# Patient Record
Sex: FEMALE | Race: WHITE | Employment: FULL TIME | URBAN - METROPOLITAN AREA
[De-identification: names, ages, dates, MRNs, and addresses within clinical notes are randomized per-mention and may not be internally consistent; named-entity substitution may affect disease eponyms.]

---

## 2017-05-02 ENCOUNTER — OFFICE VISIT (OUTPATIENT)
Dept: OBGYN CLINIC | Age: 37
End: 2017-05-02

## 2017-05-02 VITALS
BODY MASS INDEX: 38.76 KG/M2 | HEIGHT: 64 IN | DIASTOLIC BLOOD PRESSURE: 74 MMHG | WEIGHT: 227 LBS | SYSTOLIC BLOOD PRESSURE: 118 MMHG

## 2017-05-02 DIAGNOSIS — Z30.09 FAMILY PLANNING: ICD-10-CM

## 2017-05-02 DIAGNOSIS — N92.0 MENORRHAGIA WITH REGULAR CYCLE: ICD-10-CM

## 2017-05-02 DIAGNOSIS — Z01.419 ENCOUNTER FOR GYNECOLOGICAL EXAMINATION (GENERAL) (ROUTINE) WITHOUT ABNORMAL FINDINGS: Primary | ICD-10-CM

## 2017-05-02 RX ORDER — DESOGESTREL AND ETHINYL ESTRADIOL 21-5 (28)
1 KIT ORAL DAILY
Qty: 3 PACKAGE | Refills: 4 | Status: SHIPPED | OUTPATIENT
Start: 2017-05-02 | End: 2017-08-29

## 2017-05-02 NOTE — MR AVS SNAPSHOT
Visit Information Date & Time Provider Department Dept. Phone Encounter #  
 5/2/2017  9:30 AM China Swanson MD [facility] [phone] [id number] Follow-up Instructions Return in about 1 year (around 5/2/2018) for Annual exam.  
  
Your Appointments 5/5/2017  9:30 AM  
COMPLETE PHYSICAL with Joanie Ganser, MD  
[facility] [address] [facility]) Appt Note: CPE  
 [facility] [address] [phone]  
[address] [facility] [address] [facility] Upcoming Health Maintenance Date Due INFLUENZA AGE 9 TO ADULT 8/1/2017 PAP AKA CERVICAL CYTOLOGY 4/1/2020 Allergies as of 5/2/2017  Review Complete On: 5/2/2017 By: China Swanson MD  
 No Known Allergies Current Immunizations  Reviewed on 1/17/2012 Name Date Influenza Vaccine Split 1/17/2012 Not reviewed this visit You Were Diagnosed With   
  
 Codes Comments Encounter for gynecological examination (general) (routine) without abnormal findings    -  Primary ICD-10-CM: P81.069 ICD-9-CM: V72.31 Family planning     ICD-10-CM: Z30.09 
ICD-9-CM: V25.09 Vitals BP Height(growth percentile) Weight(growth percentile) LMP BMI OB Status 118/74 5' 4\" (1.626 m) 227 lb (103 kg) 04/24/2017 (Exact Date) 38.96 kg/m2 Having regular periods Smoking Status Never Smoker BMI and BSA Data Body Mass Index Body Surface Area  
 38.96 kg/m 2 2.16 m 2 Preferred Pharmacy Pharmacy Name Phone [facility] [address] [place] [address], [address] [place] [address] [phone] Your Updated Medication List  
  
   
This list is accurate as of: 5/2/17  9:52 AM.  Always use your most recent med list.  
  
  
  
  
 cetirizine 10 mg tablet Commonly known as:  ZYRTEC Take 1 Tab by mouth daily. DAILY MULTI-VITAMINS/IRON tablet Generic drug:  multivitamin with iron Take 1 Tab by mouth daily. folic acid 282 mcg tablet Take 400 mcg by mouth daily. gabapentin 300 mg capsule Commonly known as:  NEURONTIN Take 1 Cap by mouth two (2) times a day. ZOLMitriptan 5 mg disintegrating tablet Commonly known as:  ZOMIG ZMT  
1 tab at onset of migraine; take 1 tab in 2 hours if headache remains; Limit: 2 tabs in 24 hours, max 3 days a week. 90 day Rx Follow-up Instructions Return in about 1 year (around 5/2/2018) for Annual exam.  
  
  
Patient Instructions Well Visit, Ages 25 to 48: Care Instructions Your Care Instructions Physical exams can help you stay healthy. Your doctor has checked your overall health and may have suggested ways to take good care of yourself. He or she also may have recommended tests. At home, you can help prevent illness with healthy eating, regular exercise, and other steps. Follow-up care is a key part of your treatment and safety. Be sure to make and go to all appointments, and call your doctor if you are having problems. It's also a good idea to know your test results and keep a list of the medicines you take. How can you care for yourself at home? · Reach and stay at a healthy weight. This will lower your risk for many problems, such as obesity, diabetes, heart disease, and high blood pressure. · Get at least 30 minutes of physical activity on most days of the week. Walking is a good choice. You also may want to do other activities, such as running, swimming, cycling, or playing tennis or team sports. Discuss any changes in your exercise program with your doctor. · Do not smoke or allow others to smoke around you. If you need help quitting, talk to your doctor about stop-smoking programs and medicines. These can increase your chances of quitting for good. · Talk to your doctor about whether you have any risk factors for sexually transmitted infections (STIs).  Having one sex partner (who does not have STIs and does not have sex with anyone else) is a good way to avoid these infections. · Use birth control if you do not want to have children at this time. Talk with your doctor about the choices available and what might be best for you. · Protect your skin from too much sun. When you're outdoors from 10 a.m. to 4 p.m., stay in the shade or cover up with clothing and a hat with a wide brim. Wear sunglasses that block UV rays. Even when it's cloudy, put broad-spectrum sunscreen (SPF 30 or higher) on any exposed skin. · See a dentist one or two times a year for checkups and to have your teeth cleaned. · Wear a seat belt in the car. · Drink alcohol in moderation, if at all. That means no more than 2 drinks a day for men and 1 drink a day for women. Follow your doctor's advice about when to have certain tests. These tests can spot problems early. For everyone · Cholesterol. Have the fat (cholesterol) in your blood tested after age 21. Your doctor will tell you how often to have this done based on your age, family history, or other things that can increase your risk for heart disease. · Blood pressure. Have your blood pressure checked during a routine doctor visit. Your doctor will tell you how often to check your blood pressure based on your age, your blood pressure results, and other factors. · Vision. Talk with your doctor about how often to have a glaucoma test. 
· Diabetes. Ask your doctor whether you should have tests for diabetes. · Colon cancer. Have a test for colon cancer at age 48. You may have one of several tests. If you are younger than 48, you may need a test earlier if you have any risk factors. Risk factors include whether you already had a precancerous polyp removed from your colon or whether your parent, brother, sister, or child has had colon cancer. For women · Breast exam and mammogram. Talk to your doctor about when you should have a clinical breast exam and a mammogram. Medical experts differ on whether and how often women under 50 should have these tests. Your doctor can help you decide what is right for you. · Pap test and pelvic exam. Begin Pap tests at age 24. A Pap test is the best way to find cervical cancer. The test often is part of a pelvic exam. Ask how often to have this test. 
· Tests for sexually transmitted infections (STIs). Ask whether you should have tests for STIs. You may be at risk if you have sex with more than one person, especially if your partners do not wear condoms. For men · Tests for sexually transmitted infections (STIs). Ask whether you should have tests for STIs. You may be at risk if you have sex with more than one person, especially if you do not wear a condom. · Testicular cancer exam. Ask your doctor whether you should check your testicles regularly. · Prostate exam. Talk to your doctor about whether you should have a blood test (called a PSA test) for prostate cancer. Experts differ on whether and when men should have this test. Some experts suggest it if you are older than 39 and are -American or have a father or brother who got prostate cancer when he was younger than 72. When should you call for help? Watch closely for changes in your health, and be sure to contact your doctor if you have any problems or symptoms that concern you. Where can you learn more? Go to http://kori-estella.info/. Enter P072 in the search box to learn more about \"Well Visit, Ages 25 to 48: Care Instructions. \" Current as of: July 19, 2016 Content Version: 11.2 © 0068-8449 Healthwise, Incorporated. Care instructions adapted under license by In[employer] (which disclaims liability or warranty for this information).  If you have questions about a medical condition or this instruction, always ask your healthcare professional. [employer] disclaims any warranty or liability for your use of this information. Introducing [facility] & HEALTH SERVICES! Dear Dee Garcia: Thank you for requesting a [email or web address] account. Our records indicate that you already have an active [email or web address] account. You can access your account anytime at [email or web address]. [email or web address] Player Did you know that you can access your hospital and ER discharge instructions at any time in [email or web address]? You can also review all of your test results from your hospital stay or ER visit. Additional Information If you have questions, please visit the Frequently Asked Questions section of the [email or web address] website at [email or web address] Remember, [email or web address] is NOT to be used for urgent needs. For medical emergencies, dial 911. Now available from your iPhone and Android! Please provide this summary of care documentation to your next provider. Your primary care clinician is listed as Moriah Morris. If you have any questions after today's visit, please call [phone].

## 2017-05-02 NOTE — PATIENT INSTRUCTIONS

## 2017-07-20 ENCOUNTER — HOSPITAL ENCOUNTER (EMERGENCY)
Age: 37
Discharge: STILL ADMITTED OR ADMINISTRATIVE | End: 2017-07-20
Attending: OBSTETRICS & GYNECOLOGY | Admitting: OBSTETRICS & GYNECOLOGY

## 2017-07-20 VITALS — HEIGHT: 67 IN | WEIGHT: 143 LBS | BODY MASS INDEX: 22.44 KG/M2

## 2017-07-20 RX ORDER — SODIUM CHLORIDE, SODIUM LACTATE, POTASSIUM CHLORIDE, CALCIUM CHLORIDE 600; 310; 30; 20 MG/100ML; MG/100ML; MG/100ML; MG/100ML
125 INJECTION, SOLUTION INTRAVENOUS CONTINUOUS
Status: CANCELLED | OUTPATIENT
Start: 2017-07-20

## 2017-07-20 RX ORDER — SODIUM CHLORIDE 0.9 % (FLUSH) 0.9 %
5-10 SYRINGE (ML) INJECTION EVERY 8 HOURS
Status: CANCELLED | OUTPATIENT
Start: 2017-07-20

## 2017-07-20 RX ORDER — SODIUM CHLORIDE 900 MG/100ML
INJECTION INTRAVENOUS
Status: DISCONTINUED
Start: 2017-07-20 | End: 2017-07-20 | Stop reason: HOSPADM

## 2017-07-20 RX ORDER — NALOXONE HYDROCHLORIDE 0.4 MG/ML
0.4 INJECTION, SOLUTION INTRAMUSCULAR; INTRAVENOUS; SUBCUTANEOUS AS NEEDED
Status: DISCONTINUED | OUTPATIENT
Start: 2017-07-20 | End: 2017-07-20 | Stop reason: HOSPADM

## 2017-07-20 RX ORDER — PENICILLIN G POTASSIUM 5000000 [IU]/1
INJECTION, POWDER, FOR SOLUTION INTRAMUSCULAR; INTRAVENOUS
Status: DISCONTINUED
Start: 2017-07-20 | End: 2017-07-20 | Stop reason: HOSPADM

## 2017-07-20 RX ORDER — SODIUM CHLORIDE 0.9 % (FLUSH) 0.9 %
5-10 SYRINGE (ML) INJECTION AS NEEDED
Status: CANCELLED | OUTPATIENT
Start: 2017-07-20

## 2017-08-14 ENCOUNTER — OFFICE VISIT (OUTPATIENT)
Dept: INTERNAL MEDICINE CLINIC | Age: 37
End: 2017-08-14

## 2017-08-14 VITALS
SYSTOLIC BLOOD PRESSURE: 140 MMHG | HEART RATE: 75 BPM | BODY MASS INDEX: 38.58 KG/M2 | WEIGHT: 226 LBS | DIASTOLIC BLOOD PRESSURE: 93 MMHG | HEIGHT: 64 IN | RESPIRATION RATE: 20 BRPM | TEMPERATURE: 98 F

## 2017-08-14 DIAGNOSIS — R10.84 GENERALIZED ABDOMINAL PAIN: ICD-10-CM

## 2017-08-14 DIAGNOSIS — E66.9 OBESITY (BMI 30-39.9): Chronic | ICD-10-CM

## 2017-08-14 DIAGNOSIS — K21.9 GASTROESOPHAGEAL REFLUX DISEASE, ESOPHAGITIS PRESENCE NOT SPECIFIED: Primary | ICD-10-CM

## 2017-08-14 RX ORDER — LANOLIN ALCOHOL/MO/W.PET/CERES
400 CREAM (GRAM) TOPICAL DAILY
COMMUNITY
End: 2018-11-06

## 2017-08-14 RX ORDER — AA/PROT/LYSINE/METHIO/VIT C/B6 50-12.5 MG
100 TABLET ORAL DAILY
COMMUNITY
End: 2018-11-06

## 2017-08-14 RX ORDER — PANTOPRAZOLE SODIUM 40 MG/1
40 TABLET, DELAYED RELEASE ORAL DAILY
Qty: 30 TAB | Refills: 4 | Status: SHIPPED | OUTPATIENT
Start: 2017-08-14 | End: 2017-09-25 | Stop reason: SDUPTHER

## 2017-08-14 NOTE — MR AVS SNAPSHOT
Visit Information Date & Time Provider Department Dept. Phone Encounter #  
 8/14/2017  2:30 PM Erica Fothergill, [address] [id number] Follow-up Instructions Return in about 3 months (around 11/14/2017). Your Appointments 5/3/2018  7:50 AM  
ESTABLISHED PATIENT with Jaiden Husbands, MD [facility]an ([facility]) Appt Note: annual exam TP  
 [address] [facility] [address] [facility] [phone] [id number]  
[facility] 31 [address] 100[address] [facility] Upcoming Health Maintenance Date Due DTaP/Tdap/Td series (1 - Tdap) 10/6/2003 INFLUENZA AGE 9 TO ADULT 8/1/2017 PAP AKA CERVICAL CYTOLOGY 4/1/2020 Allergies as of 8/14/2017  Review Complete On: 8/14/2017 By: Elliott Chavez III, DO No Known Allergies Current Immunizations  Reviewed on 1/17/2012 Name Date Influenza Vaccine Split 1/17/2012 Not reviewed this visit You Were Diagnosed With   
  
 Codes Comments Gastroesophageal reflux disease, esophagitis presence not specified    -  Primary ICD-10-CM: K21.9 ICD-9-CM: 530.81 Obesity (BMI 30-39. 9)     ICD-10-CM: E66.9 ICD-9-CM: 278.00 Generalized abdominal pain     ICD-10-CM: R10.84 ICD-9-CM: 789.07 Vitals BP Pulse Temp Resp Height(growth percentile) Weight(growth percentile) (!) 140/93 (BP 1 Location: Right arm, BP Patient Position: Sitting) 75 98 °F (36.7 °C) (Oral) 20 5' 4\" (1.626 m) 226 lb (102.5 kg) LMP Breastfeeding? BMI OB Status Smoking Status 08/07/2017 (Exact Date) No 38.79 kg/m2 Having regular periods Never Smoker BMI and BSA Data Body Mass Index Body Surface Area 38.79 kg/m 2 2.15 m 2 Preferred Pharmacy Pharmacy Name Phone [facility], [address] [place] [address] [phone] Your Updated Medication List  
  
   
 This list is accurate as of: 8/14/17  3:20 PM.  Always use your most recent med list.  
  
  
  
  
 cetirizine 10 mg tablet Commonly known as:  ZYRTEC Take 1 Tab by mouth daily. CO Q-10 10 mg Cap Generic drug:  coenzyme q10 Take 100 mg by mouth daily. DAILY MULTI-VITAMINS/IRON tablet Generic drug:  multivitamin with iron Take 1 Tab by mouth daily. desog-e.estradiol/e.estradiol 0.15-0.02 mgx21 /0.01 mg x 5 Tab Commonly known as:  MIRCETTE (28) Take 1 Tab by mouth daily. folic acid 091 mcg tablet Take 400 mcg by mouth daily. gabapentin 300 mg capsule Commonly known as:  NEURONTIN Take 1 Cap by mouth two (2) times a day. magnesium oxide 400 mg tablet Commonly known as:  MAG-OX Take 400 mg by mouth daily. pantoprazole 40 mg tablet Commonly known as:  PROTONIX Take 1 Tab by mouth daily. Indications: gastroesophageal reflux disease ZOLMitriptan 5 mg disintegrating tablet Commonly known as:  ZOMIG ZMT  
1 tab at onset of migraine; take 1 tab in 2 hours if headache remains; Limit: 2 tabs in 24 hours, max 3 days a week. 90 day Rx Prescriptions Sent to Pharmacy Refills  
 pantoprazole (PROTONIX) 40 mg tablet 4 Sig: Take 1 Tab by mouth daily. Indications: gastroesophageal reflux disease Class: Normal  
 Pharmacy: [address], [address] [facility] #: [phone] Route: Oral  
  
We Performed the Following CBC WITH AUTOMATED DIFF [68396 CPT(R)] LIPASE [id number] CPT(R)] METABOLIC PANEL, COMPREHENSIVE [34873 CPT(R)] TSH 3RD GENERATION [68956 CPT(R)] Follow-up Instructions Return in about 3 months (around 11/14/2017). Patient Instructions Gastroesophageal Reflux Disease (GERD): Care Instructions Your Care Instructions Gastroesophageal reflux disease (GERD) is the backward flow of stomach acid into the esophagus. The esophagus is the tube that leads from your throat to your stomach. A one-way valve prevents the stomach acid from moving up into this tube. When you have GERD, this valve does not close tightly enough. If you have mild GERD symptoms including heartburn, you may be able to control the problem with antacids or over-the-counter medicine. Changing your diet, losing weight, and making other lifestyle changes can also help reduce symptoms. Follow-up care is a key part of your treatment and safety. Be sure to make and go to all appointments, and call your doctor if you are having problems. Its also a good idea to know your test results and keep a list of the medicines you take. How can you care for yourself at home? · Take your medicines exactly as prescribed. Call your doctor if you think you are having a problem with your medicine. · Your doctor may recommend over-the-counter medicine. For mild or occasional indigestion, antacids, such as Tums, Gaviscon, Mylanta, or Maalox, may help. Your doctor also may recommend over-the-counter acid reducers, such as Pepcid AC, Tagamet HB, Zantac 75, or Prilosec. Read and follow all instructions on the label. If you use these medicines often, talk with your doctor. · Change your eating habits. ¨ Its best to eat several small meals instead of two or three large meals. ¨ After you eat, wait 2 to 3 hours before you lie down. ¨ Chocolate, mint, and alcohol can make GERD worse. ¨ Spicy foods, foods that have a lot of acid (like tomatoes and oranges), and coffee can make GERD symptoms worse in some people. If your symptoms are worse after you eat a certain food, you may want to stop eating that food to see if your symptoms get better. · Do not smoke or chew tobacco. Smoking can make GERD worse. If you need help quitting, talk to your doctor about stop-smoking programs and medicines. These can increase your chances of quitting for good. · If you have GERD symptoms at night, raise the head of your bed 6 to 8 inches by putting the frame on blocks or placing a foam wedge under the head of your mattress. (Adding extra pillows does not work.) · Do not wear tight clothing around your middle. · Lose weight if you need to. Losing just 5 to 10 pounds can help. When should you call for help? Call your doctor now or seek immediate medical care if: 
· You have new or different belly pain. · Your stools are black and tarlike or have streaks of blood. Watch closely for changes in your health, and be sure to contact your doctor if: 
· Your symptoms have not improved after 2 days. · Food seems to catch in your throat or chest. 
Where can you learn more? Go to http://kori-estella.info/. Enter R443 in the search box to learn more about \"Gastroesophageal Reflux Disease (GERD): Care Instructions. \" Current as of: August 9, 2016 Content Version: 11.3 © 2904-3913 [employer]. Care instructions adapted under license by Space Apart (which disclaims liability or warranty for this information). If you have questions about a medical condition or this instruction, always ask your healthcare professional. [place] [address] any warranty or liability for your use of this information. Introducing hospitals & HEALTH SERVICES! Dear Kevin Mclean: Thank you for requesting a [employer] account. Our records indicate that you already have an active [employer] account. You can access your account anytime at [email or web address]. [email or web address]/[email or web address] Did you know that you can access your hospital and ER discharge instructions at any time in [employer]? You can also review all of your test results from your hospital stay or ER visit. Additional Information If you have questions, please visit the Frequently Asked Questions section of the [employer] website at [email or web address] Remember, Wound Care Technologieshart is NOT to be used for urgent needs. For medical emergencies, dial 911. Now available from your iPhone and Android! Please provide this summary of care documentation to your next provider. Your primary care clinician is listed as Tommie Caballero. If you have any questions after today's visit, please call [phone].

## 2017-08-14 NOTE — PROGRESS NOTES
Johnathon Urbina is a 31 y.o. female who presents for evaluation of severe reflux, as well as constipation. Last seen by dr Praveena Méndez 18, 2015. Father dx with pancreatic cancer in [de-identified], and he is not doing well. She has been having bad reflux, bloating, and intermittent diarrhea and constipation now for about 6 months. No vomiting. Stools have been black on occasion, but always after taking pepto-bismol. Has not tried any interventions for the above. Sometimes is constipated for 2 weeks at a time. ROS:  Constitutional: negative for fevers, chills, anorexia and weight loss  Eyes:   negative for visual disturbance and irritation  ENT:   negative for tinnitus,sore throat,nasal congestion,ear pain,hoarseness  Respiratory:  negative for cough, hemoptysis, dyspnea,wheezing  CV:   negative for chest pain, palpitations, lower extremity edema  GI:   negative for nausea, vomiting,melena. ++abd pain.   Alternating diarrhea and constipation  Genitourinary: negative for frequency, dysuria and hematuria  Musculoskel: negative for myalgias, arthralgias, back pain, muscle weakness, joint pain  Neurological:  negative for headaches, dizziness, focal weakness, numbness  Psychiatric:     Negative for depression or anxiety      Past Medical History:   Diagnosis Date    Dysplasia of cervix, low grade (ORION 1) 2/23/12    colpo    Encounter for IUD removal 08/19/2016    Mirena    H/O abnormal Pap smear 11/8/12;2/8/12    LGSIL, HPV positive    Headache     Headache     IUD (intrauterine device) in place 03/11/2014    Mirena    Migraine headache     without aura    pap smear for 2/1/11;2/5/13; 4/1/15    neg, HPV neg,no ECC; neg, HPV neg, Negative, HPV negative       Past Surgical History:   Procedure Laterality Date    HX COLPOSCOPY  2/23/12    ORION 1    HX HEENT      wisdom teeth    HX LITHOTRIPSY      HX WISDOM TEETH EXTRACTION         Family History   Problem Relation Age of Onset    Hypertension Father  Heart Disease Father     Diabetes Father    [facility] Elevated Lipids Father     Cancer Father      eye    Cancer Mother      Cervical,Uterine,& Ovarian    Depression Mother     Allergic Rhinitis Brother     Asthma Brother     Other Brother      Chron's    Alcohol abuse Maternal Grandfather     Depression Maternal Grandfather     Alcohol abuse Paternal Grandmother     Stroke Paternal Grandmother     Migraines Brother     Allergic Rhinitis Brother     Breast Cancer Other        Social History     Social History    Marital status: SINGLE     Spouse name: N/A    Number of children: N/A    Years of education: N/A     Occupational History    Not on file. Social History Main Topics    Smoking status: Never Smoker    Smokeless tobacco: Never Used    Alcohol use Yes      Comment: social    Drug use: No    Sexual activity: Yes     Partners: Male     Birth control/ protection: Condom     Other Topics Concern    Not on file     Social History Narrative            Visit Vitals    BP (!) 140/93 (BP 1 Location: Right arm, BP Patient Position: Sitting)    Pulse 75    Temp 98 °F (36.7 °C) (Oral)    Resp 20    Ht 5' 4\" (1.626 m)    Wt 226 lb (102.5 kg)    LMP 08/07/2017 (Exact Date)    Breastfeeding No    BMI 38.79 kg/m2       Physical Examination:   General - Well appearing female  HEENT - PERRL, TM no erythema/opacification, normal nasal turbinates, no oropharyngeal erythema or exudate, MMM  Neck - supple, no bruits, no thyroidomegaly, no lymphadenopathy  Pulm - clear to auscultation bilaterally  Cardio - RRR, normal S1 S2, no murmur  Abd - soft, nontender, no masses, no HSM--benign exam  Extrem - no edema, +2 distal pulses  Neuro-  No focal deficits, CN intact     Assessment/Plan:    1.  gerd--no red flags at this time. rx for protonix. Has been few years since had labs done, check cbc  2.  ibs--primarily constipated--check cmp, tsh  3.  abd pain--check lipase  4.   Elevated blood pressure--monitor for now    rtc 3 months        Brooke Ko III, DO

## 2017-08-14 NOTE — PATIENT INSTRUCTIONS

## 2017-08-14 NOTE — PROGRESS NOTES
Reviewed record in preparation for visit and have obtained necessary documentation. Identified pt with two pt identifiers(name and ). Chief Complaint   Patient presents with    Abdominal Pain     unable to go to the bathroom regular, nausea, heartburn    Headache       There were no vitals filed for this visit. Coordination of Care Questionnaire:  :     1) Have you been to an emergency room, urgent care clinic since your last visit? no   Hospitalized since your last visit? no             2) Have you seen or consulted any other health care providers outside of [address] [place] [address] since your last visit? no  (Include any pap smears or colon screenings in this section.)    3) In the event something were to happen to you and you were unable to speak on your behalf, do you have an Advance Directive/ Living Will in place stating your wishes? NO    Do you have an Advance Directive on file? no    4) Are you interested in receiving information on Advance Directives? NO  Patient is accompanied by N/A I have received verbal consent from Jonna Bonilla to discuss any/all medical information while they are present in the room.

## 2017-08-15 ENCOUNTER — TELEPHONE (OUTPATIENT)
Dept: NEUROLOGY | Age: 37
End: 2017-08-15

## 2017-08-15 LAB
ALBUMIN SERPL-MCNC: 4.9 G/DL (ref 3.5–5.5)
ALBUMIN/GLOB SERPL: 2.3 {RATIO} (ref 1.2–2.2)
ALP SERPL-CCNC: 56 IU/L (ref 39–117)
ALT SERPL-CCNC: 19 IU/L (ref 0–32)
AST SERPL-CCNC: 16 IU/L (ref 0–40)
BASOPHILS # BLD AUTO: 0 X10E3/UL (ref 0–0.2)
BASOPHILS NFR BLD AUTO: 0 %
BILIRUB SERPL-MCNC: 0.3 MG/DL (ref 0–1.2)
BUN SERPL-MCNC: 13 MG/DL (ref 6–20)
BUN/CREAT SERPL: 20 (ref 9–23)
CALCIUM SERPL-MCNC: 9.7 MG/DL (ref 8.7–10.2)
CHLORIDE SERPL-SCNC: 99 MMOL/L (ref 96–106)
CO2 SERPL-SCNC: 23 MMOL/L (ref 18–29)
CREAT SERPL-MCNC: 0.66 MG/DL (ref 0.57–1)
EOSINOPHIL # BLD AUTO: 0.2 X10E3/UL (ref 0–0.4)
EOSINOPHIL NFR BLD AUTO: 2 %
ERYTHROCYTE [DISTWIDTH] IN BLOOD BY AUTOMATED COUNT: 13.7 % (ref 12.3–15.4)
GLOBULIN SER CALC-MCNC: 2.1 G/DL (ref 1.5–4.5)
GLUCOSE SERPL-MCNC: 78 MG/DL (ref 65–99)
HCT VFR BLD AUTO: 42 % (ref 34–46.6)
HGB BLD-MCNC: 14.1 G/DL (ref 11.1–15.9)
IMM GRANULOCYTES # BLD: 0 X10E3/UL (ref 0–0.1)
IMM GRANULOCYTES NFR BLD: 0 %
LIPASE SERPL-CCNC: 29 U/L (ref 0–59)
LYMPHOCYTES # BLD AUTO: 3.1 X10E3/UL (ref 0.7–3.1)
LYMPHOCYTES NFR BLD AUTO: 37 %
MCH RBC QN AUTO: 29.8 PG (ref 26.6–33)
MCHC RBC AUTO-ENTMCNC: 33.6 G/DL (ref 31.5–35.7)
MCV RBC AUTO: 89 FL (ref 79–97)
MONOCYTES # BLD AUTO: 0.8 X10E3/UL (ref 0.1–0.9)
MONOCYTES NFR BLD AUTO: 9 %
NEUTROPHILS # BLD AUTO: 4.4 X10E3/UL (ref 1.4–7)
NEUTROPHILS NFR BLD AUTO: 52 %
PLATELET # BLD AUTO: 297 X10E3/UL (ref 150–379)
POTASSIUM SERPL-SCNC: 4.8 MMOL/L (ref 3.5–5.2)
PROT SERPL-MCNC: 7 G/DL (ref 6–8.5)
RBC # BLD AUTO: 4.73 X10E6/UL (ref 3.77–5.28)
SODIUM SERPL-SCNC: 140 MMOL/L (ref 134–144)
TSH SERPL DL<=0.005 MIU/L-ACNC: 2.42 UIU/ML (ref 0.45–4.5)
WBC # BLD AUTO: 8.5 X10E3/UL (ref 3.4–10.8)

## 2017-08-15 RX ORDER — GABAPENTIN 300 MG/1
300 CAPSULE ORAL 2 TIMES DAILY
Qty: 60 CAP | Refills: 5 | Status: SHIPPED | OUTPATIENT
Start: 2017-08-15 | End: 2017-09-25 | Stop reason: SDUPTHER

## 2017-08-15 NOTE — TELEPHONE ENCOUNTER
Requested Prescriptions     Pending Prescriptions Disp Refills    gabapentin (NEURONTIN) 300 mg capsule 60 Cap 5     Sig: Take 1 Cap by mouth two (2) times a day.      Last fill- 2/26/17 #60  LOV- 8/14/17 with Dr. Marni Garcia  [facility] [address]- 11/13/17

## 2017-08-15 NOTE — TELEPHONE ENCOUNTER
From: Lizbethsarah Elizondo  To: Satinder Rueda MD  Sent: 8/15/2017 11:08 AM EDT  Subject: Medication Renewal Request    Original authorizing provider: MD Teddy Brice [facility] would like a refill of the following medications:  gabapentin (NEURONTIN) 300 mg capsule Satinder Rueda MD]    Preferred pharmacy: [facility] [address]    Comment:  I was not given a refil for this yesterday on my visit, and I dont know if i can wait until December when I see [facility] again :( I have been having terrible headaches without this med.

## 2017-08-15 NOTE — TELEPHONE ENCOUNTER
----- Message from Wily Ureña sent at 8/15/2017  3:22 PM EDT -----  Regarding: Dr. Yamile Simmons would like to be seen soon as possible for her migraines. [phone].

## 2017-08-15 NOTE — PROGRESS NOTES
Labs all normal.  Hopefully protonix will do the trick. Work on managing stress--exercise usually helps.

## 2017-08-16 NOTE — TELEPHONE ENCOUNTER
Contacted patient. Offered her 9/14/17 with Dr Joel Barajas. She stated it wasn't soon enough.  Scheduled her with [facility] Tuesday, August 29, 2017 08:00 AM.

## 2017-08-16 NOTE — PROGRESS NOTES
I have attempted to contact this patient by phone with the following results:   Labs all normal.  Hopefully protonix will do the trick. Work on managing stress--exercise usually helps. Results mailed to patient's home.

## 2017-08-29 ENCOUNTER — OFFICE VISIT (OUTPATIENT)
Dept: NEUROLOGY | Age: 37
End: 2017-08-29

## 2017-08-29 VITALS — DIASTOLIC BLOOD PRESSURE: 88 MMHG | SYSTOLIC BLOOD PRESSURE: 118 MMHG | BODY MASS INDEX: 38.62 KG/M2 | WEIGHT: 225 LBS

## 2017-08-29 DIAGNOSIS — R51.9 CHRONIC DAILY HEADACHE: Primary | ICD-10-CM

## 2017-08-29 DIAGNOSIS — G43.909 MIGRAINE WITHOUT STATUS MIGRAINOSUS, NOT INTRACTABLE, UNSPECIFIED MIGRAINE TYPE: ICD-10-CM

## 2017-08-29 RX ORDER — PREDNISONE 10 MG/1
TABLET ORAL
Qty: 42 TAB | Refills: 0 | Status: SHIPPED | OUTPATIENT
Start: 2017-08-29 | End: 2017-11-13

## 2017-08-29 NOTE — PROGRESS NOTES
Murphy Mancia is a 31 y.o. female who presents with the following  Chief Complaint   Patient presents with    Follow-up       HPI Patient comes in with boyfriend for a follow up for chronic daily headaches and worsening migraines and new symptoms. She was last seen by Dr. Nagi Hinton and has tried Elavil, Pamelor, Topamax, Inderal, Wellbutrin, and currently is on Gabapentin for her headache prevention. Nothing has helped in the past. She is having a headache everyday lasting all day and they escalate into  Migraines a few times a week and these migraines can last anywhere between 6-8 hours. She has tried Imitrex and maxalt for abortive. Recently ZOmig and this made her feel like out of it, very sleepy, just did not feel good after taking it. Plus it did not work. Used midrin in the past and this worked well usually after 1-2 capsules. She right now is taking ibuprofen or tylenol and this helps to dull her headaches. not completely. She states the normal headaches are located on the top of the head, dull aching pain. She gets migraines usually behind her eyes. Sharp, intense pain like her head is going to split. She has new visual disturbances and most worrisome to her she can see red spots and it gets worse when she closes her eyes. She get light, sound, smell sensitive. She also gets nausea with no vomiting. Does get dizziness but may be due to the  zomig she is not sure. Has had migraines since age 8. Never had a head image. No family hx. Also on Magnesium and coq10.      No Known Allergies    Current Outpatient Prescriptions   Medication Sig    yzwvnsa-smpjinzjw-hxqynzrnoghd (MIDRIN) -325 mg capsule Take 1-2 capsules at HA onset and repeat with 1 capsule every hour until HA is resolved or if taken 5 capsules in 24 hours    predniSONE (DELTASONE) 10 mg tablet 6 po x2 days, 5 po x 2days, 4 po x 2days, 3 po x2days, 2 po x2days, 1 po x 2days    onabotulinumtoxinA (BOTOX) 200 unit injection Inject 200 units to [place] 3 approved sites face and neck every 12 weeks       gabapentin (NEURONTIN) 300 mg capsule Take 1 Cap by mouth two (2) times a day.  coenzyme q10 (CO Q-10) 10 mg cap Take 100 mg by mouth daily.  magnesium oxide (MAG-OX) 400 mg tablet Take 400 mg by mouth daily.  pantoprazole (PROTONIX) 40 mg tablet Take 1 Tab by mouth daily. Indications: gastroesophageal reflux disease    ZOLMitriptan (ZOMIG ZMT) 5 mg disintegrating tablet 1 tab at onset of migraine; take 1 tab in 2 hours if headache remains; Limit: 2 tabs in 24 hours, max 3 days a week. 90 day Rx    folic acid 964 mcg tablet Take 400 mcg by mouth daily.  cetirizine (ZYRTEC) 10 mg tablet Take 1 Tab by mouth daily. No current facility-administered medications for this visit.         History   Smoking Status    Never Smoker   Smokeless Tobacco    Never Used       Past Medical History:   Diagnosis Date    Dysplasia of cervix, low grade (ORION 1) 2/23/12    colpo    Encounter for IUD removal 08/19/2016    Mirena    H/O abnormal Pap smear 11/8/12;2/8/12    LGSIL, HPV positive    Headache     Headache     IUD (intrauterine device) in place 03/11/2014    Mirena    Migraine headache     without aura    pap smear for 2/1/11;2/5/13; 4/1/15    neg, HPV neg,no ECC; neg, HPV neg, Negative, HPV negative       Past Surgical History:   Procedure Laterality Date    HX COLPOSCOPY  2/23/12    ORION 1    HX HEENT      wisdom teeth    HX LITHOTRIPSY      HX WISDOM TEETH EXTRACTION         Family History   Problem Relation Age of Onset    Hypertension Father     Heart Disease Father     Diabetes Father     Elevated Lipids Father     Cancer Father      eye    Cancer Mother      Cervical,Uterine,& Ovarian    Depression Mother     Allergic Rhinitis Brother     Asthma Brother     Other Brother      Chron's    Alcohol abuse Maternal Grandfather     Depression Maternal Grandfather     Alcohol abuse Paternal Grandmother     Stroke Paternal Grandmother     Migraines Brother     Allergic Rhinitis Brother     Breast Cancer Other        Social History     Social History    Marital status: SINGLE     Spouse name: N/A    Number of children: N/A    Years of education: N/A     Social History Main Topics    Smoking status: Never Smoker    Smokeless tobacco: Never Used    Alcohol use Yes      Comment: social    Drug use: No    Sexual activity: Yes     Partners: Male     Birth control/ protection: Condom     Other Topics Concern    None     Social History Narrative       Review of Systems   HENT: Negative for ear pain, hearing loss and tinnitus. Eyes: Positive for blurred vision, double vision and photophobia. Respiratory: Negative for shortness of breath and wheezing. Cardiovascular: Negative for chest pain and palpitations. Gastrointestinal: Positive for nausea. Negative for vomiting. Neurological: Positive for dizziness and headaches. Negative for tingling, tremors, seizures, loss of consciousness and weakness. Remainder of comprehensive review is negative. Physical Exam :    Visit Vitals    /88    Wt 102.1 kg (225 lb)    LMP 08/07/2017 (Exact Date)    BMI 38.62 kg/m2       General: Well defined, nourished, and groomed individual in no acute distress.    Neck: Supple, nontender, no bruits, no pain with resistance to active range of motion.    Heart: Regular rate and rhythm, no murmurs, rub, or gallop. Normal S1S2. Lungs: Clear to auscultation bilaterally with equal chest expansion, no cough, no wheeze  Musculoskeletal: Extremities revealed no edema and had full range of motion of joints.    Psych: Good mood and bright affect    NEUROLOGICAL EXAMINATION:    Mental Status: Alert and oriented to person, place, and time    Cranial Nerves:    II, III, IV, VI: Visual acuity grossly intact.  Visual fields are normal.    Pupils are equal, round, and reactive to light and accommodation.    Extra-ocular movements are full and fluid. Fundoscopic exam was benign, no ptosis or nystagmus.    V-XII: Hearing is grossly intact. Facial features are symmetric, with normal sensation and strength. The palate rises symmetrically and the tongue protrudes midline. Sternocleidomastoids 5/5. Motor Examination: Normal tone, bulk, and strength, 5/5 muscle strength throughout. Coordination: Finger to nose was normal. No resting or intention tremor    Gait and Station: Steady while walking. Normal arm swing. No pronator drift. No muscle wasting or fasiculations noted. Reflexes: DTRs 2+ throughout. Ashwini Drummond Results for orders placed or performed in visit on 08/14/17   CBC WITH AUTOMATED DIFF   Result Value Ref Range    WBC 8.5 3.4 - 10.8 x10E3/uL    RBC 4.73 3.77 - 5.28 x10E6/uL    HGB 14.1 11.1 - 15.9 g/dL    HCT 42.0 34.0 - 46.6 %    MCV 89 79 - 97 fL    MCH 29.8 26.6 - 33.0 pg    MCHC 33.6 31.5 - 35.7 g/dL    RDW 13.7 12.3 - 15.4 %    PLATELET 382 709 - 969 x10E3/uL    NEUTROPHILS 52 %    Lymphocytes 37 %    MONOCYTES 9 %    EOSINOPHILS 2 %    BASOPHILS 0 %    ABS. NEUTROPHILS 4.4 1.4 - 7.0 x10E3/uL    Abs Lymphocytes 3.1 0.7 - 3.1 x10E3/uL    ABS. MONOCYTES 0.8 0.1 - 0.9 x10E3/uL    ABS. EOSINOPHILS 0.2 0.0 - 0.4 x10E3/uL    ABS. BASOPHILS 0.0 0.0 - 0.2 x10E3/uL    IMMATURE GRANULOCYTES 0 %    ABS. IMM. GRANS. 0.0 0.0 - 0.1 U33U3/UX   METABOLIC PANEL, COMPREHENSIVE   Result Value Ref Range    Glucose 78 65 - 99 mg/dL    BUN 13 6 - 20 mg/dL    Creatinine 0.66 0.57 - 1.00 mg/dL    GFR est non- >59 mL/min/1.73    GFR est  >59 mL/min/1.73    BUN/Creatinine ratio 20 9 - 23    Sodium 140 134 - 144 mmol/L    Potassium 4.8 3.5 - 5.2 mmol/L    Chloride 99 96 - 106 mmol/L    CO2 23 18 - 29 mmol/L    Calcium 9.7 8.7 - 10.2 mg/dL    Protein, total 7.0 6.0 - 8.5 g/dL    Albumin 4.9 3.5 - 5.5 g/dL    GLOBULIN, TOTAL 2.1 1.5 - 4.5 g/dL    A-G Ratio 2.3 (H) 1.2 - 2.2    Bilirubin, total 0.3 0.0 - 1.2 mg/dL    Alk.  phosphatase 56 39 - 117 IU/L AST (SGOT) 16 0 - 40 IU/L    ALT (SGPT) 19 0 - 32 IU/L   LIPASE   Result Value Ref Range    Lipase 29 0 - 59 U/L   TSH 3RD GENERATION   Result Value Ref Range    TSH 2.420 0.450 - 4.500 uIU/mL       Orders Placed This Encounter    MRI BRAIN W WO CONT     Standing Status:   Future     Standing Expiration Date:   2018     Order Specific Question:   Is Patient Allergic to Contrast Dye? Answer:   No     Order Specific Question:   Is Patient Pregnant? Answer:   No     Order Specific Question:   Stat POC Creatinine as needed for Radiology Policy     Answer: Yes    REFERRAL TO NEUROLOGY     Referral Priority:   Routine     Referral Type:   Consultation     Referral Reason:   Specialty Services Required     Referred to Provider:   Ana Klein MD    hhxprli-onngokxfv-tcmoeskzhfjz (MIDRIN) -325 mg capsule     Sig: Take 1-2 capsules at HA onset and repeat with 1 capsule every hour until HA is resolved or if taken 5 capsules in 24 hours     Dispense:  45 Cap     Refill:  1    predniSONE (DELTASONE) 10 mg tablet     Si po x2 days, 5 po x 2days, 4 po x 2days, 3 po x2days, 2 po x2days, 1 po x 2days     Dispense:  42 Tab     Refill:  0    onabotulinumtoxinA (BOTOX) 200 unit injection     Sig: Inject 200 units to 31 fda approved sites face and neck every 12 weeks        Dispense:  200 Units     Refill:  0       1. Chronic daily headache    2. Migraine without status migrainosus, not intractable, unspecified migraine type        Follow-up Disposition:  Return after botox. Chronic headaches with migraines escalating and lasting all day everyday. She has tried and failed  Multiple AED's, SSRI's and beta blockers and does qualify with getting botox. Has been battling these headaches for years and never getting better. She will increase her gabapentin to 300 mg in AM And 600 mg at night to see if this helps.  Also refer to botox therapy as this is FDA approved for headaches treatment and she will benefit from this as everything else has failed. We will give prednisone to break the headache cycle and try to come off all analgesics. Discussed overuse headache. Try midrin for abortive therapy as all triptans make her feel bad and really do not help. Gave her a migriane packet for information. Call with any changes. MRI of the brain to evaluate specifically for causes of her changing headaches and symptoms including stroke, tumors, lesions, masses.          This note will not be viewable in [facility]

## 2017-08-29 NOTE — PROGRESS NOTES
Pt state migraines have gotten worse Pt has a mild headache every day(30days per month). Takes tylenol and ibuprofen to prevent headache from getting worse. Last migraine pt states she felt nauseous and like her \"brain was on fire\". She also sees red when she closes her eyes. She has also been sensitive to smell and light. Pt is accompanied by her boyfriend.

## 2017-08-29 NOTE — MR AVS SNAPSHOT
Visit Information Date & Time Provider Department Dept. Phone Encounter #  
 8/29/2017  8:00 AM Molly Alonso NP [facility] [phone] [id number] Follow-up Instructions Return after botox. Your Appointments 11/13/2017  8:30 AM  
PHYSICAL PRE OP with Missael Hoffmann MD  
[facility] [address]) Appt Note: OB/MyChart Reason for visit: Physical sj 8.14.17  
 [address]  
[phone]  
  
   
 [address] [facility]  [address] [address]  
  
    
 5/3/2018  7:50 AM  
ESTABLISHED PATIENT with Lon Gray MD  
[facility] ([address]) Appt Note: annual exam TP  
 [address] [facility] [address] [facility] [id number]  
[facility] [address] Upcoming Health Maintenance Date Due DTaP/Tdap/Td series (1 - Tdap) 10/6/2003 INFLUENZA AGE 9 TO ADULT 8/1/2017 PAP AKA CERVICAL CYTOLOGY 4/1/2020 Allergies as of 8/29/2017  Review Complete On: 8/29/2017 By: Marquita Marinelli No Known Allergies Current Immunizations  Reviewed on 1/17/2012 Name Date Influenza Vaccine Split 1/17/2012 Not reviewed this visit You Were Diagnosed With   
  
 Codes Comments Chronic daily headache    -  Primary ICD-10-CM: S96 ICD-9-CM: 784.0 Migraine without status migrainosus, not intractable, unspecified migraine type     ICD-10-CM: G43.909 ICD-9-CM: 346.90 Vitals BP Weight(growth percentile) LMP BMI OB Status Smoking Status 118/88 225 lb (102.1 kg) 08/07/2017 (Exact Date) 38.62 kg/m2 Having regular periods Never Smoker BMI and BSA Data Body Mass Index Body Surface Area  
 38.62 kg/m 2 2.15 m 2 Preferred Pharmacy Pharmacy Name Phone [facility] [address] [facility], [address] [place] [address] [phone] Your Updated Medication List  
  
   
This list is accurate as of: 8/29/17  8:58 AM.  Always use your most recent med list.  
  
  
  
  
 cetirizine 10 mg tablet Commonly known as:  ZYRTEC Take 1 Tab by mouth daily. CO Q-10 10 mg Cap Generic drug:  coenzyme q10 Take 100 mg by mouth daily. folic acid 241 mcg tablet Take 400 mcg by mouth daily. gabapentin 300 mg capsule Commonly known as:  NEURONTIN Take 1 Cap by mouth two (2) times a day. ivtlydj-pxrxwzyjx-ddotbryrnppj -325 mg capsule Commonly known as:  Syd Picket Take 1-2 capsules at HA onset and repeat with 1 capsule every hour until HA is resolved or if taken 5 capsules in 24 hours  
  
 magnesium oxide 400 mg tablet Commonly known as:  MAG-OX Take 400 mg by mouth daily. onabotulinumtoxinA 200 unit injection Commonly known as:  BOTOX Inject 200 units to 31 fda approved sites face and neck every 12 weeks  
  
 pantoprazole 40 mg tablet Commonly known as:  PROTONIX Take 1 Tab by mouth daily. Indications: gastroesophageal reflux disease  
  
 predniSONE 10 mg tablet Commonly known as:  DELTASONE  
6 po x2 days, 5 po x 2days, 4 po x 2days, 3 po x2days, 2 po x2days, 1 po x 2days ZOLMitriptan 5 mg disintegrating tablet Commonly known as:  ZOMIG ZMT  
1 tab at onset of migraine; take 1 tab in 2 hours if headache remains; Limit: 2 tabs in 24 hours, max 3 days a week. 90 day Rx Prescriptions Printed Refills  
 hbuabwi-ikjrecfax-qsoofurypbjh (MIDRIN) -325 mg capsule 1 Sig: Take 1-2 capsules at HA onset and repeat with 1 capsule every hour until HA is resolved or if taken 5 capsules in 24 hours Class: Print  
 onabotulinumtoxinA (BOTOX) 200 unit injection 0 Sig: Inject 200 units to 31 fda approved sites face and neck every 12 weeks 
   
 Class: Print Prescriptions Sent to Pharmacy  Refills  
 predniSONE (DELTASONE) 10 mg tablet 0  
 Si po x2 days, 5 po x 2days, 4 po x 2days, 3 po x2days, 2 po x2days, 1 po x 2days Class: Normal  
 Pharmacy: [facility], [address] [facility] #: [phone] We Performed the Following REFERRAL TO NEUROLOGY [IYP55 Custom] Comments:  
 Please evaluate patient for botox Follow-up Instructions Return after botox. To-Do List   
 2017 Imaging:  MRI BRAIN W WO CONT Referral Information Referral ID Referred By Referred To  
  
 [id number] Eli[facility], [facility] [address], [address] [facility] Phone: [phone] Fax: [phone] Visits Status Start Date End Date 1 New Request 17 If your referral has a status of pending review or denied, additional information will be sent to support the outcome of this decision. Referral ID Referred By Referred To  
 [id number] Rut De Souza Not Available Visits Status Start Date End Date 1 New Request 17 If your referral has a status of pending review or denied, additional information will be sent to support the outcome of this decision. Patient Instructions PRESCRIPTION REFILL POLICY [facility] Statement to Patients 2014 In an effort to ensure the large volume of patient prescription refills is processed in the most efficient and expeditious manner, we are asking our patients to assist us by calling your Pharmacy for all prescription refills, this will include also your  Mail Order Pharmacy. The pharmacy will contact our office electronically to continue the refill process. Please do not wait until the last minute to call your pharmacy. We need at least 48 hours (2days) to fill prescriptions. We also encourage you to call your pharmacy before going to  your prescription to make sure it is ready. With regard to controlled substance prescription refill requests (narcotic refills) that need to be picked up at our office, we ask your cooperation by providing us with at least 72 hours (3days) notice that you will need a refill. We will not refill narcotic prescription refill requests after 4:00pm on any weekday, Monday through Thursday, or after 2:00pm on Fridays, or on the weekends. We encourage everyone to explore another way of getting your prescription refill request processed using [employer], our patient web portal through our electronic medical record system. [employer] is an efficient and effective way to communicate your medication request directly to the office and  downloadable as an emilee on your smart phone . [employer] also features a review functionality that allows you to view your medication list as well as leave messages for your physician. Are you ready to get connected? If so please review the attatched instructions or speak to any of our staff to get you set up right away! Thank you so much for your cooperation. Should you have any questions please contact our Practice Administrator. The Physicians and Staff,  [facility] [address] What is a living will? A living will is a legal form you use to write down the kind of care you want at the end of your life. It is used by the health professionals who will treat you if you aren't able to decide for yourself. If you put your wishes in writing, your loved ones and others will know what kind of care you want. They won't need to guess. This can ease your mind and be helpful to others. A living will is not the same as an estate or property will. An estate will explains what you want to happen with your money and property after you die. Is a living will a legal document? A living will is a legal document.  Each state has its own laws about living bee. If you move to another state, make sure that your living will is legal in the state where you now live. Or you might use a universal form that has been approved by many states. This kind of form can sometimes be completed and stored online. Your electronic copy will then be available wherever you have a connection to the Internet. In most cases, doctors will respect your wishes even if you have a form from a different state. · You don't need an  to complete a living will. But legal advice can be helpful if your state's laws are unclear, your health history is complicated, or your family can't agree on what should be in your living will. · You can change your living will at any time. Some people find that their wishes about end-of-life care change as their health changes. · In addition to making a living will, think about completing a medical power of  form. This form lets you name the person you want to make end-of-life treatment decisions for you (your \"health care agent\") if you're not able to. Many hospitals and nursing homes will give you the forms you need to complete a living will and a medical power of . · Your living will is used only if you can't make or communicate decisions for yourself anymore. If you become able to make decisions again, you can accept or refuse any treatment, no matter what you wrote in your living will. · Your state may offer an online registry. This is a place where you can store your living will online so the doctors and nurses who need to treat you can find it right away. What should you think about when creating a living will? Talk about your end-of-life wishes with your family members and your doctor. Let them know what you want. That way the people making decisions for you won't be surprised by your choices. Think about these questions as you make your living will: · Do you know enough about life support methods that might be used? If not, talk to your doctor so you know what might be done if you can't breathe on your own, your heart stops, or you're unable to swallow. · What things would you still want to be able to do after you receive life-support methods? Would you want to be able to walk? To speak? To eat on your own? To live without the help of machines? · If you have a choice, where do you want to be cared for? In your home? At a hospital or nursing home? · Do you want certain Druze practices performed if you become very ill? · If you have a choice at the end of your life, where would you prefer to die? At home? In a hospital or nursing home? Somewhere else? · Would you prefer to be buried or cremated? · Do you want your organs to be donated after you die? What should you do with your living will? · Make sure that your family members and your health care agent have copies of your living will. · Give your doctor a copy of your living will to keep in your medical record. If you have more than one doctor, make sure that each one has a copy. · You may want to put a copy of your living will where it can be easily found. Where can you learn more? Go to http://kori-estella.info/. Enter X466 in the search box to learn more about \"Learning About Living Shirley Arias. \" Current as of: August 8, 2016 Content Version: 11.3 © 4518-3618 [employer]. Care instructions adapted under license by [employer] (which disclaims liability or warranty for this information). If you have questions about a medical condition or this instruction, always ask your healthcare professional. [place] [address] any warranty or liability for your use of this information. Advance Directives: Care Instructions Your Care Instructions An advance directive is a legal way to state your wishes at the end of your life. It tells your family and your doctor what to do if you can no longer say what you want. There are two main types of advance directives. You can change them any time that your wishes change. · A living will tells your family and your doctor your wishes about life support and other treatment. · A durable power of  for health care lets you name a person to make treatment decisions for you when you can't speak for yourself. This person is called a health care agent. If you do not have an advance directive, decisions about your medical care may be made by a doctor or a  who doesn't know you. It may help to think of an advance directive as a gift to the people who care for you. If you have one, they won't have to make tough decisions by themselves. Follow-up care is a key part of your treatment and safety. Be sure to make and go to all appointments, and call your doctor if you are having problems. It's also a good idea to know your test results and keep a list of the medicines you take. How can you care for yourself at home? · Discuss your wishes with your loved ones and your doctor. This way, there are no surprises. · Many states have a unique form. Or you might use a universal form that has been approved by many states. This kind of form can sometimes be completed and stored online. Your electronic copy will then be available wherever you have a connection to the Internet. In most cases, doctors will respect your wishes even if you have a form from a different state. · You don't need a  to do an advance directive. But you may want to get legal advice. · Think about these questions when you prepare an advance directive: ¨ Who do you want to make decisions about your medical care if you are not able to? Many people choose a family member or close friend. ¨ Do you know enough about life support methods that might be used? If not, talk to your doctor so you understand. ¨ What are you most afraid of that might happen? You might be afraid of having pain, losing your independence, or being kept alive by machines. ¨ Where would you prefer to die? Choices include your home, a hospital, or a nursing home. ¨ Would you like to have information about hospice care to support you and your family? ¨ Do you want to donate organs when you die? ¨ Do you want certain Alevism practices performed before you die? If so, put your wishes in the advance directive. · Read your advance directive every year, and make changes as needed. When should you call for help? Be sure to contact your doctor if you have any questions. Where can you learn more? Go to http://kori-estella.info/. Enter R264 in the search box to learn more about \"Advance Directives: Care Instructions. \" Current as of: November 17, 2016 Content Version: 11.3 © 0129-3523 [employer]. Care instructions adapted under license by [employer] (which disclaims liability or warranty for this information). If you have questions about a medical condition or this instruction, always ask your healthcare professional. [place] [address] any warranty or liability for your use of this information. Introducing [facility]! Dear Jose J Alvarez: Thank you for requesting a Iizuu account. Our records indicate that you already have an active Iizuu account. You can access your account anytime at [email or web address] Did you know that you can access your hospital and ER discharge instructions at any time in Iizuu? You can also review all of your test results from your hospital stay or ER visit. Additional Information If you have questions, please visit the Frequently Asked Questions section of the Iizuu website at [email or web address] Remember, Iizuu is NOT to be used for urgent needs. For medical emergencies, dial 911. Now available from your iPhone and Android! Please provide this summary of care documentation to your next provider. Your primary care clinician is listed as Miguelina Arora. If you have any questions after today's visit, please call [phone].

## 2017-08-29 NOTE — PATIENT INSTRUCTIONS
[facility]   Statement to Patients  April 1, 2014      In an effort to ensure the large volume of patient prescription refills is processed in the most efficient and expeditious manner, we are asking our patients to assist us by calling your Pharmacy for all prescription refills, this will include also your  Mail Order Pharmacy. The pharmacy will contact our office electronically to continue the refill process. Please do not wait until the last minute to call your pharmacy. We need at least 48 hours (2days) to fill prescriptions. We also encourage you to call your pharmacy before going to  your prescription to make sure it is ready. With regard to controlled substance prescription refill requests (narcotic refills) that need to be picked up at our office, we ask your cooperation by providing us with at least 72 hours (3days) notice that you will need a refill. We will not refill narcotic prescription refill requests after 4:00pm on any weekday, Monday through Thursday, or after 2:00pm on Fridays, or on the weekends. We encourage everyone to explore another way of getting your prescription refill request processed using Cloud[employer], our patient web portal through our electronic medical record system. [employer] is an efficient and effective way to communicate your medication request directly to the office and  downloadable as an emilee on your smart phone . [employer] also features a review functionality that allows you to view your medication list as well as leave messages for your physician. Are you ready to get connected? If so please review the attatched instructions or speak to any of our staff to get you set up right away! Thank you so much for your cooperation. Should you have any questions please contact our Practice Administrator. The Physicians and Staff,  [facility] [address] [address]  What is a living will?   A living will is a legal form you use to write down the kind of care you want at the end of your life. It is used by the health professionals who will treat you if you aren't able to decide for yourself. If you put your wishes in writing, your loved ones and others will know what kind of care you want. They won't need to guess. This can ease your mind and be helpful to others. A living will is not the same as an estate or property will. An estate will explains what you want to happen with your money and property after you die. Is a living will a legal document? A living will is a legal document. Each state has its own laws about living bee. If you move to another state, make sure that your living will is legal in the state where you now live. Or you might use a universal form that has been approved by many states. This kind of form can sometimes be completed and stored online. Your electronic copy will then be available wherever you have a connection to the Internet. In most cases, doctors will respect your wishes even if you have a form from a different state. · You don't need an  to complete a living will. But legal advice can be helpful if your state's laws are unclear, your health history is complicated, or your family can't agree on what should be in your living will. · You can change your living will at any time. Some people find that their wishes about end-of-life care change as their health changes. · In addition to making a living will, think about completing a medical power of  form. This form lets you name the person you want to make end-of-life treatment decisions for you (your \"health care agent\") if you're not able to. Many hospitals and nursing homes will give you the forms you need to complete a living will and a medical power of . · Your living will is used only if you can't make or communicate decisions for yourself anymore.  If you become able to make decisions again, you can accept or refuse any treatment, no matter what you wrote in your living will. · Your state may offer an online registry. This is a place where you can store your living will online so the doctors and nurses who need to treat you can find it right away. What should you think about when creating a living will? Talk about your end-of-life wishes with your family members and your doctor. Let them know what you want. That way the people making decisions for you won't be surprised by your choices. Think about these questions as you make your living will:  · Do you know enough about life support methods that might be used? If not, talk to your doctor so you know what might be done if you can't breathe on your own, your heart stops, or you're unable to swallow. · What things would you still want to be able to do after you receive life-support methods? Would you want to be able to walk? To speak? To eat on your own? To live without the help of machines? · If you have a choice, where do you want to be cared for? In your home? At a hospital or nursing home? · Do you want certain Alevism practices performed if you become very ill? · If you have a choice at the end of your life, where would you prefer to die? At home? In a hospital or nursing home? Somewhere else? · Would you prefer to be buried or cremated? · Do you want your organs to be donated after you die? What should you do with your living will? · Make sure that your family members and your health care agent have copies of your living will. · Give your doctor a copy of your living will to keep in your medical record. If you have more than one doctor, make sure that each one has a copy. · You may want to put a copy of your living will where it can be easily found. Where can you learn more? Go to http://kori-estella.info/. Enter L108 in the search box to learn more about \"Learning About Living Perasia. \"  Current as of: August 8, 2016  Content Version: 11.3  © 5683-3367 [employer]. Care instructions adapted under license by [employer] (which disclaims liability or warranty for this information). If you have questions about a medical condition or this instruction, always ask your healthcare professional. [facility] any warranty or liability for your use of this information. Advance Directives: Care Instructions  Your Care Instructions  An advance directive is a legal way to state your wishes at the end of your life. It tells your family and your doctor what to do if you can no longer say what you want. There are two main types of advance directives. You can change them any time that your wishes change. · A living will tells your family and your doctor your wishes about life support and other treatment. · A durable power of  for health care lets you name a person to make treatment decisions for you when you can't speak for yourself. This person is called a health care agent. If you do not have an advance directive, decisions about your medical care may be made by a doctor or a  who doesn't know you. It may help to think of an advance directive as a gift to the people who care for you. If you have one, they won't have to make tough decisions by themselves. Follow-up care is a key part of your treatment and safety. Be sure to make and go to all appointments, and call your doctor if you are having problems. It's also a good idea to know your test results and keep a list of the medicines you take. How can you care for yourself at home? · Discuss your wishes with your loved ones and your doctor. This way, there are no surprises. · Many states have a unique form. Or you might use a universal form that has been approved by many states. This kind of form can sometimes be completed and stored online.  Your electronic copy will then be available wherever you have a connection to the Internet. In most cases, doctors will respect your wishes even if you have a form from a different state. · You don't need a  to do an advance directive. But you may want to get legal advice. · Think about these questions when you prepare an advance directive:  ¨ Who do you want to make decisions about your medical care if you are not able to? Many people choose a family member or close friend. ¨ Do you know enough about life support methods that might be used? If not, talk to your doctor so you understand. ¨ What are you most afraid of that might happen? You might be afraid of having pain, losing your independence, or being kept alive by machines. ¨ Where would you prefer to die? Choices include your home, a hospital, or a nursing home. ¨ Would you like to have information about hospice care to support you and your family? ¨ Do you want to donate organs when you die? ¨ Do you want certain Caodaism practices performed before you die? If so, put your wishes in the advance directive. · Read your advance directive every year, and make changes as needed. When should you call for help? Be sure to contact your doctor if you have any questions. Where can you learn more? Go to http://kori-estella.info/. Enter R264 in the search box to learn more about \"Advance Directives: Care Instructions. \"  Current as of: November 17, 2016  Content Version: 11.3  © 5967-6286 [employer]. Care instructions adapted under license by [employer] (which disclaims liability or warranty for this information). If you have questions about a medical condition or this instruction, always ask your healthcare professional. [address] any warranty or liability for your use of this information.

## 2017-09-19 ENCOUNTER — TELEPHONE (OUTPATIENT)
Dept: NEUROLOGY | Age: 37
End: 2017-09-19

## 2017-09-25 ENCOUNTER — TELEPHONE (OUTPATIENT)
Dept: NEUROLOGY | Age: 37
End: 2017-09-25

## 2017-09-25 RX ORDER — GABAPENTIN 300 MG/1
300 CAPSULE ORAL 2 TIMES DAILY
Qty: 60 CAP | Refills: 5 | Status: SHIPPED | OUTPATIENT
Start: 2017-09-25 | End: 2017-11-13 | Stop reason: SDUPTHER

## 2017-09-25 RX ORDER — PANTOPRAZOLE SODIUM 40 MG/1
40 TABLET, DELAYED RELEASE ORAL DAILY
Qty: 90 TAB | Refills: 3 | Status: SHIPPED | OUTPATIENT
Start: 2017-09-25 | End: 2018-10-22 | Stop reason: SDUPTHER

## 2017-09-25 NOTE — TELEPHONE ENCOUNTER
Requested Prescriptions     Pending Prescriptions Disp Refills    gabapentin (NEURONTIN) 300 mg capsule 60 Cap 5     Sig: Take 1 Cap by mouth two (2) times a day.      Last fill- 8/15/17  LOV- 8/14/17  Next OV- 11/13/17

## 2017-09-25 NOTE — TELEPHONE ENCOUNTER
From: Yennifer Monte  To: Shanna Gregorio MD  Sent: 9/25/2017 1:28 PM EDT  Subject: Medication Renewal Request    Original authorizing provider: MD Ronda Leal Aas would like a refill of the following medications:  gabapentin (NEURONTIN) 300 mg capsule Shanna Gregorio MD]    Preferred pharmacy: [address]    Comment:      Medication renewals requested in this message routed to other providers:  pantoprazole (PROTONIX) 40 mg tablet [Lavon Gagnon III, DO]

## 2017-09-25 NOTE — TELEPHONE ENCOUNTER
----- Message from Harvinder Haines sent at 9/25/2017  1:38 PM EDT -----  Regarding: [facility]/ Telephone  Pt would like a callback to schedule Botox injection. Pt was told to give a call once her new insurance was on file.  [phone])[phone]

## 2017-09-25 NOTE — TELEPHONE ENCOUNTER
From: Denisse Madison  To: Sharyle Irani III, DO  Sent: 9/25/2017 1:28 PM EDT  Subject: Medication Renewal Request    Original authorizing provider: Sharyle Irani III, DO Hassan Mao Perri Corns would like a refill of the following medications:  pantoprazole (PROTONIX) 40 mg tablet Sharyle Irani III, DO]    [facility]erred [facility]: [facility] [address] [place] [address]    Comment:      Medication renewals requested in this message routed to other providers:  gabapentin (NEURONTIN) 300 mg capsule Krzysztof Medrano MD]

## 2017-09-27 ENCOUNTER — TELEPHONE (OUTPATIENT)
Dept: NEUROLOGY | Age: 37
End: 2017-09-27

## 2017-09-27 NOTE — TELEPHONE ENCOUNTER
Lm on vm asking for a return call   botox not in office so will not be able to make appt  Is she giving us new insurance and will that need a new PA?

## 2017-09-27 NOTE — TELEPHONE ENCOUNTER
----- Message from 56Maryanne Fri Raphael sent at 9/27/2017 10:57 AM EDT -----  Regarding: [facility]/Telephone  Contact: [phone]  Lizbeth Elizondo is returning a call to the practice 9/27/2017 to [facility] regarding a Botox appt she would like to schedule.

## 2017-10-03 ENCOUNTER — HOSPITAL ENCOUNTER (OUTPATIENT)
Dept: MRI IMAGING | Age: 37
Discharge: HOME | End: 2017-10-03
Attending: NURSE PRACTITIONER
Payer: COMMERCIAL

## 2017-10-03 DIAGNOSIS — G43.909 MIGRAINE WITHOUT STATUS MIGRAINOSUS, NOT INTRACTABLE, UNSPECIFIED MIGRAINE TYPE: ICD-10-CM

## 2017-10-03 DIAGNOSIS — R51.9 CHRONIC DAILY HEADACHE: ICD-10-CM

## 2017-10-03 PROCEDURE — A9576 INJ PROHANCE MULTIPACK: HCPCS | Performed by: RADIOLOGY

## 2017-10-03 PROCEDURE — 74011250636 HC RX REV CODE- 250/636: Performed by: RADIOLOGY

## 2017-10-03 PROCEDURE — 70553 MRI BRAIN STEM W/O & W/DYE: CPT

## 2017-10-03 RX ADMIN — GADOTERIDOL 20 ML: 279.3 INJECTION, SOLUTION INTRAVENOUS at 08:56

## 2017-10-10 ENCOUNTER — TELEPHONE (OUTPATIENT)
Dept: NEUROLOGY | Age: 37
End: 2017-10-10

## 2017-10-10 NOTE — TELEPHONE ENCOUNTER
----- Message from Millie Ovalles sent at 10/10/2017  8:21 AM EDT -----  Regarding: [facility]/Telephone  Pt states she is ready to get the Botox injection and needs the process started, she also received a my-chart message that her MRI results are complete and needs to know if she needs an appointment or not. Her contact number is .

## 2017-10-12 NOTE — TELEPHONE ENCOUNTER
Called and spoke to patient. Informed patient of MRI results per NP. Patients states understanding. Still waiting on botox  PA. Pt understands    Per NP:  No brain tumors, lesion, or masses. Normal changes that we see in the brain with people with chronic headaches. The blood vessels and parts of the brain just look a little bit different. Nothing alarming.      botox is all you  (Routing comment)

## 2017-10-18 ENCOUNTER — TELEPHONE (OUTPATIENT)
Dept: NEUROLOGY | Age: 37
End: 2017-10-18

## 2017-10-18 NOTE — TELEPHONE ENCOUNTER
----- Message from Nadira Farmer NP sent at 10/18/2017 11:39 AM EDT -----  Can you let her know in our eyes her MRI looked normal. Some changes in the brain due to her chronic headaches but this is a normal finding in people with headaches. Also low lying cerebellar tonsils is nothing to worry about as this is just an incidental structural change in her brain. Nothing on this MRI is causing her symptoms.  Just incidental findings for which we do not do anything but watch thanks    how are her symptoms          ----- Message -----     From: Baudilio Yun Results In     Sent: 10/3/2017   9:44 AM       To: Nadira Farmer NP

## 2017-10-18 NOTE — TELEPHONE ENCOUNTER
Patient informed of results. She states she has headaches a couple times a week, but no crazy migraines lately.

## 2017-11-13 ENCOUNTER — OFFICE VISIT (OUTPATIENT)
Dept: INTERNAL MEDICINE CLINIC | Age: 37
End: 2017-11-13

## 2017-11-13 VITALS
TEMPERATURE: 98.4 F | SYSTOLIC BLOOD PRESSURE: 118 MMHG | DIASTOLIC BLOOD PRESSURE: 78 MMHG | HEIGHT: 64 IN | HEART RATE: 86 BPM | BODY MASS INDEX: 40.15 KG/M2 | RESPIRATION RATE: 16 BRPM | WEIGHT: 235.2 LBS | OXYGEN SATURATION: 100 %

## 2017-11-13 DIAGNOSIS — K21.9 GASTROESOPHAGEAL REFLUX DISEASE, ESOPHAGITIS PRESENCE NOT SPECIFIED: ICD-10-CM

## 2017-11-13 DIAGNOSIS — K58.0 IRRITABLE BOWEL SYNDROME WITH DIARRHEA: ICD-10-CM

## 2017-11-13 DIAGNOSIS — R51.9 CHRONIC DAILY HEADACHE: ICD-10-CM

## 2017-11-13 DIAGNOSIS — Z00.00 ROUTINE MEDICAL EXAM: Primary | ICD-10-CM

## 2017-11-13 RX ORDER — GABAPENTIN 300 MG/1
CAPSULE ORAL
Qty: 90 CAP | Refills: 3 | Status: SHIPPED | OUTPATIENT
Start: 2017-11-13 | End: 2018-04-08 | Stop reason: SDUPTHER

## 2017-11-13 NOTE — PATIENT INSTRUCTIONS
Gastroesophageal Reflux Disease (GERD): Care Instructions  Your Care Instructions    Gastroesophageal reflux disease (GERD) is the backward flow of stomach acid into the esophagus. The esophagus is the tube that leads from your throat to your stomach. A one-way valve prevents the stomach acid from moving up into this tube. When you have GERD, this valve does not close tightly enough. If you have mild GERD symptoms including heartburn, you may be able to control the problem with antacids or over-the-counter medicine. Changing your diet, losing weight, and making other lifestyle changes can also help reduce symptoms. Follow-up care is a key part of your treatment and safety. Be sure to make and go to all appointments, and call your doctor if you are having problems. It's also a good idea to know your test results and keep a list of the medicines you take. How can you care for yourself at home? · Take your medicines exactly as prescribed. Call your doctor if you think you are having a problem with your medicine. · Your doctor may recommend over-the-counter medicine. For mild or occasional indigestion, antacids, such as Tums, Gaviscon, Mylanta, or Maalox, may help. Your doctor also may recommend over-the-counter acid reducers, such as Pepcid AC, Tagamet HB, Zantac 75, or Prilosec. Read and follow all instructions on the label. If you use these medicines often, talk with your doctor. · Change your eating habits. ¨ It's best to eat several small meals instead of two or three large meals. ¨ After you eat, wait 2 to 3 hours before you lie down. ¨ Chocolate, mint, and alcohol can make GERD worse. ¨ Spicy foods, foods that have a lot of acid (like tomatoes and oranges), and coffee can make GERD symptoms worse in some people. If your symptoms are worse after you eat a certain food, you may want to stop eating that food to see if your symptoms get better.   · Do not smoke or chew tobacco. Smoking can make GERD worse. If you need help quitting, talk to your doctor about stop-smoking programs and medicines. These can increase your chances of quitting for good. · If you have GERD symptoms at night, raise the head of your bed 6 to 8 inches by putting the frame on blocks or placing a foam wedge under the head of your mattress. (Adding extra pillows does not work.)  · Do not wear tight clothing around your middle. · Lose weight if you need to. Losing just 5 to 10 pounds can help. When should you call for help? Call your doctor now or seek immediate medical care if:  ? · You have new or different belly pain. ? · Your stools are black and tarlike or have streaks of blood. ? Watch closely for changes in your health, and be sure to contact your doctor if:  ? · Your symptoms have not improved after 2 days. ? · Food seems to catch in your throat or chest.   Where can you learn more? Go to http://kori-estella.info/. Enter L125 in the search box to learn more about \"Gastroesophageal Reflux Disease (GERD): Care Instructions. \"  Current as of: May 12, 2017  Content Version: 11.4  © 6978-6537 [employer]. Care instructions adapted under license by [employer] (which disclaims liability or warranty for this information). If you have questions about a medical condition or this instruction, always ask your healthcare professional. [place] [address] any warranty or liability for your use of this information. Diet for Irritable Bowel Syndrome: Care Instructions  Your Care Instructions    Irritable bowel syndrome, or IBS, is a problem with the intestines. IBS can cause belly pain, bloating, gas, constipation, and diarrhea. Most people can control their symptoms by changing their diet and easing stress. No specific foods cause everyone with IBS to have symptoms. Doctors don't offer a specific diet to manage symptoms.  But many people find that they feel better when they stop eating certain foods. A high-fiber diet may help if you have constipation. Follow-up care is a key part of your treatment and safety. Be sure to make and go to all appointments, and call your doctor if you are having problems. It's also a good idea to know your test results and keep a list of the medicines you take. How can you care for yourself at home? To reduce constipation  · Include fruits, vegetables, beans, and whole grains in your diet each day. These foods are high in fiber. Slowly increase the amount of fiber you eat. This helps you avoid a lot of gas. · Drink plenty of fluids, enough so that your urine is light yellow or clear like water. If you have kidney, heart, or liver disease and have to limit fluids, talk with your doctor before you increase the amount of fluids you drink. · Get some exercise every day. Build up slowly to 30 to 60 minutes a day on 5 or more days of the week. · Take a fiber supplement, such as Citrucel or Metamucil, every day if needed. Read and follow all instructions on the label. · Schedule time each day for a bowel movement. Having a daily routine may help. Take your time and do not strain when having a bowel movement. · Check with your doctor before you increase the amount of fiber in your diet. For some people who have IBS, eating more fiber may make some symptoms worse. This includes bloating. To reduce diarrhea  You may try giving up foods or drinks one at a time to see whether symptoms improve.  Limit or avoid the following:  · Alcohol  · Caffeine, which is found in coffee, tea, cola drinks, and chocolate  · Nicotine, from smoking or chewing tobacco  · Gas-producing foods, such as beans, broccoli, cabbage, and apples  · Dairy products that contain lactose (milk sugar), such as ice cream, milk, cheese, and sour cream  · Foods and drinks high in sugar, especially fruit juice, soda, candy, and other packaged sweets (such as cookies)  · Foods high in fat, including encarnacion, sausage, butter, oils, and anything deep-fried  · Sorbitol and xylitol, artificial sweeteners found in some sugarless candies and chewing gum  Keep track of foods  · Some people with IBS use a daily food diary to keep track of what they eat and whether they have any symptoms after eating certain foods. The diary also can be a good way to record what is going on in your life. · Stress plays a role in IBS. So if you are aware that certain stresses bring on symptoms, you can try to reduce those stresses. Keep mealtimes pleasant  · Try to maintain a pleasant environment when you eat. This may reduce stress that can make symptoms likely to occur. · Give yourself plenty of time to eat, rather than eating on the go. Chew your food slowly. Try not to swallow air, which can cause bloating. Where can you learn more? Go to http://koriResponsive Sportsestella.info/. Enter Q768 in the search box to learn more about \"Diet for Irritable Bowel Syndrome: Care Instructions. \"  Current as of: May 12, 2017  Content Version: 11.4  © 7068-3567 [employer]. Care instructions adapted under license by [employer] (which disclaims liability or warranty for this information). If you have questions about a medical condition or this instruction, always ask your healthcare professional. [address] any warranty or liability for your use of this information. PEPCID COMPLETE OVER THE COUNTER TWICE A DAY. HOLD PROTONIX FOR NOW. 10 minutes of exercise a day to start. RECOMMEND [id number] CALORIE DIET. TRACK CALORIE INTAKE WITH MY FITNESS [employer]. PROTEIN AT EVERY MEAL. REDUCE INTAKE OF STARCHY CARBS, LIKE BREAD, RICE, PASTA, AND POTATOES. MAKE CARBS 1/4 OF YOUR PLATE. DRINK CALORIE FREE BEVERAGES ONLY. TRY GRAZING DIET, 3 SMALL FREQUENT MEALS. NO CARBS AT NIGHT.

## 2017-11-13 NOTE — MR AVS SNAPSHOT
Visit Information Date & Time Provider Department Dept. Phone Encounter #  
 11/13/2017  8:30 AM Clara Mobley, [address] [id number] Follow-up Instructions Return for follow up pending labs and annual.  .  
  
Your Appointments 5/3/2018  7:50 AM  
ESTABLISHED PATIENT with MD Rasta Shelton ([address]) Appt Note: annual exam TP  
 [address] [facility] 99 [id number]  
[facility] [address] [address] Upcoming Health Maintenance Date Due DTaP/Tdap/Td series (1 - Tdap) 10/6/2003 Influenza Age 5 to Adult 8/1/2017 PAP AKA CERVICAL CYTOLOGY 4/1/2020 Allergies as of 11/13/2017  Review Complete On: 11/13/2017 By: Clara Mobley MD  
 No Known Allergies Current Immunizations  Reviewed on 1/17/2012 Name Date Influenza Vaccine Split 1/17/2012 Not reviewed this visit You Were Diagnosed With   
  
 Codes Comments Routine medical exam    -  Primary ICD-10-CM: Z00.00 ICD-9-CM: V70.0 Gastroesophageal reflux disease, esophagitis presence not specified     ICD-10-CM: K21.9 ICD-9-CM: 530.81 Chronic daily headache     ICD-10-CM: R51 ICD-9-CM: 784.0 Irritable bowel syndrome with diarrhea     ICD-10-CM: K58.0 ICD-9-CM: 286.6 Vitals BP Pulse Temp Resp Height(growth percentile) Weight(growth percentile) 118/78 86 98.4 °F (36.9 °C) (Oral) 16 5' 4\" (1.626 m) 235 lb 3.2 oz (106.7 kg) LMP SpO2 BMI OB Status Smoking Status 11/01/2017 (Approximate) 100% 40.37 kg/m2 Having regular periods Never Smoker Vitals History BMI and BSA Data Body Mass Index Body Surface Area  
 40.37 kg/m 2 2.2 m 2 Preferred Pharmacy Pharmacy Name Phone [facility] [address] [place] [address], [address] [place] [address] [phone] Your Updated Medication List  
  
   
 This list is accurate as of: 11/13/17  9:32 AM.  Always use your most recent med list.  
  
  
  
  
 cetirizine 10 mg tablet Commonly known as:  ZYRTEC Take 1 Tab by mouth daily. CO Q-10 10 mg Cap Generic drug:  coenzyme q10 Take 100 mg by mouth daily. folic acid 679 mcg tablet Take 400 mcg by mouth daily. gabapentin 300 mg capsule Commonly known as:  NEURONTIN Take one capsule in am and two capsules in pm.  
  
 xpmyceu-idbesurcq-zrmeyfajwrtl -325 mg capsule Commonly known as:  Noelle Essex Take 1-2 capsules at HA onset and repeat with 1 capsule every hour until HA is resolved or if taken 5 capsules in 24 hours  
  
 magnesium oxide 400 mg tablet Commonly known as:  MAG-OX Take 400 mg by mouth daily. onabotulinumtoxinA 200 unit injection Commonly known as:  BOTOX Inject 200 units to 31 fda approved sites face and neck every 12 weeks  
  
 pantoprazole 40 mg tablet Commonly known as:  PROTONIX Take 1 Tab by mouth daily. Indications: gastroesophageal reflux disease Prescriptions Sent to Pharmacy Refills  
 gabapentin (NEURONTIN) 300 mg capsule 3 Sig: Take one capsule in am and two capsules in pm.  
 Class: Normal  
 Pharmacy: [address], [facility].  #: [phone] We Performed the Following CELIAC ANTIBODY PROFILE [[id number] Custom] LIPID PANEL [12907 CPT(R)] VITAMIN D, 25 HYDROXY [id number] CPT(R)] Follow-up Instructions Return for follow up pending labs and annual.  .  
  
  
Patient Instructions Gastroesophageal Reflux Disease (GERD): Care Instructions Your Care Instructions Gastroesophageal reflux disease (GERD) is the backward flow of stomach acid into the esophagus. The esophagus is the tube that leads from your throat to your stomach. A one-way valve prevents the stomach acid from moving up into this tube.  When you have GERD, this valve does not close tightly enough. If you have mild GERD symptoms including heartburn, you may be able to control the problem with antacids or over-the-counter medicine. Changing your diet, losing weight, and making other lifestyle changes can also help reduce symptoms. Follow-up care is a key part of your treatment and safety. Be sure to make and go to all appointments, and call your doctor if you are having problems. It's also a good idea to know your test results and keep a list of the medicines you take. How can you care for yourself at home? · Take your medicines exactly as prescribed. Call your doctor if you think you are having a problem with your medicine. · Your doctor may recommend over-the-counter medicine. For mild or occasional indigestion, antacids, such as Tums, Gaviscon, Mylanta, or Maalox, may help. Your doctor also may recommend over-the-counter acid reducers, such as Pepcid AC, Tagamet HB, Zantac 75, or Prilosec. Read and follow all instructions on the label. If you use these medicines often, talk with your doctor. · Change your eating habits. ¨ It's best to eat several small meals instead of two or three large meals. ¨ After you eat, wait 2 to 3 hours before you lie down. ¨ Chocolate, mint, and alcohol can make GERD worse. ¨ Spicy foods, foods that have a lot of acid (like tomatoes and oranges), and coffee can make GERD symptoms worse in some people. If your symptoms are worse after you eat a certain food, you may want to stop eating that food to see if your symptoms get better. · Do not smoke or chew tobacco. Smoking can make GERD worse. If you need help quitting, talk to your doctor about stop-smoking programs and medicines. These can increase your chances of quitting for good. · If you have GERD symptoms at night, raise the head of your bed 6 to 8 inches by putting the frame on blocks or placing a foam wedge under the head of your mattress. (Adding extra pillows does not work.) · Do not wear tight clothing around your middle. · Lose weight if you need to. Losing just 5 to 10 pounds can help. When should you call for help? Call your doctor now or seek immediate medical care if: 
? · You have new or different belly pain. ? · Your stools are black and tarlike or have streaks of blood. ? Watch closely for changes in your health, and be sure to contact your doctor if: 
? · Your symptoms have not improved after 2 days. ? · Food seems to catch in your throat or chest.  
Where can you learn more? Go to http://kori-estella.info/. Enter R190 in the search box to learn more about \"Gastroesophageal Reflux Disease (GERD): Care Instructions. \" Current as of: May 12, 2017 Content Version: 11.4 © 4184-0947 SlideBatch. Care instructions adapted under license by [employer] (which disclaims liability or warranty for this information). If you have questions about a medical condition or this instruction, always ask your healthcare professional. [place] [address] any warranty or liability for your use of this information. Diet for Irritable Bowel Syndrome: Care Instructions Your Care Instructions Irritable bowel syndrome, or IBS, is a problem with the intestines. IBS can cause belly pain, bloating, gas, constipation, and diarrhea. Most people can control their symptoms by changing their diet and easing stress. No specific foods cause everyone with IBS to have symptoms. Doctors don't offer a specific diet to manage symptoms. But many people find that they feel better when they stop eating certain foods. A high-fiber diet may help if you have constipation. Follow-up care is a key part of your treatment and safety. Be sure to make and go to all appointments, and call your doctor if you are having problems. It's also a good idea to know your test results and keep a list of the medicines you take. How can you care for yourself at home? To reduce constipation · Include fruits, vegetables, beans, and whole grains in your diet each day. These foods are high in fiber. Slowly increase the amount of fiber you eat. This helps you avoid a lot of gas. · Drink plenty of fluids, enough so that your urine is light yellow or clear like water. If you have kidney, heart, or liver disease and have to limit fluids, talk with your doctor before you increase the amount of fluids you drink. · Get some exercise every day. Build up slowly to 30 to 60 minutes a day on 5 or more days of the week. · Take a fiber supplement, such as Citrucel or Metamucil, every day if needed. Read and follow all instructions on the label. · Schedule time each day for a bowel movement. Having a daily routine may help. Take your time and do not strain when having a bowel movement. · Check with your doctor before you increase the amount of fiber in your diet. For some people who have IBS, eating more fiber may make some symptoms worse. This includes bloating. To reduce diarrhea You may try giving up foods or drinks one at a time to see whether symptoms improve. Limit or avoid the following: · Alcohol · Caffeine, which is found in coffee, tea, cola drinks, and chocolate · Nicotine, from smoking or chewing tobacco 
· Gas-producing foods, such as beans, broccoli, cabbage, and apples · Dairy products that contain lactose (milk sugar), such as ice cream, milk, cheese, and sour cream 
· Foods and drinks high in sugar, especially fruit juice, soda, candy, and other packaged sweets (such as cookies) · Foods high in fat, including encarnacion, sausage, butter, oils, and anything deep-fried · Sorbitol and xylitol, artificial sweeteners found in some sugarless candies and chewing gum Keep track of foods · Some people with IBS use a daily food diary to keep track of what they eat and whether they have any symptoms after eating certain foods.  The diary also can be a good way to record what is going on in your life. · Stress plays a role in IBS. So if you are aware that certain stresses bring on symptoms, you can try to reduce those stresses. Keep mealtimes pleasant · Try to maintain a pleasant environment when you eat. This may reduce stress that can make symptoms likely to occur. · Give yourself plenty of time to eat, rather than eating on the go. Chew your food slowly. Try not to swallow air, which can cause bloating. Where can you learn more? Go to http://kori-estella.info/. Enter J085 in the search box to learn more about \"Diet for Irritable Bowel Syndrome: Care Instructions. \" Current as of: May 12, 2017 Content Version: 11.4 © 6062-4701 ProudOnTV. Care instructions adapted under license by VeriWave (which disclaims liability or warranty for this information). If you have questions about a medical condition or this instruction, always ask your healthcare professional. [place] [address] any warranty or liability for your use of this information. PEPCID COMPLETE OVER THE COUNTER TWICE A DAY. HOLD PROTONIX FOR NOW. 10 minutes of exercise a day to start. RECOMMEND 1157-3486 CALORIE DIET. TRACK CALORIE INTAKE WITH MY FITNESS PAL PRASHANT. PROTEIN AT EVERY MEAL. REDUCE INTAKE OF STARCHY CARBS, LIKE BREAD, RICE, PASTA, AND POTATOES. MAKE CARBS 1/4 OF YOUR PLATE. DRINK CALORIE FREE BEVERAGES ONLY. TRY GRAZING DIET, 3 SMALL FREQUENT MEALS. NO CARBS AT NIGHT. Introducing [facility] & HEALTH SERVICES! Dear Ed Cantu: Thank you for requesting a J-Kan account. Our records indicate that you already have an active J-Kan account. You can access your account anytime at [email or web address] Did you know that you can access your hospital and ER discharge instructions at any time in J-Kan?   You can also review all of your test results from your hospital stay or ER visit. Additional Information If you have questions, please visit the Frequently Asked Questions section of the VarVee website at [email or web address] Remember, VarVee is NOT to be used for urgent needs. For medical emergencies, dial 911. Now available from your iPhone and Android! Please provide this summary of care documentation to your next provider. Your primary care clinician is listed as Jeanette Trinidad. If you have any questions after today's visit, please call [phone].

## 2017-11-13 NOTE — PROGRESS NOTES
Peyton Albrecht is a 30 y.o. female Is here for a health maintenance exam.      Seen by Chandni Fan in August with GERD and IBS symptoms. Continued on Protonix. Changed diet. On Protonix can drink coffee again. Still has \"indigestion\". Has upper abdominal pain, nausea. Under stress with father sick this year and  recently of pancreatic cancer. Awakening with symptoms. IBS symptoms alternating diarrhea and constipation. Some abdominal pain. Feeling sad since father's passing. Supportive brothers, friends, mother, fiance'. Feeling unmotivated. Weight gain. Followed by Nikhil Davila NP, neurology, for chronic daily headaches and migraines. May get botox shots for the headaches. Recent MRI negative. Due for eye exam in 2 months, no correction needed. On gabapentin 300mg in am and 600mg in pm. Sometimes related to menses. Sees Dr. Amelia Lara, gyn for routine care. Using condoms for birth control. Did not  OBC that was given by gyn.         No Known Allergies     Social History     Social History    Marital status: SINGLE     Spouse name: N/A    Number of children: N/A    Years of education: N/A     Social History Main Topics    Smoking status: Never Smoker    Smokeless tobacco: Never Used    Alcohol use Yes      Comment: social    Drug use: No    Sexual activity: Yes     Partners: Male     Birth control/ protection: Condom     Other Topics Concern    None     Social History Narrative        Past Medical History:   Diagnosis Date    Dysplasia of cervix, low grade (ORION 1) 12    colpo    Encounter for IUD removal 2016    Mirena    H/O abnormal Pap smear 12;12    LGSIL, HPV positive    Headache     Headache(784.0)     IUD (intrauterine device) in place 2014    Mirena    Migraine headache     without aura    pap smear for 11;13; 4/1/15    neg, HPV neg,no ECC; neg, HPV neg, Negative, HPV negative        Past Surgical History: Procedure Laterality Date    HX COLPOSCOPY  2/23/12    ORION 1    HX HEENT      wisdom teeth    HX LITHOTRIPSY      HX WISDOM TEETH EXTRACTION         Family History   Problem Relation Age of Onset    Hypertension Father     Heart Disease Father     Diabetes Father    Zoe Casarez Elevated Lipids Father     Cancer Father      eye    Cancer Mother      Cervical,Uterine,& Ovarian    Depression Mother     Allergic Rhinitis Brother     Asthma Brother     Other Brother      Chron's    Hypertension Brother     Alcohol abuse Maternal Grandfather     Depression Maternal Grandfather     Alcohol abuse Paternal Grandmother     Stroke Paternal Grandmother     Migraines Brother     Allergic Rhinitis Brother     Breast Cancer Other         Current Outpatient Prescriptions   Medication Sig Dispense Refill    gabapentin (NEURONTIN) 300 mg capsule Take one capsule in am and two capsules in pm. 90 Cap 3    pantoprazole (PROTONIX) 40 mg tablet Take 1 Tab by mouth daily. Indications: gastroesophageal reflux disease 90 Tab 3    vhusijf-oicudjvsp-anurvatagttg (MIDRIN) -325 mg capsule Take 1-2 capsules at HA onset and repeat with 1 capsule every hour until HA is resolved or if taken 5 capsules in 24 hours 45 Cap 1    coenzyme q10 (CO Q-10) 10 mg cap Take 100 mg by mouth daily.  magnesium oxide (MAG-OX) 400 mg tablet Take 400 mg by mouth daily.  folic acid 954 mcg tablet Take 400 mcg by mouth daily.  cetirizine (ZYRTEC) 10 mg tablet Take 1 Tab by mouth daily.  30 Tab 1    onabotulinumtoxinA (BOTOX) 200 unit injection Inject 200 units to 31 fda approved sites face and neck every 12 weeks    200 Units 0          ROS:  General: negative for fevers, chills, anorexia, weight loss  Eyes:   negative for visual disturbance, irritation  ENT:   negative for tinnitus,sore throat,nasal congestion,ear pain, sinus pain  Resp:   negative for cough, hemoptysis, dyspnea,wheezing  CV:   negative for chest pain, palpitations, lower extremity edema  GI:   negative for nausea, vomiting,  abdominal pain,melena, positive for abdominal bloating, diarrhea, constipation. Endo:               negative for polyuria,polydipsia,polyphagia,heat intolerance  :  negative for frequency, dysuria, hematuria, vaginal discharge  Skin:   negative for rash, pruritus  Heme:  negative for easy bruising, gum/nose bleeding  Musc:  negative for myalgias, arthralgias, back pain, muscle weakness, joint pain  Neuro:  negative for dizziness, numbness, focal weakness, positive for headaches  Psych:  negative for feelings of anxiety, depression, mood changes      Blood pressure 118/78, pulse 86, temperature 98.4 °F (36.9 °C), temperature source Oral, resp. rate 16, height 5' 4\" (1.626 m), weight 235 lb 3.2 oz (106.7 kg), last menstrual period 11/01/2017, SpO2 100 %. Body mass index is 40.37 kg/(m^2). General: Well, no acute distress  HEENT:   PERRL,normal conjunctiva. External ear and canals normal, TMs normal. Nose without edema or discharge, with normal septum. Lips, teeth, gums normal.  Oropharynx: no erythema, no exudates, no lesions, normal tongue. NECK:   No carotid bruit. No masses or LAD  RESP:  No wheezing, no rhonchi, no rales. No chest wall tenderness. CV:  RRR, normal S1S2, no murmur. GI: soft, nontender, without masses. No hepatosplenomegaly. Extrem:  +2 pulses, no edema, warm distally  NEURO: alert, nonfocal    Assessment and Plan:      1. Routine medical exam- Recommend heart healthy diet and regular cardiovascular exercise. - VITAMIN D, 25 HYDROXY  - LIPID PANEL    2. Gastroesophageal reflux disease, esophagitis presence not specified- Recommend bland diet to reduce reflux symptoms. Avoid caffeine. Use PPI as directed. 3. Chronic daily headache- continue gabapentin. Prn midrin.        4. Irritable bowel syndrome with diarrhea    - CELIAC ANTIBODY PROFILE    Follow-up Disposition:  Return for follow up pending labs and annual.  .     Rojas Romeo MD

## 2017-11-13 NOTE — PROGRESS NOTES
Chief Complaint   Patient presents with    Complete Physical     Pt fasting    GERD     has questions about gerd     Visit Vitals    /79 (BP 1 Location: Left arm, BP Patient Position: Sitting)    Pulse 86    Temp 98.4 °F (36.9 °C) (Oral)    Resp 16    Ht 5' 4\" (1.626 m)    Wt 235 lb 3.2 oz (106.7 kg)    LMP 11/01/2017 (Approximate)    SpO2 100%    BMI 40.37 kg/m2     Reviewed record In preparation for visit and have obtained necessary documentation    1. Have you been to the ER, urgent care clinic since your last visit? Hospitalized since your last visit? NO    2. Have you seen or consulted any other health care providers outside of the [address] [place] [address] since your last visit? Include any pap smears or colon screening. NO    Patient does not have advance directive on file.

## 2017-11-15 LAB
25(OH)D3+25(OH)D2 SERPL-MCNC: 28.1 NG/ML (ref 30–100)
CHOLEST SERPL-MCNC: 126 MG/DL (ref 100–199)
GLIADIN PEPTIDE IGA SER-ACNC: 6 UNITS (ref 0–19)
GLIADIN PEPTIDE IGG SER-ACNC: 4 UNITS (ref 0–19)
HDLC SERPL-MCNC: 37 MG/DL
IGA SERPL-MCNC: 136 MG/DL (ref 87–352)
LDLC SERPL CALC-MCNC: 70 MG/DL (ref 0–99)
TRIGL SERPL-MCNC: 94 MG/DL (ref 0–149)
TTG IGA SER-ACNC: <2 U/ML (ref 0–3)
TTG IGG SER-ACNC: <2 U/ML (ref 0–5)
VLDLC SERPL CALC-MCNC: 19 MG/DL (ref 5–40)

## 2018-01-09 ENCOUNTER — OFFICE VISIT (OUTPATIENT)
Dept: INTERNAL MEDICINE CLINIC | Age: 38
End: 2018-01-09

## 2018-01-09 VITALS
TEMPERATURE: 98 F | HEIGHT: 66 IN | OXYGEN SATURATION: 100 % | DIASTOLIC BLOOD PRESSURE: 96 MMHG | RESPIRATION RATE: 16 BRPM | HEART RATE: 81 BPM | SYSTOLIC BLOOD PRESSURE: 139 MMHG | BODY MASS INDEX: 38.73 KG/M2 | WEIGHT: 241 LBS

## 2018-01-09 DIAGNOSIS — J01.00 ACUTE MAXILLARY SINUSITIS, RECURRENCE NOT SPECIFIED: Primary | ICD-10-CM

## 2018-01-09 RX ORDER — AMOXICILLIN AND CLAVULANATE POTASSIUM 875; 125 MG/1; MG/1
1 TABLET, FILM COATED ORAL EVERY 12 HOURS
Qty: 20 TAB | Refills: 0 | Status: SHIPPED | OUTPATIENT
Start: 2018-01-09 | End: 2018-01-19

## 2018-01-09 NOTE — PROGRESS NOTES
1. Have you been to the ER, urgent care clinic since your last visit? NO  Hospitalized since your last visit? NO    2. Have you seen or consulted any other health care providers outside of the [address] [place] [address] since your last visit? Include any pap smears or colon screening. NO Pap due April 2018

## 2018-01-09 NOTE — PROGRESS NOTES
Subjective:  Ms. Redd Vivar is a pt. of Santiago Pacheco MD who presents with complaint of URI symptoms. She has sinus pain, \"burning when I breathe in.\"  +Cough productive of thick yellow - green sputum. +Orbital pain. +Sore throat, nasal congestion, headache. It is reported that her symptoms began 2 weeks ago. She has taken \"Advil cold and sinus\", and Dayquil, and tylenol. Objective: Vitals: See nurse notes. General: The patient is in NAD. HEENT: PERRLA. EOMI. OP mild posterior pharyngeal erythema. There is tenderness to percussion over maxillary sinuses. TM: Pearly bilaterally. Neck: Supple. No LAD. Lungs: CTAB. Heart: RRR. No M/G/R. Abd: S, NT, ND. Ext: Warm. No C/C/E. Assessment: Acute Sinusitis. Plan: Symptomatic care (rest, fluids, OTC analgesics, etc). Antibiotic therapy with augmentin. Call if no better in 10 days, or worsens. F/U with primary care physician as planned.

## 2018-01-09 NOTE — MR AVS SNAPSHOT
Visit Information Date & Time Provider Department Dept. Phone Encounter #  
 1/9/2018  3:30 PM Ishmael Cotto, [address] [place] [address],[address] [phone] [id number] Follow-up Instructions Return if symptoms worsen or fail to improve. Your Appointments 5/3/2018  7:50 AM  
ESTABLISHED PATIENT with Cl Torres MD  
[place] ([address]) Appt Note: annual exam TP  
 [address] [facility] [address] [facility] 99 [address]  
[facility] 31 [address]33 [address] Upcoming Health Maintenance Date Due DTaP/Tdap/Td series (1 - Tdap) 10/6/2003 Influenza Age 5 to Adult 8/1/2017 PAP AKA CERVICAL CYTOLOGY 4/1/2020 Allergies as of 1/9/2018  Review Complete On: 1/9/2018 By: Ishmael Cotto MD  
 No Known Allergies Current Immunizations  Reviewed on 1/17/2012 Name Date Influenza Vaccine Split 1/17/2012 Not reviewed this visit You Were Diagnosed With   
  
 Codes Comments Acute maxillary sinusitis, recurrence not specified    -  Primary ICD-10-CM: J01.00 ICD-9-CM: 461.0 Vitals BP Pulse Temp Resp Height(growth percentile) Weight(growth percentile) (!) 139/96 (BP 1 Location: Left arm, BP Patient Position: Sitting) 81 98 °F (36.7 °C) (Oral) 16 5' 6\" (1.676 m) 241 lb (109.3 kg) LMP SpO2 BMI OB Status Smoking Status 12/22/2017 100% 38.9 kg/m2 Having regular periods Never Smoker Vitals History BMI and BSA Data Body Mass Index Body Surface Area 38.9 kg/m 2 2.26 m 2 Preferred Pharmacy Pharmacy Name Phone Lorrie Loco [address] [facility], [address] [place] [address] [phone] Your Updated Medication List  
  
   
This list is accurate as of: 1/9/18  4:08 PM.  Always use your most recent med list.  
  
  
  
  
 amoxicillin-clavulanate 875-125 mg per tablet Commonly known as:  AUGMENTIN  
 Take 1 Tab by mouth every twelve (12) hours for 10 days. cetirizine 10 mg tablet Commonly known as:  ZYRTEC Take 1 Tab by mouth daily. CO Q-10 10 mg Cap Generic drug:  coenzyme q10 Take 100 mg by mouth daily. folic acid 454 mcg tablet Take 400 mcg by mouth daily. gabapentin 300 mg capsule Commonly known as:  NEURONTIN Take one capsule in am and two capsules in pm.  
  
 xpccwhm-gmdkglgod-bxibjwmnlpnu -325 mg capsule Commonly known as:  Alison Levi Take 1-2 capsules at HA onset and repeat with 1 capsule every hour until HA is resolved or if taken 5 capsules in 24 hours  
  
 magnesium oxide 400 mg tablet Commonly known as:  MAG-OX Take 400 mg by mouth daily. onabotulinumtoxinA 200 unit injection Commonly known as:  BOTOX Inject 200 units to 31 fda approved sites face and neck every 12 weeks  
  
 pantoprazole 40 mg tablet Commonly known as:  PROTONIX Take 1 Tab by mouth daily. Indications: gastroesophageal reflux disease Prescriptions Sent to Pharmacy Refills  
 amoxicillin-clavulanate (AUGMENTIN) 875-125 mg per tablet 0 Sig: Take 1 Tab by mouth every twelve (12) hours for 10 days. Class: Normal  
 Pharmacy: Deshawn Leone [address] [place] [address] #: [phone] Route: Oral  
  
Follow-up Instructions Return if symptoms worsen or fail to improve. Introducing [facility] & HEALTH SERVICES! Dear Tereza Trimble: Thank you for requesting a Cahootsy Limited account. Our records indicate that you already have an active Cahootsy Limited account. You can access your account anytime at [email or web address] Did you know that you can access your hospital and ER discharge instructions at any time in Cahootsy Limited? You can also review all of your test results from your hospital stay or ER visit. Additional Information If you have questions, please visit the Frequently Asked Questions section of the [employer] website at [email or web address] Remember, [employer] is NOT to be used for urgent needs. For medical emergencies, dial 911. Now available from your iPhone and Android! Please provide this summary of care documentation to your next provider. Your primary care clinician is listed as Bekah Carlin. If you have any questions after today's visit, please call [phone].

## 2018-02-02 ENCOUNTER — TELEPHONE (OUTPATIENT)
Dept: NEUROLOGY | Age: 38
End: 2018-02-02

## 2018-02-02 NOTE — TELEPHONE ENCOUNTER
----- Message from Nito Chain sent at 2/2/2018  9:28 AM EST -----  Regarding: [facility]/Telephone  Pt stated she was advised she was a candidate for Botox at her visit in August, and would like a call to schedule an appt, because she has been having a lot more headaches. Best contact number  [phone].

## 2018-04-09 RX ORDER — GABAPENTIN 300 MG/1
CAPSULE ORAL
Qty: 90 CAP | Refills: 2 | Status: SHIPPED | OUTPATIENT
Start: 2018-04-09 | End: 2018-07-11 | Stop reason: SDUPTHER

## 2018-05-03 ENCOUNTER — OFFICE VISIT (OUTPATIENT)
Dept: OBGYN CLINIC | Age: 38
End: 2018-05-03

## 2018-05-03 VITALS
WEIGHT: 232 LBS | SYSTOLIC BLOOD PRESSURE: 118 MMHG | HEIGHT: 66 IN | BODY MASS INDEX: 37.28 KG/M2 | DIASTOLIC BLOOD PRESSURE: 82 MMHG

## 2018-05-03 DIAGNOSIS — Z01.411 ENCOUNTER FOR GYNECOLOGICAL EXAMINATION (GENERAL) (ROUTINE) WITH ABNORMAL FINDINGS: Primary | ICD-10-CM

## 2018-05-03 DIAGNOSIS — N92.4 EXCESSIVE BLEEDING IN PREMENOPAUSAL PERIOD: ICD-10-CM

## 2018-05-03 LAB
HCG URINE, QL. (POC): NEGATIVE
VALID INTERNAL CONTROL?: YES

## 2018-05-03 RX ORDER — DEXTROAMPHETAMINE SACCHARATE, AMPHETAMINE ASPARTATE, DEXTROAMPHETAMINE SULFATE AND AMPHETAMINE SULFATE 5; 5; 5; 5 MG/1; MG/1; MG/1; MG/1
20 TABLET ORAL DAILY
COMMUNITY
Start: 2018-04-10 | End: 2019-02-06

## 2018-05-03 RX ORDER — DESOGESTREL AND ETHINYL ESTRADIOL 21-5 (28)
1 KIT ORAL DAILY
Qty: 3 PACKAGE | Refills: 4 | Status: SHIPPED | OUTPATIENT
Start: 2018-05-03 | End: 2018-11-06

## 2018-05-03 NOTE — PATIENT INSTRUCTIONS

## 2018-05-03 NOTE — PROGRESS NOTES
[address] [facility]  [email or web address]/  [phone]    Monse Miles MD, FACOG       Annual Gynecologic Exam:  WWE <40  Chief Complaint   Patient presents with    Well Woman    Menstrual Problem         Nena Garcia is a 30 y.o.  WHITE OR  female who presents for an annual well woman exam.  Patient's last menstrual period was 2018 (approximate). .    With regard to the Gardisil vaccine, she is older than the FDA approved age to receive it. She does report additional concerns today. Pt c/o heavy periods with clots, and cramping. Pt would like to discuss the option of an OCP to help with her menstrual sx. Getting  next October in [facility] hernandez to BF of ten years. C/o lump on right breast     Menstrual status:  Her periods are heavy, with cramping. She does report dysmenorrhea/painful menses. She does not report irregular bleeding. Sexual history and Contraception:  History   Sexual Activity    Sexual activity: Yes    Partners: Male    Birth control/ protection: Condom     She sometimes use condoms with sexual activity  She does not reports new sexual partner(s) in the last year. The patient does not request STD testing. We recommended testing per CDC guidelines and at patient request.     Preventive Medicine History:  Her most recent Pap smear result: normal was obtained in 2015  Her most recent HR HPV screen was Negative obtained in   She does not have a history of ORION 2, 3 or cervical cancer.      Past Medical History:   Diagnosis Date    Dysplasia of cervix, low grade (ORION 1) 12    colpo    Encounter for IUD removal 2016    Mirena    H/O abnormal Pap smear 12;12    LGSIL, HPV positive    Headache     Headache(784.0)     IUD (intrauterine device) in place 2014    Mirena    Migraine headache     without aura    pap smear for 11;13; 4/1/15    neg, HPV neg,no ECC; neg, HPV neg, Negative, HPV negative     OB History    Para Term  AB Living   1 0 0 0 0 0   SAB TAB Ectopic Molar Multiple Live Births   0 0 0  0       # Outcome Date GA Lbr Sonido/2nd Weight Sex Delivery Anes PTL Lv   1                Obstetric Comments   Menarche:  17. LMP: 03/17/15 (IUD Mirena). # of Children:  0. Age at Delivery of First Child:  n/a. Hysterectomy/oophorectomy:  NO/NO. Breast Bx:  No.  Hx of Breast Feeding:  n/a. BCP:  Yes. Hormone therapy:  No.      Past Surgical History:   Procedure Laterality Date    HX COLPOSCOPY  12    ORION 1    HX HEENT      wisdom teeth    HX LITHOTRIPSY      HX WISDOM TEETH EXTRACTION       Family History   Problem Relation Age of Onset    Hypertension Father     Heart Disease Father     Diabetes Father    [facility] Elevated Lipids Father     Cancer Father      eye    Cancer Mother      Cervical,Uterine,& Ovarian    Depression Mother     Allergic Rhinitis Brother     Asthma Brother     Other Brother      Chron's    Hypertension Brother     Alcohol abuse Maternal Grandfather     Depression Maternal Grandfather     Alcohol abuse Paternal Grandmother     Stroke Paternal Grandmother     Migraines Brother     Allergic Rhinitis Brother     Breast Cancer Other      Social History     Social History    Marital status: SINGLE     Spouse name: N/A    Number of children: N/A    Years of education: N/A     Occupational History    Not on file.      Social History Main Topics    Smoking status: Never Smoker    Smokeless tobacco: Never Used    Alcohol use Yes      Comment: social    Drug use: No    Sexual activity: Yes     Partners: Male     Birth control/ protection: Condom     Other Topics Concern    Not on file     Social History Narrative       No Known Allergies    Current Outpatient Prescriptions   Medication Sig    dextroamphetamine-amphetamine (ADDERALL) 20 mg tablet     desog-e.estradiol/e.estradiol (MIRCETTE, 28,) 0.15-0.02 mgx21 /0.01 mg x 5 tab Take 1 Tab by mouth daily.  gabapentin (NEURONTIN) 300 mg capsule TAKE 1 CAPSULE BY MOUTH IN THE MORNING AND TAKE 2 CAPSULES IN THE EVENING    pantoprazole (PROTONIX) 40 mg tablet Take 1 Tab by mouth daily. Indications: gastroesophageal reflux disease    xscnllv-bnerqomaq-fcmitehasmlr (MIDRIN) -325 mg capsule Take 1-2 capsules at HA onset and repeat with 1 capsule every hour until HA is resolved or if taken 5 capsules in 24 hours    cetirizine (ZYRTEC) 10 mg tablet Take 1 Tab by mouth daily.  onabotulinumtoxinA (BOTOX) 200 unit injection Inject 200 units to 31 fda approved sites face and neck every 12 weeks       coenzyme q10 (CO Q-10) 10 mg cap Take 100 mg by mouth daily.  magnesium oxide (MAG-OX) 400 mg tablet Take 400 mg by mouth daily.  folic acid 079 mcg tablet Take 400 mcg by mouth daily. No current facility-administered medications for this visit. Patient Active Problem List   Diagnosis Code    Chronic daily headache R51    H/O abnormal Pap smear Z87.898    History of kidney stones Z87.442    Weight gain R63.5    Menorrhagia with regular cycle N92.0    Obesity (BMI 30-39. 9) E66.9    GERD (gastroesophageal reflux disease) K21.9    Irritable bowel syndrome with diarrhea K58.0    Gastroesophageal reflux disease K21.9       Review of Systems - History obtained from the patient  Constitutional: negative for weight loss, fever, night sweats  HEENT: negative for hearing loss, earache, congestion, snoring, sorethroat  CV: negative for chest pain, palpitations, edema  Resp: negative for cough, shortness of breath, wheezing  GI: negative for change in bowel habits, abdominal pain, black or bloody stools  : negative for frequency, dysuria, hematuria  GYN: see HPI  MSK: negative for back pain, joint pain, muscle pain  Breast: negative for breast lumps, nipple discharge, galactorrhea  Skin :negative for itching, rash, hives  Neuro: negative for dizziness, headache, confusion, weakness  Psych: negative for anxiety, depression, change in mood  Heme/lymph: negative for bleeding, bruising, pallor    Physical Exam  Visit Vitals    /82    Ht 5' 6\" (1.676 m)    Wt 232 lb (105.2 kg)    LMP 04/09/2018 (Approximate)    BMI 37.45 kg/m2       Constitutional  · Appearance: well-nourished, well developed, alert, in no acute distress    HENT  · Head and Face: appears normal    Neck  · Inspection/Palpation: normal appearance, no masses or tenderness  · Lymph Nodes: no lymphadenopathy present  · Thyroid: gland size normal, nontender, no nodules or masses present on palpation    Chest  · Respiratory Effort: breathing unlabored  · Auscultation: normal breath sounds    Cardiovascular  · Heart:  · Auscultation: regular rate and rhythm without murmur    Breasts  · Inspection of Breasts: breasts symmetrical, see image, no discharge present, nipple appearance normal, no skin retraction present    ·   ·   · Palpation of Breasts and Axillae: no masses present on palpation, no breast tenderness  · Axillary Lymph Nodes: no lymphadenopathy present    Gastrointestinal  · Abdominal Examination: abdomen non-tender to palpation, normal bowel sounds, no masses present  · Liver and spleen: no hepatomegaly present, spleen not palpable  · Hernias: no hernias identified    Genitourinary  · External Genitalia: normal appearance for age, no discharge present, no tenderness present, no inflammatory lesions present, no masses present  · Vagina: normal vaginal vault without central or paravaginal defects, no discharge present, no inflammatory lesions present, no masses present  · Bladder: non-tender to palpation  · Urethra: appears normal  · Cervix: normal   · Uterus: normal size, shape and consistency  · Adnexa: no adnexal tenderness present, no adnexal masses present  · Perineum: perineum within normal limits, no evidence of trauma, no rashes or skin lesions present  · Anus: anus within normal limits, no hemorrhoids present  · Inguinal Lymph Nodes: no lymphadenopathy present    Skin  · General Inspection: no rash, no lesions identified    Neurologic/Psychiatric  · Mental Status:  · Orientation: grossly oriented to person, place and time  · Mood and Affect: mood normal, affect appropriate    Assessment:  30 y.o.  for well woman exam  Encounter Diagnoses   Name Primary?  Encounter for gynecological examination (general) (routine) with abnormal findings Yes    Excessive bleeding in premenopausal period        Plan:  The patient was counseled about diet, exercise, healthy lifestyle  We discussed self breast exam  We discussed safer sex practices, condom use and risk factors for sexually transmitted diseases. We discussed current pap smear and HR HPV testing guidelines. We recommend follow up one year for routine annual gynecologic exam or sooner prn  We recommend routine follow up with her primary care doctor for management of chronic medical problems and non-gynecologic concerns  Handouts were given to the patient  We discussed calcium/vitamin D/weight bearing exercise and osteoporosis prevention  Warm compress to breast skin, fu if NI  OCP h/o  We discussed progesterone only and non hormonal options for contraception including but not limited to condoms, IUDs, Nexplanon, and depo provera. FU 2-4 wks if NI in skin changes  Pt wants to restart mircette.  Did well in the past    Folllow up:  [x] return for annual well woman exam in one year or sooner if she is having problems  [] follow up and ultrasound  [] 6 months  [] 3 months  [] 6 weeks   [] 1 month    Orders Placed This Encounter    AMB POC URINE PREGNANCY TEST, VISUAL COLOR COMPARISON    desog-e.estradiol/e.estradiol (MIRCETTE, 28,) 0.15-0.02 mgx21 /0.01 mg x 5 tab       Results for orders placed or performed in visit on 18   AMB POC URINE PREGNANCY TEST, VISUAL COLOR COMPARISON   Result Value Ref Range    VALID INTERNAL CONTROL POC Yes     HCG urine, Ql. (POC) Negative Negative

## 2018-05-03 NOTE — MR AVS SNAPSHOT
[address] [facility] [address] [facility] 
[phone] Patient: Melissa Miller MRN: [id number] :1980 Visit Information Date & Time Provider Department Dept. Phone Encounter #  
 5/3/2018  7:50 AM Ben Bhakta MD [facility] Galan [phone] [id number] Your Appointments 5/15/2018 11:00 AM  
ROUTINE CARE with Leandro Davis MD  
[facility] Appt Note: Pt has possible IBS?  
 [facility] [address] [id number]  
[address] [facility] [address] [facility] Upcoming Health Maintenance Date Due Influenza Age 5 to Adult 2018 PAP AKA CERVICAL CYTOLOGY 2020 Allergies as of 5/3/2018  Review Complete On: 5/3/2018 By: Mic Kendall LPN No Known Allergies Current Immunizations  Reviewed on 2012 Name Date Influenza Vaccine Split 2012 Not reviewed this visit Vitals BP Height(growth percentile) Weight(growth percentile) LMP BMI OB Status 118/82 5' 6\" (1.676 m) 232 lb (105.2 kg) 2018 (Approximate) 37.45 kg/m2 Having regular periods Smoking Status Never Smoker BMI and BSA Data Body Mass Index Body Surface Area  
 37.45 kg/m 2 2.21 m 2 Preferred Pharmacy Pharmacy Name Phone [facility] [address] [place] [address], [address] [place] [address] [phone] Your Updated Medication List  
  
   
This list is accurate as of 5/3/18  8:08 AM.  Always use your most recent med list.  
  
  
  
  
 cetirizine 10 mg tablet Commonly known as:  ZYRTEC Take 1 Tab by mouth daily. CO Q-10 10 mg Cap Generic drug:  coenzyme q10 Take 100 mg by mouth daily. dextroamphetamine-amphetamine 20 mg tablet Commonly known as:  ADDERALL  
  
 folic acid 081 mcg tablet Take 400 mcg by mouth daily. gabapentin 300 mg capsule Commonly known as:  NEURONTIN  
TAKE 1 CAPSULE BY MOUTH IN THE MORNING AND TAKE 2 CAPSULES IN THE EVENING  
  
 bxnmsvg-jjmvfeunx-tupayyxuodld -325 mg capsule Commonly known as:  Zadie Coombe Take 1-2 capsules at HA onset and repeat with 1 capsule every hour until HA is resolved or if taken 5 capsules in 24 hours  
  
 magnesium oxide 400 mg tablet Commonly known as:  MAG-OX Take 400 mg by mouth daily. onabotulinumtoxinA 200 unit injection Commonly known as:  BOTOX Inject 200 units to 31 fda approved sites face and neck every 12 weeks  
  
 pantoprazole 40 mg tablet Commonly known as:  PROTONIX Take 1 Tab by mouth daily. Indications: gastroesophageal reflux disease Patient Instructions Well Visit, Ages 25 to 48: Care Instructions Your Care Instructions Physical exams can help you stay healthy. Your doctor has checked your overall health and may have suggested ways to take good care of yourself. He or she also may have recommended tests. At home, you can help prevent illness with healthy eating, regular exercise, and other steps. Follow-up care is a key part of your treatment and safety. Be sure to make and go to all appointments, and call your doctor if you are having problems. It's also a good idea to know your test results and keep a list of the medicines you take. How can you care for yourself at home? · Reach and stay at a healthy weight. This will lower your risk for many problems, such as obesity, diabetes, heart disease, and high blood pressure. · Get at least 30 minutes of physical activity on most days of the week. Walking is a good choice. You also may want to do other activities, such as running, swimming, cycling, or playing tennis or team sports. Discuss any changes in your exercise program with your doctor. · Do not smoke or allow others to smoke around you.  If you need help quitting, talk to your doctor about stop-smoking programs and medicines. These can increase your chances of quitting for good. · Talk to your doctor about whether you have any risk factors for sexually transmitted infections (STIs). Having one sex partner (who does not have STIs and does not have sex with anyone else) is a good way to avoid these infections. · Use birth control if you do not want to have children at this time. Talk with your doctor about the choices available and what might be best for you. · Protect your skin from too much sun. When you're outdoors from 10 a.m. to 4 p.m., stay in the shade or cover up with clothing and a hat with a wide brim. Wear sunglasses that block UV rays. Even when it's cloudy, put broad-spectrum sunscreen (SPF 30 or higher) on any exposed skin. · See a dentist one or two times a year for checkups and to have your teeth cleaned. · Wear a seat belt in the car. · Drink alcohol in moderation, if at all. That means no more than 2 drinks a day for men and 1 drink a day for women. Follow your doctor's advice about when to have certain tests. These tests can spot problems early. For everyone · Cholesterol. Have the fat (cholesterol) in your blood tested after age 21. Your doctor will tell you how often to have this done based on your age, family history, or other things that can increase your risk for heart disease. · Blood pressure. Have your blood pressure checked during a routine doctor visit. Your doctor will tell you how often to check your blood pressure based on your age, your blood pressure results, and other factors. · Vision. Talk with your doctor about how often to have a glaucoma test. 
· Diabetes. Ask your doctor whether you should have tests for diabetes. · Colon cancer. Have a test for colon cancer at age 48. You may have one of several tests.  If you are younger than 48, you may need a test earlier if you have any risk factors. Risk factors include whether you already had a precancerous polyp removed from your colon or whether your parent, brother, sister, or child has had colon cancer. For women · Breast exam and mammogram. Talk to your doctor about when you should have a clinical breast exam and a mammogram. Medical experts differ on whether and how often women under 50 should have these tests. Your doctor can help you decide what is right for you. · Pap test and pelvic exam. Begin Pap tests at age 24. A Pap test is the best way to find cervical cancer. The test often is part of a pelvic exam. Ask how often to have this test. 
· Tests for sexually transmitted infections (STIs). Ask whether you should have tests for STIs. You may be at risk if you have sex with more than one person, especially if your partners do not wear condoms. For men · Tests for sexually transmitted infections (STIs). Ask whether you should have tests for STIs. You may be at risk if you have sex with more than one person, especially if you do not wear a condom. · Testicular cancer exam. Ask your doctor whether you should check your testicles regularly. · Prostate exam. Talk to your doctor about whether you should have a blood test (called a PSA test) for prostate cancer. Experts differ on whether and when men should have this test. Some experts suggest it if you are older than 39 and are -American or have a father or brother who got prostate cancer when he was younger than 72. When should you call for help? Watch closely for changes in your health, and be sure to contact your doctor if you have any problems or symptoms that concern you. Where can you learn more? Go to http://kori-estella.info/. Enter P072 in the search box to learn more about \"Well Visit, Ages 25 to 48: Care Instructions. \" Current as of: May 12, 2017 Content Version: 11.4 © 6635-9592 [employer]wise, Incorporated. Care instructions adapted under license by [employer] (which disclaims liability or warranty for this information). If you have questions about a medical condition or this instruction, always ask your healthcare professional. [address] any warranty or liability for your use of this information. Introducing [facility]! Dear Tommie Mayorga: Thank you for requesting a Beta Dash account. Our records indicate that you already have an active Beta Dash account. You can access your account anytime at [email or web address]". [employer]/"[employer]" Did you know that you can access your hospital and ER discharge instructions at any time in Beta Dash? You can also review all of your test results from your hospital stay or ER visit. Additional Information If you have questions, please visit the Frequently Asked Questions section of the Beta Dash website at [email or web address]"/. Remember, Beta Dash is NOT to be used for urgent needs. For medical emergencies, dial 911. Now available from your iPhone and Android! Please provide this summary of care documentation to your next provider. Your primary care clinician is listed as Ruth Ballard. If you have any questions after today's visit, please call [phone].

## 2018-05-15 ENCOUNTER — OFFICE VISIT (OUTPATIENT)
Dept: INTERNAL MEDICINE CLINIC | Age: 38
End: 2018-05-15

## 2018-05-15 VITALS
RESPIRATION RATE: 16 BRPM | OXYGEN SATURATION: 100 % | TEMPERATURE: 99.1 F | SYSTOLIC BLOOD PRESSURE: 138 MMHG | HEIGHT: 66 IN | WEIGHT: 233.6 LBS | HEART RATE: 85 BPM | DIASTOLIC BLOOD PRESSURE: 86 MMHG | BODY MASS INDEX: 37.54 KG/M2

## 2018-05-15 DIAGNOSIS — G43.909 MIGRAINE WITHOUT STATUS MIGRAINOSUS, NOT INTRACTABLE, UNSPECIFIED MIGRAINE TYPE: ICD-10-CM

## 2018-05-15 DIAGNOSIS — K58.0 IRRITABLE BOWEL SYNDROME WITH DIARRHEA: Primary | ICD-10-CM

## 2018-05-15 DIAGNOSIS — R51.9 CHRONIC DAILY HEADACHE: ICD-10-CM

## 2018-05-15 NOTE — PROGRESS NOTES
Reviewed record in preparation for visit and have obtained necessary documentation. Identified pt with two pt identifiers(name and ). Chief Complaint   Patient presents with    Irritable Bowel Syndrome     wants to discuss       Health Maintenance Due   Topic Date Due    DTaP/Tdap/Td  (1 - Tdap) 10/06/2003       Ms. Fior Thurston has a reminder for a \"due or due soon\" health maintenance. I have asked that she discuss this further with her primary care provider for follow-up on this health maintenance. Coordination of Care Questionnaire:  :     1) Have you been to an emergency room, urgent care clinic since your last visit? no   Hospitalized since your last visit? no             2) Have you seen or consulted any other health care providers outside of [facility] since your last visit? no  (Include any pap smears or colon screenings in this section.)    3) In the event something were to happen to you and you were unable to speak on your behalf, do you have an Advance Directive/ Living Will in place stating your wishes? NO    Do you have an Advance Directive on file? no    4) Are you interested in receiving information on Advance Directives? YES    Patient is accompanied by self I have received verbal consent from Meenakshi Suero to discuss any/all medical information while they are present in the room.

## 2018-05-15 NOTE — PROGRESS NOTES
Jonna Bonilla is a 30 y.o. female who presents with concern of stomach symptoms. For the past 2 months in am, BM are liquid jelly type stool. 3 a day. Will get BM urgency. Feels abdominal bloating. Some nausea. No vomiting. Some headaches and dizziness. Reviewed medications only change is restarting Adderall. Stopped soda. No artificial sweeteners. Avoids uncooked veggies, fats except cheese sticks- no side effects from that. Prior probiotics, not now. Brother with crohns. Prior imodium 2 tablets with constipation. Heavy menses, started on OBC by gyn. Started on Adderall 3 weeks ago. Had been on Adderall in the past without side effects. Seeing a counselor. Past Medical History:   Diagnosis Date    Dysplasia of cervix, low grade (ORION 1) 2/23/12    colpo    Encounter for IUD removal 08/19/2016    Mirena    H/O abnormal Pap smear 11/8/12;2/8/12    LGSIL, HPV positive    Headache     Headache(784.0)     IUD (intrauterine device) in place 03/11/2014    Mirena    Migraine headache     without aura    pap smear for 2/1/11;2/5/13; 4/1/15    neg, HPV neg,no ECC; neg, HPV neg, Negative, HPV negative       Family History   Problem Relation Age of Onset    Hypertension Father     Heart Disease Father     Diabetes Father     Elevated Lipids Father     Cancer Father      eye    Cancer Mother      Cervical,Uterine,& Ovarian    Depression Mother     Allergic Rhinitis Brother     Asthma Brother     Other Brother      Chron's    Hypertension Brother     Alcohol abuse Maternal Grandfather     Depression Maternal Grandfather     Alcohol abuse Paternal Grandmother     Stroke Paternal Grandmother     Migraines Brother     Allergic Rhinitis Brother     Breast Cancer Other        Social History     Social History    Marital status: SINGLE     Spouse name: N/A    Number of children: N/A    Years of education: N/A     Occupational History    Not on file.      Social History Main Topics    Smoking status: Never Smoker    Smokeless tobacco: Never Used    Alcohol use Yes      Comment: social    Drug use: No    Sexual activity: Yes     Partners: Male     Birth control/ protection: Condom     Other Topics Concern    Not on file     Social History Narrative       Current Outpatient Prescriptions on File Prior to Visit   Medication Sig Dispense Refill    dextroamphetamine-amphetamine (ADDERALL) 20 mg tablet       desog-e.estradiol/e.estradiol (MIRCETTE, 28,) 0.15-0.02 mgx21 /0.01 mg x 5 tab Take 1 Tab by mouth daily. 3 Package 4    gabapentin (NEURONTIN) 300 mg capsule TAKE 1 CAPSULE BY MOUTH IN THE MORNING AND TAKE 2 CAPSULES IN THE EVENING 90 Cap 2    pantoprazole (PROTONIX) 40 mg tablet Take 1 Tab by mouth daily. Indications: gastroesophageal reflux disease 90 Tab 3    oojgjwd-diipdemhd-ungzgtvzfibw (MIDRIN) -325 mg capsule Take 1-2 capsules at HA onset and repeat with 1 capsule every hour until HA is resolved or if taken 5 capsules in 24 hours 45 Cap 1    cetirizine (ZYRTEC) 10 mg tablet Take 1 Tab by mouth daily. 30 Tab 1    coenzyme q10 (CO Q-10) 10 mg cap Take 100 mg by mouth daily.  magnesium oxide (MAG-OX) 400 mg tablet Take 400 mg by mouth daily.  folic acid 644 mcg tablet Take 400 mcg by mouth daily. No current facility-administered medications on file prior to visit. Review of Systems  Pertinent items are noted in HPI. Objective:     Visit Vitals    /86 (BP 1 Location: Left arm, BP Patient Position: Sitting)    Pulse 85    Temp 99.1 °F (37.3 °C) (Oral)    Resp 16    Ht 5' 6\" (1.676 m)    Wt 233 lb 9.6 oz (106 kg)    LMP 05/08/2018 (Approximate)    SpO2 100%    BMI 37.7 kg/m2     Gen: well appearing female  HEENT:   PERRL,normal conjunctiva. External ear and canals normal, TMs no opacification or erythema,  OP no erythema, no exudates, MMM  Neck:  Supple. Thyroid normal size, nontender, without nodules.  No masses or LAD  Resp:  No wheezing, no rhonchi, no rales. CV:  RRR, normal S1S2, no murmur. GI: soft, nontender, without masses. No hepatosplenomegaly. Extrem:  +2 pulses, no edema, warm distally      Assessment/Plan:       ICD-10-CM ICD-9-CM    1. Irritable bowel syndrome with diarrhea K58.0 564.1 US ABD COMP      REFERRAL TO GASTROENTEROLOGY   discussed dietary changes. Follow-up Disposition:  Return for follow up pending GI evaluation.   Antione Bay MD

## 2018-05-15 NOTE — MR AVS SNAPSHOT
[facility] [address] [facility]. 
[phone] Patient: Gina Linder MRN: [id number] :[id number] Visit Information Date & Time Provider Department Dept. Phone Encounter #  
 5/15/2018 11:00 AM Anne Marie Vega, [address] [place] [address],[address] [phone] [id number] Follow-up Instructions Return for follow up pending GI evaluation. .  
  
Your Appointments 2019  7:50 AM  
ESTABLISHED PATIENT with MD Rasta Jay ([address]) Appt Note: AE  
 [address] [facility] [address] [facility] [id number]  
[facility] 31 [facility] [address] Upcoming Health Maintenance Date Due DTaP/Tdap/Td series (1 - Tdap) 10/6/2003 Influenza Age 5 to Adult 2018 PAP AKA CERVICAL CYTOLOGY 2020 Allergies as of 5/15/2018  Review Complete On: 5/15/2018 By: Isabelle Cano LPN No Known Allergies Current Immunizations  Reviewed on 2012 Name Date Influenza Vaccine Split 2012 Not reviewed this visit You Were Diagnosed With   
  
 Codes Comments Irritable bowel syndrome with diarrhea    -  Primary ICD-10-CM: K58.0 ICD-9-CM: 680.3 Vitals BP Pulse Temp Resp Height(growth percentile) Weight(growth percentile) 138/86 (BP 1 Location: Left arm, BP Patient Position: Sitting) 85 99.1 °F (37.3 °C) (Oral) 16 5' 6\" (1.676 m) 233 lb 9.6 oz (106 kg) LMP SpO2 BMI OB Status Smoking Status 2018 (Approximate) 100% 37.7 kg/m2 Having regular periods Never Smoker BMI and BSA Data Body Mass Index Body Surface Area 37.7 kg/m 2 2.22 m 2 Preferred Pharmacy Pharmacy Name Phone [facility], [address] [place] [address] [phone] Your Updated Medication List  
  
   
 This list is accurate as of 5/15/18 12:09 PM.  Always use your most recent med list.  
  
  
  
  
 cetirizine 10 mg tablet Commonly known as:  ZYRTEC Take 1 Tab by mouth daily. CO Q-10 10 mg Cap Generic drug:  coenzyme q10 Take 100 mg by mouth daily. desog-e.estradiol/e.estradiol 0.15-0.02 mgx21 /0.01 mg x 5 Tab Commonly known as:  MIRCETTE (28) Take 1 Tab by mouth daily. dextroamphetamine-amphetamine 20 mg tablet Commonly known as:  ADDERALL  
  
 folic acid 544 mcg tablet Take 400 mcg by mouth daily. gabapentin 300 mg capsule Commonly known as:  NEURONTIN  
TAKE 1 CAPSULE BY MOUTH IN THE MORNING AND TAKE 2 CAPSULES IN THE EVENING  
  
 efkssjf-gnglydbpz-ajpactfqyquz -325 mg capsule Commonly known as:  Leidy Delilah Take 1-2 capsules at HA onset and repeat with 1 capsule every hour until HA is resolved or if taken 5 capsules in 24 hours  
  
 magnesium oxide 400 mg tablet Commonly known as:  MAG-OX Take 400 mg by mouth daily. onabotulinumtoxinA 200 unit injection Commonly known as:  BOTOX Inject 200 units to 31 fda approved sites face and neck every 12 weeks  
  
 pantoprazole 40 mg tablet Commonly known as:  PROTONIX Take 1 Tab by mouth daily. Indications: gastroesophageal reflux disease We Performed the Following REFERRAL TO GASTROENTEROLOGY [PSC46 Custom] Comments:  
 Ongoing diarrhea. Follow-up Instructions Return for follow up pending GI evaluation. Jenn Bliss To-Do List   
 05/15/2018 Imaging:  US ABD COMP Referral Information Referral ID Referred By Referred To  
  
 [id number] [facility] 305 [facility] 202 [facility] 200 [facility] Visits Status Start Date End Date 1 New Request 5/15/18 5/15/19  If your referral has a status of pending review or denied, additional information will be sent to support the outcome of this decision. Introducing [facility]! Dear Marilu Downing: Thank you for requesting a [employer] account. Our records indicate that you already have an active [employer] account. You can access your account anytime at [email or web address]. [employer] Did you know that you can access your hospital and ER discharge instructions at any time in [employer]? You can also review all of your test results from your hospital stay or ER visit. Additional Information If you have questions, please visit the Frequently Asked Questions section of the [employer] website at [email or web address]/. Remember, [employer] is NOT to be used for urgent needs. For medical emergencies, dial 911. Now available from your iPhone and Android! Please provide this summary of care documentation to your next provider. Your primary care clinician is listed as Odin Reddy. If you have any questions after today's visit, please call [phone].

## 2018-05-21 ENCOUNTER — TELEPHONE (OUTPATIENT)
Dept: NEUROLOGY | Age: 38
End: 2018-05-21

## 2018-05-21 NOTE — TELEPHONE ENCOUNTER
----- Message from Eunice Amador sent at 5/21/2018  2:58 PM EDT -----  Regarding: [facility] Bah/arthur Renae from [facility]  [phone], they need clarification on pt's botox rx that was sent over day

## 2018-05-23 ENCOUNTER — HOSPITAL ENCOUNTER (OUTPATIENT)
Dept: ULTRASOUND IMAGING | Age: 38
Discharge: HOME | End: 2018-05-23
Attending: FAMILY MEDICINE
Payer: COMMERCIAL

## 2018-05-23 DIAGNOSIS — K58.0 IRRITABLE BOWEL SYNDROME WITH DIARRHEA: ICD-10-CM

## 2018-05-23 PROCEDURE — 76700 US EXAM ABDOM COMPLETE: CPT

## 2018-07-11 RX ORDER — GABAPENTIN 300 MG/1
CAPSULE ORAL
Qty: 90 CAP | Refills: 1 | Status: SHIPPED | OUTPATIENT
Start: 2018-07-11 | End: 2018-09-11 | Stop reason: SDUPTHER

## 2018-09-12 RX ORDER — GABAPENTIN 300 MG/1
CAPSULE ORAL
Qty: 90 CAP | Refills: 0 | Status: SHIPPED | OUTPATIENT
Start: 2018-09-12 | End: 2018-10-12 | Stop reason: SDUPTHER

## 2018-10-12 RX ORDER — GABAPENTIN 300 MG/1
CAPSULE ORAL
Qty: 90 CAP | Refills: 0 | Status: SHIPPED | OUTPATIENT
Start: 2018-10-12 | End: 2018-11-30 | Stop reason: SDUPTHER

## 2018-10-22 RX ORDER — PANTOPRAZOLE SODIUM 40 MG/1
TABLET, DELAYED RELEASE ORAL
Qty: 30 TAB | Refills: 2 | Status: SHIPPED | OUTPATIENT
Start: 2018-10-22 | End: 2019-03-04 | Stop reason: SDUPTHER

## 2018-10-23 ENCOUNTER — TELEPHONE (OUTPATIENT)
Dept: INTERNAL MEDICINE CLINIC | Age: 38
End: 2018-10-23

## 2018-10-23 NOTE — TELEPHONE ENCOUNTER
Spoke with patient. Two pt identifiers confirmed. Patient states that for the past 2 weeks she has been having a lot of issues with nausea, dizzy spells and blurred vision. Patient states that due to some issues that came up, she never went to see the GI doctor. Advised patient that she should be seen in our office first, may need to have labs drawn to check her sugar. Patient offered an appointment with Dr. Javi Jin on 10/24/18 at 8:45am  Patient accepted. Patient advised if anything changes or if unable to keep this appointment to call the office  Pt verbalized understanding of information discussed w/ no further questions at this time.

## 2018-10-23 NOTE — TELEPHONE ENCOUNTER
Patient states she needs a call back in reference to stomach issues, nausea & lightheadedness. Patient states she needs to be advised does she see Dr. Estrellita Joyce or should she see Jadyn Ruth. Please call to advise.  Thank you

## 2018-10-24 ENCOUNTER — HOSPITAL ENCOUNTER (OUTPATIENT)
Dept: MRI IMAGING | Age: 38
Discharge: HOME | End: 2018-10-24
Attending: INTERNAL MEDICINE
Payer: COMMERCIAL

## 2018-10-24 ENCOUNTER — OFFICE VISIT (OUTPATIENT)
Dept: INTERNAL MEDICINE CLINIC | Age: 38
End: 2018-10-24

## 2018-10-24 VITALS
SYSTOLIC BLOOD PRESSURE: 153 MMHG | WEIGHT: 227 LBS | BODY MASS INDEX: 36.48 KG/M2 | RESPIRATION RATE: 16 BRPM | DIASTOLIC BLOOD PRESSURE: 97 MMHG | HEART RATE: 105 BPM | OXYGEN SATURATION: 100 % | TEMPERATURE: 97.9 F | HEIGHT: 66 IN

## 2018-10-24 DIAGNOSIS — R42 DIZZINESS: ICD-10-CM

## 2018-10-24 DIAGNOSIS — K21.9 GASTROESOPHAGEAL REFLUX DISEASE, ESOPHAGITIS PRESENCE NOT SPECIFIED: ICD-10-CM

## 2018-10-24 DIAGNOSIS — R51.9 CHRONIC DAILY HEADACHE: ICD-10-CM

## 2018-10-24 DIAGNOSIS — R11.0 NAUSEA: ICD-10-CM

## 2018-10-24 DIAGNOSIS — R51.9 CHRONIC DAILY HEADACHE: Primary | ICD-10-CM

## 2018-10-24 DIAGNOSIS — K58.0 IRRITABLE BOWEL SYNDROME WITH DIARRHEA: ICD-10-CM

## 2018-10-24 DIAGNOSIS — R03.0 ELEVATED BP WITHOUT DIAGNOSIS OF HYPERTENSION: ICD-10-CM

## 2018-10-24 PROBLEM — E66.01 SEVERE OBESITY (HCC): Status: ACTIVE | Noted: 2018-10-24

## 2018-10-24 PROCEDURE — 70551 MRI BRAIN STEM W/O DYE: CPT

## 2018-10-24 RX ORDER — BUTALBITAL, ACETAMINOPHEN AND CAFFEINE 50; 325; 40 MG/1; MG/1; MG/1
1 TABLET ORAL
Qty: 15 TAB | Refills: 0 | Status: SHIPPED | OUTPATIENT
Start: 2018-10-24 | End: 2019-03-25 | Stop reason: SDUPTHER

## 2018-10-24 RX ORDER — METHYLPREDNISOLONE 4 MG/1
TABLET ORAL
Qty: 1 DOSE PACK | Refills: 0 | Status: SHIPPED | OUTPATIENT
Start: 2018-10-24 | End: 2018-11-06 | Stop reason: ALTCHOICE

## 2018-10-24 RX ORDER — ONDANSETRON 4 MG/1
4 TABLET, ORALLY DISINTEGRATING ORAL
Qty: 20 TAB | Refills: 0 | Status: SHIPPED | OUTPATIENT
Start: 2018-10-24 | End: 2019-03-04 | Stop reason: SDUPTHER

## 2018-10-24 NOTE — PATIENT INSTRUCTIONS
Increase protonix to 2 times per day Labs today MRI head today Medrol dose back See GI and neurology ED for worsening symptoms

## 2018-10-24 NOTE — LETTER
10/25/2018 8:12 AM 
 
Ms. Latisha Vicente [facility] [facility] [address] Dear Latisha Vicente: Please find your most recent results below. Resulted Orders METABOLIC PANEL, COMPREHENSIVE Result Value Ref Range Glucose 92 65 - 99 mg/dL BUN 17 6 - 20 mg/dL Creatinine 0.79 0.57 - 1.00 mg/dL GFR est non-AA 97 >59 mL/min/1.73 GFR est  >59 mL/min/1.73  
 BUN/Creatinine ratio 22 9 - 23 Sodium 141 134 - 144 mmol/L Potassium 5.0 3.5 - 5.2 mmol/L Chloride 101 96 - 106 mmol/L  
 CO2 20 20 - 29 mmol/L Calcium 9.7 8.7 - 10.2 mg/dL Protein, total 6.9 6.0 - 8.5 g/dL Albumin 4.8 3.5 - 5.5 g/dL GLOBULIN, TOTAL 2.1 1.5 - 4.5 g/dL A-G Ratio 2.3 (H) 1.2 - 2.2 Bilirubin, total 0.3 0.0 - 1.2 mg/dL Alk. phosphatase 62 39 - 117 IU/L  
 AST (SGOT) 20 0 - 40 IU/L  
 ALT (SGPT) 26 0 - 32 IU/L Narrative Performed at:  [address]  [id number] : Suki Lovelace MD, Phone:  [phone] CBC W/O DIFF Result Value Ref Range WBC 8.3 3.4 - 10.8 x10E3/uL  
 RBC 5.11 3.77 - 5.28 x10E6/uL HGB 14.4 11.1 - 15.9 g/dL HCT 43.5 34.0 - 46.6 % MCV 85 79 - 97 fL  
 MCH 28.2 26.6 - 33.0 pg  
 MCHC 33.1 31.5 - 35.7 g/dL  
 RDW 13.6 12.3 - 15.4 % PLATELET 569 451 - 018 x10E3/uL Narrative Performed at:  [address]  [id number] : Suki Lovelace MD, Phone:  [phone] TSH 3RD GENERATION Result Value Ref Range TSH 2.020 0.450 - 4.500 uIU/mL Narrative Performed at:  [address]  [id number] : Suki Lovelace MD, Phone:  [phone] HEMOGLOBIN A1C WITH EAG Result Value Ref Range Hemoglobin A1c 5.4 4.8 - 5.6 % Comment:  
            Prediabetes: 5.7 - 6.4 Diabetes: >6.4 Glycemic control for adults with diabetes: <7.0 Estimated average glucose 108 mg/dL Narrative Performed at:  [address]  [id number] : Wilian Plata MD, Phone:  [phone] RECOMMENDATIONS: 
None. Keep up the good work! Please call me if you have any questions: [phone] Sincerely, 
 
 
Zaira Whiteside MD

## 2018-10-24 NOTE — PROGRESS NOTES
HISTORY OF PRESENT ILLNESS Dolly Martinez is a 41 y.o. female. HPI Pt normally follows with Dr. Raúl Ma (PCP). Pt is here for acute care. Pt c/o nausea, dizziness/lightheadedness x Monday Describes the lightheadedness as being more off-balance than spinning Sx started with nausea, which started about month ago Pt states she has been getting really bad heartburn She then gets nauseous, dizzy, blurred vision, tingling down her R arm She has also had some HAs Pt has a h/o migraines, used to follow with Dr Lois Navarro who she has not seen recently Reviewed notes from [facility] Maurice from 8/29/17: Chronic headaches with migraines escalating and lasting all day everyday. She has tried and failed  Multiple AED's, SSRI's and beta blockers and does qualify with getting botox. Has been battling these headaches for years and never getting better. She will increase her gabapentin to 300 mg in AM And 600 mg at night to see if this helps. Also refer to botox therapy as this is FDA approved for headaches treatment and she will benefit from this as everything else has failed. We will give prednisone to break the headache cycle and try to come off all analgesics. Discussed overuse headache. Try midrin for abortive therapy as all triptans make her feel bad and really do not help. Gave her a migriane packet for information. Call with any changes. MRI of the brain to evaluate specifically for causes of her changing headaches and symptoms including stroke, tumors, lesions, masses. Pt tried pamelor, topamax, and inderal, and is currently on gabapentin Pt has been taking tylenol, which does not help Reviewed MRI 10/17: No intracranial mass, hemorrhage or evidence of acute infarction. Cerebellar tonsillar ectopia borderline Chiari. Solitary focus of increased T2 signal intensity in the cerebral white matter of the right frontal lobe.  Nonspecific finding may be related to chronic headaches, broad differential.  
 Pt saw GI a couple of months ago and was recommended to do an EGD and COLO Pt takes protonix for heartburn, states it has not been helping as much recently Advised following up with neuro and GI Will provide zofran for nausea Will provide medrol dosepack Will provide fioricet Ordered brain MRI Advised her to take protonix BID for now Discussed ED for progressive sx BP was 153/97, will repeat this today Pt states she has had high BP before but not regularly Will have her monitor her BP Ordered labs Patient Active Problem List  
 Diagnosis Date Noted  Irritable bowel syndrome with diarrhea 11/13/2017  Gastroesophageal reflux disease 11/13/2017  Obesity (BMI 30-39.9) 08/14/2017  GERD (gastroesophageal reflux disease) 08/14/2017  Menorrhagia with regular cycle 05/02/2017  History of kidney stones 09/18/2015  Weight gain 09/18/2015  H/O abnormal Pap smear 11/08/2012  Chronic daily headache 01/17/2012 Current Outpatient Medications Medication Sig Dispense Refill  pantoprazole (PROTONIX) 40 mg tablet TAKE ONE TABLET BY MOUTH DAILY FOR REFLUX 30 Tab 2  
 gabapentin (NEURONTIN) 300 mg capsule TAKE ONE CAPSULE BY MOUTH EVERY MORNING,  AND TAKE TWO CAPSULES BY MOUTH EVERY EVENING 90 Cap 0  
 dextroamphetamine-amphetamine (ADDERALL) 20 mg tablet Take 20 mg by mouth daily.  mkdtmyk-kchhmxlsw-skgsihqxqweo (MIDRIN) -325 mg capsule Take 1-2 capsules at HA onset and repeat with 1 capsule every hour until HA is resolved or if taken 5 capsules in 24 hours 45 Cap 1  cetirizine (ZYRTEC) 10 mg tablet Take 1 Tab by mouth daily. 30 Tab 1  
 onabotulinumtoxinA (BOTOX) 200 unit injection Inject 200 units to 31 fda approved sites face and neck every 12 weeks 
  200 Units 5  
 desog-e.estradiol/e.estradiol (MIRCETTE, 28,) 0.15-0.02 mgx21 /0.01 mg x 5 tab Take 1 Tab by mouth daily. 3 Package 4  coenzyme q10 (CO Q-10) 10 mg cap Take 100 mg by mouth daily.  magnesium oxide (MAG-OX) 400 mg tablet Take 400 mg by mouth daily.  folic acid 920 mcg tablet Take 400 mcg by mouth daily. Past Surgical History:  
Procedure Laterality Date  HX COLPOSCOPY  2/23/12 ORION 1  
 HX HEENT    
 wisdom teeth  HX LITHOTRIPSY  HX WISDOM TEETH EXTRACTION Lab Results Component Value Date/Time WBC 8.5 08/14/2017 03:27 PM  
 HGB 14.1 08/14/2017 03:27 PM  
 HCT 42.0 08/14/2017 03:27 PM  
 PLATELET 370 47/76/9553 03:27 PM  
 MCV 89 08/14/2017 03:27 PM  
 
Lab Results Component Value Date/Time Cholesterol, total 126 11/13/2017 09:38 AM  
 HDL Cholesterol 37 (L) 11/13/2017 09:38 AM  
 LDL, calculated 70 11/13/2017 09:38 AM  
 LDL-C, External 65 09/30/2014 Triglyceride 94 11/13/2017 09:38 AM  
 
Lab Results Component Value Date/Time GFR est non- 08/14/2017 03:27 PM  
 GFR est  08/14/2017 03:27 PM  
 Creatinine 0.66 08/14/2017 03:27 PM  
 BUN 13 08/14/2017 03:27 PM  
 Sodium 140 08/14/2017 03:27 PM  
 Potassium 4.8 08/14/2017 03:27 PM  
 Chloride 99 08/14/2017 03:27 PM  
 CO2 23 08/14/2017 03:27 PM  
  
Review of Systems Constitutional: Negative for chills and fever. Eyes: Positive for blurred vision. Respiratory: Negative for shortness of breath. Cardiovascular: Negative for chest pain. Gastrointestinal: Positive for nausea. Negative for vomiting. Neurological: Positive for dizziness, tingling and headaches. Negative for focal weakness and loss of consciousness. Physical Exam  
Constitutional: She is oriented to person, place, and time. She appears well-developed and well-nourished. No distress. HENT:  
Head: Normocephalic and atraumatic. Mouth/Throat: Oropharynx is clear and moist. No oropharyngeal exudate. Eyes: Conjunctivae and EOM are normal. Pupils are equal, round, and reactive to light. Right eye exhibits no discharge. Left eye exhibits no discharge. Neck: Normal range of motion. Neck supple. Cardiovascular: Normal rate, regular rhythm, normal heart sounds and intact distal pulses. Exam reveals no gallop and no friction rub. No murmur heard. Pulmonary/Chest: Effort normal and breath sounds normal. No respiratory distress. She has no wheezes. She has no rales. She exhibits no tenderness. Musculoskeletal: Normal range of motion. She exhibits no edema, tenderness or deformity. 5/5 strength BLUE, equal on both sides Lymphadenopathy:  
  She has no cervical adenopathy. Neurological: She is alert and oriented to person, place, and time. Coordination normal.  
Cranial nerves 2-12 grossly intact Finger to nose normal BL Rapid alternating hand movements normal BL Skin: Skin is warm and dry. No rash noted. She is not diaphoretic. No erythema. No pallor. Psychiatric: She has a normal mood and affect. Her behavior is normal.  
 
 
ASSESSMENT and PLAN 
  ICD-10-CM ICD-9-CM 1. Chronic daily headache Pt has long h/o difficult to control HAs, last eval by neuro a year ago and had an MRI a year ago, she is now having progressive sx, her gabapentin is not controlling her HAs, she is having new arm numbness as well so will repeat stat MRI today, will have her f/u with neuro to discuss tx plan moving forward, will be checking basic labs as well, will treat with medrol dosepack, fioricet prn for pain, zofran for nausea, ER for progressive sx R51 784.0 MRI BRAIN W WO CONT  
   METABOLIC PANEL, COMPREHENSIVE  
   CBC W/O DIFF  
   TSH 3RD GENERATION  
   HEMOGLOBIN A1C WITH EAG  
2. Gastroesophageal reflux disease, esophagitis presence not specified Increase protonix to BID dosing as she is having progressive GERD sx, she is supposed to have an EGD and COLO d/t her chronic GERD and IBS sx but she never followed through with this, advised f/u with GI 
 Q30.0 004.35 METABOLIC PANEL, COMPREHENSIVE  
   CBC W/O DIFF  
   TSH 3RD GENERATION  
   HEMOGLOBIN A1C WITH EAG  
 3. Irritable bowel syndrome with diarrhea F/u with GI 
 W75.8 255.1 METABOLIC PANEL, COMPREHENSIVE  
   CBC W/O DIFF  
   TSH 3RD GENERATION  
   HEMOGLOBIN A1C WITH EAG 4. Dizziness Pt's dizziness is primarily lightheaded, with a little spinning, will be getting MRI today, of note BP is mildly elevated because she is feeling poorly, generally has been controlled at home, will have her monitor her BP at home and f/u with her PCP to determine if additional tx is needed R42 780.4 MRI BRAIN W WO CONT  
   METABOLIC PANEL, COMPREHENSIVE  
   CBC W/O DIFF  
   TSH 3RD GENERATION  
   HEMOGLOBIN A1C WITH EAG  
5. Nausea 
 
zofran prn 
 E61.2 908.92 METABOLIC PANEL, COMPREHENSIVE  
   CBC W/O DIFF  
   TSH 3RD GENERATION  
   HEMOGLOBIN A1C WITH EAG 6. Elevated BP without diagnosis of hypertension See above, no medication for now, up because she is feeling poorly N63.9 783.2 METABOLIC PANEL, COMPREHENSIVE  
   CBC W/O DIFF  
   TSH 3RD GENERATION  
   HEMOGLOBIN A1C WITH EAG Scribed by Tavo Reese of [facility], as dictated by Dr. Hortensia Ma. Current diagnosis and concerns discussed with pt at length. Pt understands risks and benefits or current treatment plan and medications, and accepts the treatment and medication with any possible risks. Pt asks appropriate questions, which were answered. Pt was instructed to call with any concerns or problems. I have reviewed the note documented by the scribe. The services provided are my own. The documentation is accurate This note will not be viewable in [employer].

## 2018-10-24 NOTE — PROGRESS NOTES
Let her know there is no stroke on mri    Chronic findings unchanged--has a cyst that is unchanged  Needs to f/u with neurology as discussed  to review if this needs monitoring and to address HA further

## 2018-10-25 LAB
ALBUMIN SERPL-MCNC: 4.8 G/DL (ref 3.5–5.5)
ALBUMIN/GLOB SERPL: 2.3 {RATIO} (ref 1.2–2.2)
ALP SERPL-CCNC: 62 IU/L (ref 39–117)
ALT SERPL-CCNC: 26 IU/L (ref 0–32)
AST SERPL-CCNC: 20 IU/L (ref 0–40)
BILIRUB SERPL-MCNC: 0.3 MG/DL (ref 0–1.2)
BUN SERPL-MCNC: 17 MG/DL (ref 6–20)
BUN/CREAT SERPL: 22 (ref 9–23)
CALCIUM SERPL-MCNC: 9.7 MG/DL (ref 8.7–10.2)
CHLORIDE SERPL-SCNC: 101 MMOL/L (ref 96–106)
CO2 SERPL-SCNC: 20 MMOL/L (ref 20–29)
CREAT SERPL-MCNC: 0.79 MG/DL (ref 0.57–1)
ERYTHROCYTE [DISTWIDTH] IN BLOOD BY AUTOMATED COUNT: 13.6 % (ref 12.3–15.4)
EST. AVERAGE GLUCOSE BLD GHB EST-MCNC: 108 MG/DL
GLOBULIN SER CALC-MCNC: 2.1 G/DL (ref 1.5–4.5)
GLUCOSE SERPL-MCNC: 92 MG/DL (ref 65–99)
HBA1C MFR BLD: 5.4 % (ref 4.8–5.6)
HCT VFR BLD AUTO: 43.5 % (ref 34–46.6)
HGB BLD-MCNC: 14.4 G/DL (ref 11.1–15.9)
MCH RBC QN AUTO: 28.2 PG (ref 26.6–33)
MCHC RBC AUTO-ENTMCNC: 33.1 G/DL (ref 31.5–35.7)
MCV RBC AUTO: 85 FL (ref 79–97)
PLATELET # BLD AUTO: 312 X10E3/UL (ref 150–379)
POTASSIUM SERPL-SCNC: 5 MMOL/L (ref 3.5–5.2)
PROT SERPL-MCNC: 6.9 G/DL (ref 6–8.5)
RBC # BLD AUTO: 5.11 X10E6/UL (ref 3.77–5.28)
SODIUM SERPL-SCNC: 141 MMOL/L (ref 134–144)
TSH SERPL DL<=0.005 MIU/L-ACNC: 2.02 UIU/ML (ref 0.45–4.5)
WBC # BLD AUTO: 8.3 X10E3/UL (ref 3.4–10.8)

## 2018-10-25 NOTE — PROGRESS NOTES
Called, spoke to pt. Two pt identifiers confirmed. Pt informed per Dr. Freddy Alfred there is no stroke on mri. Pt informed per Dr. Freddy Alfred chronic findings unchanged--has a cyst that is unchanged. Pt informed per Dr. Freddy Alfred needs to f/u with neurology as discussed  to review if this needs monitoring and to address HA further. Pt verbalized understanding of information discussed w/ no further questions at this time.

## 2018-11-06 ENCOUNTER — OFFICE VISIT (OUTPATIENT)
Dept: NEUROLOGY | Age: 38
End: 2018-11-06

## 2018-11-06 VITALS
BODY MASS INDEX: 36.48 KG/M2 | OXYGEN SATURATION: 95 % | DIASTOLIC BLOOD PRESSURE: 90 MMHG | RESPIRATION RATE: 20 BRPM | SYSTOLIC BLOOD PRESSURE: 142 MMHG | WEIGHT: 227 LBS | HEIGHT: 66 IN | HEART RATE: 94 BPM

## 2018-11-06 DIAGNOSIS — R51.9 CHRONIC DAILY HEADACHE: ICD-10-CM

## 2018-11-06 DIAGNOSIS — T39.95XA ANALGESIC REBOUND HEADACHE: ICD-10-CM

## 2018-11-06 DIAGNOSIS — E34.8 PINEAL GLAND CYST: ICD-10-CM

## 2018-11-06 DIAGNOSIS — G43.909 MIGRAINE WITHOUT STATUS MIGRAINOSUS, NOT INTRACTABLE, UNSPECIFIED MIGRAINE TYPE: Primary | ICD-10-CM

## 2018-11-06 DIAGNOSIS — G44.40 ANALGESIC REBOUND HEADACHE: ICD-10-CM

## 2018-11-06 NOTE — PROGRESS NOTES
Migraines- more frequent, blurred vision, dizzy and lightheaded  Left arm goes numb- started about 2 months ago goes limp, maybe a little tingling   Smells and lights really bother her   She can already have a migraine and then if she has another trigger come up then the migraine gets really bad   At night the lights from the other cars really bother   Neck can get really sore   Right side it feels like she has a lot of built up pressure just on the right side   Nausea with the headaches which is something that she used to never had with the migraines  She has to try to stick to a schedule like when she gets up in the morning   The next day her eyes are very sore from the residual headaches the next day     Still interested in doing the botox   The migraines are very debilitating anymore

## 2018-11-06 NOTE — PATIENT INSTRUCTIONS
A Healthy Lifestyle: Care Instructions  Your Care Instructions    A healthy lifestyle can help you feel good, stay at a healthy weight, and have plenty of energy for both work and play. A healthy lifestyle is something you can share with your whole family. A healthy lifestyle also can lower your risk for serious health problems, such as high blood pressure, heart disease, and diabetes. You can follow a few steps listed below to improve your health and the health of your family. Follow-up care is a key part of your treatment and safety. Be sure to make and go to all appointments, and call your doctor if you are having problems. It's also a good idea to know your test results and keep a list of the medicines you take. How can you care for yourself at home? · Do not eat too much sugar, fat, or fast foods. You can still have dessert and treats now and then. The goal is moderation. · Start small to improve your eating habits. Pay attention to portion sizes, drink less juice and soda pop, and eat more fruits and vegetables. ? Eat a healthy amount of food. A 3-ounce serving of meat, for example, is about the size of a deck of cards. Fill the rest of your plate with vegetables and whole grains. ? Limit the amount of soda and sports drinks you have every day. Drink more water when you are thirsty. ? Eat at least 5 servings of fruits and vegetables every day. It may seem like a lot, but it is not hard to reach this goal. A serving or helping is 1 piece of fruit, 1 cup of vegetables, or 2 cups of leafy, raw vegetables. Have an apple or some carrot sticks as an afternoon snack instead of a candy bar. Try to have fruits and/or vegetables at every meal.  · Make exercise part of your daily routine. You may want to start with simple activities, such as walking, bicycling, or slow swimming. Try to be active 30 to 60 minutes every day. You do not need to do all 30 to 60 minutes all at once.  For example, you can exercise 3 times a day for 10 or 20 minutes. Moderate exercise is safe for most people, but it is always a good idea to talk to your doctor before starting an exercise program.  · Keep moving. Alisha Arthur the lawn, work in the garden, or [employer]. Take the stairs instead of the elevator at work. · If you smoke, quit. People who smoke have an increased risk for heart attack, stroke, cancer, and other lung illnesses. Quitting is hard, but there are ways to boost your chance of quitting tobacco for good. ? Use nicotine gum, patches, or lozenges. ? Ask your doctor about stop-smoking programs and medicines. ? Keep trying. In addition to reducing your risk of diseases in the future, you will notice some benefits soon after you stop using tobacco. If you have shortness of breath or asthma symptoms, they will likely get better within a few weeks after you quit. · Limit how much alcohol you drink. Moderate amounts of alcohol (up to 2 drinks a day for men, 1 drink a day for women) are okay. But drinking too much can lead to liver problems, high blood pressure, and other health problems. Family health  If you have a family, there are many things you can do together to improve your health. · Eat meals together as a family as often as possible. · Eat healthy foods. This includes fruits, vegetables, lean meats and dairy, and whole grains. · Include your family in your fitness plan. Most people think of activities such as jogging or tennis as the way to fitness, but there are many ways you and your family can be more active. Anything that makes you breathe hard and gets your heart pumping is exercise. Here are some tips:  ? Walk to do errands or to take your child to school or the bus.  ? Go for a family bike ride after dinner instead of watching TV. Where can you learn more? Go to http://kori-estella.info/. Enter X362 in the search box to learn more about \"A Healthy Lifestyle: Care Instructions. \"  Current as of: December 7, 2017  Content Version: 11.8  © 7290-9763 [employer]. Care instructions adapted under license by [employer] (which disclaims liability or warranty for this information). If you have questions about a medical condition or this instruction, always ask your healthcare professional. [address] any warranty or liability for your use of this information.

## 2018-11-06 NOTE — PROGRESS NOTES
Date:  18     Name:  Brittney Malone  :  1980  MRN:  [id number]     PCP:  Sari Flores MD    Chief Complaint   Patient presents with    Results     HISTORY OF PRESENT ILLNESS: Follow up evaluation of migraine. At this point, she indicates that her migraines have been occurring more frequently. They are associated with blurred vision, dizziness and lightheadedness. She has a lot of sound and some light sensitivity. Small seem to bother her significantly more. She gets a lot of associated neck stiffness, nausea. She has been experiencing migraines at least 3-4 times per week. In addition to these issues, she has also been noticing that her left arm has been going numb. This started approximately 2 months ago. She states that it will simply go limp and that it does tingle somewhat. When she was last seen, in August of last year, she was to start Botox potentially for treatment of the headaches. Due to the change in her headaches and new associated symptoms at that time, she was also sent for an MRI of her brain. On that exam, there was a nonspecific finding of a solitary focus of increased T2 signal intensity in the right frontal lobe. More recently, her primary care physician sent her for a second MRI due to change in symptoms which demonstrated a 2 cm pineal cyst.  While the single chronic nonspecific focus of T2 hyperintensity was still present, this was reported on the left rather than the right and the pineal cyst was noted to be unchanged from her previous study last year though there is no mention of pineal cyst being present on that study. Except as noted above, denies  fever, chills, cough. No CP or SOB. No dysuria, loss of bowel or bladder control. No Weight loss. Appetite good. Sleeping well. No sweats. No edema. No bruising or bleeding. No nausea or vomit. No diarrhea. No frequency, urgency, No depressive sxs. No anxiety.   Denies sore throat, nasal congestion, nasal discharge, epistaxis, tinnitus, hearing loss, back pain, muscle pain, or joint pain. Current Outpatient Medications   Medication Sig    ondansetron (ZOFRAN ODT) 4 mg disintegrating tablet Take 1 Tab by mouth every eight (8) hours as needed for Nausea.  butalbital-acetaminophen-caffeine (FIORICET, ESGIC) -40 mg per tablet Take 1 Tab by mouth every six (6) hours as needed for Pain.  pantoprazole (PROTONIX) 40 mg tablet TAKE ONE TABLET BY MOUTH DAILY FOR REFLUX    gabapentin (NEURONTIN) 300 mg capsule TAKE ONE CAPSULE BY MOUTH EVERY MORNING,  AND TAKE TWO CAPSULES BY MOUTH EVERY EVENING    onabotulinumtoxinA (BOTOX) 200 unit injection Inject 200 units to 31 fda approved sites face and neck every 12 weeks       dextroamphetamine-amphetamine (ADDERALL) 20 mg tablet Take 20 mg by mouth daily.  cetirizine (ZYRTEC) 10 mg tablet Take 1 Tab by mouth daily. No current facility-administered medications for this visit.       No Known Allergies  Past Medical History:   Diagnosis Date    Dysplasia of cervix, low grade (ORION 1) 2/23/12    colpo    Encounter for IUD removal 08/19/2016    Mirena    H/O abnormal Pap smear 11/8/12;2/8/12    LGSIL, HPV positive    Headache     Headache(784.0)     IUD (intrauterine device) in place 03/11/2014    Mirena    Migraine headache     without aura    pap smear for 2/1/11;2/5/13; 4/1/15    neg, HPV neg,no ECC; neg, HPV neg, Negative, HPV negative     Past Surgical History:   Procedure Laterality Date    HX COLPOSCOPY  2/23/12    ORION 1    HX HEENT      wisdom teeth    HX LITHOTRIPSY      HX WISDOM TEETH EXTRACTION       Social History     Socioeconomic History    Marital status: SINGLE     Spouse name: Not on file    Number of children: Not on file    Years of education: Not on file    Highest education level: Not on file   Social Needs    Financial resource strain: Not on file    Food insecurity - worry: Not on file   Reyna-Esperanza insecurity - inability: Not on file    Transportation needs - medical: Not on file   in[employer] needs - non-medical: Not on file   Occupational History    Not on file   Tobacco Use    Smoking status: Never Smoker    Smokeless tobacco: Never Used   Substance and Sexual Activity    Alcohol use: Yes     Comment: social    Drug use: No    Sexual activity: Yes     Partners: Male     Birth control/protection: Condom   Other Topics Concern    Not on file   Social History Narrative    Not on file     Family History   Problem Relation Age of Onset    Hypertension Father     Heart Disease Father     Diabetes Father     Elevated Lipids Father     Cancer Father         eye    Cancer Mother         Cervical,Uterine,& Ovarian    Depression Mother     Allergic Rhinitis Brother     Asthma Brother     Other Brother         Chron's    Hypertension Brother     Alcohol abuse Maternal Grandfather     Depression Maternal Grandfather     Alcohol abuse Paternal Grandmother     Stroke Paternal Grandmother     Migraines Brother     Allergic Rhinitis Brother     Breast Cancer Other        PHYSICAL EXAMINATION:    Visit Vitals  /90   Pulse 94   Resp 20   Ht 5' 6\" (1.676 m)   Wt 103 kg (227 lb)   SpO2 95%   BMI 36.64 kg/m²     General:  Well defined, nourished, and groomed individual in no acute distress. Neck: Supple, nontender, no bruits, no pain with resistance to active range of motion. Heart: Regular rate and rhythm, no murmurs, rub, or gallop. Normal S1S2. Lungs:  Clear to auscultation bilaterally with equal chest expansion, no cough, no wheeze  Musculoskeletal:  Extremities revealed no edema and had full range of motion of joints. Psych:  Good mood and bright affect    NEUROLOGICAL EXAMINATION:     Mental Status:   Alert and oriented to person, place, and time with recent and remote memory intact.   Attention span and concentration are normal. Speech is fluent with a full fund of knowledge. Cranial Nerves:    II, III, IV, VI:  Visual acuity grossly intact. Visual fields are normal.    Pupils are equal, round, and reactive to light and accommodation. Extra-ocular movements are full and fluid. Fundoscopic exam was benign, no ptosis or nystagmus. V-XII: Hearing is grossly intact. Facial features are symmetric, with normal sensation and strength. The palate rises symmetrically and the tongue protrudes midline. Sternocleidomastoids 5/5. Motor Examination: Normal tone, bulk, and strength, 5/5 muscle strength throughout. N      Sensory exam:  Normal throughout to temperature    Coordination:  Finger to nose was normal.   No resting or intention tremor    Gait and Station:  Steady while walking. Normal arm swing. No Rhomberg or pronator drift. No muscle wasting or fasiculations noted. Reflexes:  DTRs 2+ throughout. ASSESSMENT AND PLAN    ICD-10-CM ICD-9-CM    1. Migraine without status migrainosus, not intractable, unspecified migraine type G43.909 346.90 fremanezumab-vfrm (AJOVY) 225 mg/1.5 mL syrg   2. Chronic daily headache R51 784.0    3. Pineal gland cyst E34.8 259.8 REFERRAL TO NEUROSURGERY   4. Analgesic rebound headache G44.40 339.3     T39.95XA E935.9      Will start Ajovy for migraine prevention. Cambia for acute abortive therapy given recent stroke like symptoms associated with headache. Recommended she avoid triptan medications due to atypical migraine status. Referred to neurosurgery for 2cm pineal cyst.   Follow up in six to eight weeks    Idania Coto

## 2018-12-02 RX ORDER — GABAPENTIN 300 MG/1
CAPSULE ORAL
Qty: 90 CAP | Refills: 0 | Status: SHIPPED | OUTPATIENT
Start: 2018-12-02 | End: 2019-01-24 | Stop reason: SDUPTHER

## 2018-12-17 ENCOUNTER — OFFICE VISIT (OUTPATIENT)
Dept: NEUROLOGY | Age: 38
End: 2018-12-17

## 2018-12-17 VITALS
OXYGEN SATURATION: 98 % | DIASTOLIC BLOOD PRESSURE: 94 MMHG | HEART RATE: 97 BPM | SYSTOLIC BLOOD PRESSURE: 156 MMHG | WEIGHT: 227 LBS | HEIGHT: 66 IN | RESPIRATION RATE: 20 BRPM | BODY MASS INDEX: 36.48 KG/M2

## 2018-12-17 DIAGNOSIS — G43.909 MIGRAINE WITHOUT STATUS MIGRAINOSUS, NOT INTRACTABLE, UNSPECIFIED MIGRAINE TYPE: ICD-10-CM

## 2018-12-17 NOTE — PROGRESS NOTES
Date:  18     Name:  Vanessa Bowser  :  1980  MRN:  [id number]     PCP:  Keisha Jorge MD    Chief Complaint   Patient presents with    Follow-up     headaches      HISTORY OF PRESENT ILLNESS: Follow up evaluation of migraine. At her last office visit, she was started on Ajovy for migraine prevention. She has had two doses so far. In the past month, she has had about five migraines but they were very minor, to the point that she would not really even consider it a migraine. Last week, she did have a more severe headache which she used Fiorinal. She did not use the Cambia. Her fiance has noticed that she has been complaining less of headaches. She will see neurosurgery at the end of this week. Except as noted above, denies  fever, chills, cough. No CP or SOB. No dysuria, loss of bowel or bladder control. No Weight loss. Appetite good. Sleeping well. No sweats. No edema. No bruising or bleeding. No nausea or vomit. No diarrhea. No frequency, urgency, No depressive sxs. No anxiety. Denies sore throat, nasal congestion, nasal discharge, epistaxis, tinnitus, hearing loss, back pain, muscle pain, or joint pain. Current Outpatient Medications   Medication Sig    gabapentin (NEURONTIN) 300 mg capsule TAKE ONE CAPSULE BY MOUTH EVERY MORNING AND TAKE 2 CAPSULES EVERY EVENING    fremanezumab-vfrm (AJOVY) 225 mg/1.5 mL syrg 1.5 mL by SubCUTAneous route every three (3) months.  Diclofenac Potassium (CAMBIA) 50 mg pwpk Take 50 mg by mouth as needed.  ondansetron (ZOFRAN ODT) 4 mg disintegrating tablet Take 1 Tab by mouth every eight (8) hours as needed for Nausea.  butalbital-acetaminophen-caffeine (FIORICET, ESGIC) -40 mg per tablet Take 1 Tab by mouth every six (6) hours as needed for Pain.  pantoprazole (PROTONIX) 40 mg tablet TAKE ONE TABLET BY MOUTH DAILY FOR REFLUX    dextroamphetamine-amphetamine (ADDERALL) 20 mg tablet Take 20 mg by mouth daily.  cetirizine (ZYRTEC) 10 mg tablet Take 1 Tab by mouth daily. No current facility-administered medications for this visit.       No Known Allergies  Past Medical History:   Diagnosis Date    Dysplasia of cervix, low grade (ORION 1) 2/23/12    colpo    Encounter for IUD removal 08/19/2016    Mirena    H/O abnormal Pap smear 11/8/12;2/8/12    LGSIL, HPV positive    Headache     Headache(784.0)     IUD (intrauterine device) in place 03/11/2014    Mirena    Migraine headache     without aura    pap smear for 2/1/11;2/5/13; 4/1/15    neg, HPV neg,no ECC; neg, HPV neg, Negative, HPV negative     Past Surgical History:   Procedure Laterality Date    HX COLPOSCOPY  2/23/12    OIRON 1    HX HEENT      wisdom teeth    HX LITHOTRIPSY      HX WISDOM TEETH EXTRACTION       Social History     Socioeconomic History    Marital status: SINGLE     Spouse name: Not on file    Number of children: Not on file    Years of education: Not on file    Highest education level: Not on file   Social Needs    Financial resource strain: Not on file    Food insecurity - worry: Not on file    Food insecurity - inability: Not on file   Cognitive Code needs - medical: Not on file   Cognitive Code needs - non-medical: Not on file   Occupational History    Not on file   Tobacco Use    Smoking status: Never Smoker    Smokeless tobacco: Never Used   Substance and Sexual Activity    Alcohol use: Yes     Comment: social    Drug use: No    Sexual activity: Yes     Partners: Male     Birth control/protection: Condom   Other Topics Concern    Not on file   Social History Narrative    Not on file     Family History   Problem Relation Age of Onset    Hypertension Father     Heart Disease Father     Diabetes Father     Elevated Lipids Father     Cancer Father         eye    Cancer Mother         Cervical,Uterine,& Ovarian    Depression Mother     Allergic Rhinitis Brother     Asthma Brother     Other Brother Chron's    Hypertension Brother     Alcohol abuse Maternal Grandfather     Depression Maternal Grandfather     Alcohol abuse Paternal Grandmother     Stroke Paternal Grandmother     Migraines Brother     Allergic Rhinitis Brother     Breast Cancer Other        PHYSICAL EXAMINATION:    Visit Vitals  BP (!) 156/94   Pulse 97   Resp 20   Ht 5' 6\" (1.676 m)   Wt 103 kg (227 lb)   SpO2 98%   BMI 36.64 kg/m²     General:  Well defined, nourished, and groomed individual in no acute distress. Neck: Supple, nontender, no bruits, no pain with resistance to active range of motion. Heart: Regular rate and rhythm, no murmurs, rub, or gallop. Normal S1S2. Lungs:  Clear to auscultation bilaterally with equal chest expansion, no cough, no wheeze  Musculoskeletal:  Extremities revealed no edema and had full range of motion of joints. Psych:  Good mood and bright affect    NEUROLOGICAL EXAMINATION:     Mental Status:   Alert and oriented to person, place, and time with recent and remote memory intact. Attention span and concentration are normal. Speech is fluent with a full fund of knowledge. Cranial Nerves:    II, III, IV, VI:  Visual acuity grossly intact. Visual fields are normal.    Pupils are equal, round, and reactive to light and accommodation. Extra-ocular movements are full and fluid. Fundoscopic exam was benign, no ptosis or nystagmus. V-XII: Hearing is grossly intact. Facial features are symmetric, with normal sensation and strength. The palate rises symmetrically and the tongue protrudes midline. Sternocleidomastoids 5/5. Motor Examination: Normal tone, bulk, and strength, 5/5 muscle strength throughout. N      Sensory exam:  Normal throughout to temperature    Coordination:  Finger to nose was normal.   No resting or intention tremor    Gait and Station:  Steady while walking. Normal arm swing. No Rhomberg or pronator drift. No muscle wasting or fasiculations noted. Reflexes:  DTRs 2+ throughout. ASSESSMENT AND PLAN    ICD-10-CM ICD-9-CM    1. Migraine without status migrainosus, not intractable, unspecified migraine type G43.909 346.90 fremanezumab-vfrm (AJOVY) 225 mg/1.5 mL syrg     Doing much better since the introduction of the Ajovy and now down to about one migraine per week. Will continue with this for ongoing cumulative benefit monthly. Follow up in three months. Idania Silva

## 2018-12-17 NOTE — PROGRESS NOTES
We have had 2 of the ajovy injections   Tolerated them okay, no side effects from them that she knows of     Has had maybe 5 a month, lasting maybe an hour with advil     Last Friday was when she did the injection   She didn't have a headache at all this weekend

## 2018-12-20 ENCOUNTER — TELEPHONE (OUTPATIENT)
Dept: NEUROLOGY | Age: 38
End: 2018-12-20

## 2018-12-20 NOTE — TELEPHONE ENCOUNTER
Received PA request through cover my meds. Completed PA and sent electronically for review.      PA Pending  Key: [id number]

## 2019-01-07 NOTE — TELEPHONE ENCOUNTER
Aetamadou needed additional information. Submitted electronically.  AJOVY    PA Pending  Sheldon# [id number]  RX# [id number]

## 2019-01-24 RX ORDER — GABAPENTIN 300 MG/1
CAPSULE ORAL
Qty: 90 CAP | Refills: 0 | Status: SHIPPED | OUTPATIENT
Start: 2019-01-24 | End: 2019-02-06

## 2019-02-04 ENCOUNTER — TELEPHONE (OUTPATIENT)
Dept: OBGYN CLINIC | Age: 39
End: 2019-02-04

## 2019-02-04 NOTE — TELEPHONE ENCOUNTER
I can see her before EOB/US for a problem visit to discuss medications/ evaluate cramping. Let me know if you need a spot and if she has a preferred day/time.      TRP

## 2019-02-04 NOTE — TELEPHONE ENCOUNTER
Patient of TP, she is newly getting a positive UPT. She has set up her NOB visit with ultrasound on 2/26/19. Her LMP (spot bleeding)  is noted to be 12/31/18 which on pregnancy wheel puts her at 5 week gestation but had some other heavier  bleeding on 12/6/18 which would put her at 8 weeks 4 days. Patient is calling to see if we should see her sooner? She is concerned that she had to discontinue all of her use of her migraine medications and Adderall. She is saying that she has had some mild lower abdominal cramping that has been very mild and irregular in nature. She and I discussed good water intake and importance. She is not seeing any discharge or bleeding. Please advise what you would like to do.

## 2019-02-06 ENCOUNTER — OFFICE VISIT (OUTPATIENT)
Dept: OBGYN CLINIC | Age: 39
End: 2019-02-06

## 2019-02-06 VITALS
HEIGHT: 66 IN | SYSTOLIC BLOOD PRESSURE: 152 MMHG | WEIGHT: 230 LBS | DIASTOLIC BLOOD PRESSURE: 100 MMHG | BODY MASS INDEX: 36.96 KG/M2

## 2019-02-06 DIAGNOSIS — N92.6 MISSED MENSES: Primary | ICD-10-CM

## 2019-02-06 DIAGNOSIS — R03.0 ELEVATED BLOOD PRESSURE READING: ICD-10-CM

## 2019-02-06 DIAGNOSIS — G43.809 OTHER MIGRAINE WITHOUT STATUS MIGRAINOSUS, NOT INTRACTABLE: ICD-10-CM

## 2019-02-06 DIAGNOSIS — K21.9 GASTROESOPHAGEAL REFLUX DISEASE, ESOPHAGITIS PRESENCE NOT SPECIFIED: ICD-10-CM

## 2019-02-06 DIAGNOSIS — E66.9 OBESITY, CLASS II, BMI 35-39.9: ICD-10-CM

## 2019-02-06 LAB
HCG URINE, QL. (POC): POSITIVE
VALID INTERNAL CONTROL?: YES

## 2019-02-06 RX ORDER — LABETALOL 100 MG/1
100 TABLET, FILM COATED ORAL 2 TIMES DAILY
Qty: 60 TAB | Refills: 0 | Status: SHIPPED | OUTPATIENT
Start: 2019-02-06 | End: 2019-03-07 | Stop reason: SDUPTHER

## 2019-02-06 NOTE — PROGRESS NOTES
[address] [facility]  [email or web address]  [phone]    Andre Coreas MD, [facility]       OB/GYN Problem visit    Chief Complaint:   Chief Complaint   Patient presents with    Missed Menses     + UPT at home. Wants to discuss her medications for migraines       History of Present Illness: This is a new problem being evaluated by this provider. The patient is a 41 y.o.  female who reports having positive UPT Friday and Saturday at home. She is here to discuss her migraine and ADD medications. She has an EOB visit scheduled 19. UPT in office today is positive. Her last full, regular period was 18, which would make her 8w6d, but also had a 2 day period on 18 which would make her 5w2d. She denies any recent HA. GERD stable. Pt reports h/o elev BP at PCP and at work for \" a while\"   Not treated. LMP: Patient's last menstrual period was 2018.     PFSH:  Past Medical History:   Diagnosis Date    ADHD     Dysplasia of cervix, low grade (ORION 1) 12    colpo    Encounter for IUD removal 2016    Mirena    GERD (gastroesophageal reflux disease)     H/O abnormal Pap smear 12;12    LGSIL, HPV positive    Headache     Headache(784.0)     IUD (intrauterine device) in place 2014    Mirena    Migraine headache     without aura    pap smear for 11;13; 4/1/15    neg, HPV neg,no ECC; neg, HPV neg, Negative, HPV negative     Past Surgical History:   Procedure Laterality Date    HX COLPOSCOPY  12    ORION 1    HX HEENT      wisdom teeth    HX LITHOTRIPSY      HX WISDOM TEETH EXTRACTION       Family History   Problem Relation Age of Onset    Hypertension Father     Heart Disease Father     Diabetes Father     Elevated Lipids Father     Cancer Father         eye    Cancer Mother         Cervical,Uterine,& Ovarian    Depression Mother     Allergic Rhinitis Brother     Asthma Brother     Other Brother Chron's    Hypertension Brother     Alcohol abuse Maternal Grandfather     Depression Maternal Grandfather     Alcohol abuse Paternal Grandmother     Stroke Paternal Grandmother     Migraines Brother     Allergic Rhinitis Brother     Breast Cancer Other      Social History     Tobacco Use    Smoking status: Never Smoker    Smokeless tobacco: Never Used   Substance Use Topics    Alcohol use: Yes     Comment: social    Drug use: No     No Known Allergies  Current Outpatient Medications   Medication Sig    prenatal vits62/FA/om3/dha/epa (PRENATAL GUMMY PO) Take  by mouth.  labetalol (NORMODYNE) 100 mg tablet Take 1 Tab by mouth two (2) times a day.  ondansetron (ZOFRAN ODT) 4 mg disintegrating tablet Take 1 Tab by mouth every eight (8) hours as needed for Nausea.  butalbital-acetaminophen-caffeine (FIORICET, ESGIC) -40 mg per tablet Take 1 Tab by mouth every six (6) hours as needed for Pain.  pantoprazole (PROTONIX) 40 mg tablet TAKE ONE TABLET BY MOUTH DAILY FOR REFLUX    cetirizine (ZYRTEC) 10 mg tablet Take 1 Tab by mouth daily.  fremanezumab-vfrm (AJOVY) 225 mg/1.5 mL syrg 1.5 mL by SubCUTAneous route every thirty (30) days. No current facility-administered medications for this visit.         Review of Systems:  History obtained from the patient  Constitutional: negative for fevers, chills and weight loss  ENT ROS: negative for - hearing change, oral lesions or visual changes  Respiratory: negative for cough, wheezing or dyspnea on exertion  Cardiovascular: negative for chest pain, irregular heart beats, exertional chest pressure/discomfort  Gastrointestinal: negative for dysphagia, nausea and vomiting + abdominal cramping + diarrhea  Genito-Urinary ROS:  see HPI  Inteument/breast: negative for rash, breast lump and nipple discharge  Musculoskeletal:negative for stiff joints, neck pain and muscle weakness  Endocrine ROS: negative for - breast changes, galactorrhea or temperature intolerance  Hematological and Lymphatic ROS: negative for - blood clots, bruising or swollen lymph nodes    Physical Exam:  Visit Vitals  BP (!) 152/100   Ht 5' 6\" (1.676 m)   Wt 230 lb (104.3 kg)   BMI 37.12 kg/m²       GENERAL: alert, well appearing, and in no distress  HEAD: normocephalic, atraumatic. PULM: clear to auscultation, no wheezes, rales or rhonchi, symmetric air entry   COR: normal rate and regular rhythm, S1 and S2 normal   ABDOMEN: soft, nontender, nondistended, no masses or organomegaly   NEURO: alert, oriented, normal speech  EXT: c/t/e    Assessment:  Encounter Diagnoses   Name Primary?  Missed menses Yes    Elevated blood pressure reading     Comment: suspect CHTN    Obesity, Class II, BMI 35-39.9     Gastroesophageal reflux disease, esophagitis presence not specified     Other migraine without status migrainosus, not intractable        Plan:  The patient is advised that she should contact the office if she does not note improvement or if symptoms recur  Recommend follow up with PCP for non-gynecologic complaints and chronic medical problems. She should contact our office with any questions or concerns  She could keep her routine annual exam appointment. Disc concerns for [facility]  Disc close observation but since has h/o htn, rec txt  Begin labetalol  BP check 1 wk  htn precautions, stroke precautions, pt reports she will monitor  Notify MD if >160/>100  HA prec  Disc safe meds in pregnancy  I spent 30 minutes of face to face time counseling and discussing HTN, elevated BP, GERD, migraine HA, ADHD, Pregnancy risk, SIPIH, healthy diet/exercise with the patient. More than 50 % of her visit was spent performing counseling.    EOB/US    Orders Placed This Encounter    AMB POC URINE PREGNANCY TEST, VISUAL COLOR COMPARISON    labetalol (NORMODYNE) 100 mg tablet       Results for orders placed or performed in visit on 02/06/19   AMB POC URINE PREGNANCY TEST, VISUAL COLOR COMPARISON   Result Value Ref Range    VALID INTERNAL CONTROL POC Yes     HCG urine, Ql. (POC) Positive Negative

## 2019-02-06 NOTE — PATIENT INSTRUCTIONS
Advanced Maternal Age: Care Instructions  Your Care Instructions    Advanced maternal age is the medical term for pregnancy in a woman who will be 28 or older on her due date. Most women this age have healthy babies. But a pregnancy at this age has a greater risk for problems than a pregnancy at a younger age. These include  birth and preeclampsia. They also include gestational diabetes, problems with the placenta, and genetic problems. Most of these problems can't be prevented. But it's best to catch any problems early. This is why your doctor will want to check you for diabetes. He or she will also check your blood pressure and urine at every visit. High blood pressure and protein in urine are signs of preeclampsia. You can decide if you want to have tests to find out if your fetus has certain genetic problems, such as Down syndrome. A fetus is the medical term for a baby before birth. There are many things you can't control about pregnancy. But there is a lot you can do to help you have a healthy pregnancy. Do your best to eat well. And try to get plenty of exercise and rest.  Follow-up care is a key part of your treatment and safety. Be sure to make and go to all appointments, and call your doctor if you are having problems. It's also a good idea to know your test results and keep a list of the medicines you take. How can you care for yourself at home? · Talk with your doctor about prenatal screening tests. These can help find Down syndrome and other possible problems. · Eat a balanced diet with plenty of protein, calcium, and iron. Be sure to include fruits, vegetables, and whole grains. · Talk to your doctor about an exercise plan. Many pregnant women enjoy walking, swimming, and prenatal yoga. · Make sure that you get enough folic acid. Folic acid helps prevent some birth defects, especially if you take it before you get pregnant. Your doctor will tell you how much you need.  You can take folic acid pills. · Take your prenatal vitamins. · Drink plenty of fluids, enough so that your urine is light yellow or clear like water. Dehydration can lead to contractions. If you have kidney, heart, or liver disease and have to limit fluids, talk with your doctor before you increase the amount of fluids you drink. · Check with your doctor or pharmacist before you take any over-the-counter medicines, vitamins, herbal supplements, or home remedies. · Do not drink alcohol. No amount of alcohol has been found to be safe during pregnancy. · Do not smoke. If you need help quitting, talk to your doctor about stop-smoking programs and medicines. These can increase your chances of quitting for good. When should you call for help? Watch closely for changes in your health, and be sure to contact your doctor if you have any problems. Where can you learn more? Go to http://kori-estella.info/. Enter C333 in the search box to learn more about \"Advanced Maternal Age: Care Instructions. \"  Current as of: September 5, 2018  Content Version: 11.9  © 0223-8319 [employer]. Care instructions adapted under license by [employer] (which disclaims liability or warranty for this information). If you have questions about a medical condition or this instruction, always ask your healthcare professional. [address] any warranty or liability for your use of this information.

## 2019-02-12 ENCOUNTER — OFFICE VISIT (OUTPATIENT)
Dept: OBGYN CLINIC | Age: 39
End: 2019-02-12

## 2019-02-12 VITALS
BODY MASS INDEX: 37.61 KG/M2 | SYSTOLIC BLOOD PRESSURE: 130 MMHG | DIASTOLIC BLOOD PRESSURE: 82 MMHG | WEIGHT: 234 LBS | HEIGHT: 66 IN

## 2019-02-12 DIAGNOSIS — I10 ESSENTIAL HYPERTENSION: Primary | ICD-10-CM

## 2019-02-12 RX ORDER — GABAPENTIN 300 MG/1
CAPSULE ORAL
COMMUNITY
Start: 2019-01-24 | End: 2019-04-23

## 2019-02-12 RX ORDER — DEXTROAMPHETAMINE SACCHARATE, AMPHETAMINE ASPARTATE, DEXTROAMPHETAMINE SULFATE AND AMPHETAMINE SULFATE 5; 5; 5; 5 MG/1; MG/1; MG/1; MG/1
TABLET ORAL
COMMUNITY
Start: 2019-01-04 | End: 2019-04-23

## 2019-02-12 NOTE — PATIENT INSTRUCTIONS
Learning About High Blood Pressure  What is high blood pressure? Blood pressure is a measure of how hard the blood pushes against the walls of your arteries. It's normal for blood pressure to go up and down throughout the day, but if it stays up, you have high blood pressure. Another name for high blood pressure is hypertension. Two numbers tell you your blood pressure. The first number is the systolic pressure. It shows how hard the blood pushes when your heart is pumping. The second number is the diastolic pressure. It shows how hard the blood pushes between heartbeats, when your heart is relaxed and filling with blood. Your doctor will give you a goal for your blood pressure. Your goal will be based on your health and your age. High blood pressure (hypertension) means that the top number stays high, or the bottom number stays high, or both. High blood pressure increases the risk of stroke, heart attack, and other problems. You and your doctor will talk about your risks of these problems based on your blood pressure. What happens when you have high blood pressure? · Blood flows through your arteries with too much force. Over time, this damages the walls of your arteries. But you can't feel it. High blood pressure usually doesn't cause symptoms. · Fat and calcium start to build up in your arteries. This buildup is called plaque. Plaque makes your arteries narrower and stiffer. Blood can't flow through them as easily. · This lack of good blood flow starts to damage some of the organs in your body. This can lead to problems such as coronary artery disease and heart attack, heart failure, stroke, kidney failure, and eye damage. How can you prevent high blood pressure? · Stay at a healthy weight. · Try to limit how much sodium you eat to less than 2,300 milligrams (mg) a day. If you limit your sodium to 1,500 mg a day, you can lower your blood pressure even more.   ? Buy foods that are labeled \"unsalted,\" \"sodium-free,\" or \"low-sodium. \" Foods labeled \"reduced-sodium\" and \"light sodium\" may still have too much sodium. ? Flavor your food with garlic, lemon juice, onion, vinegar, herbs, and spices instead of salt. Do not use soy sauce, steak sauce, onion salt, garlic salt, mustard, or ketchup on your food. ? Use less salt (or none) when recipes call for it. You can often use half the salt a recipe calls for without losing flavor. · Be physically active. Get at least 30 minutes of exercise on most days of the week. Walking is a good choice. You also may want to do other activities, such as running, swimming, cycling, or playing tennis or team sports. · Limit alcohol to 2 drinks a day for men and 1 drink a day for women. · Eat plenty of fruits, vegetables, and low-fat dairy products. Eat less saturated and total fats. How is high blood pressure treated? · Your doctor will suggest making lifestyle changes. For example, your doctor may ask you to eat healthy foods, quit smoking, lose extra weight, and be more active. · If lifestyle changes don't help enough, your doctor may recommend that you take medicine. · When blood pressure is very high, medicines are needed to lower it. Follow-up care is a key part of your treatment and safety. Be sure to make and go to all appointments, and call your doctor if you are having problems. It's also a good idea to know your test results and keep a list of the medicines you take. Where can you learn more? Go to http://kori-estella.info/. Enter P501 in the search box to learn more about \"Learning About High Blood Pressure. \"  Current as of: July 22, 2018  Content Version: 11.9  © 9096-1329 [employer]. Care instructions adapted under license by [employer] (which disclaims liability or warranty for this information).  If you have questions about a medical condition or this instruction, always ask your healthcare professional. [employer] disclaims any warranty or liability for your use of this information.

## 2019-02-12 NOTE — PROGRESS NOTES
[facility]  [email or web address]    Karel Riley MD, [address] [facility]       OB/GYN Follow-up visit    Chief Complaint: Follow up visit  Chief Complaint   Patient presents with    Blood Pressure Check     1wk BP f/u         History of Present Illness: This is a follow up visit from 2/6/19. She is having a follow up for elevated blood pressure and new pregnancy. Pt started Labetalol 100mg BID 6 days ago and monitoring BP at home  Has noticed fluctuations mainly based on times when med is wearing off. LMP: Patient's last menstrual period was 12/06/2018.     PFSH:  Past Medical History:   Diagnosis Date    ADHD     Dysplasia of cervix, low grade (ORION 1) 2/23/12    colpo    Encounter for IUD removal 08/19/2016    Mirena    GERD (gastroesophageal reflux disease)     H/O abnormal Pap smear 11/8/12;2/8/12    LGSIL, HPV positive    Headache     Headache(784.0)     IUD (intrauterine device) in place 03/11/2014    Mirena    Migraine headache     without aura    pap smear for 2/1/11;2/5/13; 4/1/15    neg, HPV neg,no ECC; neg, HPV neg, Negative, HPV negative     Past Surgical History:   Procedure Laterality Date    HX COLPOSCOPY  2/23/12    ORION 1    HX HEENT      wisdom teeth    HX LITHOTRIPSY      HX WISDOM TEETH EXTRACTION       Family History   Problem Relation Age of Onset    Hypertension Father     Heart Disease Father     Diabetes Father     Elevated Lipids Father     Cancer Father         eye    Cancer Mother         Cervical,Uterine,& Ovarian    Depression Mother     Allergic Rhinitis Brother     Asthma Brother     Other Brother         Chron's    Hypertension Brother     Alcohol abuse Maternal Grandfather     Depression Maternal Grandfather     Alcohol abuse Paternal Grandmother     Stroke Paternal Grandmother     Migraines Brother     Allergic Rhinitis Brother     Breast Cancer Other      Social History     Tobacco Use    Smoking status: Never Smoker    Smokeless tobacco: Never Used   Substance Use Topics    Alcohol use: Yes     Comment: social    Drug use: No     No Known Allergies  Current Outpatient Medications   Medication Sig    prenatal vits62/FA/om3/dha/epa (PRENATAL GUMMY PO) Take  by mouth.  labetalol (NORMODYNE) 100 mg tablet Take 1 Tab by mouth two (2) times a day.  dextroamphetamine-amphetamine (ADDERALL) 20 mg tablet     gabapentin (NEURONTIN) 300 mg capsule     fremanezumab-vfrm (AJOVY) 225 mg/1.5 mL syrg 1.5 mL by SubCUTAneous route every thirty (30) days.  ondansetron (ZOFRAN ODT) 4 mg disintegrating tablet Take 1 Tab by mouth every eight (8) hours as needed for Nausea.  butalbital-acetaminophen-caffeine (FIORICET, ESGIC) -40 mg per tablet Take 1 Tab by mouth every six (6) hours as needed for Pain.  pantoprazole (PROTONIX) 40 mg tablet TAKE ONE TABLET BY MOUTH DAILY FOR REFLUX    cetirizine (ZYRTEC) 10 mg tablet Take 1 Tab by mouth daily. No current facility-administered medications for this visit.         Review of Systems:  History obtained from the patient  Constitutional: negative for fevers, chills and weight loss  ENT ROS: negative for - hearing change, oral lesions or visual changes  Respiratory: negative for cough, wheezing or dyspnea on exertion  Cardiovascular: negative for chest pain, irregular heart beats, exertional chest pressure/discomfort  Gastrointestinal: +Nausea  Genito-Urinary ROS: +GERD sx  Inteument/breast: negative for rash, breast lump and nipple discharge  Musculoskeletal:negative for stiff joints, neck pain and muscle weakness  Endocrine ROS: negative for - breast changes, galactorrhea or temperature intolerance  Hematological and Lymphatic ROS: negative for - blood clots, bruising or swollen lymph nodes      Physical Exam:  Visit Vitals  /82   Ht 5' 6\" (1.676 m)   Wt 234 lb (106.1 kg)   BMI 37.77 kg/m²       GENERAL: alert, well appearing, and in no distress  NEURO: alert, oriented, normal speech    Assessment:  Encounter Diagnosis   Name Primary?  Essential hypertension Yes       Plan:  The patient is advised that she should contact the office with any questions or concerns. She should make her routine annual gynecologic appointment if needed. Continue labetalol  Keep eob us  bp log  Reviewed safer meds in pregnancy  N/v in pregnancy h/o  Disc safer options for sleep assistance and nausea    No orders of the defined types were placed in this encounter. No results found for this visit on 02/12/19.     Karel Riley MD

## 2019-02-26 ENCOUNTER — OFFICE VISIT (OUTPATIENT)
Dept: OBGYN CLINIC | Age: 39
End: 2019-02-26

## 2019-02-26 VITALS
DIASTOLIC BLOOD PRESSURE: 82 MMHG | SYSTOLIC BLOOD PRESSURE: 138 MMHG | WEIGHT: 232 LBS | HEIGHT: 66 IN | BODY MASS INDEX: 37.28 KG/M2

## 2019-02-26 DIAGNOSIS — O09.529 ENCOUNTER FOR SUPERVISION OF HIGH-RISK PREGNANCY WITH ELDERLY MULTIGRAVIDA: Primary | ICD-10-CM

## 2019-02-26 DIAGNOSIS — O10.919 CHRONIC HYPERTENSION AFFECTING PREGNANCY: ICD-10-CM

## 2019-02-26 DIAGNOSIS — Z23 ENCOUNTER FOR IMMUNIZATION: ICD-10-CM

## 2019-02-26 LAB
ANTIBODY SCREEN, EXTERNAL: NEGATIVE
HBSAG, EXTERNAL: NEGATIVE
HCT, EXTERNAL: 37.2
HGB, EXTERNAL: 12.4
HIV, EXTERNAL: NORMAL
PLATELET CNT,   EXTERNAL: 273
RUBELLA, EXTERNAL: NORMAL
T. PALLIDUM, EXTERNAL: NEGATIVE
TYPE, ABO & RH, EXTERNAL: NORMAL
URINALYSIS, EXTERNAL: NORMAL

## 2019-02-26 NOTE — PROGRESS NOTES
[facility]  [email or web address]  [phone]    Zhang Martinez MD, [facility]     Chief complaint:  Irregular cycles  Last cycle; Patient's last menstrual period was 2018. This is a new concern and an evaluation is planned. Current pregnancy history:  Jonna Bonilla is a , 41 y.o. female [facility]   She presents for the evaluation of irregular menses and a positive pregnancy test.    LMP history:  Patient's last menstrual period was 2018. .  The date of the beginning of her last menstrual period is certain. Her menses are regular. Her cycles occur about every 4 weeks. A urine pregnancy test was positive about 4 weeks ago. She was not using contraception at the estimated time of conception. Based on her LMP, her EGA is 8 weeks,1 days with and EDC of 10/7/19. Ultrasound data:  She had an ultrasound today which revealed a viable bond pregnancy with a gestational age of 11 weeks and 1 days giving an EDC of 10/7/19. Pregnancy symptoms:  She reports breast tenderness, nausea, constipation. She denies vomiting or bleeding. Since she found out she is pregnant, she has there was no change in patient's weight. She reports her prepregnancy weight as 232 pounds. Relevant past pregnancy history:  None, first pregnancy    Relevant past medical history:(relevant to this pregnancy):   Chronic HTN     Pap smear history:  Last pap smear: 2015  Results: within normal limits    Occupational history  Her occupation is: office. Substance history:   She does not report current tobacco use. She does not report current alcohol use. She does not report current drug use. Exposure history: There are indoor cat(s) in the home. The patient was instructed not to change cat litter boxes during pregnancy. She does not report close contact with children on a regular basis.    She has had chicken pox in the past.   Patient does not report issues with domestic violence. Genetic Screening/Teratology Counseling:   (Includes patient, baby's father, or anyone in either family with:)  3.  Patient's age >/= 28 at EDC?--38 y.o.       FOB age: 36years old. 2.  Thalassemia ([facility], [facility], [address], or  background): MCV<80?--no  3. Neural tube defect (meningomyelocele, spina bifida, anencephaly)? --yes and spina bifida FOB father (surgery)  4. Congenital heart defect?--no  5. Down syndrome?--no  6. Tho-Sachs ([employer], [place])? --no  7. Canavan's Disease?--no  8. Familial Dysautonomia?--no   9. Sickle cell disease or trait ()? --no   Has she been tested for sickle trait: Unknown  10. Hemophilia or other blood disorders?--no  11. Muscular dystrophy?--no  12. Cystic fibrosis? --no  13. Teri's Chorea?--no  14. Mental retardation/autism (if yes was person tested for Fragile X)?-no  15. Other inherited genetic or chromosomal disorder?- no  16. Maternal metabolic disorder (DM, PKU, etc)? --no  17. Patient or FOB with a child with a birth defect not listed above?--no  17a. Patient or FOB with a birth defect themselves?--no  25. Recurrent pregnancy loss, or stillbirth?--no  19. Any medications since LMP other than prenatal vitamins (include vitamins, supplements, OTC meds, drugs, alcohol)? --   Current Outpatient Medications on File Prior to Visit   Medication Sig Dispense Refill    prenatal vits62/FA/om3/dha/epa (PRENATAL GUMMY PO) Take  by mouth.  labetalol (NORMODYNE) 100 mg tablet Take 1 Tab by mouth two (2) times a day. 60 Tab 0    ondansetron (ZOFRAN ODT) 4 mg disintegrating tablet Take 1 Tab by mouth every eight (8) hours as needed for Nausea. 20 Tab 0    butalbital-acetaminophen-caffeine (FIORICET, ESGIC) -40 mg per tablet Take 1 Tab by mouth every six (6) hours as needed for Pain.  15 Tab 0    dextroamphetamine-amphetamine (ADDERALL) 20 mg tablet       gabapentin (NEURONTIN) 300 mg capsule       fremanezumab-vfrm (AJOVY) 225 mg/1.5 mL syrg 1.5 mL by SubCUTAneous route every thirty (30) days. 1 Syringe 3    pantoprazole (PROTONIX) 40 mg tablet TAKE ONE TABLET BY MOUTH DAILY FOR REFLUX 30 Tab 2    cetirizine (ZYRTEC) 10 mg tablet Take 1 Tab by mouth daily. 30 Tab 1     No current facility-administered medications on file prior to visit. 20.  Any other genetic/environmental exposure to discuss?--no. Infection History:  1. Lives with someone with TB or TB exposed?--no  2. Patient or partner has history of genital herpes?--no  3. Rash or viral illness since LMP?--no  4. History of STD (GC, CT, HPV, syphilis, HIV)? --no  5. Have you received a flu vaccine for the most recent flu season? -- no  6.   Have you or your sexual partner(s) travelled to a [facility] area in the last 3 months? -- no    Past Medical History:   Diagnosis Date    ADHD     Dysplasia of cervix, low grade (ORION 1) 2/23/12    colpo    Encounter for IUD removal 08/19/2016    Mirena    GERD (gastroesophageal reflux disease)     H/O abnormal Pap smear 11/8/12;2/8/12    LGSIL, HPV positive    Headache     Headache(784.0)     IUD (intrauterine device) in place 03/11/2014    Mirena    Migraine headache     without aura    pap smear for 2/1/11;2/5/13; 4/1/15    neg, HPV neg,no ECC; neg, HPV neg, Negative, HPV negative     Past Surgical History:   Procedure Laterality Date    HX COLPOSCOPY  2/23/12    ORION 1    HX HEENT      wisdom teeth    HX LITHOTRIPSY      HX WISDOM TEETH EXTRACTION       Social History     Occupational History    Not on file   Tobacco Use    Smoking status: Never Smoker    Smokeless tobacco: Never Used   Substance and Sexual Activity    Alcohol use: Yes     Comment: social    Drug use: No    Sexual activity: Yes     Partners: Male     Family History   Problem Relation Age of Onset    Hypertension Father     Heart Disease Father     Diabetes Father     Elevated Lipids Father  Cancer Father         eye    Cancer Mother         Cervical,Uterine,& Ovarian    Depression Mother     Allergic Rhinitis Brother     Asthma Brother     Other Brother         Chron's    Hypertension Brother     Alcohol abuse Maternal Grandfather     Depression Maternal Grandfather     Alcohol abuse Paternal Grandmother     Stroke Paternal Grandmother     Migraines Brother     Allergic Rhinitis Brother     Breast Cancer Other      OB History    Para Term  AB Living   1 0 0 0 0 0   SAB TAB Ectopic Molar Multiple Live Births   0 0 0   0        # Outcome Date GA Lbr Sonido/2nd Weight Sex Delivery Anes PTL Lv   1 Current               Obstetric Comments   Menarche:  17. LMP: 03/17/15 (IUD Mirena). # of Children:  0. Age at Delivery of First Child:  n/a. Hysterectomy/oophorectomy:  NO/NO. Breast Bx:  No.  Hx of Breast Feeding:  n/a. BCP:  Yes. Hormone therapy:  No.      No Known Allergies  Prior to Admission medications    Medication Sig Start Date End Date Taking? Authorizing Provider   prenatal vits62/FA/om3/dha/epa (PRENATAL GUMMY PO) Take  by mouth. Yes Provider, Historical   labetalol (NORMODYNE) 100 mg tablet Take 1 Tab by mouth two (2) times a day. 19  Yes Timothy Plasencia MD   ondansetron (ZOFRAN ODT) 4 mg disintegrating tablet Take 1 Tab by mouth every eight (8) hours as needed for Nausea. 10/24/18  Yes Robb Locke MD   butalbital-acetaminophen-caffeine (FIORICET, ESGIC) -40 mg per tablet Take 1 Tab by mouth every six (6) hours as needed for Pain. 10/24/18  Yes Robb Locke MD   dextroamphetamine-amphetamine (ADDERALL) 20 mg tablet  19   Provider, Historical   gabapentin (NEURONTIN) 300 mg capsule  19   Provider, Historical   fremanezumab-vfrm (AJOVY) 225 mg/1.5 mL syrg 1.5 mL by SubCUTAneous route every thirty (30) days.  18   Jacky Vallecillo NP   pantoprazole (PROTONIX) 40 mg tablet TAKE ONE TABLET BY MOUTH DAILY FOR REFLUX 10/22/18 Frances Johnson MD   cetirizine (ZYRTEC) 10 mg tablet Take 1 Tab by mouth daily.  4/24/15   Marcus Joy MD        Review of Systems - History obtained from the patient  Constitutional: negative for weight loss, fever, night sweats  HEENT: negative for hearing loss, earache, congestion, snoring, sorethroat  CV: negative for chest pain, palpitations, edema  Resp: negative for cough, shortness of breath, wheezing  GI: negative for change in bowel habits, abdominal pain, black or bloody stools  : negative for frequency, dysuria, hematuria, vaginal discharge  MSK: negative for back pain, joint pain, muscle pain  Breast: negative for breast lumps, nipple discharge, galactorrhea  Skin :negative for itching, rash, hives  Neuro: negative for dizziness, headache, confusion, weakness  Psych: negative for anxiety, depression, change in mood  Heme/lymph: negative for bleeding, bruising, pallor    Objective:  Visit Vitals  /82   Ht 5' 6\" (1.676 m)   Wt 232 lb (105.2 kg)   LMP 12/31/2018   BMI 37.45 kg/m²       Physical Exam:   Constitutional  · Appearance: well-nourished, well developed, alert, in no acute distress    HENT  · Head  · Face: appears normal  · Eyes: appear normal  · Ears: normal  · Mouth: normal  · Lips: no lesions    Neck  · Inspection/Palpation: normal appearance, no masses or tenderness  · Lymph Nodes: no lymphadenopathy present  · Thyroid: gland size normal, nontender, no nodules or masses present on palpation    Chest  · Respiratory Effort: breathing unlabored  · Auscultation: normal breath sounds    Cardiovascular  · Heart:  · Auscultation: regular rate and rhythm without murmur    Breasts  · Inspection of Breasts: breasts symmetrical, no skin changes, no discharge present, nipple appearance normal, no skin retraction present  · Palpation of Breasts and Axillae: no masses present on palpation, no breast tenderness  · Axillary Lymph Nodes: no lymphadenopathy present    Gastrointestinal  · Abdominal Examination: abdomen non-tender to palpation, normal bowel sounds, no masses present  · Liver and spleen: no hepatomegaly present, spleen not palpable  · Hernias: no hernias identified    Genitourinary  · External Genitalia: normal appearance for age, no discharge present, no tenderness present, no inflammatory lesions present, no masses present, no atrophy present  · Vagina: normal vaginal vault without central or paravaginal defects, no discharge present, no inflammatory lesions present, no masses present  · Bladder: non-tender to palpation  · Urethra: appears normal  · Cervix: normal appearing with discharge or lesions, os closed  · Uterus: enlarged, normal shape, soft  · Adnexa: no adnexal tenderness present, no adnexal masses present  · Perineum: perineum within normal limits, no evidence of trauma, no rashes or skin lesions present  · Anus: anus within normal limits, no hemorrhoids present  · Inguinal Lymph Nodes: no lymphadenopathy present    Skin  · General Inspection: no rash, no lesions identified    Neurologic/Psychiatric  · Mental Status:  · Orientation: grossly oriented to person, place and time  · Mood and Affect: mood normal, affect appropriate    Assessment:   Irregular cycles  Encounter Diagnoses   Name Primary?  Encounter for supervision of high-risk pregnancy with elderly multigravida Yes    Chronic hypertension affecting pregnancy     Encounter for immunization      Due date: LMP= US    Plan:   We discussed options of genetic screening and diagnostic testing including:  CF testing, CVS, amniocentesis first trimester screening/NT, MSAFP, and NIPT (handout given to patient for review and consent)  Disc typical course of ov cysts, will observe for now, fu NT  She is not interested in prenatal genetic testing of her fetus.   Plan: NIPS/NT/FS  FH spina bifida  We discussed risks of AMA: including but not limited to the patient's risk for adverse outcome, including miscarriage, pregnancy loss, stillbirth, fetal chromosomal and anatomic anomalies,  delivery, low birth weight, intrauterine growth restriction, placenta previa, gestational diabetes, preeclampsia, and  delivery. I recommended a genetics and MFM consult to review genetic and OB risks in detail. The course of pregnancy discussed including visit schedule, ultrasounds, lab testing, etc.  Pt advised to avoid alcoholic beverages and illicit/recreational drugs use  Recommend taking prenatal vitamins or folic acid daily with DHA/fish oil. The hospital and practice style discussed with coverage system. We discussed nutrition, toxoplasmosis precautions, sexual activity, exercise, need for influenza vaccine, environmental and work hazards, travel advice, screen for domestic violence, need for seat belts. We discussed seafood, unpasteurized dairy products, deli meat, artificial sweeteners, and caffeine intake. We recommend avoiding chemical and toxin exposures when possible. Information on prenatal and breastfeeding classes given. Information on circumcision given  Patient encouraged not to smoke. Discussed current prescription drug use. Given medication list.  Discussed the use of over the counter medications and chemicals. She is advised to contact her MD with any questions. Pt understands risk of hemorrhage during pregnancy and post delivery and would accept blood products if necessary in life-threatening emergencies  We discussed signs and symptoms of abnormal pregnancies and miscarriage. Handouts given to pt. 24 hr urine  We discussed the impact of hypertension on pregnancy and the impact of pregnancy on the patient's hypertension.  We discussed increased maternal risks of hypertension in the pregnancy, including risks of stroke and heart attack, end-organ disease, as well as her increased risk for developing pre-eclampsia (super-imposed on chronic hypertension) in the pregnancy. We discussed increased risks of hypertension and pregnancy included but were not limited to higher risk for intervention, induction of labor,  delivery, placental abruption, stillbirth and  delivery. We discussed the importance of good blood pressure control, and monitoring for changes in maternal and fetal status during pregnancy and I referred her to [facility] for additional counseling and monitoring. We will do a baseline 24 hour urine collection for creatinine and protein calculation. I recommended taking a daily 81 mg aspirin during pregnancy to potentially decrease risks of developing pre-eclampsia. Physician review of ultrasound performed by technician    TA ULTRASOUND PERFORMED  A SINGLE VIABLE 8W1D IUP IS SEEN WITH NORMAL CARDIAC RHYTHM. GESTATIONAL AGE BASED ON TODAYS ULTRASOUND. A NORMAL YOLK SAC IS SEEN. RIGHT OVARY APPEARS WITHIN NORMAL LIMITS. A FOLLICULAR, <9OB, CYST IS SEEN. LEFT OVARY APPEARS WITHIN NORMAL LIMITS. A SIMPLE CYST IS SEEN >3CM. NO FREE FLUID SEEN IN THE CDS. Today's ultrasound report and images were reviewed and discussed with the patient. Please see images and imaging report entered by technician in PACS for more detail and progress note and diagnosis entered by MD.    Jerome Burns MD    Orders Placed This Encounter    ND IMMUNIZ ADMIN,1 SINGLE/COMB VAC/TOXOID    CULTURE, URINE    Influenza virus vaccine (QUADRIVALENT PF SYRINGE) ([id number])    HEP B SURFACE AG    HIV SCREEN, [facility]. W/REFLEX CONFIRM    CBC W/O DIFF    RUBELLA AB, IGG    T PALLIDUM SCREEN W/REFLEX    METABOLIC PANEL, COMPREHENSIVE    CREATININE, UR, 24 HR    PROTEIN, URINE, 24 HR    TYPE, ABO & RH    ANTIBODY SCREEN    PAP IG,CT-NG, APTIMA HPV AND RFX 16/18,45 ([id number])     Follow-up Disposition:  Return in about 4 weeks (around 3/26/2019) for Follow up OB visit.

## 2019-02-26 NOTE — PATIENT INSTRUCTIONS
Weeks 6 to 10 of Your Pregnancy: Care Instructions  Your Care Instructions    Congratulations on your pregnancy. This is an exciting and important time for you. During the first 6 to 10 weeks of your pregnancy, your body goes through many changes. Your baby grows very fast, even though you cannot feel it yet. You may start to notice that you feel different, both in your body and your emotions. Because each woman's pregnancy is unique, there is no right way to feel. You may feel the healthiest you have ever been, or you may feel tired or sick to your stomach (\"morning sickness\"). These early weeks are a time to make healthy choices and to eat the best foods for you and your baby. This care sheet will give you some ideas. This is also a good time to think about birth defects testing. These are tests done during pregnancy to look for possible problems with the baby. First trimester tests for birth defects can be done between 8 and 17 weeks of pregnancy, depending on the test. Talk with your doctor about what kinds of tests are available. Follow-up care is a key part of your treatment and safety. Be sure to make and go to all appointments, and call your doctor if you are having problems. It's also a good idea to know your test results and keep a list of the medicines you take. How can you care for yourself at home? Eat well  · Eat at least 3 meals and 2 healthy snacks every day. Eat fresh, whole foods, including:  ? 7 or more servings of bread, tortillas, cereal, rice, pasta, or oatmeal.  ? 3 or more servings of vegetables, especially leafy green vegetables. ? 2 or more servings of fruits. ? 3 or more servings of milk, yogurt, or cheese. ? 2 or more servings of meat, turkey, chicken, fish, eggs, or dried beans. · Drink plenty of fluids, especially water. Avoid sodas and other sweetened drinks. · Choose foods that have important vitamins for your baby, such as calcium, iron, and folate.   ? Dairy products, tofu, canned fish with bones, almonds, broccoli, dark leafy greens, corn tortillas, and fortified orange juice are good sources of calcium. ? Beef, poultry, liver, spinach, lentils, dried beans, fortified cereals, and dried fruits are rich in iron. ? Dark leafy greens, broccoli, asparagus, liver, fortified cereals, orange juice, peanuts, and almonds are good sources of folate. · Avoid foods that could harm your baby. ? Do not eat raw or undercooked meat, chicken, or fish (such as sushi or raw oysters). ? Do not eat raw eggs or foods that contain raw eggs, such as Caesar dressing. ? Do not eat soft cheeses and unpasteurized dairy foods, such as Brie, feta, or blue cheese. ? Do not eat fish that contains a lot of mercury, such as shark, swordfish, tilefish, or marielos mackerel. Do not eat more than 6 ounces of tuna each week. ? Do not eat raw sprouts, especially alfalfa sprouts. ? Cut down on caffeine, such as coffee, tea, and cola. Protect yourself and your baby  · Do not touch virgilio litter or cat feces. They can cause an infection that could harm your baby. · High body temperature can be harmful to your baby. So if you want to use a sauna or hot tub, be sure to talk to your doctor about how to use it safely. [place] with morning sickness  · Sip small amounts of water, juices, or shakes. Try drinking between meals, not with meals. · Eat 5 or 6 small meals a day. Try dry toast or crackers when you first get up, and eat breakfast a little later. · Avoid spicy, greasy, and fatty foods. · When you feel sick, open your windows or go for a short walk to get fresh air. · Try nausea wristbands. These help some women. · Tell your doctor if you think your prenatal vitamins make you sick. Where can you learn more? Go to http://lanny.info/. Enter G112 in the search box to learn more about \"Weeks 6 to 10 of Your Pregnancy: Care Instructions. \"  Current as of: September 5, 2018  Content Version: 11.9  © 4575-0814 [employer]. Care instructions adapted under license by [employer] (which disclaims liability or warranty for this information). If you have questions about a medical condition or this instruction, always ask your healthcare professional. [address] any warranty or liability for your use of this information.

## 2019-02-26 NOTE — PROGRESS NOTES
After obtaining consent, and per orders of Dr Jazmyn Soriano, injection of Flurarix given in left deltoid by Nola Ley LPN. Patient instructed to remain in clinic for 20 minutes afterwards, and to report any adverse reaction to me immediately. Lot: [id number] Exp: 06/30/19 NDC: 57104-485-63 , VIS given.

## 2019-02-28 PROBLEM — Z34.90 PREGNANCY: Status: ACTIVE | Noted: 2019-02-28

## 2019-02-28 LAB
ABO GROUP BLD: NORMAL
ALBUMIN SERPL-MCNC: 4.4 G/DL (ref 3.5–5.5)
ALBUMIN/GLOB SERPL: 2 {RATIO} (ref 1.2–2.2)
ALP SERPL-CCNC: 50 IU/L (ref 39–117)
ALT SERPL-CCNC: 15 IU/L (ref 0–32)
AST SERPL-CCNC: 14 IU/L (ref 0–40)
BACTERIA UR CULT: ABNORMAL
BACTERIA UR CULT: ABNORMAL
BILIRUB SERPL-MCNC: 0.3 MG/DL (ref 0–1.2)
BLD GP AB SCN SERPL QL: NEGATIVE
BUN SERPL-MCNC: 6 MG/DL (ref 6–20)
BUN/CREAT SERPL: 10 (ref 9–23)
CALCIUM SERPL-MCNC: 9 MG/DL (ref 8.7–10.2)
CHLORIDE SERPL-SCNC: 102 MMOL/L (ref 96–106)
CO2 SERPL-SCNC: 20 MMOL/L (ref 20–29)
CREAT SERPL-MCNC: 0.62 MG/DL (ref 0.57–1)
ERYTHROCYTE [DISTWIDTH] IN BLOOD BY AUTOMATED COUNT: 14.4 % (ref 12.3–15.4)
GLOBULIN SER CALC-MCNC: 2.2 G/DL (ref 1.5–4.5)
GLUCOSE SERPL-MCNC: 85 MG/DL (ref 65–99)
HBV SURFACE AG SERPL QL IA: NEGATIVE
HCT VFR BLD AUTO: 37.2 % (ref 34–46.6)
HGB BLD-MCNC: 12.4 G/DL (ref 11.1–15.9)
HIV 1+2 AB+HIV1 P24 AG SERPL QL IA: NON REACTIVE
MCH RBC QN AUTO: 28.2 PG (ref 26.6–33)
MCHC RBC AUTO-ENTMCNC: 33.3 G/DL (ref 31.5–35.7)
MCV RBC AUTO: 85 FL (ref 79–97)
PLATELET # BLD AUTO: 273 X10E3/UL (ref 150–379)
POTASSIUM SERPL-SCNC: 3.9 MMOL/L (ref 3.5–5.2)
PROT SERPL-MCNC: 6.6 G/DL (ref 6–8.5)
RBC # BLD AUTO: 4.39 X10E6/UL (ref 3.77–5.28)
RH BLD: POSITIVE
RUBV IGG SERPL IA-ACNC: 3.63 INDEX
SODIUM SERPL-SCNC: 139 MMOL/L (ref 134–144)
T PALLIDUM AB SER QL IA: NEGATIVE
WBC # BLD AUTO: 6.6 X10E3/UL (ref 3.4–10.8)

## 2019-02-28 NOTE — PROGRESS NOTES
Normal results, add to prenatal records. We can review in detail with patient at next visit.   Add pih labs to PL w date  Add gbs + to PL (urine) and update PNL  pls fu on CF/SMA results

## 2019-03-01 LAB
C TRACH RRNA CVX QL NAA+PROBE: NEGATIVE
CYTOLOGIST CVX/VAG CYTO: NORMAL
CYTOLOGY CVX/VAG DOC THIN PREP: NORMAL
CYTOLOGY HISTORY:: NORMAL
DX ICD CODE: NORMAL
HPV I/H RISK 4 DNA CVX QL PROBE+SIG AMP: NEGATIVE
Lab: NORMAL
N GONORRHOEA RRNA CVX QL NAA+PROBE: NEGATIVE
OTHER STN SPEC: NORMAL
PATH REPORT.FINAL DX SPEC: NORMAL
STAT OF ADQ CVX/VAG CYTO-IMP: NORMAL

## 2019-03-04 RX ORDER — ONDANSETRON 4 MG/1
4 TABLET, ORALLY DISINTEGRATING ORAL
Qty: 24 TAB | Refills: 2 | Status: SHIPPED | OUTPATIENT
Start: 2019-03-04 | End: 2019-05-21 | Stop reason: SDUPTHER

## 2019-03-05 RX ORDER — PANTOPRAZOLE SODIUM 40 MG/1
40 TABLET, DELAYED RELEASE ORAL DAILY
Qty: 30 TAB | Refills: 2 | Status: SHIPPED | OUTPATIENT
Start: 2019-03-05 | End: 2019-04-23

## 2019-03-07 RX ORDER — LABETALOL 100 MG/1
TABLET, FILM COATED ORAL
Qty: 60 TAB | Refills: 0 | Status: SHIPPED | OUTPATIENT
Start: 2019-03-07 | End: 2019-04-07 | Stop reason: SDUPTHER

## 2019-03-18 ENCOUNTER — TELEPHONE (OUTPATIENT)
Dept: OBGYN CLINIC | Age: 39
End: 2019-03-18

## 2019-03-18 NOTE — TELEPHONE ENCOUNTER
Call received at 646am    41year old  11w0d pregnant patient last seen in the office on 19    [facility] genetic counselor calling from [facility] calling to report the patient had a lab specimen that is high risk due to low fetal fraction and make the fetus high risk for triploidy, trisomy 25 or 15.    1 in 16 .      Lab report to be faxed to the office and then scanned into the chart.

## 2019-03-20 DIAGNOSIS — O09.511 ELDERLY PRIMIGRAVIDA IN FIRST TRIMESTER: Primary | ICD-10-CM

## 2019-03-21 NOTE — PROGRESS NOTES
Abnormal results. Please notify pt.   Update PL: low fetal fraction: rec repeat NIPS  Copy to MFM for NT (this one and repeated one)  Add to MFM consult: abnl NIPS/low FF  Tickle for repeat NIPS

## 2019-03-25 RX ORDER — BUTALBITAL, ACETAMINOPHEN AND CAFFEINE 50; 325; 40 MG/1; MG/1; MG/1
TABLET ORAL
Qty: 15 TAB | Refills: 0 | Status: SHIPPED | OUTPATIENT
Start: 2019-03-25 | End: 2019-04-15 | Stop reason: SDUPTHER

## 2019-03-26 ENCOUNTER — HOSPITAL ENCOUNTER (OUTPATIENT)
Dept: PERINATAL CARE | Age: 39
Discharge: HOME | End: 2019-03-26
Attending: OBSTETRICS & GYNECOLOGY
Payer: COMMERCIAL

## 2019-03-26 ENCOUNTER — ROUTINE PRENATAL (OUTPATIENT)
Dept: OBGYN CLINIC | Age: 39
End: 2019-03-26

## 2019-03-26 VITALS
DIASTOLIC BLOOD PRESSURE: 74 MMHG | BODY MASS INDEX: 36.9 KG/M2 | WEIGHT: 229.6 LBS | HEIGHT: 66 IN | SYSTOLIC BLOOD PRESSURE: 126 MMHG

## 2019-03-26 DIAGNOSIS — K59.00 CONSTIPATION, UNSPECIFIED CONSTIPATION TYPE: ICD-10-CM

## 2019-03-26 DIAGNOSIS — M79.621 AXILLARY PAIN, RIGHT: ICD-10-CM

## 2019-03-26 DIAGNOSIS — O21.9 NAUSEA/VOMITING IN PREGNANCY: ICD-10-CM

## 2019-03-26 DIAGNOSIS — R23.4 BREAST SKIN CHANGES: ICD-10-CM

## 2019-03-26 DIAGNOSIS — I10 ESSENTIAL HYPERTENSION: ICD-10-CM

## 2019-03-26 DIAGNOSIS — Z34.00 PRENATAL CARE OF PRIMIGRAVIDA, ANTEPARTUM: Primary | ICD-10-CM

## 2019-03-26 PROCEDURE — 99204 OFFICE O/P NEW MOD 45 MIN: CPT | Performed by: OBSTETRICS & GYNECOLOGY

## 2019-03-26 PROCEDURE — 76813 OB US NUCHAL MEAS 1 GEST: CPT | Performed by: OBSTETRICS & GYNECOLOGY

## 2019-03-26 RX ORDER — MICONAZOLE NITRATE 2 %
CREAM WITH APPLICATOR VAGINAL 2 TIMES DAILY
COMMUNITY
End: 2019-04-23

## 2019-03-26 NOTE — PATIENT INSTRUCTIONS
Weeks 10 to 14 of Your Pregnancy: Care Instructions  Your Care Instructions    By weeks 10 to 14 of your pregnancy, the placenta has formed inside your uterus. It is possible to hear your baby's heartbeat with a special ultrasound device. Your baby's eyes can and do move. The arms and legs can bend. This is a good time to think about testing for birth defects. There are two types of tests: screening and diagnostic. Screening tests show the chance that a baby has a certain birth defect. They can't tell you for sure that your baby has a problem. Diagnostic tests show if a baby has a certain birth defect. It's your choice whether to have these tests. You and your partner can talk to your doctor or midwife about birth defects tests. Follow-up care is a key part of your treatment and safety. Be sure to make and go to all appointments, and call your doctor if you are having problems. It's also a good idea to know your test results and keep a list of the medicines you take. How can you care for yourself at home? Decide about tests  · You can have screening tests and diagnostic tests to check for birth defects. The decision to have a test for birth defects is personal. Think about your age, your chance of passing on a family disease, your need to know about any problems, and what you might do after you have the test results. ? Triple or quadruple (quad) blood tests. These screening tests can be done between 15 and 20 weeks of pregnancy. They check the amounts of three or four substances in your blood. The doctor looks at these test results, along with your age and other factors, to find out the chance that your baby may have certain problems. ? Amniocentesis. This diagnostic test is used to look for chromosomal problems in the baby's cells.  It can be done between 15 and 20 weeks of pregnancy, usually around week 16.  ? Nuchal translucency test. This test uses ultrasound to measure the thickness of the area at the back of the baby's neck. An increase in the thickness can be an early sign of Down syndrome. ? Chorionic villus sampling (CVS). This is a test that looks for certain genetic problems with your baby. The same genes that are in your baby are in the placenta. A small piece of the placenta is taken out and tested. This test is done when you are 10 to 13 weeks pregnant. Ease discomfort  · Slow down and take naps when you feel tired. · If your emotions swing, talk to someone. Crying, anxiety, and concentration problems are common. · If your gums bleed, try a softer toothbrush. If your gums are puffy and bleed a lot, see your dentist.  · If you feel dizzy:  ? Get up slowly after sitting or lying down. ? Drink plenty of fluids. ? Eat small snacks to keep your blood sugar stable. ? Put your head between your legs as though you were tying your shoelaces. ? Lie down with your legs higher than your head. Use pillows to prop up your feet. · If you have a headache:  ? Lie down. ? Ask your partner or a good friend for a neck massage. ? Try cool cloths over your forehead or across the back of your neck. ? Use acetaminophen (Tylenol) for pain relief. Do not use nonsteroidal anti-inflammatory drugs (NSAIDs), such as ibuprofen (Advil, Motrin) or naproxen (Aleve), unless your doctor says it is okay. · If you have a nosebleed, pinch your nose gently, and hold it for a short while. To prevent nosebleeds, try massaging a small dab of petroleum jelly, such as Vaseline, in your nostrils. · If your nose is stuffed up, try saline (saltwater) nose sprays. Do not use decongestant sprays. Care for your breasts  · Wear a bra that gives you good support. · Know that changes in your breasts are normal.  ? Your breasts may get larger and more tender. Tenderness usually gets better by 12 weeks. ? Your nipples may get darker and larger, and small bumps around your nipples may show more. ?  The veins in your chest and breasts may show more. · Don't worry about \"toughening'\" your nipples. Breastfeeding will naturally do this. Where can you learn more? Go to http://kori-estella.info/. Enter J535 in the search box to learn more about \"Weeks 10 to 14 of Your Pregnancy: Care Instructions. \"  Current as of: September 5, 2018  Content Version: 11.9  © 4953-7594 [employer]. Care instructions adapted under license by [employer] (which disclaims liability or warranty for this information). If you have questions about a medical condition or this instruction, always ask your healthcare professional. [address] any warranty or liability for your use of this information.

## 2019-03-26 NOTE — PROGRESS NOTES
[facility]  [email or web address]  [phone]    Sharonda Elizabeth MD, FACOG     Follow-up OB visit    Chief Complaint   Patient presents with    Routine Prenatal Visit       There were no vitals filed for this visit. Patient Active Problem List    Diagnosis Date Noted    Pregnancy 2019    Severe obesity ([address] 10/24/2018    Irritable bowel syndrome with diarrhea 2017    Gastroesophageal reflux disease 2017    Obesity (BMI 30-39.9) 2017    GERD (gastroesophageal reflux disease) 2017    Menorrhagia with regular cycle 2017    History of kidney stones 2015    Weight gain 2015    H/O abnormal Pap smear 2012    Chronic daily headache 2012        The patient reports the following concerns: continues to have nausea, no vomiting, taking Zofran daily, concerns about variety in diet/tolerance. She is also concerned about constipation, lbm today and yesterday \"firm balls\"; prior to that bowel movements weekly. Endorses occasional intake of green veggies, drinking as much as she can without nausea, occasionally taking citrate-not consistently. See PN flowsheet for exam    41 y.o.  12w1d   Encounter Diagnosis   Name Primary?     Prenatal care of primigravida, antepartum Yes        [] SAB/bleeding precautions reviewed   [] PTL/PPROM precautions reviewed   [] Labor precautions reviewed   [] Fetal kick counts discussed   [] Labs reviewed with patient   [] Charles Cuco precautions reviewed   [] Consent reviewed   [] Handouts given to pt   [] Glucola handout    [] GBS/labor/Magic Hour handout   []    [] We reviewed CDC recommendations for Tdap for patient and close contacts and RBA of receiving in pregnancy, advised obtaining in third trimester   [] Reviewed healthy nutrition in pregnancy and good exercise practices   [] We disc safer medications in pregnancy and referred patient to [facility] NATHANIEL resources   [] We reviewed CDC recommendations for flu vaccine and RBA of receiving in pregnancy   []    []    []             No orders of the defined types were placed in this encounter.       Macho Saucedo MD

## 2019-03-26 NOTE — PROGRESS NOTES
MFM US/NT-ultrasound component normal. 
Update prenatals. Confirm NIPS/1st trimester serum results in chart. Fu repeat nips Confirm FS

## 2019-03-26 NOTE — PROGRESS NOTES
[facility]  [email or web address]    Oscar MD Gabriel, FACOG       OB/GYN Problem visit    Chief Complaint:   Chief Complaint   Patient presents with    Routine Prenatal Visit    Other     right arm pit pain       History of Present Illness: This is not a new problem being evaluated by this provider. The patient is a 41 y.o.  female who reports having skin changes (a red area that could be a pimple on right breast with right axilla tenderness) for 3 months. She reports the symptoms are is unchanged. Aggravating factors include none. Alleviating factors include none. Also c/o inc tenderness when she cleans under right arm.     +nausea, taking zofran. She reports no prior history of breast cancer, biopsy or abnormal mammograms, family hh/o paternal aunt breast CA dx < 60   She is not breast feeding. She does not have other concerns. Last Mammogram Results:  No results found for this or any previous visit. LMP: Patient's last menstrual period was 2018.     PFSH:  Past Medical History:   Diagnosis Date    ADHD     Dysplasia of cervix, low grade (ORION 1) 12    colpo    Encounter for IUD removal 2016    Mirena    GERD (gastroesophageal reflux disease)     H/O abnormal Pap smear 12;12    LGSIL, HPV positive    Headache     Headache(784.0)     IUD (intrauterine device) in place 2014    Mirena    Migraine headache     without aura    pap smear for 11;13; 4/1/15;19    neg, HPV neg,no ECC; neg, HPV neg, Negative, HPV negative; neg pap and neg HPV     Past Surgical History:   Procedure Laterality Date    HX COLPOSCOPY  12    ORION 1    HX HEENT      wisdom teeth    HX LITHOTRIPSY      HX WISDOM TEETH EXTRACTION       Family History   Problem Relation Age of Onset    Hypertension Father     Heart Disease Father     Diabetes Father     Elevated Lipids Father     Cancer Father         eye    Cancer Mother         Cervical,Uterine,& Ovarian    Depression Mother     Allergic Rhinitis Brother     Asthma Brother     Other Brother         Chron's    Hypertension Brother     Alcohol abuse Maternal Grandfather     Depression Maternal Grandfather     Alcohol abuse Paternal Grandmother     Stroke Paternal Grandmother     Migraines Brother     Allergic Rhinitis Brother     Breast Cancer Other      Social History     Tobacco Use    Smoking status: Never Smoker    Smokeless tobacco: Never Used   Substance Use Topics    Alcohol use: Yes     Comment: social    Drug use: No     No Known Allergies  Current Outpatient Medications   Medication Sig    calcium citrate-vitamin D3 (CITRACAL + D) tablet Take  by mouth two (2) times a day. Indications: prn    doxylamine-pyridoxine, vit B6, (BONJESTA) 20-20 mg TbID Take 1 Tab by mouth daily for 1 day. Increase to 2 po every day on day 2, if needed for N/v    butalbital-acetaminophen-caffeine (FIORICET, ESGIC) -40 mg per tablet TAKE ONE TABLET BY MOUTH EVERY 6 HOURS AS NEEDED FOR PAIN    labetalol (NORMODYNE) 100 mg tablet TAKE ONE TABLET BY MOUTH TWICE A DAY    ondansetron (ZOFRAN ODT) 4 mg disintegrating tablet Take 1 Tab by mouth every eight (8) hours as needed for Nausea.  prenatal vits62/FA/om3/dha/epa (PRENATAL GUMMY PO) Take  by mouth.  pantoprazole (PROTONIX) 40 mg tablet Take 1 Tab by mouth daily.  dextroamphetamine-amphetamine (ADDERALL) 20 mg tablet     gabapentin (NEURONTIN) 300 mg capsule     fremanezumab-vfrm (AJOVY) 225 mg/1.5 mL syrg 1.5 mL by SubCUTAneous route every thirty (30) days.  cetirizine (ZYRTEC) 10 mg tablet Take 1 Tab by mouth daily. No current facility-administered medications for this visit.         Review of Systems:  History obtained from the patient  Constitutional: negative for fevers, chills and weight loss  ENT ROS: negative for - hearing change, oral lesions or visual changes  Respiratory: negative for cough, wheezing or dyspnea on exertion  Cardiovascular: negative for chest pain, irregular heart beats, exertional chest pressure/discomfort  Gastrointestinal: +constipation, +nausea  Genito-Urinary ROS: no dysuria, trouble voiding, or hematuria  Inteument/breast: see HPI  Musculoskeletal:negative for stiff joints, neck pain and muscle weakness  Endocrine ROS: see hpi  Hematological and Lymphatic ROS: negative for - blood clots, bruising or swollen lymph nodes    Physical Exam:  Visit Vitals  /74 (BP 1 Location: Left arm, BP Patient Position: Sitting)   Ht 5' 6\" (1.676 m)   Wt 229 lb 9.6 oz (104.1 kg)   BMI 37.06 kg/m²       GENERAL: alert, well appearing, and in no distress  HEAD: normocephalic, atraumatic. NECK:  supple, no significant adenopathy, no palpable masses  PULM: clear to auscultation, no wheezes, rales or rhonchi, symmetric air entry   COR: normal rate and regular rhythm, S1 and S2 normal   BACK: no cvat  ABDOMEN: soft, nontender, nondistended, no masses or organomegaly   BREAST:   Right breast appear normal with superficial skin changes, no suspicious masses, no skin or nipple changes, +tenderness right axilla, ? Shoddy LAD      Left breast appear normal, no suspicious masses, no skin or nipple changes or axillary nodes  NEURO: alert, oriented, normal speech  SKIN: no breast skin changes    Assessment:  Encounter Diagnoses   Name Primary?  Prenatal care of primigravida, antepartum Yes    Axillary pain, right     Breast skin changes        Plan:  The patient is advised that she should contact the office if she does not note improvement or if symptoms recur. She should contact our office with any questions or concerns. She could keep her routine annual exam appointment. We reviewed self breast exams with the patient. We recommend follow up with PCP for non-gynecologic complaints and chronic medical problems.       Breast referral, disc typical breast changes in pregnancy and possible of skin irritation causing LAD  Repeat NIPS  MFM NT/ today  Bowel regimen, pt has handout    Add bonjesta        Orders Placed This Encounter    CREATININE, UR, 24 HR    PROTEIN, URINE, 24 HR    REFERRAL TO BREAST SURGERY    doxylamine-pyridoxine, vit B6, (BONJESTA) 20-20 mg TbID

## 2019-03-27 LAB
CREAT 24H UR-MRATE: 1835 MG/24 HR (ref 800–1800)
CREAT UR-MCNC: 73.4 MG/DL
PROT 24H UR-MRATE: 765 MG/24 HR (ref 30–150)
PROT UR-MCNC: 30.6 MG/DL

## 2019-04-04 ENCOUNTER — OFFICE VISIT (OUTPATIENT)
Dept: SURGERY | Age: 39
End: 2019-04-04

## 2019-04-04 VITALS
HEART RATE: 88 BPM | BODY MASS INDEX: 39.27 KG/M2 | DIASTOLIC BLOOD PRESSURE: 92 MMHG | HEIGHT: 64 IN | SYSTOLIC BLOOD PRESSURE: 133 MMHG | WEIGHT: 230 LBS

## 2019-04-04 DIAGNOSIS — N63.10 BREAST MASS, RIGHT: Primary | ICD-10-CM

## 2019-04-04 RX ORDER — DOXYLAMINE SUCCINATE AND PYRIDOXINE HYDROCHLORIDE 20; 20 MG/1; MG/1
TABLET, EXTENDED RELEASE ORAL
COMMUNITY
Start: 2019-03-29 | End: 2019-06-19

## 2019-04-04 NOTE — LETTER
4/4/19 Patient: Neeta Edward YOB: 1980 Date of Visit: 4/4/2019 Joni Alonso MD 
[address] [address] VIA In Basket Dear Joni Alonso MD, Thank you for referring Ms. Brigitte Curran to [address] for evaluation. My notes for this consultation are attached. If you have questions, please do not hesitate to call me. I look forward to following your patient along with you.  
 
 
Sincerely, 
 
Yoselin Subramanian MD 
 
 Date of Service: 05/18/2017    SUBJECTIVE:    The patient had her MRI arthrogram yesterday.  This was essentially a normal exam.  She says, since that exam, she has had some numbness in the leg and difficulty making a fist in the hand.  Her shoulder has also been very sore.    OBJECTIVE:    On examination, I really cannot elevate her shoulder much at all today, even passively.  She will not completely extend the fingers or make a fist in the hand.    RECOMMENDATION:  Teddys shoulder seems to be irritated after the arthrogram.  At this point, I do want to get her back in therapy.  I am not seeing anything that she would need a surgery for at this point.  I do want her hand stiffness and shoulder stiffness worked out in therapy.  I told her I did not have a good explanation as to why she is getting some numbness in the leg after a shoulder arthrogram.  I will see her back only if her stiffness does not resolve.      Dictated By: Kwesi Villavicencio MD  Signing Provider: Kwesi Villavicencio MD OD/hemant ([id number])  DD: 05/18/2017 09:32:05 TD: 05/19/2017 11:20:33    Copy Sent To:     cc: Kenia Mcmanus PT

## 2019-04-04 NOTE — PATIENT INSTRUCTIONS

## 2019-04-04 NOTE — PROGRESS NOTES
HISTORY OF PRESENT ILLNESS  Ambrosio Garcia is a 41 y.o. female. HPI  NEW patient consult referred by  Dr. Lynette Jiang for RIGHT breast skin lesion and axillary pain. Pt is 3 months pregnant with her first child. The skin lesion has been present for 6 months. It is not painful. RIGHT axillary pain started one month ago. It was painful to put on deodorant or shave. It feels less sensitive today. OB History    Para Term  AB Living   1 0 0 0 0 0   SAB TAB Ectopic Molar Multiple Live Births   0 0 0 0 0 0   Obstetric Comments   Menarche:  17. LMP: 20. # of Children:  Currently pregnant. Age at Delivery of First Child:  n/a. Hysterectomy/oophorectomy:  NO/NO. Breast Bx:  No.  Hx of Breast Feeding:  n/a. BCP:  Yes. Hormone therapy:  No.          Family History: Mother-patient thinks her mother had uterine cancer in her 29's and was told it came from a copper 7 IUD      No breast imaging. Review of Systems   Constitutional: Negative. HENT: Negative. Eyes: Negative. Respiratory: Negative. Cardiovascular: Negative. Gastrointestinal: Negative. Genitourinary: Negative. Musculoskeletal: Negative. Skin: Negative. Neurological: Positive for headaches. Endo/Heme/Allergies: Negative. Psychiatric/Behavioral: Negative. All other systems reviewed and are negative. Physical Exam   Pulmonary/Chest: Right breast exhibits skin change (8 mm soft  red skin lesion, well-circumscribed. no drainage. no skin changes in the axilla). Right breast exhibits no inverted nipple, no mass, no nipple discharge and no tenderness. Left breast exhibits no inverted nipple, no mass, no nipple discharge, no skin change and no tenderness. Breasts are symmetrical.       Lymphadenopathy:     She has no cervical adenopathy. She has no axillary adenopathy. Right: No supraclavicular adenopathy present. Left: No supraclavicular adenopathy present.      BREAST ULTRASOUND  Indication: Right breast skin lesion and right axillary pain  Technique: The area was scanned using a high-frequency linear-array near-field transducer  Findings: No underlying abnormal mass, lesion, or shadowing noted at the skin lesion. No axillary lymphadenopathy. No axillary skin changes. Impression: Normal breast and axillary tissue   Disposition: No worrisome finding on ultrasound  ASSESSMENT and PLAN    ICD-10-CM ICD-9-CM    1. Breast mass, right N63.10 611.72      RIGHT breast skin lesion is a tiny sebaceous cyst.  May remain as is, but is harmless  No axillary lymphadenopathy. Pain may be subsiding. It could have been contact dermatitis. No underlying pathology. Pt reassured that everything is fine.   PRN follow up

## 2019-04-07 ENCOUNTER — TELEPHONE (OUTPATIENT)
Dept: OBGYN CLINIC | Age: 39
End: 2019-04-07

## 2019-04-08 RX ORDER — LABETALOL 100 MG/1
TABLET, FILM COATED ORAL
Qty: 60 TAB | Refills: 3 | Status: SHIPPED | OUTPATIENT
Start: 2019-04-08 | End: 2019-08-07 | Stop reason: SDUPTHER

## 2019-04-08 NOTE — TELEPHONE ENCOUNTER
Patient is a , 14w0d pregnant, given a rx for labetalol on 3/7/2019 for 30 days. Patient last seen for FOB 3/26/2019 and was not given a refill at that time. Patient requesting a new rx. Please advise.

## 2019-04-09 DIAGNOSIS — O09.529 ENCOUNTER FOR SUPERVISION OF HIGH-RISK PREGNANCY WITH ELDERLY MULTIGRAVIDA: Primary | ICD-10-CM

## 2019-04-09 LAB — NIPT, EXTERNAL: NORMAL

## 2019-04-09 NOTE — PROGRESS NOTES
Normal results, add to prenatal records. We can review in detail with patient at next visit.   Add to pl nl repeat nips xx

## 2019-04-15 ENCOUNTER — OFFICE VISIT (OUTPATIENT)
Dept: INTERNAL MEDICINE CLINIC | Age: 39
End: 2019-04-15

## 2019-04-15 ENCOUNTER — TELEPHONE (OUTPATIENT)
Dept: INTERNAL MEDICINE CLINIC | Age: 39
End: 2019-04-15

## 2019-04-15 VITALS
HEART RATE: 100 BPM | TEMPERATURE: 98.6 F | DIASTOLIC BLOOD PRESSURE: 89 MMHG | WEIGHT: 229 LBS | OXYGEN SATURATION: 100 % | BODY MASS INDEX: 39.09 KG/M2 | SYSTOLIC BLOOD PRESSURE: 135 MMHG | RESPIRATION RATE: 18 BRPM | HEIGHT: 64 IN

## 2019-04-15 DIAGNOSIS — J02.9 PHARYNGITIS, UNSPECIFIED ETIOLOGY: ICD-10-CM

## 2019-04-15 DIAGNOSIS — J01.00 ACUTE NON-RECURRENT MAXILLARY SINUSITIS: Primary | ICD-10-CM

## 2019-04-15 DIAGNOSIS — R50.9 FEVER, UNSPECIFIED FEVER CAUSE: ICD-10-CM

## 2019-04-15 LAB
QUICKVUE INFLUENZA TEST: NEGATIVE
S PYO AG THROAT QL: NEGATIVE
VALID INTERNAL CONTROL?: YES
VALID INTERNAL CONTROL?: YES

## 2019-04-15 RX ORDER — BUTALBITAL, ACETAMINOPHEN AND CAFFEINE 50; 325; 40 MG/1; MG/1; MG/1
TABLET ORAL
Qty: 30 TAB | Refills: 1 | Status: SHIPPED | OUTPATIENT
Start: 2019-04-15 | End: 2019-04-23 | Stop reason: SDUPTHER

## 2019-04-15 RX ORDER — AZITHROMYCIN 250 MG/1
250 TABLET, FILM COATED ORAL SEE ADMIN INSTRUCTIONS
Qty: 6 TAB | Refills: 0 | Status: SHIPPED | OUTPATIENT
Start: 2019-04-15 | End: 2019-04-20

## 2019-04-15 NOTE — TELEPHONE ENCOUNTER
Spoke with patient. Two pt identifiers confirmed. Patient offered an appointment with Dr. Josee Louis today 04/15/19 at [place] [address].  Patient accepted. Patient advised if anything changes or if unable to keep this appointment to call the office  Pt verbalized understanding of information discussed w/ no further questions at this time.

## 2019-04-15 NOTE — TELEPHONE ENCOUNTER
----- Message from Philip Monterroso sent at 4/15/2019 11:19 AM EDT -----  Regarding: Dr. Edgar Villasenor is returning Nurse Natarajan's call and would like a call back.  Callback: [phone]

## 2019-04-15 NOTE — TELEPHONE ENCOUNTER
Pt is returning Nurse  Delgado's call and would like a call back.  Callback: [phone]       Message received & copied from [facility]

## 2019-04-15 NOTE — TELEPHONE ENCOUNTER
Pt requesting a sick appt for today. Pt is 15 weeks pregnant running a fever or 99.9 and has been sick for about a week. Best contact [phone].        Message received & copied from [facility]

## 2019-04-15 NOTE — PROGRESS NOTES
Uzma Crawford is a 41 y.o. female who complains of URI symptoms for 6 days. The patient reports sore throat, nasal congestion, ear congestion and cough. Reports green mucous for 4 days. Reports low grade fever and sweats yesterday. Some SOB. No chest congestion. Took benadryl Past Medical History:  
Diagnosis Date  ADHD  Dysplasia of cervix, low grade (ORION 1) 2/23/12  
 colpo  Encounter for IUD removal 08/19/2016 Mirena  GERD (gastroesophageal reflux disease) trying to control with diet since pregnant  H/O abnormal Pap smear 11/8/12;2/8/12 LGSIL, HPV positive  Headache   
 Headache(784.0)  IUD (intrauterine device) in place 03/11/2014 Mirena  Migraine headache   
 without aura  pap smear for 2/1/11;2/5/13; 4/1/15;2/26/19  
 neg, HPV neg,no ECC; neg, HPV neg, Negative, HPV negative; neg pap and neg HPV Review of Systems Pertinent items are noted in HPI. Objective:  
 
Visit Vitals /89 (BP 1 Location: Left arm, BP Patient Position: Sitting) Pulse 100 Temp 98.6 °F (37 °C) (Oral) Resp 18 Ht 5' 4\" (1.626 m) Wt 229 lb (103.9 kg) LMP 12/31/2018 SpO2 100% BMI 39.31 kg/m² Gen: Mildly ill appearing female HEENT:   PERRL,normal conjunctiva. External ear and canals normal, TMs no opacification or erythema,  Swollen nasal turbinates, no sinus pain on palpation,  OP no erythema, no exudates, MMM Neck:   No masses or LAD Resp:  No wheezing, no rhonchi, no rales. CV:  RRR, normal S1S2, no murmur. Assessment/Plan: ICD-10-CM ICD-9-CM 1. Acute non-recurrent maxillary sinusitis J01.00 461.0 azithromycin (ZITHROMAX) 250 mg tablet 2. Pharyngitis, unspecified etiology J02.9 462 AMB POC RAPID STREP A  
3. Fever, unspecified fever cause R50.9 780.60 AMB POC RAPID INFLUENZA TEST Nasal saline. Steamy shower. Humidifier at bedside. Benadryl 25mg at bedtime. Tylenol as needed. Follow-up and Dispositions · Return if symptoms worsen or fail to improve.  
  
 
 
Yobani Berry MD

## 2019-04-23 ENCOUNTER — ROUTINE PRENATAL (OUTPATIENT)
Dept: OBGYN CLINIC | Age: 39
End: 2019-04-23

## 2019-04-23 VITALS
WEIGHT: 231 LBS | BODY MASS INDEX: 39.44 KG/M2 | HEIGHT: 64 IN | SYSTOLIC BLOOD PRESSURE: 128 MMHG | DIASTOLIC BLOOD PRESSURE: 84 MMHG

## 2019-04-23 DIAGNOSIS — Z36.9 ENCOUNTER FOR ANTENATAL SCREENING OF MOTHER: ICD-10-CM

## 2019-04-23 DIAGNOSIS — O10.919 HYPERTENSION IN PREGNANCY, ESSENTIAL: ICD-10-CM

## 2019-04-23 DIAGNOSIS — O09.512 PRIMIGRAVIDA OF ADVANCED MATERNAL AGE IN SECOND TRIMESTER: Primary | ICD-10-CM

## 2019-04-23 DIAGNOSIS — Z3A.16 16 WEEKS GESTATION OF PREGNANCY: ICD-10-CM

## 2019-04-23 LAB — AFP, MATERNAL, EXTERNAL: NORMAL

## 2019-04-23 RX ORDER — BUTALBITAL, ACETAMINOPHEN AND CAFFEINE 50; 325; 40 MG/1; MG/1; MG/1
TABLET ORAL
Qty: 30 TAB | Refills: 1 | Status: SHIPPED | OUTPATIENT
Start: 2019-04-23 | End: 2019-12-12

## 2019-04-23 NOTE — PROGRESS NOTES
[facility]  [email or web address]  [phone]    Soraya Alaniz MD, [address] [facility]     Follow-up OB visit    Chief Complaint   Patient presents with    Routine Prenatal Visit       Vitals:    19 0837   BP: 128/84   Weight: 231 lb (104.8 kg)   Height: 5' 4\" (1.626 m)       Patient Active Problem List    Diagnosis Date Noted    Pregnancy 2019    Severe obesity ([address] 10/24/2018    Irritable bowel syndrome with diarrhea 2017    Gastroesophageal reflux disease 2017    Obesity (BMI 30-39.9) 2017    GERD (gastroesophageal reflux disease) 2017    Menorrhagia with regular cycle 2017    History of kidney stones 2015    Weight gain 2015    H/O abnormal Pap smear 2012    Chronic daily headache 2012        The patient reports the following concerns: Occ headaches. Needs rf on Fioricet. See PN flowsheet for exam    41 y.o.  16w1d   Encounter Diagnoses   Name Primary?     Primigravida of advanced maternal age in second trimester Yes    Encounter for  screening of mother     Hypertension in pregnancy, essential     16 weeks gestation of pregnancy          Disc rba of pain meds/fioricet in pregnancy, notify MD if NI  Keep mfm fu     [] SAB/bleeding precautions reviewed   [] PTL/PPROM precautions reviewed   [] Labor precautions reviewed   [] Fetal kick counts discussed   [] Labs reviewed with patient   [] Leopoldo Mcclintock precautions reviewed   [] Consent reviewed   [] Handouts given to pt   [] Glucola handout    [] GBS/labor/Magic Hour handout   []    [] We reviewed CDC recommendations for Tdap for patient and close contacts and RBA of receiving in pregnancy, advised obtaining in third trimester   [] Reviewed healthy nutrition in pregnancy and good exercise practices   [] We disc safer medications in pregnancy and referred patient to [facility] NATHANIEL resources   [] We reviewed CDC recommendations for flu vaccine and RBA of receiving in pregnancy   []    []    []         Follow-up and Dispositions    · Return in about 1 month (around 5/21/2019) for Follow up OB visit.          Orders Placed This Encounter    AFP, MATERNAL SCREEN    butalbital-acetaminophen-caffeine (FIORICET, ESGIC) -40 mg per tablet       Laurita Patton MD2

## 2019-04-23 NOTE — PATIENT INSTRUCTIONS

## 2019-04-26 LAB
AFP ADJ MOM SERPL: 1.44
AFP INTERP SERPL-IMP: NORMAL
AFP INTERP SERPL-IMP: NORMAL
AFP SERPL-MCNC: 35.8 NG/ML
AGE AT DELIVERY: 37 YR
COMMENT, 018013: NORMAL
GA METHOD: NORMAL
GA: 16.1 WEEKS
IDDM PATIENT QL: NO
MULTIPLE PREGNANCY: NO
NEURAL TUBE DEFECT RISK FETUS: 3220 %
RESULTS, 017004: NORMAL

## 2019-04-29 ENCOUNTER — TELEPHONE (OUTPATIENT)
Dept: OBGYN CLINIC | Age: 39
End: 2019-04-29

## 2019-04-29 NOTE — TELEPHONE ENCOUNTER
Call received at 325PM    41year old patient  17wod pregnant patient last seen in the office on 19. MFM calling to make sure the patient is aware of her nipts testing results.     My chart message sent on 19 to patient  and read by patient on 4/10/19

## 2019-05-09 ENCOUNTER — TELEPHONE (OUTPATIENT)
Dept: OBGYN CLINIC | Age: 39
End: 2019-05-09

## 2019-05-09 NOTE — TELEPHONE ENCOUNTER
136 Jovany Garza at [facility] is calling to verify per request of her insurance that you are aware that she is on a \"Category C\" drug Labetalol 100 mg BID. Please verify if you are aware of this and if this is an \"issue'.

## 2019-05-21 ENCOUNTER — ROUTINE PRENATAL (OUTPATIENT)
Dept: OBGYN CLINIC | Age: 39
End: 2019-05-21

## 2019-05-21 ENCOUNTER — HOSPITAL ENCOUNTER (OUTPATIENT)
Dept: PERINATAL CARE | Age: 39
Discharge: HOME | End: 2019-05-21
Attending: OBSTETRICS & GYNECOLOGY
Payer: COMMERCIAL

## 2019-05-21 VITALS
SYSTOLIC BLOOD PRESSURE: 120 MMHG | DIASTOLIC BLOOD PRESSURE: 80 MMHG | HEIGHT: 64 IN | WEIGHT: 239 LBS | BODY MASS INDEX: 40.8 KG/M2

## 2019-05-21 DIAGNOSIS — O10.919 HYPERTENSION IN PREGNANCY, ESSENTIAL: ICD-10-CM

## 2019-05-21 DIAGNOSIS — O09.512 PRIMIGRAVIDA OF ADVANCED MATERNAL AGE IN SECOND TRIMESTER: Primary | ICD-10-CM

## 2019-05-21 DIAGNOSIS — Z3A.20 20 WEEKS GESTATION OF PREGNANCY: ICD-10-CM

## 2019-05-21 PROCEDURE — 76811 OB US DETAILED SNGL FETUS: CPT | Performed by: OBSTETRICS & GYNECOLOGY

## 2019-05-21 RX ORDER — LORATADINE 10 MG
10 TABLET,DISINTEGRATING ORAL
COMMUNITY
End: 2019-12-12

## 2019-05-21 RX ORDER — ONDANSETRON 4 MG/1
4 TABLET, ORALLY DISINTEGRATING ORAL
Qty: 24 TAB | Refills: 2 | Status: SHIPPED | OUTPATIENT
Start: 2019-05-21 | End: 2019-10-24

## 2019-05-21 NOTE — PROGRESS NOTES
[facility] OB-GYN  [email or web address]  [phone]    Luz Maria Childs MD, [address] [facility]     Follow-up OB visit    Chief Complaint   Patient presents with    Routine Prenatal Visit       Vitals:    05/21/19 0841   BP: 120/80   Weight: 239 lb (108.4 kg)   Height: 5' 4\" (1.626 m)       Patient Active Problem List    Diagnosis Date Noted    Pregnancy 02/28/2019    Severe obesity ([address] 10/24/2018    Irritable bowel syndrome with diarrhea 11/13/2017    Gastroesophageal reflux disease 11/13/2017    Obesity (BMI 30-39.9) 08/14/2017    GERD (gastroesophageal reflux disease) 08/14/2017    Menorrhagia with regular cycle 05/02/2017    History of kidney stones 09/18/2015    Weight gain 09/18/2015    H/O abnormal Pap smear 11/08/2012    Chronic daily headache 01/17/2012        The patient reports the following concerns: none    MFM appt today    See PN flowsheet for exam    41 y.o. Yael Mathew 20w1d   Encounter Diagnoses   Name Primary?     Hypertension in pregnancy, essential     Primigravida of advanced maternal age in second trimester Yes    20 weeks gestation of pregnancy      MFM us today  PIH precautions   [] SAB/bleeding precautions reviewed   [] PTL/PPROM precautions reviewed   [] Labor precautions reviewed   [] Fetal kick counts discussed   [] Labs reviewed with patient   [] Krystle Romo precautions reviewed   [] Consent reviewed   [] Handouts given to pt   [] Glucola handout    [] GBS/labor/Magic Hour handout   []    [] We reviewed CDC recommendations for Tdap for patient and close contacts and RBA of receiving in pregnancy, advised obtaining in third trimester   [] Reviewed healthy nutrition in pregnancy and good exercise practices   [] We disc safer medications in pregnancy and referred patient to [facility] NATHANIEL resources   [] We reviewed CDC recommendations for flu vaccine and RBA of receiving in pregnancy   []    []    []         Follow-up and Dispositions    · Return in about 1 month (around 6/18/2019) for Follow up OB visit. No orders of the defined types were placed in this encounter.       Norma Aguilar MD

## 2019-05-21 NOTE — PATIENT INSTRUCTIONS

## 2019-05-28 RX ORDER — PANTOPRAZOLE SODIUM 20 MG/1
20 TABLET, DELAYED RELEASE ORAL DAILY
Qty: 30 TAB | Refills: 0 | Status: SHIPPED | OUTPATIENT
Start: 2019-05-28 | End: 2019-06-26 | Stop reason: SDUPTHER

## 2019-06-19 ENCOUNTER — HOSPITAL ENCOUNTER (OUTPATIENT)
Dept: PERINATAL CARE | Age: 39
Discharge: HOME | End: 2019-06-19
Attending: OBSTETRICS & GYNECOLOGY
Payer: COMMERCIAL

## 2019-06-19 ENCOUNTER — ROUTINE PRENATAL (OUTPATIENT)
Dept: OBGYN CLINIC | Age: 39
End: 2019-06-19

## 2019-06-19 VITALS
SYSTOLIC BLOOD PRESSURE: 112 MMHG | DIASTOLIC BLOOD PRESSURE: 68 MMHG | WEIGHT: 242 LBS | HEIGHT: 64 IN | BODY MASS INDEX: 41.32 KG/M2

## 2019-06-19 DIAGNOSIS — O16.9 HYPERTENSION DURING PREGNANCY, ANTEPARTUM, UNSPECIFIED HYPERTENSION IN PREGNANCY TYPE: ICD-10-CM

## 2019-06-19 DIAGNOSIS — O99.210 OBESITY IN PREGNANCY: ICD-10-CM

## 2019-06-19 DIAGNOSIS — O09.529 ANTEPARTUM MULTIGRAVIDA OF ADVANCED MATERNAL AGE: Primary | ICD-10-CM

## 2019-06-19 DIAGNOSIS — Z3A.24 24 WEEKS GESTATION OF PREGNANCY: ICD-10-CM

## 2019-06-19 PROCEDURE — 76816 OB US FOLLOW-UP PER FETUS: CPT | Performed by: OBSTETRICS & GYNECOLOGY

## 2019-06-19 NOTE — PATIENT INSTRUCTIONS
Weeks 22 to 26 of Your Pregnancy: Care Instructions  Your Care Instructions    As you enter your 7th month of pregnancy at week 26, your baby's lungs are growing stronger and getting ready to breathe. You may notice that your baby responds to the sound of your or your partner's voice. You may also notice that your baby does less turning and twisting and more squirming or jerking. Jerking often means that your baby has the hiccups. Hiccups are perfectly normal and are only temporary. You may want to think about attending a childbirth preparation class. This is also a good time to start thinking about whether you want to have pain medicine during labor. Most pregnant women are tested for gestational diabetes between weeks 25 and 28. Gestational diabetes occurs when your blood sugar level gets too high when you're pregnant. The test is important, because you can have gestational diabetes and not know it. But the condition can cause problems for your baby. Follow-up care is a key part of your treatment and safety. Be sure to make and go to all appointments, and call your doctor if you are having problems. It's also a good idea to know your test results and keep a list of the medicines you take. How can you care for yourself at home? Ease discomfort from your baby's kicking  · Change your position. Sometimes this will cause your baby to change position too. · Take a deep breath while you raise your arm over your head. Then breathe out while you drop your arm. Do Kegel exercises to prevent urine from leaking  · You can do Kegel exercises while you stand or sit. ? Squeeze the same muscles you would use to stop your urine. Your belly and thighs should not move. ? Hold the squeeze for 3 seconds, and then relax for 3 seconds. ? Start with 3 seconds. Then add 1 second each week until you are able to squeeze for 10 seconds. ? Repeat the exercise 10 to 15 times for each session.  Do three or more sessions each day.  Ease or reduce swelling in your feet, ankles, hands, and fingers  · If your fingers are puffy, take off your rings. · Do not eat high-salt foods, such as potato chips. · Prop up your feet on a stool or couch as much as possible. Sleep with pillows under your feet. · Do not stand for long periods of time or wear tight shoes. · Wear support stockings. Where can you learn more? Go to http://kori-estella.info/. Enter G264 in the search box to learn more about \"Weeks 22 to 26 of Your Pregnancy: Care Instructions. \"  Current as of: September 5, 2018  Content Version: 11.9  © 9906-7968 [employer]. Care instructions adapted under license by [employer] (which disclaims liability or warranty for this information). If you have questions about a medical condition or this instruction, always ask your healthcare professional. [place] [address] any warranty or liability for your use of this information. High Blood Pressure in Pregnancy: Care Instructions  Your Care Instructions    High blood pressure (hypertension) means that the force of blood against your artery walls is too strong. Mild high blood pressure during pregnancy is not usually dangerous. Your doctor will probably just want to watch you closely. But when blood pressure is very high, it can reduce oxygen to your baby. This can affect how well your baby grows. High blood pressure also means that you are at higher risk for:  · Preeclampsia. This is a problem that includes high blood pressure and damage to your liver or kidneys. It can also reduce how much oxygen your baby gets. In some cases, it leads to eclampsia. Eclampsia causes seizures. · Placental abruption. This is a problem when the placenta separates from the uterus before birth. It prevents the baby from getting enough oxygen and nutrients. Sometimes it can cause death for the baby and the mother.   To prevent problems for you or your baby, you will have to check your blood pressure often. You will do this until after your baby is born. If your blood pressure rises suddenly or is very high during your pregnancy, your doctor may prescribe medicines. They can usually control blood pressure. If your blood pressure affects your or your baby's health, your doctor may need to deliver your baby early. After your baby is born, your blood pressure will probably improve. But sometimes blood pressure problems continue after birth. Follow-up care is a key part of your treatment and safety. Be sure to make and go to all appointments, and call your doctor if you are having problems. It's also a good idea to know your test results and keep a list of the medicines you take. How can you care for yourself at home? · Take and write down your blood pressure at home if your doctor tells you to. · Take your medicines exactly as prescribed. Call your doctor if you think you are having a problem with your medicine. · Do not smoke. If you need help quitting, talk to your doctor about stop-smoking programs and medicines. These can increase your chances of quitting for good. · Do not gain too much weight during your pregnancy. Talk to your doctor about how much weight gain is healthy. · Eat a healthy diet. · If your doctor says it's okay, get regular exercise. Walking or swimming several times a week can be healthy for you and your baby. · Reduce stress, and find time to relax. This is very important if you continue to work or have a busy schedule. It's also important if you have small children at home. When should you call for help? Call 911 anytime you think you may need emergency care. For example, call if:    · You passed out (lost consciousness).     · You have a seizure.    Call your doctor now or seek immediate medical care if:    · You have symptoms of preeclampsia, such as:  ? Sudden swelling of your face, hands, or feet.   ? New vision problems (such as dimness or blurring). ? A severe headache.     · Your blood pressure is higher than it should be or rises suddenly.     · You have new nausea or vomiting.     · You think that you are in labor.     · You have pain in your belly or pelvis.    Watch closely for changes in your health, and be sure to contact your doctor if:    · You gain weight rapidly. Where can you learn more? Go to http://kori-estella.info/. Enter [phone] in the search box to learn more about \"High Blood Pressure in Pregnancy: Care Instructions. \"  Current as of: September 5, 2018  Content Version: 11.9  © 8910-6784 [employer], [employer]. Care instructions adapted under license by [employer] (which disclaims liability or warranty for this information). If you have questions about a medical condition or this instruction, always ask your healthcare professional. [address] any warranty or liability for your use of this information.

## 2019-06-19 NOTE — PROGRESS NOTES
[facility] OB-GYN  [email or web address]  [phone]    Koko Pimentel MD, [address] [facility]     Follow-up OB visit    Chief Complaint   Patient presents with    Routine Prenatal Visit       Vitals:    19 1100   BP: 112/68   Weight: 242 lb (109.8 kg)   Height: 5' 4\" (1.626 m)       Patient Active Problem List    Diagnosis Date Noted    Pregnancy 2019    Severe obesity ([address] 10/24/2018    Irritable bowel syndrome with diarrhea 2017    Gastroesophageal reflux disease 2017    Obesity (BMI 30-39.9) 2017    GERD (gastroesophageal reflux disease) 2017    Menorrhagia with regular cycle 2017    History of kidney stones 2015    Weight gain 2015    H/O abnormal Pap smear 2012    Chronic daily headache 2012        The patient reports the following concerns: none  Unable to leave urine sample  See PN flowsheet for exam    41 y.o.  24w2d   Encounter Diagnoses   Name Primary?     Obesity in pregnancy     Hypertension during pregnancy, antepartum, unspecified hypertension in pregnancy type     Antepartum multigravida of advanced maternal age Yes    24 weeks gestation of pregnancy      Keep mfm fu  PIH prec reviewed  glucola cmp NV   [] SAB/bleeding precautions reviewed   [x] PTL/PPROM precautions reviewed   [] Labor precautions reviewed   [] Fetal kick counts discussed   [] Labs reviewed with patient   [] Delrae Pierz precautions reviewed   [] Consent reviewed   [x] Handouts given to pt   [x] Glucola handout    [] GBS/labor/Magic Hour handout   []    [] We reviewed CDC recommendations for Tdap for patient and close contacts and RBA of receiving in pregnancy, advised obtaining in third trimester   [] Reviewed healthy nutrition in pregnancy and good exercise practices   [] We disc safer medications in pregnancy and referred patient to [facility] NATHANIEL resources   [] We reviewed CDC recommendations for flu vaccine and RBA of receiving in pregnancy   [] []    []         Follow-up and Dispositions    · Return in about 1 month (around 7/17/2019) for Follow up OB visit. No orders of the defined types were placed in this encounter.       Mira Miranda MD

## 2019-06-26 ENCOUNTER — TELEPHONE (OUTPATIENT)
Dept: OBGYN CLINIC | Age: 39
End: 2019-06-26

## 2019-06-26 RX ORDER — PANTOPRAZOLE SODIUM 20 MG/1
TABLET, DELAYED RELEASE ORAL
Qty: 90 TAB | Refills: 0 | Status: SHIPPED | OUTPATIENT
Start: 2019-06-26 | End: 2019-10-24

## 2019-06-26 NOTE — TELEPHONE ENCOUNTER
Pharmacist from [facility] [place] is just verifying that Dr. Lavon Levine is aware that this patient has been prescribed Pantoprazole (by [facility]) during pregnancy and they do not have proper studies to see if safe? Tyrell Zamarripa for her to continue?

## 2019-07-03 ENCOUNTER — TELEPHONE (OUTPATIENT)
Dept: OBGYN CLINIC | Age: 39
End: 2019-07-03

## 2019-07-03 NOTE — TELEPHONE ENCOUNTER
Patient of [facility]. Calling for concerns of high blood pressure. Concerns that she has swelling in her ankles, BP of  150/95 today at work and she typically runs around 112/68. She is a 26 weeks 2 days gestation. No headache today, does not feel dizzy or lightheaded, no vision disturbances. Watching her salt intake and drinking plenty of water. Needs to monitor over the holiday. Come in Friday for check if maintaining. Patient will be in [place] and declined appt. Will call prn for any concerns.   She will monitor BP

## 2019-07-18 ENCOUNTER — ROUTINE PRENATAL (OUTPATIENT)
Dept: OBGYN CLINIC | Age: 39
End: 2019-07-18

## 2019-07-18 ENCOUNTER — HOSPITAL ENCOUNTER (OUTPATIENT)
Dept: PERINATAL CARE | Age: 39
Discharge: HOME | End: 2019-07-18
Attending: OBSTETRICS & GYNECOLOGY
Payer: COMMERCIAL

## 2019-07-18 VITALS — WEIGHT: 250 LBS | DIASTOLIC BLOOD PRESSURE: 78 MMHG | SYSTOLIC BLOOD PRESSURE: 132 MMHG | BODY MASS INDEX: 42.91 KG/M2

## 2019-07-18 DIAGNOSIS — Z3A.24 24 WEEKS GESTATION OF PREGNANCY: ICD-10-CM

## 2019-07-18 DIAGNOSIS — Z36.9 PRENATAL SCREENING ENCOUNTER: ICD-10-CM

## 2019-07-18 DIAGNOSIS — O16.9 HYPERTENSION DURING PREGNANCY, ANTEPARTUM, UNSPECIFIED HYPERTENSION IN PREGNANCY TYPE: Primary | ICD-10-CM

## 2019-07-18 LAB
GTT, 1 HR, GLUCOLA, EXTERNAL: 90
HCT, EXTERNAL: 36.2
HGB, EXTERNAL: 11.7
PLATELET CNT,   EXTERNAL: 278

## 2019-07-18 PROCEDURE — 76816 OB US FOLLOW-UP PER FETUS: CPT | Performed by: OBSTETRICS & GYNECOLOGY

## 2019-07-18 NOTE — PATIENT INSTRUCTIONS
Weeks 26 to 30 of Your Pregnancy: Care Instructions  Your Care Instructions    You are now in your last trimester of pregnancy. Your baby is growing rapidly. And you'll probably feel your baby moving around more often. Your doctor may ask you to count your baby's kicks. Your back may ache as your body gets used to your baby's size and length. If you haven't already had the Tdap shot during this pregnancy, talk to your doctor about getting it. It will help protect your  against pertussis infection. During this time, it's important to take care of yourself and pay attention to what your body needs. If you feel sexual, explore ways to be close with your partner that match your comfort and desire. Use the tips provided in this care sheet to find ways to be sexual in your own way. Follow-up care is a key part of your treatment and safety. Be sure to make and go to all appointments, and call your doctor if you are having problems. It's also a good idea to know your test results and keep a list of the medicines you take. How can you care for yourself at home? Take it easy at work  · Take frequent breaks. If possible, stop working when you are tired, and rest during your lunch hour. · Take bathroom breaks every 2 hours. · Change positions often. If you sit for long periods, stand up and walk around. · When you stand for a long time, keep one foot on a low stool with your knee bent. After standing a lot, sit with your feet up. · Avoid fumes, chemicals, and tobacco smoke. Be sexual in your own way  · Having sex during pregnancy is okay, unless your doctor tells you not to. · You may be very interested in sex, or you may have no interest at all. · Your growing belly can make it hard to find a good position during intercourse. [place] and explore. · You may get cramps in your uterus when your partner touches your breasts.   · A back rub may relieve the backache or cramps that sometimes follow orgasm. Learn about  labor  · Watch for signs of  labor. You may be going into labor if:  ? You have menstrual-like cramps, with or without nausea. ? You have about 6 or more contractions in 1 hour, even after you have had a glass of water and are resting. ? You have a low, dull backache that does not go away when you change your position. ? You have pain or pressure in your pelvis that comes and goes in a pattern. ? You have intestinal cramping or flu-like symptoms, with or without diarrhea.  ? You notice an increase or change in your vaginal discharge. Discharge may be heavy, mucus-like, watery, or streaked with blood. ? Your water breaks. · If you think you have  labor:  ? Drink 2 or 3 glasses of water or juice. Not drinking enough fluids can cause contractions. ? Stop what you are doing, and empty your bladder. Then lie down on your left side for at least 1 hour. ? While lying on your side, find your breast bone. Put your fingers in the soft spot just below it. Move your fingers down toward your belly button to find the top of your uterus. Check to see if it is tight. ? Contractions can be weak or strong. Record your contractions for an hour. Time a contraction from the start of one contraction to the start of the next one.  ? Single or several strong contractions without a pattern are called [facility] contractions. They are practice contractions but not the start of labor. They often stop if you change what you are doing. ? Call your doctor if you have regular contractions. Where can you learn more? Go to http://kori-estella.info/. Enter A144 in the search box to learn more about \"Weeks 26 to 30 of Your Pregnancy: Care Instructions. \"  Current as of: 2018  Content Version: 11.9  © 4923-9323 [employer]. Care instructions adapted under license by [employer] (which disclaims liability or warranty for this information). If you have questions about a medical condition or this instruction, always ask your healthcare professional. [place] [address] any warranty or liability for your use of this information.

## 2019-07-18 NOTE — PROGRESS NOTES
[facility] OB-GYN  [email or web address]  [phone]    Mg Gutiérrez MD, [address] [facility]     Follow-up OB visit    Chief Complaint   Patient presents with    Routine Prenatal Visit       Vitals:    19 0941   BP: 132/78   Weight: 250 lb (113.4 kg)       Patient Active Problem List    Diagnosis Date Noted    Pregnancy 2019    Severe obesity ([address] 10/24/2018    Irritable bowel syndrome with diarrhea 2017    Gastroesophageal reflux disease 2017    Obesity (BMI 30-39.9) 2017    GERD (gastroesophageal reflux disease) 2017    Menorrhagia with regular cycle 2017    History of kidney stones 2015    Weight gain 2015    H/O abnormal Pap smear 2012    Chronic daily headache 2012        The patient reports the following concerns: none    1hr GTT, CBC, CMET today  Vag delivery consent signed    See PN flowsheet for exam    41 y.o.  28w3d   Encounter Diagnoses   Name Primary?     Hypertension during pregnancy, antepartum, unspecified hypertension in pregnancy type Yes    Prenatal screening encounter     24 weeks gestation of pregnancy      PIH precautions  Disc plan for IOL 39 wks  Keep mfm fu     [] SAB/bleeding precautions reviewed   [] PTL/PPROM precautions reviewed   [] Labor precautions reviewed   [] Fetal kick counts discussed   [] Labs reviewed with patient   [] Lizzie Rock precautions reviewed   [] Consent reviewed   [] Handouts given to pt   [] Glucola handout    [] GBS/labor/Magic Hour handout   []    [] We reviewed CDC recommendations for Tdap for patient and close contacts and RBA of receiving in pregnancy, advised obtaining in third trimester   [] Reviewed healthy nutrition in pregnancy and good exercise practices   [] We disc safer medications in pregnancy and referred patient to [facility] NATHANIEL resources   [] We reviewed CDC recommendations for flu vaccine and RBA of receiving in pregnancy   []    []    []         Follow-up and Dispositions    · Return in about 2 weeks (around 8/1/2019) for Follow up OB visit.          Orders Placed This Encounter    GLUCOSE, GESTATIONAL 1 HR TOLERANCE    CBC W/O DIFF    METABOLIC PANEL, COMPREHENSIVE       Nina Naylor MD

## 2019-07-19 LAB
ALBUMIN SERPL-MCNC: 3.7 G/DL (ref 3.5–5.5)
ALBUMIN/GLOB SERPL: 1.8 {RATIO} (ref 1.2–2.2)
ALP SERPL-CCNC: 79 IU/L (ref 39–117)
ALT SERPL-CCNC: 13 IU/L (ref 0–32)
AST SERPL-CCNC: 14 IU/L (ref 0–40)
BILIRUB SERPL-MCNC: <0.2 MG/DL (ref 0–1.2)
BUN SERPL-MCNC: 8 MG/DL (ref 6–20)
BUN/CREAT SERPL: 16 (ref 9–23)
CALCIUM SERPL-MCNC: 8.9 MG/DL (ref 8.7–10.2)
CHLORIDE SERPL-SCNC: 105 MMOL/L (ref 96–106)
CO2 SERPL-SCNC: 20 MMOL/L (ref 20–29)
CREAT SERPL-MCNC: 0.5 MG/DL (ref 0.57–1)
ERYTHROCYTE [DISTWIDTH] IN BLOOD BY AUTOMATED COUNT: 13.8 % (ref 12.3–15.4)
GLOBULIN SER CALC-MCNC: 2.1 G/DL (ref 1.5–4.5)
GLUCOSE 1H P 50 G GLC PO SERPL-MCNC: 90 MG/DL (ref 65–139)
GLUCOSE SERPL-MCNC: 81 MG/DL (ref 65–99)
HCT VFR BLD AUTO: 36.2 % (ref 34–46.6)
HGB BLD-MCNC: 11.7 G/DL (ref 11.1–15.9)
MCH RBC QN AUTO: 28.7 PG (ref 26.6–33)
MCHC RBC AUTO-ENTMCNC: 32.3 G/DL (ref 31.5–35.7)
MCV RBC AUTO: 89 FL (ref 79–97)
PLATELET # BLD AUTO: 278 X10E3/UL (ref 150–450)
POTASSIUM SERPL-SCNC: 3.9 MMOL/L (ref 3.5–5.2)
PROT SERPL-MCNC: 5.8 G/DL (ref 6–8.5)
RBC # BLD AUTO: 4.08 X10E6/UL (ref 3.77–5.28)
SODIUM SERPL-SCNC: 141 MMOL/L (ref 134–144)
WBC # BLD AUTO: 9.6 X10E3/UL (ref 3.4–10.8)

## 2019-07-20 NOTE — PROGRESS NOTES
Normal results, add to prenatal records. We can review in detail with patient at next visit.   Add pih labs to PL w date pls

## 2019-08-01 ENCOUNTER — ROUTINE PRENATAL (OUTPATIENT)
Dept: OBGYN CLINIC | Age: 39
End: 2019-08-01

## 2019-08-01 ENCOUNTER — HOSPITAL ENCOUNTER (OUTPATIENT)
Age: 39
Setting detail: OBSERVATION
Discharge: HOME | End: 2019-08-01
Attending: OBSTETRICS & GYNECOLOGY | Admitting: OBSTETRICS & GYNECOLOGY
Payer: COMMERCIAL

## 2019-08-01 VITALS
WEIGHT: 254 LBS | SYSTOLIC BLOOD PRESSURE: 135 MMHG | DIASTOLIC BLOOD PRESSURE: 65 MMHG | BODY MASS INDEX: 43.36 KG/M2 | HEART RATE: 83 BPM | HEIGHT: 64 IN | RESPIRATION RATE: 16 BRPM | TEMPERATURE: 98.3 F | OXYGEN SATURATION: 99 %

## 2019-08-01 VITALS
SYSTOLIC BLOOD PRESSURE: 138 MMHG | BODY MASS INDEX: 43.36 KG/M2 | DIASTOLIC BLOOD PRESSURE: 90 MMHG | HEIGHT: 64 IN | WEIGHT: 254 LBS

## 2019-08-01 DIAGNOSIS — Z23 ENCOUNTER FOR IMMUNIZATION: Primary | ICD-10-CM

## 2019-08-01 DIAGNOSIS — O16.9 ELEVATED BLOOD PRESSURE AFFECTING PREGNANCY, ANTEPARTUM: ICD-10-CM

## 2019-08-01 DIAGNOSIS — O10.019 PRE-EXISTING ESSENTIAL HYPERTENSION DURING PREGNANCY, ANTEPARTUM: ICD-10-CM

## 2019-08-01 DIAGNOSIS — O99.210 OBESITY IN PREGNANCY: ICD-10-CM

## 2019-08-01 LAB
ALBUMIN SERPL-MCNC: 3 G/DL (ref 3.5–5)
ALBUMIN/GLOB SERPL: 0.9 {RATIO} (ref 1.1–2.2)
ALP SERPL-CCNC: 107 U/L (ref 45–117)
ALT SERPL-CCNC: 21 U/L (ref 12–78)
ANION GAP SERPL CALC-SCNC: 9 MMOL/L (ref 5–15)
AST SERPL-CCNC: 19 U/L (ref 15–37)
BILIRUB SERPL-MCNC: 0.3 MG/DL (ref 0.2–1)
BUN SERPL-MCNC: 6 MG/DL (ref 6–20)
BUN/CREAT SERPL: 11 (ref 12–20)
CALCIUM SERPL-MCNC: 8.5 MG/DL (ref 8.5–10.1)
CHLORIDE SERPL-SCNC: 107 MMOL/L (ref 97–108)
CO2 SERPL-SCNC: 25 MMOL/L (ref 21–32)
CREAT SERPL-MCNC: 0.54 MG/DL (ref 0.55–1.02)
CREAT UR-MCNC: 35.3 MG/DL
ERYTHROCYTE [DISTWIDTH] IN BLOOD BY AUTOMATED COUNT: 12.9 % (ref 11.5–14.5)
GLOBULIN SER CALC-MCNC: 3.2 G/DL (ref 2–4)
GLUCOSE SERPL-MCNC: 77 MG/DL (ref 65–100)
HCT VFR BLD AUTO: 35.4 % (ref 35–47)
HGB BLD-MCNC: 11.8 G/DL (ref 11.5–16)
MCH RBC QN AUTO: 28.3 PG (ref 26–34)
MCHC RBC AUTO-ENTMCNC: 33.3 G/DL (ref 30–36.5)
MCV RBC AUTO: 84.9 FL (ref 80–99)
NRBC # BLD: 0 K/UL (ref 0–0.01)
NRBC BLD-RTO: 0 PER 100 WBC
PLATELET # BLD AUTO: 254 K/UL (ref 150–400)
PMV BLD AUTO: 10.7 FL (ref 8.9–12.9)
POTASSIUM SERPL-SCNC: 3.8 MMOL/L (ref 3.5–5.1)
PROT SERPL-MCNC: 6.2 G/DL (ref 6.4–8.2)
PROT UR-MCNC: 22 MG/DL (ref 0–11.9)
PROT/CREAT UR-RTO: 0.6
RBC # BLD AUTO: 4.17 M/UL (ref 3.8–5.2)
SODIUM SERPL-SCNC: 141 MMOL/L (ref 136–145)
WBC # BLD AUTO: 9.5 K/UL (ref 3.6–11)

## 2019-08-01 PROCEDURE — 74011250637 HC RX REV CODE- 250/637: Performed by: OBSTETRICS & GYNECOLOGY

## 2019-08-01 PROCEDURE — 36415 COLL VENOUS BLD VENIPUNCTURE: CPT

## 2019-08-01 PROCEDURE — 80053 COMPREHEN METABOLIC PANEL: CPT

## 2019-08-01 PROCEDURE — 84156 ASSAY OF PROTEIN URINE: CPT

## 2019-08-01 PROCEDURE — 99218 HC RM OBSERVATION: CPT

## 2019-08-01 PROCEDURE — 99285 EMERGENCY DEPT VISIT HI MDM: CPT

## 2019-08-01 PROCEDURE — 59025 FETAL NON-STRESS TEST: CPT

## 2019-08-01 PROCEDURE — 85027 COMPLETE CBC AUTOMATED: CPT

## 2019-08-01 RX ORDER — FAMOTIDINE 20 MG/1
20 TABLET, FILM COATED ORAL 2 TIMES DAILY
COMMUNITY
End: 2019-08-08 | Stop reason: SDUPTHER

## 2019-08-01 RX ORDER — SODIUM CHLORIDE 0.9 % (FLUSH) 0.9 %
5-10 SYRINGE (ML) INJECTION EVERY 8 HOURS
Status: DISCONTINUED | OUTPATIENT
Start: 2019-08-01 | End: 2019-08-01 | Stop reason: HOSPADM

## 2019-08-01 RX ORDER — ACETAMINOPHEN 325 MG/1
325 TABLET ORAL
Status: DISCONTINUED | OUTPATIENT
Start: 2019-08-01 | End: 2019-08-01 | Stop reason: HOSPADM

## 2019-08-01 RX ADMIN — ACETAMINOPHEN 325 MG: 325 TABLET ORAL at 14:34

## 2019-08-01 NOTE — PROGRESS NOTES
Pt arrived for R/O pre-E from office of MD Juan David.     11:15- Pt arm measured d/t elevated BPs. Arm measuring 38cm. RN switches to large adult BP cuff per sizing parameters. BP rechecked with new cuff and 142/73. Will continue to cycle per MD orders. 11:35- Pt resting comfortably in bed. No complaints at this time. RN waiting on lab results. Pt started 24hr urine collection at 11:17.     11:57- MD Juan David paged with PCR result 0.6    12:14- Pt up to BR.    12:30- MD Juan David at bedside to discuss plan of care with pt and review labs. PCR 0.6. RN to continue monitoring BPs and continue with continuous fetal monitoring. MD states pt may eat lunch. 13:50- RN spent 10-15min at bedside educating pt and significant other about pre-e. RN answering pt questions. Plan for MD Juan David to make decision regarding whether pt will stay overnight or be D/C home this evening. Pt verbalizes understanding. Continues to collect urine for 24 hr urine test. BP continues to cycle Q15min    14:25- MD Juan David notified pt c/o mild headache and requesting tylenol. 15:43- Pt up to BR    17:15- Pt given verbal and written D/C instructions. Pt to return to office with 24 hr urine tomorrow. Has F/U appt with MD Juan David and MD Bernadine on 8/8. Pt instructed to notify MD if BP >160/110 or if s/sx of worsening pre-e. Also given education about tamir jacobs ctx and fetal kick counts. Pt ambulatory and left unit accompanied by .

## 2019-08-01 NOTE — PROGRESS NOTES
NST Inpatient Procedure Note    Whitney Jacobson presents for fetal non-stress test.    Indication is elevated BP. She is 30w3d. She has been monitored for more than 60 minutes. The FHR was reactive. NST Interpretation:   FHR baseline 140-150 bpm,   Variability moderate  Accelerations present. Decelerations Absent. Uterine contractions were not present     Assessment  NST is reactive. NST is reassuring. Patient does need admission/observation for further monitoring. Tuan Torres was informed of the NST results and her questions were answered.     Plan:    [] Continue admission to labor and delivery    [x] Continue observation   [] Keep routine OB follow up upon discharge   [] Reviewed fetal movement kick counts, notify MD if decreased   []    []     After stable bp and reactive NST, plan discharge home, close follow up

## 2019-08-01 NOTE — PROGRESS NOTES
After obtaining consent,  and per orders of Dr. Nimesh Hayden, injection of TDAP given left deltoid IM by Ling Garsia. VIS given/reviewed. Patient tolerated well without reaction and instructed to remain in clinic for 20 minutes afterwards, and to report any adverse reaction to me immediately. Patient verbalized understanding.   Lot: [id number]  Exp: 10/08/2021  NDC: 49138-192-36  : [employer]

## 2019-08-01 NOTE — PROGRESS NOTES
Recent Results (from the past 12 hour(s))   CBC W/O DIFF    Collection Time: 08/01/19 11:12 AM   Result Value Ref Range    WBC 9.5 3.6 - 11.0 K/uL    RBC 4.17 3.80 - 5.20 M/uL    HGB 11.8 11.5 - 16.0 g/dL    HCT 35.4 35.0 - 47.0 %    MCV 84.9 80.0 - 99.0 FL    MCH 28.3 26.0 - 34.0 PG    MCHC 33.3 30.0 - 36.5 g/dL    RDW 12.9 11.5 - 14.5 %    PLATELET 357 284 - 257 K/uL    MPV 10.7 8.9 - 12.9 FL    NRBC 0.0 0  WBC    ABSOLUTE NRBC 0.00 0.00 - 7.01 K/uL   METABOLIC PANEL, COMPREHENSIVE    Collection Time: 08/01/19 11:12 AM   Result Value Ref Range    Sodium 141 136 - 145 mmol/L    Potassium 3.8 3.5 - 5.1 mmol/L    Chloride 107 97 - 108 mmol/L    CO2 25 21 - 32 mmol/L    Anion gap 9 5 - 15 mmol/L    Glucose 77 65 - 100 mg/dL    BUN 6 6 - 20 MG/DL    Creatinine 0.54 (L) 0.55 - 1.02 MG/DL    BUN/Creatinine ratio 11 (L) 12 - 20      GFR est AA >60 >60 ml/min/1.73m2    GFR est non-AA >60 >60 ml/min/1.73m2    Calcium 8.5 8.5 - 10.1 MG/DL    Bilirubin, total 0.3 0.2 - 1.0 MG/DL    ALT (SGPT) 21 12 - 78 U/L    AST (SGOT) 19 15 - 37 U/L    Alk. phosphatase 107 45 - 117 U/L    Protein, total 6.2 (L) 6.4 - 8.2 g/dL    Albumin 3.0 (L) 3.5 - 5.0 g/dL    Globulin 3.2 2.0 - 4.0 g/dL    A-G Ratio 0.9 (L) 1.1 - 2.2     PROTEIN/CREATININE RATIO, URINE    Collection Time: 08/01/19 11:12 AM   Result Value Ref Range    Protein, urine random 22 (H) 0.0 - 11.9 mg/dL    Creatinine, urine 35.30 mg/dL    Protein/Creat. urine Ratio 0.6         [facility] labs.     Patient Vitals for the past 12 hrs:   Pulse BP SpO2   08/01/19 1203   100 %   08/01/19 1158   100 %   08/01/19 1153   100 %   08/01/19 1151 85 144/74    08/01/19 1148   99 %   08/01/19 1143   99 %   08/01/19 1138   100 %   08/01/19 1137 82 141/81    08/01/19 1133   98 %   08/01/19 1128   99 %   08/01/19 1117   100 %   08/01/19 1116  142/73    08/01/19 1114 85     08/01/19 1112   99 %   08/01/19 1108 92 (!) 163/95    08/01/19 1107   99 %   08/01/19 1102   100 %   08/01/19 1057   99 %   08/01/19 1052   99 %   08/01/19 1047   100 %   08/01/19 1042 81 170/83 99 %         Severe range BP when pt first arrived, pt anxious from office. Repeat improved (not severe). D/w nurse: wrong cuff on during severe range bp.     Kitty Sumner MD

## 2019-08-01 NOTE — PROGRESS NOTES
[facility]  [email or web address]  [phone]    Litzy Fuentes MD, [address] [facility]     Follow-up OB visit    Chief Complaint   Patient presents with    Routine Prenatal Visit       Vitals:    08/01/19 0943 08/01/19 0944   BP: 138/90 138/90   Weight: 254 lb (115.2 kg)    Height: 5' 4\" (1.626 m)        Patient Active Problem List    Diagnosis Date Noted    Pregnancy 02/28/2019    Severe obesity ([address] 10/24/2018    Irritable bowel syndrome with diarrhea 11/13/2017    Gastroesophageal reflux disease 11/13/2017    Obesity (BMI 30-39.9) 08/14/2017    GERD (gastroesophageal reflux disease) 08/14/2017    Menorrhagia with regular cycle 05/02/2017    History of kidney stones 09/18/2015    Weight gain 09/18/2015    H/O abnormal Pap smear 11/08/2012    Chronic daily headache 01/17/2012        The patient reports the following concerns: increased fatigue and elevated BP at work, 140-150s over 90s. See PN flowsheet for exam    41 y.o. [facility] [address]   Encounter Diagnoses   Name Primary?     Encounter for immunization Yes    Obesity in pregnancy     Pre-existing essential hypertension during pregnancy, antepartum     Elevated blood pressure affecting pregnancy, antepartum        PIH precautions  Disc concerns re elevated BP over baseline with h/o chtn  To L and D for labs/urine, nst   [] SAB/bleeding precautions reviewed   [] PTL/PPROM precautions reviewed   [] Labor precautions reviewed   [] Fetal kick counts discussed   [] Labs reviewed with patient   [] Vena Cee precautions reviewed   [] Consent reviewed   [] Handouts given to pt   [] Glucola handout    [] GBS/labor/Magic Hour handout   []    [] We reviewed CDC recommendations for Tdap for patient and close contacts and RBA of receiving in pregnancy, advised obtaining in third trimester   [] Reviewed healthy nutrition in pregnancy and good exercise practices   [] We disc safer medications in pregnancy and referred patient to [facility] resources   [] We reviewed CDC recommendations for flu vaccine and RBA of receiving in pregnancy   []    []    []         Follow-up and Dispositions    · Return for to L and D.          Orders Placed This Encounter    NV IMMUNIZ ADMIN,1 SINGLE/COMB VAC/TOXOID    TETANUS, DIPHTHERIA TOXOIDS AND ACELLULAR PERTUSSIS VACCINE (TDAP), IN INDIVIDS. >=7, IM       Litzy Fuentes MD

## 2019-08-01 NOTE — DISCHARGE INSTRUCTIONS
Patient Education   Patient Education   Patient Education        Counting Your [de-identified] Kicks: Care Instructions  Your Care Instructions    Counting your baby's kicks is one way your doctor can tell that your baby is healthy. Most women--especially in a first pregnancy--feel their baby move for the first time between 16 and 22 weeks. The movement may feel like flutters rather than kicks. Your baby may move more at certain times of the day. When you are active, you may notice less kicking than when you are resting. At your prenatal visits, your doctor will ask whether the baby is active. In your last trimester, your doctor may ask you to count the number of times you feel your baby move. Follow-up care is a key part of your treatment and safety. Be sure to make and go to all appointments, and call your doctor if you are having problems. It's also a good idea to know your test results and keep a list of the medicines you take. How do you count fetal kicks? · A common method of checking your baby's movement is to count the number of kicks or moves you feel in 1 hour. Ten movements (such as kicks, flutters, or rolls) in 1 hour are normal. Some doctors suggest that you count in the morning until you get to 10 movements. Then you can quit for that day and start again the next day. · Pick your baby's most active time of day to count. This may be any time from morning to evening. · If you do not feel 10 movements in an hour, your baby may be sleeping. Wait for the next hour and count again. When should you call for help? Call your doctor now or seek immediate medical care if:    · You noticed that your baby has stopped moving or is moving much less than normal.    Watch closely for changes in your health, and be sure to contact your doctor if you have any problems. Where can you learn more? Go to http://kori-estella.info/.   Enter N147 in the search box to learn more about \"Counting Your [de-identified] Khadar: Care Instructions. \"  Current as of: September 5, 2018  Content Version: 12.1  © 0345-9481 [employer]. Care instructions adapted under license by [employer] (which disclaims liability or warranty for this information). If you have questions about a medical condition or this instruction, always ask your healthcare professional. [address] any warranty or liability for your use of this information. Suella Chirag Contractions: Care Instructions  Your Care Instructions    [place] Turner contractions prepare your uterus for labor. Think of them as a \"warm-up\" exercise that your body does. You may begin to feel them between the 28th and 30th weeks of your pregnancy. But they start as early as the 20th week. Madi Turner contractions usually occur more often during the ninth month. They may go away when you are active and return when you rest. These contractions are like mild contractions of true labor, but they occur less often. (You feel fewer than 8 in an hour.) They don't cause your cervix to open. It may be hard for you to tell the difference between Suella Chirag contractions and true labor, especially in your first pregnancy. Follow-up care is a key part of your treatment and safety. Be sure to make and go to all appointments, and call your doctor if you are having problems. It's also a good idea to know your test results and keep a list of the medicines you take. How can you care for yourself at home? · Try a warm bath to help relieve muscle tension and reduce pain. · Change positions every 30 minutes. Take breaks if you must sit for a long time. Get up and walk around. · Drink plenty of water, enough so that your urine is light yellow or clear like water. · Taking short walks may help you feel better. Your doctor needs to check any contractions that are getting stronger or closer together. Where can you learn more?   Go to http://kori-estella.info/. Enter [id number] [id number] in the search box to learn more about \"[place] Turner Contractions: Care Instructions. \"  Current as of: September 5, 2018  Content Version: 12.1  © 0547-5755 [employer]. Care instructions adapted under license by [employer]([facility]) (which disclaims liability or warranty for this information). If you have questions about a medical condition or this instruction, always ask your healthcare professional. [address] any warranty or liability for your use of this information. Preeclampsia: Care Instructions  Overview    Preeclampsia occurs when a woman's blood pressure rises during pregnancy. Often with preeclampsia, you also have swelling in your legs, hands, and face. A test may show too much protein in your urine. Preeclampsia is also called toxemia. If preeclampsia is severe and not treated, it can lead to seizures (eclampsia) and damage to your liver or kidneys. Preeclampsia can prevent your baby from getting enough food and oxygen. This can cause a low birth weight or other problems. Your doctor will watch you closely to prevent these problems. He or she also may recommend that you reduce your activity. If your preeclampsia is a danger to your health or the health of your baby, your doctor may need to deliver your baby early. While preeclampsia is a concern, most women with preeclampsia have healthy babies. After a woman gives birth, preeclampsia usually goes away on its own. But symptoms may last a few weeks or more and can get worse after delivery. Rarely, symptoms of preeclampsia don't show up until days or even weeks after childbirth. Follow-up care is a key part of your treatment and safety. Be sure to make and go to all appointments, and call your doctor if you are having problems. It's also a good idea to know your test results and keep a list of the medicines you take.   How can you care for yourself at home? · Take and record your blood pressure at home if your doctor tells you to. ? Learn the importance of the two measures of blood pressure (such as 120 over 80, or 120/80). The first number is the systolic pressure, which is the force of blood on the artery walls as the heart pumps. The second number is the diastolic pressure, which is the force of blood on the artery walls between heartbeats, when the heart is at rest. You have a choice of monitors to use. ? Manual monitor: You pump up the cuff and use a stethoscope to listen for your pulse. ? Electronic monitor: The cuff inflates, and a gauge shows your pulse rate. ? To take your blood pressure:  ? Ask your doctor to check your blood pressure monitor to be sure that it is accurate and that the cuff fits you. Also ask your doctor to watch you to make sure that you are using it right. ? You should not eat, use tobacco products, or use medicine known to raise blood pressure (such as some nasal decongestant sprays) before you take your blood pressure. ? Avoid taking your blood pressure if you have just exercised. Also avoid taking it if you are nervous or upset. Rest at least 15 minutes before you take your blood pressure. · If your doctor advises, check the protein levels in your urine. Your doctor or nurse will show you how to do this. · Take your medicines exactly as prescribed. Call your doctor if you think you are having a problem with your medicine. · Do not smoke. Quitting smoking will help improve your baby's growth and health. If you need help quitting, talk to your doctor about stop-smoking programs and medicines. These can increase your chances of quitting for good. · Eat a balanced and healthy diet that has lots of fruits and vegetables. · If your doctor advised bed rest, be sure to stay off your feet and rest as much as possible. ?  Keep a phone, phone book, notepad, and pen near the bed where you can easily reach them.  ? Gently stretch your legs every hour to maintain good blood flow. ? Have another family member pack snacks and lunch food in a cooler close to your bed. ? Use this time for activities that you usually cannot find time for, such as reading, craft projects, or letter writing. · You can keep track of your baby's health by noting the length of time it takes to count 10 movements (such as kicks, flutters, or rolls). Feeling 10 movements in less than 1 hour is considered normal. Track your baby's movements once each day. Bring this record with you to each prenatal visit. When should you call for help? [id number] anytime you think you may need emergency care. For example, call if:    · You passed out (lost consciousness).     · You have a seizure.    Call your doctor now or seek immediate medical care if:    · You have symptoms of preeclampsia, such as:  ? Sudden swelling of your face, hands, or feet. ? New vision problems (such as dimness, blurring, or seeing spots). ? A severe headache.     · Your blood pressure is higher than it should be, or it rises suddenly.     · You have new nausea or vomiting.     · You think that you are in labor.     · You have pain in your belly or pelvis.    Watch closely for changes in your health, and be sure to contact your doctor if:    · You gain weight rapidly. Where can you learn more? Go to http://kori-estella.info/. Enter A436 in the search box to learn more about \"Preeclampsia: Care Instructions. \"  Current as of: September 5, 2018  Content Version: 12.1  © 3602-0576 [employer]. Care instructions adapted under license by [employer] (which disclaims liability or warranty for this information). If you have questions about a medical condition or this instruction, always ask your healthcare professional. [address] any warranty or liability for your use of this information.

## 2019-08-01 NOTE — PATIENT INSTRUCTIONS

## 2019-08-03 LAB
CREAT 24H UR-MRATE: 671 MG/24 HR (ref 800–1800)
CREAT UR-MCNC: 55.9 MG/DL
PROT 24H UR-MRATE: 149 MG/24 HR (ref 30–150)
PROT UR-MCNC: 12.4 MG/DL

## 2019-08-07 RX ORDER — LABETALOL 100 MG/1
TABLET, FILM COATED ORAL
Qty: 60 TAB | Refills: 2 | Status: SHIPPED | OUTPATIENT
Start: 2019-08-07 | End: 2019-09-14

## 2019-08-07 NOTE — PATIENT INSTRUCTIONS

## 2019-08-08 ENCOUNTER — HOSPITAL ENCOUNTER (OUTPATIENT)
Age: 39
Setting detail: OBSERVATION
Discharge: HOME | End: 2019-08-08
Attending: OBSTETRICS & GYNECOLOGY | Admitting: OBSTETRICS & GYNECOLOGY
Payer: COMMERCIAL

## 2019-08-08 ENCOUNTER — HOSPITAL ENCOUNTER (OUTPATIENT)
Dept: PERINATAL CARE | Age: 39
Discharge: HOME | End: 2019-08-08
Attending: OBSTETRICS & GYNECOLOGY
Payer: COMMERCIAL

## 2019-08-08 ENCOUNTER — ROUTINE PRENATAL (OUTPATIENT)
Dept: OBGYN CLINIC | Age: 39
End: 2019-08-08

## 2019-08-08 VITALS — DIASTOLIC BLOOD PRESSURE: 74 MMHG | HEART RATE: 83 BPM | SYSTOLIC BLOOD PRESSURE: 141 MMHG

## 2019-08-08 VITALS
WEIGHT: 252.4 LBS | DIASTOLIC BLOOD PRESSURE: 104 MMHG | SYSTOLIC BLOOD PRESSURE: 154 MMHG | BODY MASS INDEX: 43.09 KG/M2 | HEIGHT: 64 IN

## 2019-08-08 DIAGNOSIS — Z3A.31 31 WEEKS GESTATION OF PREGNANCY: ICD-10-CM

## 2019-08-08 DIAGNOSIS — O09.529 ANTEPARTUM MULTIGRAVIDA OF ADVANCED MATERNAL AGE: Primary | ICD-10-CM

## 2019-08-08 DIAGNOSIS — O10.019 PRE-EXISTING ESSENTIAL HYPERTENSION DURING PREGNANCY, ANTEPARTUM: ICD-10-CM

## 2019-08-08 DIAGNOSIS — O99.210 OBESITY IN PREGNANCY: ICD-10-CM

## 2019-08-08 PROBLEM — O16.9 HYPERTENSION IN PREGNANCY: Status: ACTIVE | Noted: 2019-08-08

## 2019-08-08 LAB
ALBUMIN SERPL-MCNC: 3 G/DL (ref 3.5–5)
ALBUMIN/GLOB SERPL: 1 {RATIO} (ref 1.1–2.2)
ALP SERPL-CCNC: 117 U/L (ref 45–117)
ALT SERPL-CCNC: 20 U/L (ref 12–78)
ANION GAP SERPL CALC-SCNC: 7 MMOL/L (ref 5–15)
AST SERPL-CCNC: 13 U/L (ref 15–37)
BASOPHILS # BLD: 0 K/UL (ref 0–0.1)
BASOPHILS NFR BLD: 0 % (ref 0–1)
BILIRUB SERPL-MCNC: 0.2 MG/DL (ref 0.2–1)
BUN SERPL-MCNC: 7 MG/DL (ref 6–20)
BUN/CREAT SERPL: 15 (ref 12–20)
CALCIUM SERPL-MCNC: 9.1 MG/DL (ref 8.5–10.1)
CHLORIDE SERPL-SCNC: 109 MMOL/L (ref 97–108)
CO2 SERPL-SCNC: 23 MMOL/L (ref 21–32)
CREAT SERPL-MCNC: 0.46 MG/DL (ref 0.55–1.02)
CREAT UR-MCNC: 66.6 MG/DL
DIFFERENTIAL METHOD BLD: ABNORMAL
EOSINOPHIL # BLD: 0.1 K/UL (ref 0–0.4)
EOSINOPHIL NFR BLD: 1 % (ref 0–7)
ERYTHROCYTE [DISTWIDTH] IN BLOOD BY AUTOMATED COUNT: 12.7 % (ref 11.5–14.5)
GLOBULIN SER CALC-MCNC: 3.1 G/DL (ref 2–4)
GLUCOSE SERPL-MCNC: 95 MG/DL (ref 65–100)
HCT VFR BLD AUTO: 35 % (ref 35–47)
HGB BLD-MCNC: 11.5 G/DL (ref 11.5–16)
IMM GRANULOCYTES # BLD AUTO: 0.1 K/UL (ref 0–0.04)
IMM GRANULOCYTES NFR BLD AUTO: 1 % (ref 0–0.5)
LYMPHOCYTES # BLD: 1.6 K/UL (ref 0.8–3.5)
LYMPHOCYTES NFR BLD: 16 % (ref 12–49)
MCH RBC QN AUTO: 27.5 PG (ref 26–34)
MCHC RBC AUTO-ENTMCNC: 32.9 G/DL (ref 30–36.5)
MCV RBC AUTO: 83.7 FL (ref 80–99)
MONOCYTES # BLD: 0.9 K/UL (ref 0–1)
MONOCYTES NFR BLD: 10 % (ref 5–13)
NEUTS SEG # BLD: 7 K/UL (ref 1.8–8)
NEUTS SEG NFR BLD: 72 % (ref 32–75)
NRBC # BLD: 0 K/UL (ref 0–0.01)
NRBC BLD-RTO: 0 PER 100 WBC
PLATELET # BLD AUTO: 266 K/UL (ref 150–400)
PMV BLD AUTO: 10.5 FL (ref 8.9–12.9)
POTASSIUM SERPL-SCNC: 3.8 MMOL/L (ref 3.5–5.1)
PROT SERPL-MCNC: 6.1 G/DL (ref 6.4–8.2)
PROT UR-MCNC: 23 MG/DL (ref 0–11.9)
PROT/CREAT UR-RTO: 0.3
RBC # BLD AUTO: 4.18 M/UL (ref 3.8–5.2)
SODIUM SERPL-SCNC: 139 MMOL/L (ref 136–145)
WBC # BLD AUTO: 9.6 K/UL (ref 3.6–11)

## 2019-08-08 PROCEDURE — 99218 HC RM OBSERVATION: CPT

## 2019-08-08 PROCEDURE — 80053 COMPREHEN METABOLIC PANEL: CPT

## 2019-08-08 PROCEDURE — 59025 FETAL NON-STRESS TEST: CPT

## 2019-08-08 PROCEDURE — 84156 ASSAY OF PROTEIN URINE: CPT

## 2019-08-08 PROCEDURE — 85025 COMPLETE CBC W/AUTO DIFF WBC: CPT

## 2019-08-08 PROCEDURE — 76816 OB US FOLLOW-UP PER FETUS: CPT | Performed by: OBSTETRICS & GYNECOLOGY

## 2019-08-08 PROCEDURE — 36415 COLL VENOUS BLD VENIPUNCTURE: CPT

## 2019-08-08 PROCEDURE — 99285 EMERGENCY DEPT VISIT HI MDM: CPT

## 2019-08-08 RX ORDER — ACETAMINOPHEN 325 MG/1
650 TABLET ORAL
Status: DISCONTINUED | OUTPATIENT
Start: 2019-08-08 | End: 2019-08-08 | Stop reason: HOSPADM

## 2019-08-08 NOTE — DISCHARGE INSTRUCTIONS
Patient Education        Weeks 30 to 28 of Your Pregnancy: Care Instructions  Your Care Instructions    You have made it to the final months of your pregnancy. By now, your baby is really starting to look like a baby, with hair and plump skin. As you enter the final weeks of pregnancy, the reality of having a baby may start to set in. This is the time to settle on a name, get your household in order, set up a safe nursery, and find quality  if needed. Doing these things in advance will allow you to focus on caring for and enjoying your new baby. You may also want to have a tour of your hospital's labor and delivery unit to get a better idea of what to expect while you are in the hospital.  During these last months, it is very important to take good care of yourself and pay attention to what your body needs. If your doctor says it is okay for you to work, don't push yourself too hard. Use the tips provided in this care sheet to ease heartburn and care for varicose veins. If you haven't already had the Tdap shot during this pregnancy, talk to your doctor about getting it. It will help protect your  against pertussis infection. Follow-up care is a key part of your treatment and safety. Be sure to make and go to all appointments, and call your doctor if you are having problems. It's also a good idea to know your test results and keep a list of the medicines you take. How can you care for yourself at home? Pay attention to your baby's movements  · You should feel your baby move several times every day. · Your baby now turns less, and kicks and jabs more. · Your baby sleeps 20 to 45 minutes at a time and is more active at certain times of day. · If your doctor wants you to count your baby's kicks:  ? Empty your bladder, and lie on your side or relax in a comfortable chair. ? Write down your start time. ? Pay attention only to your baby's movements. Count any movement except hiccups. ?  After you have counted 10 movements, write down your stop time. ? Write down how many minutes it took for your baby to move 10 times. ? If an hour goes by and you have not recorded 10 movements, have something to eat or drink and then count for another hour. If you do not record 10 movements in either hour, call your doctor. Ease heartburn  · Eat small, frequent meals. · Do not eat chocolate, peppermint, or very spicy foods. Avoid drinks with caffeine, such as coffee, tea, and sodas. · Avoid bending over or lying down after meals. · Talk a short walk after you eat. · If heartburn is a problem at night, do not eat for 2 hours before bedtime. · Take antacids like Mylanta, Maalox, Rolaids, or Tums. Do not take antacids that have sodium bicarbonate. Care for varicose veins  · Varicose veins are blood vessels that stretch out with the extra blood during pregnancy. Your legs may ache or throb. Most varicose veins will go away after the birth. · Avoid standing for long periods of time. Sit with your legs crossed at the ankles, not the knees. · Sit with your feet propped up. · Avoid tight clothing or stockings. Wear support hose. · Exercise regularly. Try walking for at least 30 minutes a day. Where can you learn more? Go to http://kori-estella.info/. Enter M183 in the search box to learn more about \"Weeks 30 to 32 of Your Pregnancy: Care Instructions. \"  Current as of: September 5, 2018  Content Version: 12.1  © 6276-0165 [employer]wise[employer] Inc[employer]. Care instructions adapted under license by [employer] (which disclaims liability or warranty for this information). If you have questions about a medical condition or this instruction, always ask your healthcare professional. [place] [address] any warranty or liability for your use of this information. Patient Education   MyChart Activation    Thank you for enrolling in [facility].  Please follow the instructions below to securely access your online medical record. DEMANDIT allows you to send messages to your doctor, view your test results, renew your prescriptions, schedule appointments, and more. How Do I Sign Up? 1. In your internet browser, go to [email or web address]. 2. Click on the First Time User? Click Here link in the Sign In box. You will see the New Member Sign Up page. 3. Enter your QSecuret Access Code exactly as it appears below. You will not need to use this code after youve completed the sign-up process. If you do not sign up before the expiration date, you must request a new code. DEMANDIT Access Code: Activation code not generated  Current DEMANDIT Status: Active     4. Enter the last four digits of your Social Security Number (xxxx) and Date of Birth (mm/dd/yyyy) as indicated and click Submit. You will be taken to the next sign-up page. 5. Create a DEMANDIT ID. This will be your DEMANDIT login ID and cannot be changed, so think of one that is secure and easy to remember. 6. Create a DEMANDIT password. You can change your password at any time. 7. Enter your Password Reset Question and Answer. This can be used at a later time if you forget your password. 8. Enter your e-mail address. You will receive e-mail notification when new information is available in [address] 9. Click Sign Up. You can now view your medical record. Additional Information    Remember, DEMANDIT is NOT to be used for urgent needs. For medical emergencies, dial 911. Now available from your iPhone and Android! Preeclampsia: Care Instructions  Overview    Preeclampsia occurs when a woman's blood pressure rises during pregnancy. Often with preeclampsia, you also have swelling in your legs, hands, and face. A test may show too much protein in your urine. Preeclampsia is also called toxemia.  If preeclampsia is severe and not treated, it can lead to seizures (eclampsia) and damage to your liver or kidneys. Preeclampsia can prevent your baby from getting enough food and oxygen. This can cause a low birth weight or other problems. Your doctor will watch you closely to prevent these problems. He or she also may recommend that you reduce your activity. If your preeclampsia is a danger to your health or the health of your baby, your doctor may need to deliver your baby early. While preeclampsia is a concern, most women with preeclampsia have healthy babies. After a woman gives birth, preeclampsia usually goes away on its own. But symptoms may last a few weeks or more and can get worse after delivery. Rarely, symptoms of preeclampsia don't show up until days or even weeks after childbirth. Follow-up care is a key part of your treatment and safety. Be sure to make and go to all appointments, and call your doctor if you are having problems. It's also a good idea to know your test results and keep a list of the medicines you take. How can you care for yourself at home? · Take and record your blood pressure at home if your doctor tells you to. ? Learn the importance of the two measures of blood pressure (such as 120 over 80, or 120/80). The first number is the systolic pressure, which is the force of blood on the artery walls as the heart pumps. The second number is the diastolic pressure, which is the force of blood on the artery walls between heartbeats, when the heart is at rest. You have a choice of monitors to use. ? Manual monitor: You pump up the cuff and use a stethoscope to listen for your pulse. ? Electronic monitor: The cuff inflates, and a gauge shows your pulse rate. ? To take your blood pressure:  ? Ask your doctor to check your blood pressure monitor to be sure that it is accurate and that the cuff fits you. Also ask your doctor to watch you to make sure that you are using it right.   ? You should not eat, use tobacco products, or use medicine known to raise blood pressure (such as some nasal decongestant sprays) before you take your blood pressure. ? Avoid taking your blood pressure if you have just exercised. Also avoid taking it if you are nervous or upset. Rest at least 15 minutes before you take your blood pressure. · If your doctor advises, check the protein levels in your urine. Your doctor or nurse will show you how to do this. · Take your medicines exactly as prescribed. Call your doctor if you think you are having a problem with your medicine. · Do not smoke. Quitting smoking will help improve your baby's growth and health. If you need help quitting, talk to your doctor about stop-smoking programs and medicines. These can increase your chances of quitting for good. · Eat a balanced and healthy diet that has lots of fruits and vegetables. · If your doctor advised bed rest, be sure to stay off your feet and rest as much as possible. ? Keep a phone, phone book, notepad, and pen near the bed where you can easily reach them. ? Gently stretch your legs every hour to maintain good blood flow. ? Have another family member pack snacks and lunch food in a cooler close to your bed. ? Use this time for activities that you usually cannot find time for, such as reading, craft projects, or letter writing. · You can keep track of your baby's health by noting the length of time it takes to count 10 movements (such as kicks, flutters, or rolls). Feeling 10 movements in less than 1 hour is considered normal. Track your baby's movements once each day. Bring this record with you to each prenatal visit. When should you call for help? [id number] anytime you think you may need emergency care. For example, call if:    · You passed out (lost consciousness).     · You have a seizure.    Call your doctor now or seek immediate medical care if:    · You have symptoms of preeclampsia, such as:  ? Sudden swelling of your face, hands, or feet. ? New vision problems (such as dimness, blurring, or seeing spots). ?  A severe headache.     · Your blood pressure is higher than it should be, or it rises suddenly.     · You have new nausea or vomiting.     · You think that you are in labor.     · You have pain in your belly or pelvis.    Watch closely for changes in your health, and be sure to contact your doctor if:    · You gain weight rapidly. Where can you learn more? Go to http://kori-estella.info/. Enter L653 in the search box to learn more about \"Preeclampsia: Care Instructions. \"  Current as of: September 5, 2018  Content Version: 12.1  © 0122-5566 [employer]. Care instructions adapted under license by [employer] (which disclaims liability or warranty for this information). If you have questions about a medical condition or this instruction, always ask your healthcare professional. [address] any warranty or liability for your use of this information.

## 2019-08-08 NOTE — PROGRESS NOTES
_ [address] [facility]  [email or web address]/  [phone]    Cesilia Edmonds MD, [address] [facility]     Follow-up OB visit    Chief Complaint   Patient presents with    Routine Prenatal Visit       Vitals:    08/08/19 1007   BP: (!) 154/104   Weight: 252 lb 6.4 oz (114.5 kg)   Height: 5' 4\" (1.626 m)       Patient Active Problem List    Diagnosis Date Noted    Hypertension in pregnancy 08/08/2019    Hypertension affecting pregnancy 08/01/2019    Pregnancy 02/28/2019    Severe obesity ([address] 10/24/2018    Irritable bowel syndrome with diarrhea 11/13/2017    Gastroesophageal reflux disease 11/13/2017    Obesity (BMI 30-39.9) 08/14/2017    GERD (gastroesophageal reflux disease) 08/14/2017    Menorrhagia with regular cycle 05/02/2017    History of kidney stones 09/18/2015    Weight gain 09/18/2015    H/O abnormal Pap smear 11/08/2012    Chronic daily headache 01/17/2012        The patient reports the following concerns: blood pressure high this morning at home 157/101. She feels \"ok\". Had MFM appt this am. Currently taking Labetalol 100mg bid. Prenatal Visit    Vitals:    08/08/19 1007   BP: (!) 154/104   Weight: 252 lb 6.4 oz (114.5 kg)   Height: 5' 4\" (1.626 m)     See PN flowsheet for exam      41 y.o. [address]   Encounter Diagnoses   Name Primary?     Obesity in pregnancy     Pre-existing essential hypertension during pregnancy, antepartum     Antepartum multigravida of advanced maternal age Yes    31 weeks gestation of pregnancy         To L and D for PIH labs, serial BP  D/w MFM: ok to inc meds if needed since low dose and monitor closely for s/sx severe SIPIH       [] SAB/bleeding precautions reviewed   [x] PTL/PPROM precautions reviewed   [] Labor precautions reviewed   [] Fetal kick counts discussed   [] Labs reviewed with patient   [] Laren Bulls precautions reviewed   [] Consent reviewed   [] Handouts given to pt   [] Glucola handout    [] GBS/labor/Magic Hour handout   []    [] We reviewed CDC recommendations for Tdap for patient and close contacts and RBA of receiving in pregnancy, advised obtaining in third trimester   [] Reviewed healthy nutrition in pregnancy and good exercise practices   [] We disc safer medications in pregnancy and referred patient to [facility] resources   [] We reviewed CDC recommendations for flu vaccine and RBA of receiving in pregnancy   []    []    []       Follow-up and Dispositions    · Return in about 2 weeks (around 8/22/2019) for Follow up OB visit. No orders of the defined types were placed in this encounter.       Venu Encarnacion MD

## 2019-08-08 NOTE — PROGRESS NOTES
Dr Eddie Cook called and notified of lab results and blood pressure readings. and reactive NST. MD to come to unit to reevaluate pt status. 200 - Dr Eddie Cook at bedside discussing plan of care with pt. MD reviewed labs and gave orders to discharge home and follow-up in office as scheduled on Tuesday. Pre-e precautions given. Pt verbalized understanding.

## 2019-08-08 NOTE — H&P
History & Physical    Name: Kirstin Glover MRN: [id number]  SSN: [id number]    YOB: 1980  Age: 41 y.o. Sex: female        Subjective:     Estimated Date of Delivery: 10/7/19  OB History        1    Para   0    Term   0       0    AB   0    Living   0       SAB   0    TAB   0    Ectopic   0    Molar        Multiple   0    Live Births              Obstetric Comments   Menarche:  17. LMP: 20. # of Children:  Currently pregnant. Age at Delivery of First Child:  n/a. Hysterectomy/oophorectomy:  NO/NO. Breast Bx:  No.  Hx of Breast Feeding:  n/a. BCP:  Yes. Hormone therapy:  No.                Ms. Daryll Seip is admitted with pregnancy at 31w3d for elevated BP, eval for severe SIPIH. Prenatal course was complicated by chtn, labile bp. Please see prenatal records for details. Mild HA, not sleeping well.      Past Medical History:   Diagnosis Date    ADHD     Dysplasia of cervix, low grade (ORION 1) 12    colpo    Encounter for IUD removal 2016    Mirena    Essential hypertension     10 years ago    GERD (gastroesophageal reflux disease)     trying to control with diet since pregnant    H/O abnormal Pap smear 12;12    LGSIL, HPV positive    Headache     Headache(784.0)     IUD (intrauterine device) in place 2014    Mirena    Kidney disease     kidney stones     Migraine headache     without aura    pap smear for 11;13; 4/1/15;19    neg, HPV neg,no ECC; neg, HPV neg, Negative, HPV negative; neg pap and neg HPV     Past Surgical History:   Procedure Laterality Date    HX COLPOSCOPY  12    ORION 1    HX HEENT      wisdom teeth    HX LITHOTRIPSY      HX WISDOM TEETH EXTRACTION       Social History     Occupational History    Not on file   Tobacco Use    Smoking status: Never Smoker    Smokeless tobacco: Never Used   Substance and Sexual Activity    Alcohol use: Not Currently     Comment: social    Drug use: No    Sexual activity: Yes     Partners: Male     Family History   Problem Relation Age of Onset    Hypertension Father     Heart Disease Father     Diabetes Father     Elevated Lipids Father     Cancer Father         eye    Cancer Mother         Cervical,Uterine,& Ovarian (pt questions ovarian but got cancer after copper 7 IUD)    Depression Mother     Allergic Rhinitis Brother     Asthma Brother     Other Brother         Chron's    Hypertension Brother     Alcohol abuse Maternal Grandfather     Depression Maternal Grandfather     Alcohol abuse Paternal Grandmother     Stroke Paternal Grandmother     Migraines Brother     Allergic Rhinitis Brother     Breast Cancer Other        No Known Allergies  Prior to Admission medications    Medication Sig Start Date End Date Taking? Authorizing Provider   labetalol (NORMODYNE) 100 mg tablet TAKE ONE TABLET BY MOUTH TWICE A DAY 8/7/19  Yes Beryle Rick, MD   pantoprazole (PROTONIX) 20 mg tablet TAKE ONE TABLET BY MOUTH DAILY 6/26/19  Yes Beryle Rick, MD   loratadine (CLARITIN REDITABS) 10 mg dissolvable tablet Take 10 mg by mouth. Yes Provider, Historical   ondansetron (ZOFRAN ODT) 4 mg disintegrating tablet Take 1 Tab by mouth every eight (8) hours as needed for Nausea. 5/21/19  Yes Beryle Rick, MD   prenatal vits62/FA/om3/dha/epa (PRENATAL GUMMY PO) Take  by mouth. Yes Provider, Historical   doxylamine succinate (UNISOM, DOXYLAMINE, PO) Take  by mouth. Provider, Historical   butalbital-acetaminophen-caffeine (FIORICET, ESGIC) -40 mg per tablet TAKE ONE TABLET BY MOUTH EVERY 6 HOURS AS NEEDED FOR PAIN 4/23/19   Beryle Rick, MD        Active Hospital Problems    Diagnosis Date Noted    Hypertension in pregnancy 08/08/2019       Review of Systems: A comprehensive review of systems was negative except for that written in the HPI.   Constitutional: negative for fevers, chills and weight loss  ENT ROS: negative for - hearing change, oral lesions or visual changes  Respiratory: negative for cough, wheezing or dyspnea on exertion  Cardiovascular: negative for chest pain, irregular heart beats, exertional chest pressure/discomfort  Gastrointestinal: negative for dysphagia, nausea and vomiting  Genito-Urinary ROS: see HPI  Inteument/breast: negative for rash, breast lump and nipple discharge  Musculoskeletal:negative for stiff joints, neck pain and muscle weakness  Endocrine ROS: negative for - breast changes, galactorrhea or temperature intolerance  Hematological and Lymphatic ROS: negative for - bruising or swollen lymph nodes          Objective:     Vitals:  Vitals:    08/08/19 1151 08/08/19 1208 08/08/19 1215 08/08/19 1232   BP: 139/68 144/68 135/64 141/74   Pulse: 80 84 72 83        Patient Vitals for the past 12 hrs:   Pulse BP   08/08/19 1232 83 141/74   08/08/19 1215 72 135/64   08/08/19 1208 84 144/68   08/08/19 1151 80 139/68   08/08/19 1137 76 148/76   08/08/19 1122 76 142/76   08/08/19 1105 84 147/83       Physical Exam:  Patient without distress.   Heart: Regular rate and rhythm or S1S2 present  Lung: clear to auscultation throughout lung fields, no wheezes, no rales, no rhonchi and normal respiratory effort  Abdomen: soft, nontender  Fundus: soft and non tender  Cervical Exam: not checked  Lower Extremities: no c/t trace edema bl    Prenatal Labs:   Lab Results   Component Value Date/Time    Rubella, External Immune 02/26/2019    HBsAg, External Negative 02/26/2019    HIV, External Non-reactive 02/26/2019        WBC   Date Value Ref Range Status   08/08/2019 9.6 3.6 - 11.0 K/uL Final     RBC   Date Value Ref Range Status   08/08/2019 4.18 3.80 - 5.20 M/uL Final     HGB   Date Value Ref Range Status   08/08/2019 11.5 11.5 - 16.0 g/dL Final     HCT   Date Value Ref Range Status   08/08/2019 35.0 35.0 - 47.0 % Final     PLATELET   Date Value Ref Range Status   08/08/2019 266 150 - 400 K/uL Final     Hgb, External   Date Value Ref Range Status 07/18/2019 11.7  Final     Hct, External   Date Value Ref Range Status   07/18/2019 36.2  Final     Platelet cnt., External   Date Value Ref Range Status   07/18/2019 278  Final     Recent Results (from the past 24 hour(s))   CBC WITH AUTOMATED DIFF    Collection Time: 08/08/19 11:14 AM   Result Value Ref Range    WBC 9.6 3.6 - 11.0 K/uL    RBC 4.18 3.80 - 5.20 M/uL    HGB 11.5 11.5 - 16.0 g/dL    HCT 35.0 35.0 - 47.0 %    MCV 83.7 80.0 - 99.0 FL    MCH 27.5 26.0 - 34.0 PG    MCHC 32.9 30.0 - 36.5 g/dL    RDW 12.7 11.5 - 14.5 %    PLATELET 881 624 - 048 K/uL    MPV 10.5 8.9 - 12.9 FL    NRBC 0.0 0  WBC    ABSOLUTE NRBC 0.00 0.00 - 0.01 K/uL    NEUTROPHILS 72 32 - 75 %    LYMPHOCYTES 16 12 - 49 %    MONOCYTES 10 5 - 13 %    EOSINOPHILS 1 0 - 7 %    BASOPHILS 0 0 - 1 %    IMMATURE GRANULOCYTES 1 (H) 0.0 - 0.5 %    ABS. NEUTROPHILS 7.0 1.8 - 8.0 K/UL    ABS. LYMPHOCYTES 1.6 0.8 - 3.5 K/UL    ABS. MONOCYTES 0.9 0.0 - 1.0 K/UL    ABS. EOSINOPHILS 0.1 0.0 - 0.4 K/UL    ABS. BASOPHILS 0.0 0.0 - 0.1 K/UL    ABS. IMM. GRANS. 0.1 (H) 0.00 - 0.04 K/UL    DF AUTOMATED     METABOLIC PANEL, COMPREHENSIVE    Collection Time: 08/08/19 11:14 AM   Result Value Ref Range    Sodium 139 136 - 145 mmol/L    Potassium 3.8 3.5 - 5.1 mmol/L    Chloride 109 (H) 97 - 108 mmol/L    CO2 23 21 - 32 mmol/L    Anion gap 7 5 - 15 mmol/L    Glucose 95 65 - 100 mg/dL    BUN 7 6 - 20 MG/DL    Creatinine 0.46 (L) 0.55 - 1.02 MG/DL    BUN/Creatinine ratio 15 12 - 20      GFR est AA >60 >60 ml/min/1.73m2    GFR est non-AA >60 >60 ml/min/1.73m2    Calcium 9.1 8.5 - 10.1 MG/DL    Bilirubin, total 0.2 0.2 - 1.0 MG/DL    ALT (SGPT) 20 12 - 78 U/L    AST (SGOT) 13 (L) 15 - 37 U/L    Alk.  phosphatase 117 45 - 117 U/L    Protein, total 6.1 (L) 6.4 - 8.2 g/dL    Albumin 3.0 (L) 3.5 - 5.0 g/dL    Globulin 3.1 2.0 - 4.0 g/dL    A-G Ratio 1.0 (L) 1.1 - 2.2     PROTEIN/CREATININE RATIO, URINE    Collection Time: 08/08/19 11:14 AM   Result Value Ref Range Protein, urine random 23 (H) 0.0 - 11.9 mg/dL    Creatinine, urine 66.60 mg/dL    Protein/Creat. urine Ratio 0.3           Assessment/Plan:   41 y.o.  31w3d  CHTN in pregnancy, possible mild SIPIH  Stable bp/labs  HA< mild  The patient does not have anemia  GBS unknown  Reassuring fetal surveillance    Plan: Admit for [address] labs, serial BP, NST. CEFM/TOCO  Disc with MFM earlier about increasing labetalol but it seems BP cuff not reliable DBP 20points higher than medical BP  Stop home BP testing. PIH precautions  BP chck  1110am  Will hold on inc labetalol for now: BP mild  [facility] BID      Signed By:  Joann Alfonso MD     2019         NST Inpatient Procedure Note    Megha Garsia presents for fetal non-stress test.    Indication is elevated BP. She is 31w3d. She has been monitored for more than 60 minutes. The FHR was reactive. NST Interpretation:   FHR baseline 130 bpm,   Variability moderate  Accelerations present. Decelerations Absent. Uterine contractions were not present     Assessment  NST is reactive. NST is reassuring. Patient does not need admission/observation for further monitoring. Sabas Wilson was informed of the NST results and her questions were answered.     Plan:    [] Continue admission to labor and delivery    [x] Continue observation   [] Keep routine OB follow up upon discharge   [] Reviewed fetal movement kick counts, notify MD if decreased   []    []

## 2019-08-13 ENCOUNTER — ROUTINE PRENATAL (OUTPATIENT)
Dept: OBGYN CLINIC | Age: 39
End: 2019-08-13

## 2019-08-13 VITALS
HEIGHT: 64 IN | DIASTOLIC BLOOD PRESSURE: 96 MMHG | WEIGHT: 252 LBS | SYSTOLIC BLOOD PRESSURE: 142 MMHG | BODY MASS INDEX: 43.02 KG/M2

## 2019-08-13 DIAGNOSIS — O10.019 PRE-EXISTING ESSENTIAL HYPERTENSION DURING PREGNANCY, ANTEPARTUM: Primary | ICD-10-CM

## 2019-08-13 DIAGNOSIS — Z3A.32 32 WEEKS GESTATION OF PREGNANCY: ICD-10-CM

## 2019-08-13 NOTE — PATIENT INSTRUCTIONS
Weeks 32 to 34 of Your Pregnancy: Care Instructions  Your Care Instructions    During the last few weeks of your pregnancy, you may have more aches and pains. It's important to rest when you can. Your growing baby is putting more pressure on your bladder. So you may need to urinate more often. Hemorrhoids are also common. These are painful, itchy veins in the rectal area. In the 36th week, most women have a test for group B streptococcus (GBS). GBS is a common bacteria that can live in the vagina and rectum. It can make your baby sick after birth. If you test positive, you will get antibiotics during labor. These will keep your baby from getting the bacteria. You may want to talk with your doctor about banking your baby's umbilical cord blood. This is the blood left in the cord after birth. If you want to save this blood, you must arrange it ahead of time. You can't decide at the last minute. If you haven't already had the Tdap shot during this pregnancy, talk to your doctor about getting it. It will help protect your  against pertussis infection. Follow-up care is a key part of your treatment and safety. Be sure to make and go to all appointments, and call your doctor if you are having problems. It's also a good idea to know your test results and keep a list of the medicines you take. How can you care for yourself at home? Ease hemorrhoids  · Get more liquids, fruits, vegetables, and fiber in your diet. This will help keep your stools soft. · Avoid sitting for too long. Lie on your left side several times a day. · Clean yourself with soft, moist toilet paper. Or you can use witch hazel pads or personal hygiene pads. · If you are uncomfortable, try ice packs. Or you can sit in a warm sitz bath. Do these for 20 minutes at a time, as needed. · Use hydrocortisone cream for pain and itching. Two examples are Anusol and Preparation H Hydrocortisone.   · Ask your doctor about taking an over-the-counter stool softener. Consider breastfeeding  · Experts recommend that women breastfeed for 1 year or longer. Breast milk is the perfect food for babies. · Breast milk is easier for babies to digest than formula. And it is always available, just the right temperature, and free. · Breast milk may help protect your child from some health problems.  babies are less likely than formula-fed babies to:  ? Get ear infections, colds, diarrhea, and pneumonia. ? Be obese or get diabetes later in life. · Women who breastfeed have less bleeding after the birth. Their uteruses also shrink back faster. · Some women who breastfeed lose weight faster. Making milk burns calories. · Breastfeeding can lower your risk of breast cancer, ovarian cancer, and osteoporosis. Decide about circumcision for boys  · As you make this decision, it may help to think about your personal, Holiness, and family traditions. You get to decide if you will keep your son's penis natural or if he will be circumcised. · If you decide that you would like to have your baby circumcised, talk with your doctor. You can share your concerns about pain. And you can discuss your preferences for anesthesia. Where can you learn more? Go to http://kori-estella.info/. Enter L941 in the search box to learn more about \"Weeks 32 to 34 of Your Pregnancy: Care Instructions. \"  Current as of: September 5, 2018  Content Version: 12.1  © 6849-6837 Healthwise, Incorporated. Care instructions adapted under license by [employer] (which disclaims liability or warranty for this information). If you have questions about a medical condition or this instruction, always ask your healthcare professional. [place] [address] any warranty or liability for your use of this information.

## 2019-08-13 NOTE — PROGRESS NOTES
[facility]  [email or web address]/  [phone]    Chintan Borjas MD, [address] [facility]     Follow-up OB visit    Chief Complaint   Patient presents with    Routine Prenatal Visit       Vitals:    19 1125   BP: (!) 142/96   Weight: 252 lb (114.3 kg)   Height: 5' 4\" (1.626 m)       Patient Active Problem List    Diagnosis Date Noted    Hypertension in pregnancy 2019    Hypertension affecting pregnancy 2019    Pregnancy 2019    Severe obesity ([address] 10/24/2018    Irritable bowel syndrome with diarrhea 2017    Gastroesophageal reflux disease 2017    Obesity (BMI 30-39.9) 2017    GERD (gastroesophageal reflux disease) 2017    Menorrhagia with regular cycle 2017    History of kidney stones 2015    Weight gain 2015    H/O abnormal Pap smear 2012    Chronic daily headache 2012        The patient reports the following concerns: none    Prenatal Visit    Vitals:    19 1125   BP: (!) 142/96   Weight: 252 lb (114.3 kg)   Height: 5' 4\" (1.626 m)     See PN flowsheet for exam      41 y.o.  32w1d   Encounter Diagnoses   Name Primary?     Pre-existing essential hypertension during pregnancy, antepartum Yes    32 weeks gestation of pregnancy      PIH precuations  FU 2d MFM/ BP check  Disc call coverage/rotation 2 wks     [] SAB/bleeding precautions reviewed   [] PTL/PPROM precautions reviewed   [] Labor precautions reviewed   [] Fetal kick counts discussed   [] Labs reviewed with patient   [] Ana Maria Geraldine precautions reviewed   [] Consent reviewed   [] Handouts given to pt   [] Glucola handout    [] GBS/labor/Magic Hour handout   []    [] We reviewed CDC recommendations for Tdap for patient and close contacts and RBA of receiving in pregnancy, advised obtaining in third trimester   [] Reviewed healthy nutrition in pregnancy and good exercise practices   [] We disc safer medications in pregnancy and referred patient to [facility] resources   [] We reviewed CDC recommendations for flu vaccine and RBA of receiving in pregnancy   []    []    []       Follow-up and Dispositions    · Return in about 1 week (around 8/20/2019) for Follow up OB visit. No orders of the defined types were placed in this encounter.       Blaise Hernandez MD

## 2019-08-15 ENCOUNTER — HOSPITAL ENCOUNTER (OUTPATIENT)
Dept: PERINATAL CARE | Age: 39
Discharge: HOME | End: 2019-08-15
Attending: OBSTETRICS & GYNECOLOGY
Payer: COMMERCIAL

## 2019-08-15 ENCOUNTER — ROUTINE PRENATAL (OUTPATIENT)
Dept: OBGYN CLINIC | Age: 39
End: 2019-08-15

## 2019-08-15 VITALS
WEIGHT: 252.4 LBS | HEIGHT: 64 IN | BODY MASS INDEX: 43.09 KG/M2 | SYSTOLIC BLOOD PRESSURE: 140 MMHG | DIASTOLIC BLOOD PRESSURE: 86 MMHG

## 2019-08-15 DIAGNOSIS — O16.9 HYPERTENSION DURING PREGNANCY, ANTEPARTUM, UNSPECIFIED HYPERTENSION IN PREGNANCY TYPE: Primary | ICD-10-CM

## 2019-08-15 DIAGNOSIS — Z3A.32 32 WEEKS GESTATION OF PREGNANCY: ICD-10-CM

## 2019-08-15 DIAGNOSIS — Z34.80 PRENATAL CARE OF MULTIGRAVIDA, ANTEPARTUM: ICD-10-CM

## 2019-08-15 PROCEDURE — 76819 FETAL BIOPHYS PROFIL W/O NST: CPT | Performed by: OBSTETRICS & GYNECOLOGY

## 2019-08-15 NOTE — PROGRESS NOTES
[facility]  [email or web address]/  [phone]    Kolby Maldonado MD, [address] [facility]     Follow-up OB visit    Chief Complaint   Patient presents with    Routine Prenatal Visit     BP check       Vitals:    08/15/19 0753   BP: 140/86   Weight: 252 lb 6.4 oz (114.5 kg)   Height: 5' 4\" (1.626 m)       Patient Active Problem List    Diagnosis Date Noted    Hypertension in pregnancy 2019    Hypertension affecting pregnancy 2019    Pregnancy 2019    Severe obesity ([address] 10/24/2018    Irritable bowel syndrome with diarrhea 2017    Gastroesophageal reflux disease 2017    Obesity (BMI 30-39.9) 2017    GERD (gastroesophageal reflux disease) 2017    Menorrhagia with regular cycle 2017    History of kidney stones 2015    Weight gain 2015    H/O abnormal Pap smear 2012    Chronic daily headache 2012        The patient reports the following concerns: none    Prenatal Visit    Vitals:    08/15/19 0753   BP: 140/86   Weight: 252 lb 6.4 oz (114.5 kg)   Height: 5' 4\" (1.626 m)     See PN flowsheet for exam      41 y.o.  32w3d   Encounter Diagnoses   Name Primary?     Hypertension during pregnancy, antepartum, unspecified hypertension in pregnancy type Yes    Prenatal care of multigravida, antepartum     32 weeks gestation of pregnancy      mfm fu today and weekly  Weekly pih labs  pih precautions  [address] bid     [] SAB/bleeding precautions reviewed   [] PTL/PPROM precautions reviewed   [] Labor precautions reviewed   [] Fetal kick counts discussed   [] Labs reviewed with patient   [] Griselda Scale precautions reviewed   [] Consent reviewed   [] Handouts given to pt   [] Glucola handout    [] GBS/labor/Magic Hour handout   []    [] We reviewed CDC recommendations for Tdap for patient and close contacts and RBA of receiving in pregnancy, advised obtaining in third trimester   [] Reviewed healthy nutrition in pregnancy and good exercise practices   [] We disc safer medications in pregnancy and referred patient to [facility] resources   [] We reviewed CDC recommendations for flu vaccine and RBA of receiving in pregnancy   []    []    []       Follow-up and Dispositions    · Return in about 1 week (around 8/22/2019) for Follow up OB visit, [address] [facility].          Orders Placed This Encounter    CBC W/O DIFF    METABOLIC PANEL, COMPREHENSIVE       Nina Naylor MD

## 2019-08-15 NOTE — PATIENT INSTRUCTIONS
Weeks 32 to 34 of Your Pregnancy: Care Instructions  Your Care Instructions    During the last few weeks of your pregnancy, you may have more aches and pains. It's important to rest when you can. Your growing baby is putting more pressure on your bladder. So you may need to urinate more often. Hemorrhoids are also common. These are painful, itchy veins in the rectal area. In the 36th week, most women have a test for group B streptococcus (GBS). GBS is a common bacteria that can live in the vagina and rectum. It can make your baby sick after birth. If you test positive, you will get antibiotics during labor. These will keep your baby from getting the bacteria. You may want to talk with your doctor about banking your baby's umbilical cord blood. This is the blood left in the cord after birth. If you want to save this blood, you must arrange it ahead of time. You can't decide at the last minute. If you haven't already had the Tdap shot during this pregnancy, talk to your doctor about getting it. It will help protect your  against pertussis infection. Follow-up care is a key part of your treatment and safety. Be sure to make and go to all appointments, and call your doctor if you are having problems. It's also a good idea to know your test results and keep a list of the medicines you take. How can you care for yourself at home? Ease hemorrhoids  · Get more liquids, fruits, vegetables, and fiber in your diet. This will help keep your stools soft. · Avoid sitting for too long. Lie on your left side several times a day. · Clean yourself with soft, moist toilet paper. Or you can use witch hazel pads or personal hygiene pads. · If you are uncomfortable, try ice packs. Or you can sit in a warm sitz bath. Do these for 20 minutes at a time, as needed. · Use hydrocortisone cream for pain and itching. Two examples are Anusol and Preparation H Hydrocortisone.   · Ask your doctor about taking an over-the-counter stool softener. Consider breastfeeding  · Experts recommend that women breastfeed for 1 year or longer. Breast milk is the perfect food for babies. · Breast milk is easier for babies to digest than formula. And it is always available, just the right temperature, and free. · Breast milk may help protect your child from some health problems.  babies are less likely than formula-fed babies to:  ? Get ear infections, colds, diarrhea, and pneumonia. ? Be obese or get diabetes later in life. · Women who breastfeed have less bleeding after the birth. Their uteruses also shrink back faster. · Some women who breastfeed lose weight faster. Making milk burns calories. · Breastfeeding can lower your risk of breast cancer, ovarian cancer, and osteoporosis. Decide about circumcision for boys  · As you make this decision, it may help to think about your personal, Mormonism, and family traditions. You get to decide if you will keep your son's penis natural or if he will be circumcised. · If you decide that you would like to have your baby circumcised, talk with your doctor. You can share your concerns about pain. And you can discuss your preferences for anesthesia. Where can you learn more? Go to http://kori-estella.info/. Enter H254 in the search box to learn more about \"Weeks 32 to 34 of Your Pregnancy: Care Instructions. \"  Current as of: September 5, 2018  Content Version: 12.1  © 8480-8935 Healthwise, Incorporated. Care instructions adapted under license by [employer] (which disclaims liability or warranty for this information). If you have questions about a medical condition or this instruction, always ask your healthcare professional. [place] [address] any warranty or liability for your use of this information.

## 2019-08-16 LAB
ALBUMIN SERPL-MCNC: 3.7 G/DL (ref 3.5–5.5)
ALBUMIN/GLOB SERPL: 1.6 {RATIO} (ref 1.2–2.2)
ALP SERPL-CCNC: 116 IU/L (ref 39–117)
ALT SERPL-CCNC: 16 IU/L (ref 0–32)
AST SERPL-CCNC: 16 IU/L (ref 0–40)
BILIRUB SERPL-MCNC: <0.2 MG/DL (ref 0–1.2)
BUN SERPL-MCNC: 8 MG/DL (ref 6–20)
BUN/CREAT SERPL: 14 (ref 9–23)
CALCIUM SERPL-MCNC: 8.8 MG/DL (ref 8.7–10.2)
CHLORIDE SERPL-SCNC: 105 MMOL/L (ref 96–106)
CO2 SERPL-SCNC: 20 MMOL/L (ref 20–29)
CREAT SERPL-MCNC: 0.59 MG/DL (ref 0.57–1)
ERYTHROCYTE [DISTWIDTH] IN BLOOD BY AUTOMATED COUNT: 13.2 % (ref 12.3–15.4)
GLOBULIN SER CALC-MCNC: 2.3 G/DL (ref 1.5–4.5)
GLUCOSE SERPL-MCNC: 80 MG/DL (ref 65–99)
HCT VFR BLD AUTO: 37.2 % (ref 34–46.6)
HGB BLD-MCNC: 12.1 G/DL (ref 11.1–15.9)
MCH RBC QN AUTO: 27.5 PG (ref 26.6–33)
MCHC RBC AUTO-ENTMCNC: 32.5 G/DL (ref 31.5–35.7)
MCV RBC AUTO: 85 FL (ref 79–97)
PLATELET # BLD AUTO: 279 X10E3/UL (ref 150–450)
POTASSIUM SERPL-SCNC: 4.6 MMOL/L (ref 3.5–5.2)
PROT SERPL-MCNC: 6 G/DL (ref 6–8.5)
RBC # BLD AUTO: 4.4 X10E6/UL (ref 3.77–5.28)
SODIUM SERPL-SCNC: 138 MMOL/L (ref 134–144)
WBC # BLD AUTO: 9.6 X10E3/UL (ref 3.4–10.8)

## 2019-08-16 NOTE — PROGRESS NOTES
Normal results, add to prenatal records. We can review in detail with patient at next visit.   Add pih labs to PL w date  Tickle repeat q 1 wk until delivery

## 2019-08-21 NOTE — PATIENT INSTRUCTIONS
Weeks 32 to 34 of Your Pregnancy: Care Instructions  Your Care Instructions    During the last few weeks of your pregnancy, you may have more aches and pains. It's important to rest when you can. Your growing baby is putting more pressure on your bladder. So you may need to urinate more often. Hemorrhoids are also common. These are painful, itchy veins in the rectal area. In the 36th week, most women have a test for group B streptococcus (GBS). GBS is a common bacteria that can live in the vagina and rectum. It can make your baby sick after birth. If you test positive, you will get antibiotics during labor. These will keep your baby from getting the bacteria. You may want to talk with your doctor about banking your baby's umbilical cord blood. This is the blood left in the cord after birth. If you want to save this blood, you must arrange it ahead of time. You can't decide at the last minute. If you haven't already had the Tdap shot during this pregnancy, talk to your doctor about getting it. It will help protect your  against pertussis infection. Follow-up care is a key part of your treatment and safety. Be sure to make and go to all appointments, and call your doctor if you are having problems. It's also a good idea to know your test results and keep a list of the medicines you take. How can you care for yourself at home? Ease hemorrhoids  · Get more liquids, fruits, vegetables, and fiber in your diet. This will help keep your stools soft. · Avoid sitting for too long. Lie on your left side several times a day. · Clean yourself with soft, moist toilet paper. Or you can use witch hazel pads or personal hygiene pads. · If you are uncomfortable, try ice packs. Or you can sit in a warm sitz bath. Do these for 20 minutes at a time, as needed. · Use hydrocortisone cream for pain and itching. Two examples are Anusol and Preparation H Hydrocortisone.   · Ask your doctor about taking an over-the-counter stool softener. Consider breastfeeding  · Experts recommend that women breastfeed for 1 year or longer. Breast milk is the perfect food for babies. · Breast milk is easier for babies to digest than formula. And it is always available, just the right temperature, and free. · Breast milk may help protect your child from some health problems.  babies are less likely than formula-fed babies to:  ? Get ear infections, colds, diarrhea, and pneumonia. ? Be obese or get diabetes later in life. · Women who breastfeed have less bleeding after the birth. Their uteruses also shrink back faster. · Some women who breastfeed lose weight faster. Making milk burns calories. · Breastfeeding can lower your risk of breast cancer, ovarian cancer, and osteoporosis. Decide about circumcision for boys  · As you make this decision, it may help to think about your personal, Sabianism, and family traditions. You get to decide if you will keep your son's penis natural or if he will be circumcised. · If you decide that you would like to have your baby circumcised, talk with your doctor. You can share your concerns about pain. And you can discuss your preferences for anesthesia. Where can you learn more? Go to http://kori-estella.info/. Enter F671 in the search box to learn more about \"Weeks 32 to 34 of Your Pregnancy: Care Instructions. \"  Current as of: September 5, 2018  Content Version: 12.1  © 0155-0852 Healthwise, Incorporated. Care instructions adapted under license by [employer] (which disclaims liability or warranty for this information). If you have questions about a medical condition or this instruction, always ask your healthcare professional. [place] [address] any warranty or liability for your use of this information.          Weeks 32 to 34 of Your Pregnancy: Care Instructions  Your Care Instructions    During the last few weeks of your pregnancy, you may have more aches and pains. It's important to rest when you can. Your growing baby is putting more pressure on your bladder. So you may need to urinate more often. Hemorrhoids are also common. These are painful, itchy veins in the rectal area. In the 36th week, most women have a test for group B streptococcus (GBS). GBS is a common bacteria that can live in the vagina and rectum. It can make your baby sick after birth. If you test positive, you will get antibiotics during labor. These will keep your baby from getting the bacteria. You may want to talk with your doctor about banking your baby's umbilical cord blood. This is the blood left in the cord after birth. If you want to save this blood, you must arrange it ahead of time. You can't decide at the last minute. If you haven't already had the Tdap shot during this pregnancy, talk to your doctor about getting it. It will help protect your  against pertussis infection. Follow-up care is a key part of your treatment and safety. Be sure to make and go to all appointments, and call your doctor if you are having problems. It's also a good idea to know your test results and keep a list of the medicines you take. How can you care for yourself at home? Ease hemorrhoids  · Get more liquids, fruits, vegetables, and fiber in your diet. This will help keep your stools soft. · Avoid sitting for too long. Lie on your left side several times a day. · Clean yourself with soft, moist toilet paper. Or you can use witch hazel pads or personal hygiene pads. · If you are uncomfortable, try ice packs. Or you can sit in a warm sitz bath. Do these for 20 minutes at a time, as needed. · Use hydrocortisone cream for pain and itching. Two examples are Anusol and Preparation H Hydrocortisone. · Ask your doctor about taking an over-the-counter stool softener. Consider breastfeeding  · Experts recommend that women breastfeed for 1 year or longer.  Breast milk is the perfect food for babies. · Breast milk is easier for babies to digest than formula. And it is always available, just the right temperature, and free. · Breast milk may help protect your child from some health problems.  babies are less likely than formula-fed babies to:  ? Get ear infections, colds, diarrhea, and pneumonia. ? Be obese or get diabetes later in life. · Women who breastfeed have less bleeding after the birth. Their uteruses also shrink back faster. · Some women who breastfeed lose weight faster. Making milk burns calories. · Breastfeeding can lower your risk of breast cancer, ovarian cancer, and osteoporosis. Decide about circumcision for boys  · As you make this decision, it may help to think about your personal, Mandaen, and family traditions. You get to decide if you will keep your son's penis natural or if he will be circumcised. · If you decide that you would like to have your baby circumcised, talk with your doctor. You can share your concerns about pain. And you can discuss your preferences for anesthesia. Where can you learn more? Go to http://kori-estella.info/. Enter P720 in the search box to learn more about \"Weeks 32 to 34 of Your Pregnancy: Care Instructions. \"  Current as of: September 5, 2018  Content Version: 12.1  © 3014-3302 Healthwise, Incorporated. Care instructions adapted under license by [employer] (which disclaims liability or warranty for this information). If you have questions about a medical condition or this instruction, always ask your healthcare professional. [place] [address] any warranty or liability for your use of this information.

## 2019-08-22 ENCOUNTER — ROUTINE PRENATAL (OUTPATIENT)
Dept: OBGYN CLINIC | Age: 39
End: 2019-08-22

## 2019-08-22 ENCOUNTER — HOSPITAL ENCOUNTER (OUTPATIENT)
Dept: PERINATAL CARE | Age: 39
Discharge: HOME | End: 2019-08-22
Attending: OBSTETRICS & GYNECOLOGY
Payer: COMMERCIAL

## 2019-08-22 VITALS
SYSTOLIC BLOOD PRESSURE: 134 MMHG | WEIGHT: 254.5 LBS | DIASTOLIC BLOOD PRESSURE: 76 MMHG | BODY MASS INDEX: 43.45 KG/M2 | HEIGHT: 64 IN

## 2019-08-22 DIAGNOSIS — O16.9 HYPERTENSION DURING PREGNANCY, ANTEPARTUM, UNSPECIFIED HYPERTENSION IN PREGNANCY TYPE: Primary | ICD-10-CM

## 2019-08-22 PROCEDURE — 76819 FETAL BIOPHYS PROFIL W/O NST: CPT | Performed by: OBSTETRICS & GYNECOLOGY

## 2019-08-22 NOTE — PROGRESS NOTES
[facility]  [email or web address]  [phone]    Navjot Nunez MD, [address] [facility]     Follow-up OB visit    Chief Complaint   Patient presents with    Routine Prenatal Visit       Vitals:    19 1145   BP: 134/76   Weight: 254 lb 8 oz (115.4 kg)   Height: 5' 4\" (1.626 m)       Patient Active Problem List    Diagnosis Date Noted    Hypertension in pregnancy 2019    Hypertension affecting pregnancy 2019    Pregnancy 2019    Severe obesity ([address] 10/24/2018    Irritable bowel syndrome with diarrhea 2017    Gastroesophageal reflux disease 2017    Obesity (BMI 30-39.9) 2017    GERD (gastroesophageal reflux disease) 2017    Menorrhagia with regular cycle 2017    History of kidney stones 2015    Weight gain 2015    H/O abnormal Pap smear 2012    Chronic daily headache 2012        The patient reports the following concerns: still having bad heartburn, even with Protonix. Had MFM appt today        Prenatal Visit    Vitals:    19 1145   BP: 134/76   Weight: 254 lb 8 oz (115.4 kg)   Height: 5' 4\" (1.626 m)     See PN flowsheet for exam      41 y.o.  33w3d   Encounter Diagnosis   Name Primary?     Hypertension during pregnancy, antepartum, unspecified hypertension in pregnancy type Yes     Disc safer meds in pregnancy'  pih labs  Consider trial of nexium  Weekly pih labs     [] SAB/bleeding precautions reviewed   [] PTL/PPROM precautions reviewed   [] Labor precautions reviewed   [] Fetal kick counts discussed   [] Labs reviewed with patient   [] Miles Rosarios precautions reviewed   [] Consent reviewed   [] Handouts given to pt   [] Glucola handout    [] GBS/labor/Magic Hour handout   []    [] We reviewed CDC recommendations for Tdap for patient and close contacts and RBA of receiving in pregnancy, advised obtaining in third trimester   [] Reviewed healthy nutrition in pregnancy and good exercise practices [] We disc safer medications in pregnancy and referred patient to [facility]ANIEL resources   [] We reviewed CDC recommendations for flu vaccine and RBA of receiving in pregnancy   []    []    []       Follow-up and Dispositions    · Return in about 1 week (around 8/29/2019) for Follow up OB visit.          Orders Placed This Encounter    CBC W/O DIFF    METABOLIC PANEL, COMPREHENSIVE       Litzy Fuentes MD

## 2019-08-23 LAB
ALBUMIN SERPL-MCNC: 3.6 G/DL (ref 3.5–5.5)
ALBUMIN/GLOB SERPL: 1.6 {RATIO} (ref 1.2–2.2)
ALP SERPL-CCNC: 123 IU/L (ref 39–117)
ALT SERPL-CCNC: 16 IU/L (ref 0–32)
AST SERPL-CCNC: 17 IU/L (ref 0–40)
BILIRUB SERPL-MCNC: <0.2 MG/DL (ref 0–1.2)
BUN SERPL-MCNC: 10 MG/DL (ref 6–20)
BUN/CREAT SERPL: 19 (ref 9–23)
CALCIUM SERPL-MCNC: 9.3 MG/DL (ref 8.7–10.2)
CHLORIDE SERPL-SCNC: 104 MMOL/L (ref 96–106)
CO2 SERPL-SCNC: 20 MMOL/L (ref 20–29)
CREAT SERPL-MCNC: 0.52 MG/DL (ref 0.57–1)
ERYTHROCYTE [DISTWIDTH] IN BLOOD BY AUTOMATED COUNT: 13.3 % (ref 12.3–15.4)
GLOBULIN SER CALC-MCNC: 2.3 G/DL (ref 1.5–4.5)
GLUCOSE SERPL-MCNC: 80 MG/DL (ref 65–99)
HCT VFR BLD AUTO: 36.7 % (ref 34–46.6)
HGB BLD-MCNC: 12 G/DL (ref 11.1–15.9)
MCH RBC QN AUTO: 27.3 PG (ref 26.6–33)
MCHC RBC AUTO-ENTMCNC: 32.7 G/DL (ref 31.5–35.7)
MCV RBC AUTO: 83 FL (ref 79–97)
PLATELET # BLD AUTO: 260 X10E3/UL (ref 150–450)
POTASSIUM SERPL-SCNC: 4.2 MMOL/L (ref 3.5–5.2)
PROT SERPL-MCNC: 5.9 G/DL (ref 6–8.5)
RBC # BLD AUTO: 4.4 X10E6/UL (ref 3.77–5.28)
SODIUM SERPL-SCNC: 140 MMOL/L (ref 134–144)
WBC # BLD AUTO: 11.3 X10E3/UL (ref 3.4–10.8)

## 2019-08-27 ENCOUNTER — ROUTINE PRENATAL (OUTPATIENT)
Dept: OBGYN CLINIC | Age: 39
End: 2019-08-27

## 2019-08-27 VITALS
DIASTOLIC BLOOD PRESSURE: 94 MMHG | BODY MASS INDEX: 43.71 KG/M2 | HEART RATE: 90 BPM | SYSTOLIC BLOOD PRESSURE: 135 MMHG | HEIGHT: 64 IN | WEIGHT: 256 LBS

## 2019-08-27 DIAGNOSIS — Z3A.34 34 WEEKS GESTATION OF PREGNANCY: ICD-10-CM

## 2019-08-27 DIAGNOSIS — O16.9 HYPERTENSION DURING PREGNANCY, ANTEPARTUM, UNSPECIFIED HYPERTENSION IN PREGNANCY TYPE: Primary | ICD-10-CM

## 2019-08-27 NOTE — PROGRESS NOTES
Labetalol 100mg BID, started in first trimester. Has had several evals on L&D. Feels good, +FM. KC=725/94, 1+proteinuria (previously negative in office); blood work nl 5d ago. Will collect 24hr urine, RTO tomorrow, rpt blood work, recheck BP. Has MFM appt Thur.

## 2019-08-27 NOTE — PATIENT INSTRUCTIONS
Weeks 34 to 36 of Your Pregnancy: Care Instructions  Your Care Instructions    By now, your baby and your belly have grown quite large. It is almost time to give birth. Your baby's lungs are almost ready to breathe air. The bones in your baby's head are now firm enough to protect it, but soft enough to move down through the birth canal.  You may feel excited, happy, anxious, or scared. You may wonder how you will know if you are in labor or what to expect during labor. Try to be flexible in your expectations of the birth. Because each birth is different, there is no way to know exactly what childbirth will be like for you. This care sheet will help you know what to expect and how to prepare. This may make your childbirth easier. If you haven't already had the Tdap shot during this pregnancy, talk to your doctor about getting it. It will help protect your  against pertussis infection. In the 36th week, most women have a test for group B streptococcus (GBS). GBS is a common bacteria that can live in the vagina and rectum. It can make your baby sick after birth. If you test positive, you will get antibiotics during labor. The medicine will keep your baby from getting the bacteria. Follow-up care is a key part of your treatment and safety. Be sure to make and go to all appointments, and call your doctor if you are having problems. It's also a good idea to know your test results and keep a list of the medicines you take. How can you care for yourself at home? Learn about pain relief choices  · Pain is different for every woman. Talk with your doctor about your feelings about pain. · You can choose from several types of pain relief. These include medicine or breathing techniques, as well as comfort measures. You can use more than one option. · If you choose to have pain medicine during labor, talk to your doctor about your options. Some medicines lower anxiety and help with some of the pain.  Others make your lower body numb so that you won't feel pain. · Be sure to tell your doctor about your pain medicine choice before you start labor or very early in your labor. You may be able to change your mind as labor progresses. · Rarely, a woman is put to sleep by medicine given through a mask or an IV. Labor and delivery  · The first stage of labor has three parts: early, active, and transition. ? Most women have early labor at home. You can stay busy or rest, eat light snacks, drink clear fluids, and start counting contractions. ? When talking during a contraction gets hard, you may be moving to active labor. During active labor, you should head for the hospital if you are not there already. ? You are in active labor when contractions come every 3 to 4 minutes and last about 60 seconds. Your cervix is opening more rapidly. ? If your water breaks, contractions will come faster and stronger. ? During transition, your cervix is stretching, and contractions are coming more rapidly. ? You may want to push, but your cervix might not be ready. Your doctor will tell you when to push. · The second stage starts when your cervix is completely opened and you are ready to push. ? Contractions are very strong to push the baby down the birth canal.  ? You will feel the urge to push. You may feel like you need to have a bowel movement. ? You may be coached to push with contractions. These contractions will be very strong, but you will not have them as often. You can get a little rest between contractions. ? You may be emotional and irritable. You may not be aware of what is going on around you.  ? One last push, and your baby is born. · The third stage is when a few more contractions push out the placenta. This may take 30 minutes or less. · The fourth stage is the welcome recovery. You may feel overwhelmed with emotions and exhausted but alert. This is a good time to start breastfeeding. Where can you learn more?   Go to http://kori-estella.info/. Enter J526 in the search box to learn more about \"Weeks 34 to 36 of Your Pregnancy: Care Instructions. \"  Current as of: September 5, 2018  Content Version: 12.1  © 1079-9012 Healthwise, Incorporated. Care instructions adapted under license by [employer] (which disclaims liability or warranty for this information). If you have questions about a medical condition or this instruction, always ask your healthcare professional. [place] [address] any warranty or liability for your use of this information.

## 2019-08-28 ENCOUNTER — ROUTINE PRENATAL (OUTPATIENT)
Dept: OBGYN CLINIC | Age: 39
End: 2019-08-28

## 2019-08-28 VITALS
HEIGHT: 64 IN | SYSTOLIC BLOOD PRESSURE: 137 MMHG | DIASTOLIC BLOOD PRESSURE: 90 MMHG | WEIGHT: 255 LBS | HEART RATE: 104 BPM | BODY MASS INDEX: 43.54 KG/M2

## 2019-08-28 DIAGNOSIS — O16.9 HYPERTENSION DURING PREGNANCY, ANTEPARTUM, UNSPECIFIED HYPERTENSION IN PREGNANCY TYPE: Primary | ICD-10-CM

## 2019-08-28 DIAGNOSIS — Z3A.34 34 WEEKS GESTATION OF PREGNANCY: ICD-10-CM

## 2019-08-28 NOTE — PATIENT INSTRUCTIONS
High Blood Pressure in Pregnancy: Care Instructions  Your Care Instructions    High blood pressure (hypertension) means that the force of blood against your artery walls is too strong. Mild high blood pressure during pregnancy is not usually dangerous. Your doctor will probably just want to watch you closely. But when blood pressure is very high, it can reduce oxygen to your baby. This can affect how well your baby grows. High blood pressure also means that you are at higher risk for:  · Preeclampsia. This is a problem that includes high blood pressure and damage to your liver or kidneys. It can also reduce how much oxygen your baby gets. In some cases, it leads to eclampsia. Eclampsia causes seizures. · Placental abruption. This is a problem when the placenta separates from the uterus before birth. It prevents the baby from getting enough oxygen and nutrients. Sometimes it can cause death for the baby and the mother. To prevent problems for you or your baby, you will have to check your blood pressure often. You will do this until after your baby is born. If your blood pressure rises suddenly or is very high during your pregnancy, your doctor may prescribe medicines. They can usually control blood pressure. If your blood pressure affects your or your baby's health, your doctor may need to deliver your baby early. After your baby is born, your blood pressure will probably improve. But sometimes blood pressure problems continue after birth. Follow-up care is a key part of your treatment and safety. Be sure to make and go to all appointments, and call your doctor if you are having problems. It's also a good idea to know your test results and keep a list of the medicines you take. How can you care for yourself at home? · Take and write down your blood pressure at home if your doctor says to. · Take your medicines exactly as prescribed.  Call your doctor if you think you are having a problem with your medicine. · Do not smoke. This is one of the best things you can do to help your baby be healthy. If you need help quitting, talk to your doctor about stop-smoking programs and medicines. These can increase your chances of quitting for good. · Don't gain too much weight during pregnancy. Talk to your doctor about how much weight gain is healthy. · Eat a healthy diet. · If your doctor says it's okay, get regular exercise. Walking or swimming several times a week can be healthy for you and your baby. · Reduce stress, and find time to relax. This is very important if you continue to work or have a busy schedule. It's also important if you have small children at home. When should you call for help? [id number] anytime you think you may need emergency care. For example, call if:    · You passed out (lost consciousness).     · You have a seizure.    Call your doctor now or seek immediate medical care if:    · You have symptoms of preeclampsia, such as:  ? Sudden swelling of your face, hands, or feet. ? New vision problems (such as dimness, blurring, or seeing spots). ? A severe headache.     · Your blood pressure is higher than it should be or rises suddenly.     · You have new nausea or vomiting.     · You think that you are in labor.     · You have pain in your belly or pelvis.    Watch closely for changes in your health, and be sure to contact your doctor if:    · You gain weight rapidly. Where can you learn more? Go to http://kori-estella.info/. Enter [phone] in the search box to learn more about \"High Blood Pressure in Pregnancy: Care Instructions. \"  Current as of: September 5, 2018  Content Version: 12.1  © 6673-0518 Healthwise, Incorporated. Care instructions adapted under license by [employer] (which disclaims liability or warranty for this information).  If you have questions about a medical condition or this instruction, always ask your healthcare professional. [employer] disclaims any warranty or liability for your use of this information.

## 2019-08-29 ENCOUNTER — HOSPITAL ENCOUNTER (OUTPATIENT)
Dept: PERINATAL CARE | Age: 39
Discharge: HOME | End: 2019-08-29
Attending: OBSTETRICS & GYNECOLOGY
Payer: COMMERCIAL

## 2019-08-29 LAB
ALBUMIN SERPL-MCNC: 3.5 G/DL (ref 3.5–5.5)
ALBUMIN/GLOB SERPL: 1.5 {RATIO} (ref 1.2–2.2)
ALP SERPL-CCNC: 130 IU/L (ref 39–117)
ALT SERPL-CCNC: 17 IU/L (ref 0–32)
AST SERPL-CCNC: 16 IU/L (ref 0–40)
BILIRUB SERPL-MCNC: <0.2 MG/DL (ref 0–1.2)
BUN SERPL-MCNC: 8 MG/DL (ref 6–20)
BUN/CREAT SERPL: 14 (ref 9–23)
CALCIUM SERPL-MCNC: 9.2 MG/DL (ref 8.7–10.2)
CHLORIDE SERPL-SCNC: 101 MMOL/L (ref 96–106)
CO2 SERPL-SCNC: 20 MMOL/L (ref 20–29)
CREAT SERPL-MCNC: 0.56 MG/DL (ref 0.57–1)
ERYTHROCYTE [DISTWIDTH] IN BLOOD BY AUTOMATED COUNT: 13.4 % (ref 12.3–15.4)
GLOBULIN SER CALC-MCNC: 2.3 G/DL (ref 1.5–4.5)
GLUCOSE SERPL-MCNC: 133 MG/DL (ref 65–99)
HCT VFR BLD AUTO: 37.5 % (ref 34–46.6)
HGB BLD-MCNC: 12.3 G/DL (ref 11.1–15.9)
LDH SERPL-CCNC: 142 IU/L (ref 119–226)
MCH RBC QN AUTO: 28 PG (ref 26.6–33)
MCHC RBC AUTO-ENTMCNC: 32.8 G/DL (ref 31.5–35.7)
MCV RBC AUTO: 85 FL (ref 79–97)
PLATELET # BLD AUTO: 265 X10E3/UL (ref 150–450)
POTASSIUM SERPL-SCNC: 4.2 MMOL/L (ref 3.5–5.2)
PROT 24H UR-MRATE: 872 MG/24 HR (ref 30–150)
PROT SERPL-MCNC: 5.8 G/DL (ref 6–8.5)
PROT UR-MCNC: 24.9 MG/DL
RBC # BLD AUTO: 4.4 X10E6/UL (ref 3.77–5.28)
SODIUM SERPL-SCNC: 136 MMOL/L (ref 134–144)
WBC # BLD AUTO: 8.8 X10E3/UL (ref 3.4–10.8)

## 2019-08-29 PROCEDURE — 76819 FETAL BIOPHYS PROFIL W/O NST: CPT | Performed by: OBSTETRICS & GYNECOLOGY

## 2019-08-29 NOTE — PROGRESS NOTES
Dropping off 24hr urine and drawing PIH labs. No c/o. Has MFM appt tomorrow. Has NATHANIEL scheduled next wk with Dr. Heather Bloom.

## 2019-08-30 NOTE — PATIENT INSTRUCTIONS

## 2019-08-31 ENCOUNTER — HOSPITAL ENCOUNTER (EMERGENCY)
Age: 39
Discharge: HOME | End: 2019-09-01
Attending: OBSTETRICS & GYNECOLOGY | Admitting: OBSTETRICS & GYNECOLOGY
Payer: COMMERCIAL

## 2019-08-31 LAB
ALBUMIN SERPL-MCNC: 2.9 G/DL (ref 3.5–5)
ALBUMIN/GLOB SERPL: 0.9 {RATIO} (ref 1.1–2.2)
ALP SERPL-CCNC: 136 U/L (ref 45–117)
ALT SERPL-CCNC: 20 U/L (ref 12–78)
ANION GAP SERPL CALC-SCNC: 8 MMOL/L (ref 5–15)
AST SERPL-CCNC: 18 U/L (ref 15–37)
BASOPHILS # BLD: 0 K/UL (ref 0–0.1)
BASOPHILS NFR BLD: 0 % (ref 0–1)
BILIRUB SERPL-MCNC: 0.2 MG/DL (ref 0.2–1)
BUN SERPL-MCNC: 9 MG/DL (ref 6–20)
BUN/CREAT SERPL: 16 (ref 12–20)
CALCIUM SERPL-MCNC: 8.6 MG/DL (ref 8.5–10.1)
CHLORIDE SERPL-SCNC: 109 MMOL/L (ref 97–108)
CO2 SERPL-SCNC: 24 MMOL/L (ref 21–32)
CREAT SERPL-MCNC: 0.57 MG/DL (ref 0.55–1.02)
CREAT UR-MCNC: 27.7 MG/DL
DIFFERENTIAL METHOD BLD: ABNORMAL
EOSINOPHIL # BLD: 0.1 K/UL (ref 0–0.4)
EOSINOPHIL NFR BLD: 1 % (ref 0–7)
ERYTHROCYTE [DISTWIDTH] IN BLOOD BY AUTOMATED COUNT: 13 % (ref 11.5–14.5)
GLOBULIN SER CALC-MCNC: 3.3 G/DL (ref 2–4)
GLUCOSE SERPL-MCNC: 82 MG/DL (ref 65–100)
HCT VFR BLD AUTO: 34.9 % (ref 35–47)
HGB BLD-MCNC: 11.4 G/DL (ref 11.5–16)
IMM GRANULOCYTES # BLD AUTO: 0.1 K/UL (ref 0–0.04)
IMM GRANULOCYTES NFR BLD AUTO: 1 % (ref 0–0.5)
LDH SERPL L TO P-CCNC: 139 U/L (ref 81–246)
LYMPHOCYTES # BLD: 2 K/UL (ref 0.8–3.5)
LYMPHOCYTES NFR BLD: 19 % (ref 12–49)
MCH RBC QN AUTO: 27.5 PG (ref 26–34)
MCHC RBC AUTO-ENTMCNC: 32.7 G/DL (ref 30–36.5)
MCV RBC AUTO: 84.1 FL (ref 80–99)
MONOCYTES # BLD: 1.1 K/UL (ref 0–1)
MONOCYTES NFR BLD: 11 % (ref 5–13)
NEUTS SEG # BLD: 7.1 K/UL (ref 1.8–8)
NEUTS SEG NFR BLD: 68 % (ref 32–75)
NRBC # BLD: 0 K/UL (ref 0–0.01)
NRBC BLD-RTO: 0 PER 100 WBC
PLATELET # BLD AUTO: 249 K/UL (ref 150–400)
PMV BLD AUTO: 10.9 FL (ref 8.9–12.9)
POTASSIUM SERPL-SCNC: 4.2 MMOL/L (ref 3.5–5.1)
PROT SERPL-MCNC: 6.2 G/DL (ref 6.4–8.2)
PROT UR-MCNC: 9 MG/DL (ref 0–11.9)
PROT/CREAT UR-RTO: 0.3
RBC # BLD AUTO: 4.15 M/UL (ref 3.8–5.2)
SODIUM SERPL-SCNC: 141 MMOL/L (ref 136–145)
URATE SERPL-MCNC: 3.8 MG/DL (ref 2.6–6)
WBC # BLD AUTO: 10.4 K/UL (ref 3.6–11)

## 2019-08-31 PROCEDURE — 80053 COMPREHEN METABOLIC PANEL: CPT

## 2019-08-31 PROCEDURE — 85025 COMPLETE CBC W/AUTO DIFF WBC: CPT

## 2019-08-31 PROCEDURE — 83615 LACTATE (LD) (LDH) ENZYME: CPT

## 2019-08-31 PROCEDURE — 74011250637 HC RX REV CODE- 250/637: Performed by: OBSTETRICS & GYNECOLOGY

## 2019-08-31 PROCEDURE — 84550 ASSAY OF BLOOD/URIC ACID: CPT

## 2019-08-31 PROCEDURE — 84156 ASSAY OF PROTEIN URINE: CPT

## 2019-08-31 PROCEDURE — 36415 COLL VENOUS BLD VENIPUNCTURE: CPT

## 2019-08-31 RX ORDER — BUTALBITAL, ACETAMINOPHEN AND CAFFEINE 50; 325; 40 MG/1; MG/1; MG/1
2 TABLET ORAL ONCE
Status: DISCONTINUED | OUTPATIENT
Start: 2019-08-31 | End: 2019-09-01 | Stop reason: HOSPADM

## 2019-08-31 RX ORDER — ACETAMINOPHEN 325 MG/1
650 TABLET ORAL
Status: DISCONTINUED | OUTPATIENT
Start: 2019-08-31 | End: 2019-09-01 | Stop reason: HOSPADM

## 2019-08-31 RX ADMIN — ACETAMINOPHEN 650 MG: 325 TABLET ORAL at 22:48

## 2019-09-01 VITALS
TEMPERATURE: 98 F | HEART RATE: 82 BPM | RESPIRATION RATE: 16 BRPM | DIASTOLIC BLOOD PRESSURE: 78 MMHG | SYSTOLIC BLOOD PRESSURE: 137 MMHG

## 2019-09-01 PROCEDURE — 59025 FETAL NON-STRESS TEST: CPT

## 2019-09-01 PROCEDURE — 99285 EMERGENCY DEPT VISIT HI MDM: CPT

## 2019-09-01 NOTE — PROGRESS NOTES
2330--Spoke with Dr. Jose Hernandez at this time and reviewed labs and patients headache currently and history of migraines. Orders received for Fioricet and Phenergan to be given at this time for headache. 0000--Patient does not wish to take the fioricet here at the hospital as she has some at home and wishes to be discharged and will take her own once discharged. Dr. Jose Hernandez called and notified of this and states that patient can be discharged to home but that she will need to see and evaluate her. Informed patient of this. Will continue to monitor. 0100--Patient is ready for discharge and would like to leave. Dr. Jose Hernandez called at this time and she states that she will be out to see her. 0110--Driss Brantley in to see patient at this time with writer. Patient now has discharge orders and may go home. Education provided to patient and spouse for s/s of pre-eclampsia and what to report to the physician. Patient has an appt. With Dr. Mamadou Joe on Tuesday 09/03/19 and she is to keep that appt. As scheduled. Verbalized understanding and voiced no questions or concerns at this time. 0120--Patient and spouse escorted to lobby for discharge to home. No acute distress noted.

## 2019-09-01 NOTE — H&P
History & Physical    Name: Whitney Jacobson MRN: [id number]  SSN: [id number]    YOB: 1980  Age: 41 y.o. Sex: female        Subjective:     Estimated Date of Delivery: 10/7/19  OB History    Para Term  AB Living   1 0 0 0 0 0   SAB TAB Ectopic Molar Multiple Live Births   0 0 0   0        # Outcome Date GA Lbr Sonido/2nd Weight Sex Delivery Anes PTL Lv   1 Current               Obstetric Comments   Menarche:  17. LMP: 20. # of Children:  Currently pregnant. Age at Delivery of First Child:  n/a. Hysterectomy/oophorectomy:  NO/NO. Breast Bx:  No.  Hx of Breast Feeding:  n/a. BCP:  Yes. Hormone therapy:  No.        Ms. Ramiro Bess is a  with pregnancy at 34w6d that has a history of chronic hypertension and migraine headaches. She had a migraine headache for most of the day and therefore went to her local [facility] to take her blood pressure with the automated cuff. Her blood pressure was 100-170/110s. She therefore called her MD and was advised to com to labor and delivery for further evaluation. Feels that headache if consistent with her usual migraines and has improved slightly with Tylenol. Has had one episode of nausea and vomiting. Denies vision changes, upper abdominal pain, worsening edema, contractions, and notes good fetal movement. Please see prenatal records for details.     Past Medical History:   Diagnosis Date    ADHD     Dysplasia of cervix, low grade (ORION 1) 12    colpo    Encounter for IUD removal 2016    Mirena    Essential hypertension     10 years ago    GERD (gastroesophageal reflux disease)     trying to control with diet since pregnant    H/O abnormal Pap smear 12;12    LGSIL, HPV positive    Headache     Headache(784.0)     IUD (intrauterine device) in place 2014    Mirena    Kidney disease     kidney stones     Migraine headache     without aura    pap smear for 11;13; 4/1/15;19    neg, HPV neg,no ECC; neg, HPV neg, Negative, HPV negative; neg pap and neg HPV     Past Surgical History:   Procedure Laterality Date    HX COLPOSCOPY  2/23/12    ORION 1    HX HEENT      wisdom teeth    HX LITHOTRIPSY      HX WISDOM TEETH EXTRACTION       Social History     Occupational History    Not on file   Tobacco Use    Smoking status: Never Smoker    Smokeless tobacco: Never Used   Substance and Sexual Activity    Alcohol use: Not Currently     Comment: social    Drug use: No    Sexual activity: Yes     Partners: Male     Family History   Problem Relation Age of Onset    Hypertension Father     Heart Disease Father     Diabetes Father     Elevated Lipids Father     Cancer Father         eye    Cancer Mother         Cervical,Uterine,& Ovarian (pt questions ovarian but got cancer after copper 7 IUD)    Depression Mother     Allergic Rhinitis Brother     Asthma Brother     Other Brother         Chron's    Hypertension Brother     Alcohol abuse Maternal Grandfather     Depression Maternal Grandfather     Alcohol abuse Paternal Grandmother     Stroke Paternal Grandmother     Migraines Brother     Allergic Rhinitis Brother     Breast Cancer Other        No Known Allergies  Prior to Admission medications    Medication Sig Start Date End Date Taking? Authorizing Provider   diphenhydramine HCl (BENADRYL PO) Take  by mouth. Provider, Historical   labetalol (NORMODYNE) 100 mg tablet TAKE ONE TABLET BY MOUTH TWICE A DAY 8/7/19   Josette Berumen MD   doxylamine succinate (UNISOM, DOXYLAMINE, PO) Take  by mouth. Provider, Historical   pantoprazole (PROTONIX) 20 mg tablet TAKE ONE TABLET BY MOUTH DAILY 6/26/19   Josette Berumen MD   loratadine (CLARITIN REDITABS) 10 mg dissolvable tablet Take 10 mg by mouth. Provider, Historical   ondansetron (ZOFRAN ODT) 4 mg disintegrating tablet Take 1 Tab by mouth every eight (8) hours as needed for Nausea.  5/21/19   Josette Berumen MD butalbital-acetaminophen-caffeine (FIORICET, ESGIC) -40 mg per tablet TAKE ONE TABLET BY MOUTH EVERY 6 HOURS AS NEEDED FOR PAIN 4/23/19   Jose Raul Barnhart MD   prenatal vits62/FA/om3/dha/epa (PRENATAL GUMMY PO) Take  by mouth. Provider, Historical        Review of Systems: Pertinent items are noted in HPI. Objective:     Vitals:  Vitals:    08/31/19 2224 08/31/19 2244 08/31/19 2318 08/31/19 2348   BP: 141/75 141/90 145/86 137/78   Pulse: 82 (!) 103 89 82        Physical Exam:  Patient without distress. Membranes:  Intact  Fetal Heart Rate: Baseline: 135 per minute  Variability: moderate  Accelerations: yes  Decelerations: none  Uterine contractions: none    Recent Results (from the past 12 hour(s))   CBC WITH AUTOMATED DIFF    Collection Time: 08/31/19  9:04 PM   Result Value Ref Range    WBC 10.4 3.6 - 11.0 K/uL    RBC 4.15 3.80 - 5.20 M/uL    HGB 11.4 (L) 11.5 - 16.0 g/dL    HCT 34.9 (L) 35.0 - 47.0 %    MCV 84.1 80.0 - 99.0 FL    MCH 27.5 26.0 - 34.0 PG    MCHC 32.7 30.0 - 36.5 g/dL    RDW 13.0 11.5 - 14.5 %    PLATELET 890 655 - 653 K/uL    MPV 10.9 8.9 - 12.9 FL    NRBC 0.0 0  WBC    ABSOLUTE NRBC 0.00 0.00 - 0.01 K/uL    NEUTROPHILS 68 32 - 75 %    LYMPHOCYTES 19 12 - 49 %    MONOCYTES 11 5 - 13 %    EOSINOPHILS 1 0 - 7 %    BASOPHILS 0 0 - 1 %    IMMATURE GRANULOCYTES 1 (H) 0.0 - 0.5 %    ABS. NEUTROPHILS 7.1 1.8 - 8.0 K/UL    ABS. LYMPHOCYTES 2.0 0.8 - 3.5 K/UL    ABS. MONOCYTES 1.1 (H) 0.0 - 1.0 K/UL    ABS. EOSINOPHILS 0.1 0.0 - 0.4 K/UL    ABS. BASOPHILS 0.0 0.0 - 0.1 K/UL    ABS. IMM.  GRANS. 0.1 (H) 0.00 - 0.04 K/UL    DF AUTOMATED     METABOLIC PANEL, COMPREHENSIVE    Collection Time: 08/31/19  9:04 PM   Result Value Ref Range    Sodium 141 136 - 145 mmol/L    Potassium 4.2 3.5 - 5.1 mmol/L    Chloride 109 (H) 97 - 108 mmol/L    CO2 24 21 - 32 mmol/L    Anion gap 8 5 - 15 mmol/L    Glucose 82 65 - 100 mg/dL    BUN 9 6 - 20 MG/DL    Creatinine 0.57 0.55 - 1.02 MG/DL BUN/Creatinine ratio 16 12 - 20      GFR est AA >60 >60 ml/min/1.73m2    GFR est non-AA >60 >60 ml/min/1.73m2    Calcium 8.6 8.5 - 10.1 MG/DL    Bilirubin, total 0.2 0.2 - 1.0 MG/DL    ALT (SGPT) 20 12 - 78 U/L    AST (SGOT) 18 15 - 37 U/L    Alk. phosphatase 136 (H) 45 - 117 U/L    Protein, total 6.2 (L) 6.4 - 8.2 g/dL    Albumin 2.9 (L) 3.5 - 5.0 g/dL    Globulin 3.3 2.0 - 4.0 g/dL    A-G Ratio 0.9 (L) 1.1 - 2.2     URIC ACID    Collection Time: 19  9:04 PM   Result Value Ref Range    Uric acid 3.8 2.6 - 6.0 MG/DL   LD    Collection Time: 19  9:04 PM   Result Value Ref Range     81 - 246 U/L   PROTEIN/CREATININE RATIO, URINE    Collection Time: 19  9:04 PM   Result Value Ref Range    Protein, urine random 9 0.0 - 11.9 mg/dL    Creatinine, urine 27.70 mg/dL    Protein/Creat.  urine Ratio 0.3       Prenatal Labs:   Lab Results   Component Value Date/Time    Rubella, External Immune 2019    HBsAg, External Negative 2019    HIV, External Non-reactive 2019    ABO,Rh O  Positive 2019         Assessment/Plan:   Ass:  at 34 6/7 wks with chronic hypertension, migraine headache, no evidence of superimposed preeclampsia, reactive cat 1 fetal tracing  Plan: Informed that Fioricet is safe to take for migraines  Preeclampsia precautions reviewed  Fetal kick counts  Follow up with scheduled OB appointment Tuesday 9/3/2019

## 2019-09-01 NOTE — DISCHARGE INSTRUCTIONS
Patient Education   Keep current appointment with Dr. Jose E Wallace for 2019. Please notify physician if any changes in vision, headache, swelling, and abdominal pain. Weeks 34 to 36 of Your Pregnancy: Care Instructions  Your Care Instructions    By now, your baby and your belly have grown quite large. It is almost time to give birth. Your baby's lungs are almost ready to breathe air. The bones in your baby's head are now firm enough to protect it, but soft enough to move down through the birth canal.  You may feel excited, happy, anxious, or scared. You may wonder how you will know if you are in labor or what to expect during labor. Try to be flexible in your expectations of the birth. Because each birth is different, there is no way to know exactly what childbirth will be like for you. This care sheet will help you know what to expect and how to prepare. This may make your childbirth easier. If you haven't already had the Tdap shot during this pregnancy, talk to your doctor about getting it. It will help protect your  against pertussis infection. In the 36th week, most women have a test for group B streptococcus (GBS). GBS is a common bacteria that can live in the vagina and rectum. It can make your baby sick after birth. If you test positive, you will get antibiotics during labor. The medicine will keep your baby from getting the bacteria. Follow-up care is a key part of your treatment and safety. Be sure to make and go to all appointments, and call your doctor if you are having problems. It's also a good idea to know your test results and keep a list of the medicines you take. How can you care for yourself at home? Learn about pain relief choices  · Pain is different for every woman. Talk with your doctor about your feelings about pain. · You can choose from several types of pain relief. These include medicine or breathing techniques, as well as comfort measures.  You can use more than one option. · If you choose to have pain medicine during labor, talk to your doctor about your options. Some medicines lower anxiety and help with some of the pain. Others make your lower body numb so that you won't feel pain. · Be sure to tell your doctor about your pain medicine choice before you start labor or very early in your labor. You may be able to change your mind as labor progresses. · Rarely, a woman is put to sleep by medicine given through a mask or an IV. Labor and delivery  · The first stage of labor has three parts: early, active, and transition. ? Most women have early labor at home. You can stay busy or rest, eat light snacks, drink clear fluids, and start counting contractions. ? When talking during a contraction gets hard, you may be moving to active labor. During active labor, you should head for the hospital if you are not there already. ? You are in active labor when contractions come every 3 to 4 minutes and last about 60 seconds. Your cervix is opening more rapidly. ? If your water breaks, contractions will come faster and stronger. ? During transition, your cervix is stretching, and contractions are coming more rapidly. ? You may want to push, but your cervix might not be ready. Your doctor will tell you when to push. · The second stage starts when your cervix is completely opened and you are ready to push. ? Contractions are very strong to push the baby down the birth canal.  ? You will feel the urge to push. You may feel like you need to have a bowel movement. ? You may be coached to push with contractions. These contractions will be very strong, but you will not have them as often. You can get a little rest between contractions. ? You may be emotional and irritable. You may not be aware of what is going on around you.  ? One last push, and your baby is born. · The third stage is when a few more contractions push out the placenta. This may take 30 minutes or less.   · The fourth stage is the welcome recovery. You may feel overwhelmed with emotions and exhausted but alert. This is a good time to start breastfeeding. Where can you learn more? Go to http://kori-estella.info/. Enter K013 in the search box to learn more about \"Weeks 34 to 36 of Your Pregnancy: Care Instructions. \"  Current as of: September 5, 2018  Content Version: 12.1  © 1541-8194 [employer]. Care instructions adapted under license by [employer] (which disclaims liability or warranty for this information). If you have questions about a medical condition or this instruction, always ask your healthcare professional. [address] any warranty or liability for your use of this information. Patient Education        Preeclampsia: Care Instructions  Overview    Preeclampsia occurs when a woman's blood pressure rises during pregnancy. Often with preeclampsia, you also have swelling in your legs, hands, and face. A test may show too much protein in your urine. Preeclampsia is also called toxemia. If preeclampsia is severe and not treated, it can lead to seizures (eclampsia) and damage to your liver or kidneys. Preeclampsia can prevent your baby from getting enough food and oxygen. This can cause a low birth weight or other problems. Your doctor will watch you closely to prevent these problems. He or she also may recommend that you reduce your activity. If your preeclampsia is a danger to your health or the health of your baby, your doctor may need to deliver your baby early. While preeclampsia is a concern, most women with preeclampsia have healthy babies. After a woman gives birth, preeclampsia usually goes away on its own. But symptoms may last a few weeks or more and can get worse after delivery. Rarely, symptoms of preeclampsia don't show up until days or even weeks after childbirth. Follow-up care is a key part of your treatment and safety.  Be sure to make and go to all appointments, and call your doctor if you are having problems. It's also a good idea to know your test results and keep a list of the medicines you take. How can you care for yourself at home? · Take and record your blood pressure at home if your doctor tells you to. ? Learn the importance of the two measures of blood pressure (such as 120 over 80, or 120/80). The first number is the systolic pressure, which is the force of blood on the artery walls as the heart pumps. The second number is the diastolic pressure, which is the force of blood on the artery walls between heartbeats, when the heart is at rest. You have a choice of monitors to use. ? Manual monitor: You pump up the cuff and use a stethoscope to listen for your pulse. ? Electronic monitor: The cuff inflates, and a gauge shows your pulse rate. ? To take your blood pressure:  ? Ask your doctor to check your blood pressure monitor to be sure that it is accurate and that the cuff fits you. Also ask your doctor to watch you to make sure that you are using it right. ? You should not eat, use tobacco products, or use medicine known to raise blood pressure (such as some nasal decongestant sprays) before you take your blood pressure. ? Avoid taking your blood pressure if you have just exercised. Also avoid taking it if you are nervous or upset. Rest at least 15 minutes before you take your blood pressure. · If your doctor advises, check the protein levels in your urine. Your doctor or nurse will show you how to do this. · Take your medicines exactly as prescribed. Call your doctor if you think you are having a problem with your medicine. · Do not smoke. Quitting smoking will help improve your baby's growth and health. If you need help quitting, talk to your doctor about stop-smoking programs and medicines. These can increase your chances of quitting for good.   · Eat a balanced and healthy diet that has lots of fruits and vegetables. · If your doctor advised bed rest, be sure to stay off your feet and rest as much as possible. ? Keep a phone, phone book, notepad, and pen near the bed where you can easily reach them. ? Gently stretch your legs every hour to maintain good blood flow. ? Have another family member pack snacks and lunch food in a cooler close to your bed. ? Use this time for activities that you usually cannot find time for, such as reading, craft projects, or letter writing. · You can keep track of your baby's health by noting the length of time it takes to count 10 movements (such as kicks, flutters, or rolls). Feeling 10 movements in less than 1 hour is considered normal. Track your baby's movements once each day. Bring this record with you to each prenatal visit. When should you call for help? [id number] anytime you think you may need emergency care. For example, call if:    · You passed out (lost consciousness).     · You have a seizure.    Call your doctor now or seek immediate medical care if:    · You have symptoms of preeclampsia, such as:  ? Sudden swelling of your face, hands, or feet. ? New vision problems (such as dimness, blurring, or seeing spots). ? A severe headache.     · Your blood pressure is higher than it should be, or it rises suddenly.     · You have new nausea or vomiting.     · You think that you are in labor.     · You have pain in your belly or pelvis.    Watch closely for changes in your health, and be sure to contact your doctor if:    · You gain weight rapidly. Where can you learn more? Go to http://kori-estella.info/. Enter O285 in the search box to learn more about \"Preeclampsia: Care Instructions. \"  Current as of: September 5, 2018  Content Version: 12.1  © 2601-6995 [employer]. Care instructions adapted under license by [employer] (which disclaims liability or warranty for this information).  If you have questions about a medical condition or this instruction, always ask your healthcare professional. [address] any warranty or liability for your use of this information. Your [employer] Activation    Thank you for requesting access to SYLLETA. Please follow the instructions below to securely access and download your online medical record. SYLLETA allows you to send messages to your doctor, view your test results, renew your prescriptions, schedule appointments, and more. How Do I Sign Up? 1. In your internet browser, go to [email or web address] 2. Click on the First Time User? Click Here link in the Sign In box. You will see the New Member Sign Up page. 3. Enter your SYLLETA Access Code exactly as it appears below. You will not need to use this code after youve completed the sign-up process. If you do not sign up before the expiration date, you must request a new code. SYLLETA Access Code: Activation code not generated  Current SYLLETA Status: Active (This is the date your SYLLETA access code will )    4. Enter the last four digits of your Social Security Number (xxxx) and Date of Birth (mm/dd/yyyy) as indicated and click Submit. You will be taken to the next sign-up page. 5. Create a SYLLETA ID. This will be your SYLLETA login ID and cannot be changed, so think of one that is secure and easy to remember. 6. Create a SYLLETA password. You can change your password at any time. 7. Enter your Password Reset Question and Answer. This can be used at a later time if you forget your password. 8. Enter your e-mail address. You will receive e-mail notification when new information is available in [address]. 9. Click Sign Up. You can now view and download portions of your medical record. 10. Click the Download Summary menu link to download a portable copy of your medical information.     Additional Information    If you have questions, please visit the Frequently Asked Questions section of the Drive[employer] website at [email or web address] Remember, DriveFactor is NOT to be used for urgent needs. For medical emergencies, dial 911.

## 2019-09-03 ENCOUNTER — ROUTINE PRENATAL (OUTPATIENT)
Dept: OBGYN CLINIC | Age: 39
End: 2019-09-03

## 2019-09-03 VITALS
SYSTOLIC BLOOD PRESSURE: 142 MMHG | HEIGHT: 64 IN | WEIGHT: 258 LBS | BODY MASS INDEX: 44.05 KG/M2 | DIASTOLIC BLOOD PRESSURE: 98 MMHG

## 2019-09-03 DIAGNOSIS — Z36.85 ANTENATAL SCREENING FOR STREPTOCOCCUS B: ICD-10-CM

## 2019-09-03 DIAGNOSIS — O16.9 HYPERTENSION DURING PREGNANCY, ANTEPARTUM, UNSPECIFIED HYPERTENSION IN PREGNANCY TYPE: Primary | ICD-10-CM

## 2019-09-03 LAB — GRBS, EXTERNAL: POSITIVE

## 2019-09-03 NOTE — PROGRESS NOTES
[facility]  [email or web address]  [phone]    Adriana Mullen MD, [address] [facility]     Follow-up OB visit    Chief Complaint   Patient presents with    Routine Prenatal Visit       Vitals:    19 0824 19 0846 19 0909 19 0910   BP: (!) 152/98 (!) 152/110 (!) 136/98 (!) 142/98   Weight:       Height:           Patient Active Problem List    Diagnosis Date Noted    Hypertension in pregnancy 2019    Hypertension affecting pregnancy 2019    Pregnancy 2019    Severe obesity ([address] 10/24/2018    Irritable bowel syndrome with diarrhea 2017    Gastroesophageal reflux disease 2017    Obesity (BMI 30-39.9) 2017    GERD (gastroesophageal reflux disease) 2017    Menorrhagia with regular cycle 2017    History of kidney stones 2015    Weight gain 2015    H/O abnormal Pap smear 2012    Chronic daily headache 2012        The patient reports the following concerns: ER visit on 19. Endorses was feeling bad, checked blood press  Re at [place] was 153/111 and 173/1113, called oncall Doctor encouraged to go to ER, did and BP was lower 140s/90s stayed about 4 hours. No ha/cp/sob/vision change. Good FM    Vitals:    19 0824 19 0846 19 0909 19 0910   BP: (!) 152/98 (!) 152/110 (!) 136/98 (!) 142/98   Weight:       Height:         See PN flowsheet for exam      41 y.o. Basilia Forde 35w1d   Encounter Diagnoses   Name Primary?  Hypertension during pregnancy, antepartum, unspecified hypertension in pregnancy type Yes     screening for streptococcus B      Disc concerns re inc BP and L and D for labs/serial BP. Pt declines.  Stayed in office for repeat BP: see above,  Labs  Keep MFM fu Thursday  Strict PIH precautions     [] SAB/bleeding precautions reviewed   [] PTL/PPROM precautions reviewed   [] Labor precautions reviewed   [] Fetal kick counts discussed   [] Labs reviewed with patient   [] [place] Pare precautions reviewed   [] Consent reviewed   [] Handouts given to pt   [] Glucola handout    [] GBS/labor/Magic Hour handout   []    [] We reviewed CDC recommendations for Tdap for patient and close contacts and RBA of receiving in pregnancy, advised obtaining in third trimester   [] Reviewed healthy nutrition in pregnancy and good exercise practices   [] We disc safer medications in pregnancy and referred patient to [facility] NATHANIEL resources   [] We reviewed CDC recommendations for flu vaccine and RBA of receiving in pregnancy   []    []    []       Follow-up and Dispositions    · Return in about 2 weeks (around 9/17/2019) for Follow up OB visit.          Orders Placed This Encounter    GROUP B STREP BY JAIME    CBC W/O DIFF    METABOLIC PANEL, Mamadou Moore MD

## 2019-09-04 LAB
ALBUMIN SERPL-MCNC: 3.5 G/DL (ref 3.5–5.5)
ALBUMIN/GLOB SERPL: 1.6 {RATIO} (ref 1.2–2.2)
ALP SERPL-CCNC: 127 IU/L (ref 39–117)
ALT SERPL-CCNC: 12 IU/L (ref 0–32)
AST SERPL-CCNC: 19 IU/L (ref 0–40)
BILIRUB SERPL-MCNC: <0.2 MG/DL (ref 0–1.2)
BUN SERPL-MCNC: 12 MG/DL (ref 6–20)
BUN/CREAT SERPL: 23 (ref 9–23)
CALCIUM SERPL-MCNC: 9.2 MG/DL (ref 8.7–10.2)
CHLORIDE SERPL-SCNC: 102 MMOL/L (ref 96–106)
CO2 SERPL-SCNC: 21 MMOL/L (ref 20–29)
CREAT SERPL-MCNC: 0.52 MG/DL (ref 0.57–1)
ERYTHROCYTE [DISTWIDTH] IN BLOOD BY AUTOMATED COUNT: 13.5 % (ref 12.3–15.4)
GLOBULIN SER CALC-MCNC: 2.2 G/DL (ref 1.5–4.5)
GLUCOSE SERPL-MCNC: 81 MG/DL (ref 65–99)
HCT VFR BLD AUTO: 35.8 % (ref 34–46.6)
HGB BLD-MCNC: 11.4 G/DL (ref 11.1–15.9)
MCH RBC QN AUTO: 26.8 PG (ref 26.6–33)
MCHC RBC AUTO-ENTMCNC: 31.8 G/DL (ref 31.5–35.7)
MCV RBC AUTO: 84 FL (ref 79–97)
PLATELET # BLD AUTO: 257 X10E3/UL (ref 150–450)
POTASSIUM SERPL-SCNC: 4.5 MMOL/L (ref 3.5–5.2)
PROT SERPL-MCNC: 5.7 G/DL (ref 6–8.5)
RBC # BLD AUTO: 4.25 X10E6/UL (ref 3.77–5.28)
SODIUM SERPL-SCNC: 139 MMOL/L (ref 134–144)
WBC # BLD AUTO: 8.8 X10E3/UL (ref 3.4–10.8)

## 2019-09-05 ENCOUNTER — HOSPITAL ENCOUNTER (OUTPATIENT)
Dept: PERINATAL CARE | Age: 39
Discharge: HOME | End: 2019-09-05
Attending: OBSTETRICS & GYNECOLOGY
Payer: COMMERCIAL

## 2019-09-05 LAB — GP B STREP DNA SPEC QL NAA+PROBE: POSITIVE

## 2019-09-05 PROCEDURE — 76819 FETAL BIOPHYS PROFIL W/O NST: CPT | Performed by: OBSTETRICS & GYNECOLOGY

## 2019-09-05 PROCEDURE — 76816 OB US FOLLOW-UP PER FETUS: CPT | Performed by: OBSTETRICS & GYNECOLOGY

## 2019-09-05 NOTE — PROGRESS NOTES
Normal results, add to prenatal records. We can review in detail with patient at next visit.   Update [facility] labs on PL w date

## 2019-09-06 NOTE — PATIENT INSTRUCTIONS

## 2019-09-10 ENCOUNTER — HOSPITAL ENCOUNTER (OUTPATIENT)
Dept: PERINATAL CARE | Age: 39
Discharge: HOME | End: 2019-09-10
Attending: OBSTETRICS & GYNECOLOGY
Payer: COMMERCIAL

## 2019-09-10 ENCOUNTER — HOSPITAL ENCOUNTER (INPATIENT)
Age: 39
LOS: 4 days | Discharge: HOME | End: 2019-09-14
Attending: OBSTETRICS & GYNECOLOGY | Admitting: OBSTETRICS & GYNECOLOGY
Payer: COMMERCIAL

## 2019-09-10 ENCOUNTER — ROUTINE PRENATAL (OUTPATIENT)
Dept: OBGYN CLINIC | Age: 39
End: 2019-09-10

## 2019-09-10 VITALS
DIASTOLIC BLOOD PRESSURE: 106 MMHG | HEIGHT: 64 IN | BODY MASS INDEX: 44.9 KG/M2 | WEIGHT: 263 LBS | SYSTOLIC BLOOD PRESSURE: 168 MMHG

## 2019-09-10 DIAGNOSIS — O16.9 HYPERTENSION DURING PREGNANCY, ANTEPARTUM, UNSPECIFIED HYPERTENSION IN PREGNANCY TYPE: Primary | ICD-10-CM

## 2019-09-10 PROBLEM — O11.9 CHRONIC HYPERTENSION WITH SUPERIMPOSED PREECLAMPSIA: Status: ACTIVE | Noted: 2019-09-10

## 2019-09-10 LAB
ALBUMIN SERPL-MCNC: 2.5 G/DL (ref 3.5–5)
ALBUMIN/GLOB SERPL: 0.8 {RATIO} (ref 1.1–2.2)
ALP SERPL-CCNC: 142 U/L (ref 45–117)
ALT SERPL-CCNC: 26 U/L (ref 12–78)
ANION GAP SERPL CALC-SCNC: 10 MMOL/L (ref 5–15)
AST SERPL-CCNC: 57 U/L (ref 15–37)
BILIRUB SERPL-MCNC: 0.3 MG/DL (ref 0.2–1)
BUN SERPL-MCNC: 13 MG/DL (ref 6–20)
BUN/CREAT SERPL: 25 (ref 12–20)
CALCIUM SERPL-MCNC: 9.1 MG/DL (ref 8.5–10.1)
CHLORIDE SERPL-SCNC: 109 MMOL/L (ref 97–108)
CO2 SERPL-SCNC: 20 MMOL/L (ref 21–32)
CREAT SERPL-MCNC: 0.53 MG/DL (ref 0.55–1.02)
CREAT UR-MCNC: 50.1 MG/DL
ERYTHROCYTE [DISTWIDTH] IN BLOOD BY AUTOMATED COUNT: 13.2 % (ref 11.5–14.5)
GLOBULIN SER CALC-MCNC: 3.3 G/DL (ref 2–4)
GLUCOSE SERPL-MCNC: 104 MG/DL (ref 65–100)
HCT VFR BLD AUTO: 32.9 % (ref 35–47)
HGB BLD-MCNC: 10.8 G/DL (ref 11.5–16)
MCH RBC QN AUTO: 27.3 PG (ref 26–34)
MCHC RBC AUTO-ENTMCNC: 32.8 G/DL (ref 30–36.5)
MCV RBC AUTO: 83.1 FL (ref 80–99)
NRBC # BLD: 0 K/UL (ref 0–0.01)
NRBC BLD-RTO: 0 PER 100 WBC
PLATELET # BLD AUTO: 235 K/UL (ref 150–400)
PMV BLD AUTO: 11.5 FL (ref 8.9–12.9)
POTASSIUM SERPL-SCNC: 4.9 MMOL/L (ref 3.5–5.1)
PROT SERPL-MCNC: 5.8 G/DL (ref 6.4–8.2)
PROT UR-MCNC: 19 MG/DL (ref 0–11.9)
PROT/CREAT UR-RTO: 0.4
RBC # BLD AUTO: 3.96 M/UL (ref 3.8–5.2)
SODIUM SERPL-SCNC: 139 MMOL/L (ref 136–145)
WBC # BLD AUTO: 8.7 K/UL (ref 3.6–11)

## 2019-09-10 PROCEDURE — 75410000003 HC RECOV DEL/VAG/CSECN EA 0.5 HR: Performed by: OBSTETRICS & GYNECOLOGY

## 2019-09-10 PROCEDURE — 75410000000 HC DELIVERY VAGINAL/SINGLE: Performed by: OBSTETRICS & GYNECOLOGY

## 2019-09-10 PROCEDURE — 74011000258 HC RX REV CODE- 258: Performed by: OBSTETRICS & GYNECOLOGY

## 2019-09-10 PROCEDURE — 74011250636 HC RX REV CODE- 250/636: Performed by: OBSTETRICS & GYNECOLOGY

## 2019-09-10 PROCEDURE — 80053 COMPREHEN METABOLIC PANEL: CPT

## 2019-09-10 PROCEDURE — 4A1HXCZ MONITORING OF PRODUCTS OF CONCEPTION, CARDIAC RATE, EXTERNAL APPROACH: ICD-10-PCS | Performed by: OBSTETRICS & GYNECOLOGY

## 2019-09-10 PROCEDURE — 74011250637 HC RX REV CODE- 250/637: Performed by: OBSTETRICS & GYNECOLOGY

## 2019-09-10 PROCEDURE — 3E033VJ INTRODUCTION OF OTHER HORMONE INTO PERIPHERAL VEIN, PERCUTANEOUS APPROACH: ICD-10-PCS | Performed by: OBSTETRICS & GYNECOLOGY

## 2019-09-10 PROCEDURE — 75410000002 HC LABOR FEE PER 1 HR: Performed by: OBSTETRICS & GYNECOLOGY

## 2019-09-10 PROCEDURE — 74011250636 HC RX REV CODE- 250/636

## 2019-09-10 PROCEDURE — 76819 FETAL BIOPHYS PROFIL W/O NST: CPT | Performed by: OBSTETRICS & GYNECOLOGY

## 2019-09-10 PROCEDURE — 10907ZC DRAINAGE OF AMNIOTIC FLUID, THERAPEUTIC FROM PRODUCTS OF CONCEPTION, VIA NATURAL OR ARTIFICIAL OPENING: ICD-10-PCS | Performed by: OBSTETRICS & GYNECOLOGY

## 2019-09-10 PROCEDURE — 36415 COLL VENOUS BLD VENIPUNCTURE: CPT

## 2019-09-10 PROCEDURE — 65270000029 HC RM PRIVATE

## 2019-09-10 PROCEDURE — 59200 INSERT CERVICAL DILATOR: CPT | Performed by: OBSTETRICS & GYNECOLOGY

## 2019-09-10 PROCEDURE — 84156 ASSAY OF PROTEIN URINE: CPT

## 2019-09-10 PROCEDURE — 77010026065 HC OXYGEN MINIMUM MEDICAL AIR: Performed by: OBSTETRICS & GYNECOLOGY

## 2019-09-10 PROCEDURE — 85027 COMPLETE CBC AUTOMATED: CPT

## 2019-09-10 PROCEDURE — 76060000078 HC EPIDURAL ANESTHESIA: Performed by: NURSE ANESTHETIST, CERTIFIED REGISTERED

## 2019-09-10 RX ORDER — LABETALOL 100 MG/1
100 TABLET, FILM COATED ORAL EVERY 12 HOURS
Status: DISCONTINUED | OUTPATIENT
Start: 2019-09-10 | End: 2019-09-10

## 2019-09-10 RX ORDER — SODIUM CHLORIDE, SODIUM LACTATE, POTASSIUM CHLORIDE, CALCIUM CHLORIDE 600; 310; 30; 20 MG/100ML; MG/100ML; MG/100ML; MG/100ML
75 INJECTION, SOLUTION INTRAVENOUS CONTINUOUS
Status: DISCONTINUED | OUTPATIENT
Start: 2019-09-10 | End: 2019-09-12 | Stop reason: HOSPADM

## 2019-09-10 RX ORDER — ACETAMINOPHEN 325 MG/1
650 TABLET ORAL
Status: DISCONTINUED | OUTPATIENT
Start: 2019-09-10 | End: 2019-09-12

## 2019-09-10 RX ORDER — MAGNESIUM SULFATE HEPTAHYDRATE 40 MG/ML
2 INJECTION, SOLUTION INTRAVENOUS CONTINUOUS
Status: DISCONTINUED | OUTPATIENT
Start: 2019-09-10 | End: 2019-09-13

## 2019-09-10 RX ORDER — NALOXONE HYDROCHLORIDE 0.4 MG/ML
0.4 INJECTION, SOLUTION INTRAMUSCULAR; INTRAVENOUS; SUBCUTANEOUS AS NEEDED
Status: DISCONTINUED | OUTPATIENT
Start: 2019-09-10 | End: 2019-09-12

## 2019-09-10 RX ORDER — SODIUM CHLORIDE 0.9 % (FLUSH) 0.9 %
5-40 SYRINGE (ML) INJECTION AS NEEDED
Status: DISCONTINUED | OUTPATIENT
Start: 2019-09-10 | End: 2019-09-12

## 2019-09-10 RX ORDER — SODIUM CHLORIDE 0.9 % (FLUSH) 0.9 %
5-40 SYRINGE (ML) INJECTION AS NEEDED
Status: DISCONTINUED | OUTPATIENT
Start: 2019-09-10 | End: 2019-09-13

## 2019-09-10 RX ORDER — MAG HYDROX/ALUMINUM HYD/SIMETH 200-200-20
30 SUSPENSION, ORAL (FINAL DOSE FORM) ORAL
Status: DISCONTINUED | OUTPATIENT
Start: 2019-09-10 | End: 2019-09-12 | Stop reason: HOSPADM

## 2019-09-10 RX ORDER — FENTANYL CITRATE 50 UG/ML
INJECTION, SOLUTION INTRAMUSCULAR; INTRAVENOUS
Status: COMPLETED
Start: 2019-09-10 | End: 2019-09-10

## 2019-09-10 RX ORDER — LABETALOL HCL 20 MG/4 ML
SYRINGE (ML) INTRAVENOUS
Status: COMPLETED
Start: 2019-09-10 | End: 2019-09-10

## 2019-09-10 RX ORDER — LABETALOL HCL 20 MG/4 ML
80 SYRINGE (ML) INTRAVENOUS
Status: DISCONTINUED | OUTPATIENT
Start: 2019-09-10 | End: 2019-09-14 | Stop reason: HOSPADM

## 2019-09-10 RX ORDER — SODIUM CHLORIDE 0.9 % (FLUSH) 0.9 %
5-40 SYRINGE (ML) INJECTION EVERY 8 HOURS
Status: DISCONTINUED | OUTPATIENT
Start: 2019-09-10 | End: 2019-09-12 | Stop reason: HOSPADM

## 2019-09-10 RX ORDER — NALBUPHINE HYDROCHLORIDE 10 MG/ML
10 INJECTION, SOLUTION INTRAMUSCULAR; INTRAVENOUS; SUBCUTANEOUS
Status: DISCONTINUED | OUTPATIENT
Start: 2019-09-10 | End: 2019-09-12

## 2019-09-10 RX ORDER — LABETALOL HCL 20 MG/4 ML
20 SYRINGE (ML) INTRAVENOUS ONCE
Status: COMPLETED | OUTPATIENT
Start: 2019-09-10 | End: 2019-09-10

## 2019-09-10 RX ORDER — FENTANYL CITRATE 50 UG/ML
50 INJECTION, SOLUTION INTRAMUSCULAR; INTRAVENOUS ONCE
Status: COMPLETED | OUTPATIENT
Start: 2019-09-10 | End: 2019-09-10

## 2019-09-10 RX ORDER — LABETALOL HCL 20 MG/4 ML
40 SYRINGE (ML) INTRAVENOUS
Status: ACTIVE | OUTPATIENT
Start: 2019-09-10 | End: 2019-09-11

## 2019-09-10 RX ORDER — LABETALOL HYDROCHLORIDE 5 MG/ML
80 INJECTION, SOLUTION INTRAVENOUS
Status: DISCONTINUED | OUTPATIENT
Start: 2019-09-10 | End: 2019-09-10 | Stop reason: SDUPTHER

## 2019-09-10 RX ORDER — LABETALOL HYDROCHLORIDE 5 MG/ML
20 INJECTION, SOLUTION INTRAVENOUS ONCE
Status: DISCONTINUED | OUTPATIENT
Start: 2019-09-10 | End: 2019-09-10 | Stop reason: SDUPTHER

## 2019-09-10 RX ORDER — LABETALOL HYDROCHLORIDE 5 MG/ML
40 INJECTION, SOLUTION INTRAVENOUS
Status: DISCONTINUED | OUTPATIENT
Start: 2019-09-10 | End: 2019-09-10 | Stop reason: SDUPTHER

## 2019-09-10 RX ORDER — MAGNESIUM SULFATE HEPTAHYDRATE 40 MG/ML
4 INJECTION, SOLUTION INTRAVENOUS ONCE
Status: COMPLETED | OUTPATIENT
Start: 2019-09-10 | End: 2019-09-10

## 2019-09-10 RX ORDER — OXYTOCIN/0.9 % SODIUM CHLORIDE 30/500 ML
0-25 PLASTIC BAG, INJECTION (ML) INTRAVENOUS
Status: DISCONTINUED | OUTPATIENT
Start: 2019-09-11 | End: 2019-09-11

## 2019-09-10 RX ORDER — ONDANSETRON 2 MG/ML
4 INJECTION INTRAMUSCULAR; INTRAVENOUS
Status: DISCONTINUED | OUTPATIENT
Start: 2019-09-10 | End: 2019-09-12

## 2019-09-10 RX ADMIN — Medication 20 MG: at 18:15

## 2019-09-10 RX ADMIN — NALBUPHINE HYDROCHLORIDE 10 MG: 10 INJECTION, SOLUTION INTRAMUSCULAR; INTRAVENOUS; SUBCUTANEOUS at 23:46

## 2019-09-10 RX ADMIN — LABETALOL 20 MG/4 ML (5 MG/ML) INTRAVENOUS SYRINGE 20 MG: at 18:15

## 2019-09-10 RX ADMIN — MAGNESIUM SULFATE HEPTAHYDRATE 2 G/HR: 40 INJECTION, SOLUTION INTRAVENOUS at 18:42

## 2019-09-10 RX ADMIN — FENTANYL CITRATE 50 MCG: 50 INJECTION, SOLUTION INTRAMUSCULAR; INTRAVENOUS at 17:48

## 2019-09-10 RX ADMIN — MAGNESIUM SULFATE HEPTAHYDRATE 4 G: 40 INJECTION, SOLUTION INTRAVENOUS at 18:05

## 2019-09-10 RX ADMIN — SODIUM CHLORIDE 5 MILLION UNITS: 900 INJECTION, SOLUTION INTRAVENOUS at 23:39

## 2019-09-10 RX ADMIN — NALBUPHINE HYDROCHLORIDE 10 MG: 10 INJECTION, SOLUTION INTRAMUSCULAR; INTRAVENOUS; SUBCUTANEOUS at 18:27

## 2019-09-10 RX ADMIN — ONDANSETRON 4 MG: 2 INJECTION INTRAMUSCULAR; INTRAVENOUS at 17:49

## 2019-09-10 RX ADMIN — ONDANSETRON 4 MG: 2 INJECTION INTRAMUSCULAR; INTRAVENOUS at 23:46

## 2019-09-10 RX ADMIN — ACETAMINOPHEN 650 MG: 325 TABLET ORAL at 23:35

## 2019-09-10 NOTE — PROGRESS NOTES
Problem: Falls - Risk of  Goal: *Absence of Falls  Description  Document Kiki Deshawn Fall Risk and appropriate interventions in the flowsheet.   Outcome: Progressing Towards Goal  Note:   Fall Risk Interventions:            Medication Interventions: Patient to call before getting OOB                   Problem: Patient Education: Go to Patient Education Activity  Goal: Patient/Family Education  Outcome: Progressing Towards Goal

## 2019-09-10 NOTE — PROGRESS NOTES
1401  LM to Dr Hair Busby regarding pt blood pressure readings. 1414  Discussed pt BP with MD Fall and plan for now to monitor and will treat if sustained BP above 160/110    1615 MD Fall at bedside explaining that pt will be induced, magnesium started    1700 Per MD Fall pt may eat dinner    1730 MD Ramón Castelan placed cook catheter. 1800 Magnesium bolus started. 1805 MD Ramón Castelan back to bedside as pt is having increased pain with in abdomen in contraction like pattern. [address] pt and discussing plan of care. 1830 Pt given nubain. Per MD Ramón Castelan if BP remains elevated will treat BP again with Labetolol and if pain persists after nubain will dc cook catheter. 1850 Pt currently resting with eyes closed, states contractions feel better now and headache is gone.  MD Ramón Castelan to bedside to evaluate

## 2019-09-10 NOTE — H&P
History & Physical    Name: Efren Gomez MRN: [id number]  SSN: [id number]    YOB: 1980  Age: 41 y.o. Sex: female        Subjective:     Estimated Date of Delivery: 10/7/19  OB History        1    Para   0    Term   0       0    AB   0    Living   0       SAB   0    TAB   0    Ectopic   0    Molar        Multiple   0    Live Births              Obstetric Comments   Menarche:  17. LMP: 20. # of Children:  Currently pregnant. Age at Delivery of First Child:  n/a. Hysterectomy/oophorectomy:  NO/NO. Breast Bx:  No.  Hx of Breast Feeding:  n/a. BCP:  Yes. Hormone therapy:  No.                Ms. Moses Pérez is admitted with pregnancy at 36w1d for elevated BP, evaluation for [facility]. Prenatal course was complicated by newly diagnosed CHTN, started in new meds with pregnancy. Please see prenatal records for details. No HA/CP/SOB/vision changes. Good FM. No VB/VD/LOF.     Past Medical History:   Diagnosis Date    ADHD     Dysplasia of cervix, low grade (ORION 1) 12    colpo    Encounter for IUD removal 2016    Mirena    Essential hypertension     10 years ago    GERD (gastroesophageal reflux disease)     trying to control with diet since pregnant    H/O abnormal Pap smear 12;12    LGSIL, HPV positive    Headache     Headache(784.0)     IUD (intrauterine device) in place 2014    Mirena    Kidney disease     kidney stones     Migraine headache     without aura    pap smear for 11;13; 4/1/15;19    neg, HPV neg,no ECC; neg, HPV neg, Negative, HPV negative; neg pap and neg HPV     Past Surgical History:   Procedure Laterality Date    HX COLPOSCOPY  12    ORION 1    HX HEENT      wisdom teeth    HX LITHOTRIPSY      HX WISDOM TEETH EXTRACTION       Social History     Occupational History    Not on file   Tobacco Use    Smoking status: Never Smoker    Smokeless tobacco: Never Used   Substance and Sexual Activity    Alcohol use: Not Currently     Comment: social    Drug use: No    Sexual activity: Yes     Partners: Male     Family History   Problem Relation Age of Onset    Hypertension Father     Heart Disease Father     Diabetes Father     Elevated Lipids Father     Cancer Father         eye    Cancer Mother         Cervical,Uterine,& Ovarian (pt questions ovarian but got cancer after copper 7 IUD)    Depression Mother     Allergic Rhinitis Brother     Asthma Brother     Other Brother         Chron's    Hypertension Brother     Alcohol abuse Maternal Grandfather     Depression Maternal Grandfather     Alcohol abuse Paternal Grandmother     Stroke Paternal Grandmother     Migraines Brother     Allergic Rhinitis Brother     Breast Cancer Other        No Known Allergies  Prior to Admission medications    Medication Sig Start Date End Date Taking? Authorizing Provider   labetalol (NORMODYNE) 100 mg tablet TAKE ONE TABLET BY MOUTH TWICE A DAY 8/7/19  Yes Rahel Balderrama MD   esomeprazole magnesium (NEXIUM PO) Take  by mouth. Provider, Historical   diphenhydramine HCl (BENADRYL PO) Take  by mouth. Provider, Historical   doxylamine succinate (UNISOM, DOXYLAMINE, PO) Take  by mouth. Provider, Historical   pantoprazole (PROTONIX) 20 mg tablet TAKE ONE TABLET BY MOUTH DAILY 6/26/19   Rahel Balderrama MD   loratadine (CLARITIN REDITABS) 10 mg dissolvable tablet Take 10 mg by mouth. Provider, Historical   ondansetron (ZOFRAN ODT) 4 mg disintegrating tablet Take 1 Tab by mouth every eight (8) hours as needed for Nausea. 5/21/19   Rahel Balderrama MD   butalbital-acetaminophen-caffeine (FIORICET, ESGIC) -40 mg per tablet TAKE ONE TABLET BY MOUTH EVERY 6 HOURS AS NEEDED FOR PAIN 4/23/19   Rahel Balderrama MD   prenatal vits62/FA/om3/dha/epa (PRENATAL GUMMY PO) Take  by mouth.     Provider, Historical        Active Hospital Problems    Diagnosis Date Noted    Chronic hypertension with superimposed preeclampsia 09/10/2019       Review of Systems: A comprehensive review of systems was negative except for that written in the HPI. Constitutional: negative for fevers, chills and weight loss  ENT ROS: negative for - hearing change, oral lesions or visual changes  Respiratory: negative for cough, wheezing or dyspnea on exertion  Cardiovascular: negative for chest pain, irregular heart beats, exertional chest pressure/discomfort  Gastrointestinal: negative for dysphagia, nausea and vomiting  Genito-Urinary ROS: see HPI  Inteument/breast: negative for rash, breast lump and nipple discharge  Musculoskeletal:negative for stiff joints, neck pain and muscle weakness  Endocrine ROS: negative for - breast changes, galactorrhea or temperature intolerance  Hematological and Lymphatic ROS: negative for - bruising or swollen lymph nodes          Objective:     Vitals:  Vitals:    09/10/19 1536 09/10/19 1551 09/10/19 1638 09/10/19 1700   BP: 160/76 153/83 (!) 145/99    Pulse: 80 82 93    Resp:       Temp:       SpO2:    95%      Patient Vitals for the past 12 hrs:   Temp Pulse Resp BP SpO2   09/10/19 1700     95 %   09/10/19 1638  93  (!) 145/99    09/10/19 1551  82  153/83    09/10/19 1536  80  160/76    09/10/19 1522  89  159/85 96 %   09/10/19 1436  80  155/85    09/10/19 1406 98 °F (36.7 °C) 82 20 151/84 98 %   09/10/19 1351  86  162/87    09/10/19 1336  80  147/77    09/10/19 1319  78  156/81    09/10/19 1315 98 °F (36.7 °C)       09/10/19 1305  81  171/84 100 %   09/10/19 1251  85  (!) 160/97        Physical Exam:  Patient without distress.   Heart: Regular rate and rhythm or S1S2 present  Lung: clear to auscultation throughout lung fields, no wheezes, no rales, no rhonchi and normal respiratory effort  Abdomen: soft, nontender  Fundus: soft and non tender  Cervical Exam: 1cm in office  Lower Extremities: 1+ le edema no c/t       Membranes:  Intact  Fetal Heart Rate: Baseline: 130 per minute  Variability: moderate  Accelerations: yes  Decelerations: none  TOCO: None    Prenatal Labs:   Lab Results   Component Value Date/Time    Rubella, External Immune 02/26/2019    GrBStrep, External Positive 09/03/2019    HBsAg, External Negative 02/26/2019    HIV, External Non-reactive 02/26/2019        WBC   Date Value Ref Range Status   09/10/2019 8.7 3.6 - 11.0 K/uL Final     RBC   Date Value Ref Range Status   09/10/2019 3.96 3.80 - 5.20 M/uL Final     HGB   Date Value Ref Range Status   09/10/2019 10.8 (L) 11.5 - 16.0 g/dL Final     HCT   Date Value Ref Range Status   09/10/2019 32.9 (L) 35.0 - 47.0 % Final     PLATELET   Date Value Ref Range Status   09/10/2019 235 150 - 400 K/uL Final     Hgb, External   Date Value Ref Range Status   07/18/2019 11.7  Final     Hct, External   Date Value Ref Range Status   07/18/2019 36.2  Final     Platelet cnt., External   Date Value Ref Range Status   07/18/2019 278  Final       Lab Results   Component Value Date/Time    Sodium 139 09/10/2019 01:05 PM    Potassium 4.9 09/10/2019 01:05 PM    Chloride 109 (H) 09/10/2019 01:05 PM    CO2 20 (L) 09/10/2019 01:05 PM    Anion gap 10 09/10/2019 01:05 PM    Glucose 104 (H) 09/10/2019 01:05 PM    BUN 13 09/10/2019 01:05 PM    Creatinine 0.53 (L) 09/10/2019 01:05 PM    BUN/Creatinine ratio 25 (H) 09/10/2019 01:05 PM    GFR est AA >60 09/10/2019 01:05 PM    GFR est non-AA >60 09/10/2019 01:05 PM    Calcium 9.1 09/10/2019 01:05 PM    Bilirubin, total 0.3 09/10/2019 01:05 PM    AST (SGOT) 57 (H) 09/10/2019 01:05 PM    Alk. phosphatase 142 (H) 09/10/2019 01:05 PM    Protein, total 5.8 (L) 09/10/2019 01:05 PM    Albumin 2.5 (L) 09/10/2019 01:05 PM    Globulin 3.3 09/10/2019 01:05 PM    A-G Ratio 0.8 (L) 09/10/2019 01:05 PM    ALT (SGPT) 26 09/10/2019 01:05 PM       This SmartLink has not been configured with any valid records.         Recent Results (from the past 12 hour(s))   PROTEIN/CREATININE RATIO, URINE    Collection Time: 09/10/19  1:05 PM   Result Value Ref Range    Protein, urine random 19 (H) 0.0 - 11.9 mg/dL    Creatinine, urine 50.10 mg/dL    Protein/Creat. urine Ratio 0.4     METABOLIC PANEL, COMPREHENSIVE    Collection Time: 09/10/19  1:05 PM   Result Value Ref Range    Sodium 139 136 - 145 mmol/L    Potassium 4.9 3.5 - 5.1 mmol/L    Chloride 109 (H) 97 - 108 mmol/L    CO2 20 (L) 21 - 32 mmol/L    Anion gap 10 5 - 15 mmol/L    Glucose 104 (H) 65 - 100 mg/dL    BUN 13 6 - 20 MG/DL    Creatinine 0.53 (L) 0.55 - 1.02 MG/DL    BUN/Creatinine ratio 25 (H) 12 - 20      GFR est AA >60 >60 ml/min/1.73m2    GFR est non-AA >60 >60 ml/min/1.73m2    Calcium 9.1 8.5 - 10.1 MG/DL    Bilirubin, total 0.3 0.2 - 1.0 MG/DL    ALT (SGPT) 26 12 - 78 U/L    AST (SGOT) 57 (H) 15 - 37 U/L    Alk. phosphatase 142 (H) 45 - 117 U/L    Protein, total 5.8 (L) 6.4 - 8.2 g/dL    Albumin 2.5 (L) 3.5 - 5.0 g/dL    Globulin 3.3 2.0 - 4.0 g/dL    A-G Ratio 0.8 (L) 1.1 - 2.2     CBC W/O DIFF    Collection Time: 09/10/19  1:05 PM   Result Value Ref Range    WBC 8.7 3.6 - 11.0 K/uL    RBC 3.96 3.80 - 5.20 M/uL    HGB 10.8 (L) 11.5 - 16.0 g/dL    HCT 32.9 (L) 35.0 - 47.0 %    MCV 83.1 80.0 - 99.0 FL    MCH 27.3 26.0 - 34.0 PG    MCHC 32.8 30.0 - 36.5 g/dL    RDW 13.2 11.5 - 14.5 %    PLATELET 668 782 - 831 K/uL    MPV 11.5 8.9 - 12.9 FL    NRBC 0.0 0  WBC    ABSOLUTE NRBC 0.00 0.00 - 0.01 K/uL     Recent Results (from the past 12 hour(s))   PROTEIN/CREATININE RATIO, URINE    Collection Time: 09/10/19  1:05 PM   Result Value Ref Range    Protein, urine random 19 (H) 0.0 - 11.9 mg/dL    Creatinine, urine 50.10 mg/dL    Protein/Creat.  urine Ratio 0.4     METABOLIC PANEL, COMPREHENSIVE    Collection Time: 09/10/19  1:05 PM   Result Value Ref Range    Sodium 139 136 - 145 mmol/L    Potassium 4.9 3.5 - 5.1 mmol/L    Chloride 109 (H) 97 - 108 mmol/L    CO2 20 (L) 21 - 32 mmol/L    Anion gap 10 5 - 15 mmol/L    Glucose 104 (H) 65 - 100 mg/dL    BUN 13 6 - 20 MG/DL Creatinine 0.53 (L) 0.55 - 1.02 MG/DL    BUN/Creatinine ratio 25 (H) 12 - 20      GFR est AA >60 >60 ml/min/1.73m2    GFR est non-AA >60 >60 ml/min/1.73m2    Calcium 9.1 8.5 - 10.1 MG/DL    Bilirubin, total 0.3 0.2 - 1.0 MG/DL    ALT (SGPT) 26 12 - 78 U/L    AST (SGOT) 57 (H) 15 - 37 U/L    Alk. phosphatase 142 (H) 45 - 117 U/L    Protein, total 5.8 (L) 6.4 - 8.2 g/dL    Albumin 2.5 (L) 3.5 - 5.0 g/dL    Globulin 3.3 2.0 - 4.0 g/dL    A-G Ratio 0.8 (L) 1.1 - 2.2     CBC W/O DIFF    Collection Time: 09/10/19  1:05 PM   Result Value Ref Range    WBC 8.7 3.6 - 11.0 K/uL    RBC 3.96 3.80 - 5.20 M/uL    HGB 10.8 (L) 11.5 - 16.0 g/dL    HCT 32.9 (L) 35.0 - 47.0 %    MCV 83.1 80.0 - 99.0 FL    MCH 27.3 26.0 - 34.0 PG    MCHC 32.8 30.0 - 36.5 g/dL    RDW 13.2 11.5 - 14.5 %    PLATELET 990 665 - 094 K/uL    MPV 11.5 8.9 - 12.9 FL    NRBC 0.0 0  WBC    ABSOLUTE NRBC 0.00 0.00 - 0.01 K/uL       Assessment/Plan:   41 y.o.  36w1d  CHTN, suspect SIPIH with severe range BP and sl elevated LFT  The patient does have anemia  GBS Positive  Reassuring fetal surveillance  Anemia, suspect iron deficiency     Plan: Admit for evaluation . CEFM/TOCO  Disc with pt concerns re inc in BP, sl elevated LFT  Reviewed with MFM, Dr. Trev Mandel, agrees with plan for IOL  D/W pt plan for IOL with cervical oliveros. Disc r/b/a of induction and pitocin use and increase risk of longer labor,  section, bleeding and infection. Disc rba of magnesium  Hypertension/labetalol protocol prn severe range BP      Sign out given to [facility], Dr. Fatoumata Valenzuela who will place cervical oliveros for IOL    Signed By:  Lois Suresh MD     September 10, 2019         NST Inpatient Procedure Note    [place]  presents for fetal non-stress test.    Indication is preeclampsia. She is 36w1d. She has been monitored for more than 60 minutes. The FHR was reactive.     NST Interpretation:   FHR baseline 130 bpm,   Variability moderate  Accelerations present. Decelerations Absent. Uterine contractions were abent     Assessment  NST is reactive. NST is reassuring. Patient does need admission/observation for further monitoring. Lis was informed of the NST results and her questions were answered.     Plan:    [x] Continue admission to labor and delivery    [] Continue observation   [] Keep routine OB follow up upon discharge   [] Reviewed fetal movement kick counts, notify MD if decreased   []    []

## 2019-09-10 NOTE — H&P
Sign out given to [facility], covering MD, Dr. Shani Molina. She agrees to place cx oliveros for cx ripening. Pt aware.     Darrell Waite MD

## 2019-09-10 NOTE — ROUTINE PROCESS
1630: This RN calling Dr. Anish Azul to determine plan of care and ask for orders. No answer at this time. 4168: This RN paging Dr. Anish Azul at this time    [phone]: Dr. Anish Azul returning page. MD stating that she will place orders shortly. MD plans to place oliveros balloon following pt transfer for labor room. MD states plan is to then start magnesium later this evening. This RN verbalizing understanding and informing MD that pt has already transferred to room 211 labor room. MD verbalizing understanding and stating she will place orders and then come to place a oliveros balloon afterwards    1650: This RN updating Dr. Alexandr Hoskins on pt status.  MD stating she is waiting to speak with Dr. Anish Azul

## 2019-09-10 NOTE — PROGRESS NOTES
_ [address] [facility]  [email or web address]  [phone]    Cesilia Edmonds MD, [address] [facility]     Follow-up OB visit    Chief Complaint   Patient presents with    Routine Prenatal Visit       Vitals:    09/10/19 1131 09/10/19 1150 09/10/19 1159   BP: (!) 144/102 (!) 170/98 (!) 168/106   Weight: 119.3 kg (263 lb)     Height: 5' 4\" (1.626 m)         Patient Active Problem List    Diagnosis Date Noted    Hypertension in pregnancy 2019    Hypertension affecting pregnancy 2019    Pregnancy 2019    Severe obesity ([address] 10/24/2018    Irritable bowel syndrome with diarrhea 2017    Gastroesophageal reflux disease 2017    Obesity (BMI 30-39.9) 2017    GERD (gastroesophageal reflux disease) 2017    Menorrhagia with regular cycle 2017    History of kidney stones 2015    Weight gain 2015    H/O abnormal Pap smear 2012    Chronic daily headache 2012        The patient reports the following concerns: decrease sleep, vaginal pressure. No ha/cp/sob vision change  Prenatal Visit    Vitals:    09/10/19 1131 09/10/19 1150 09/10/19 1159   BP: (!) 144/102 (!) 170/98 (!) 168/106   Weight: 119.3 kg (263 lb)     Height: 5' 4\" (1.626 m)       See PN flowsheet for exam      41 y.o.  36w1d   Encounter Diagnosis   Name Primary?     Hypertension during pregnancy, antepartum, unspecified hypertension in pregnancy type Yes     To L D, pih labs  Report called     [] SAB/bleeding precautions reviewed   [] PTL/PPROM precautions reviewed   [] Labor precautions reviewed   [] Fetal kick counts discussed   [] Labs reviewed with patient   [] Laren Bulls precautions reviewed   [] Consent reviewed   [] Handouts given to pt   [] Glucola handout    [] GBS/labor/Magic Hour handout   []    [] We reviewed CDC recommendations for Tdap for patient and close contacts and RBA of receiving in pregnancy, advised obtaining in third trimester   [] Reviewed healthy nutrition in pregnancy and good exercise practices   [] We disc safer medications in pregnancy and referred patient to [facility] resources   [] We reviewed CDC recommendations for flu vaccine and RBA of receiving in pregnancy   []    []    []       Follow-up and Dispositions    · Return in about 1 week (around 9/17/2019) for Follow up OB visit. No orders of the defined types were placed in this encounter.       Navjot Nunez MD

## 2019-09-10 NOTE — PROGRESS NOTES
Report received from outgoing physician, Dr. Osmar Quach. Pt is G1 39 1/7 with KOKI with occassional severe range BPs and a increased AST today. P/c ratio . 4  Here for IOL with cervical oliveros tonight and pitocin in AM    BPs 140-180/70-90s    FHT: Cat I  [place]: none  SVE: 1/50/posterior high, bloody show noted  Cook catheter placed with 60 cc in uterus and 60 cc in vagina  Pt tearful with cramping post procedure      A/ G1 at 36 1/7 with KOKI with severe features. IOL started with cook catheter placement. GBS +      P/ Magnesium for seizure prophylaxis, PCN tonight, for GBS, and Pitocin in AM.  Continuous monitoring      Pt with elevated BPs post catheter insertion 180-200/80-90s, 20 mg labetalol given with good results. Nubain given for pain.

## 2019-09-10 NOTE — PROGRESS NOTES
1900 Assumed care of pt at this time from [address] [place] [address]. Heber Evans Spoke to Dr. Ciarra Holden, made aware of pt's bp, MD does not want to treat at this time. 0700 Bedside shift change report given to LISA Tyson RN and ROLF Saavedra RN (oncoming nurse) by MERCEDES Domínguez RN (offgoing nurse). Report included the following information SBAR, Kardex, Intake/Output, MAR, Recent Results and Med Rec Status.

## 2019-09-11 ENCOUNTER — ANESTHESIA EVENT (OUTPATIENT)
Dept: LABOR AND DELIVERY | Age: 39
End: 2019-09-11
Payer: COMMERCIAL

## 2019-09-11 ENCOUNTER — ANESTHESIA (OUTPATIENT)
Dept: LABOR AND DELIVERY | Age: 39
End: 2019-09-11
Payer: COMMERCIAL

## 2019-09-11 PROCEDURE — 77030014125 HC TY EPDRL BBMI -B: Performed by: ANESTHESIOLOGY

## 2019-09-11 PROCEDURE — 77030010848 HC CATH INTUTR PRSS KOLB -B

## 2019-09-11 PROCEDURE — 00HU33Z INSERTION OF INFUSION DEVICE INTO SPINAL CANAL, PERCUTANEOUS APPROACH: ICD-10-PCS | Performed by: ANESTHESIOLOGY

## 2019-09-11 PROCEDURE — 74011000250 HC RX REV CODE- 250: Performed by: NURSE ANESTHETIST, CERTIFIED REGISTERED

## 2019-09-11 PROCEDURE — 75410000002 HC LABOR FEE PER 1 HR: Performed by: OBSTETRICS & GYNECOLOGY

## 2019-09-11 PROCEDURE — 65270000029 HC RM PRIVATE

## 2019-09-11 PROCEDURE — 77030034850

## 2019-09-11 PROCEDURE — 74011250637 HC RX REV CODE- 250/637: Performed by: OBSTETRICS & GYNECOLOGY

## 2019-09-11 PROCEDURE — 3E0R3BZ INTRODUCTION OF ANESTHETIC AGENT INTO SPINAL CANAL, PERCUTANEOUS APPROACH: ICD-10-PCS | Performed by: ANESTHESIOLOGY

## 2019-09-11 PROCEDURE — 77030011943

## 2019-09-11 PROCEDURE — 74011250636 HC RX REV CODE- 250/636

## 2019-09-11 PROCEDURE — 74011250636 HC RX REV CODE- 250/636: Performed by: OBSTETRICS & GYNECOLOGY

## 2019-09-11 PROCEDURE — 0HQ9XZZ REPAIR PERINEUM SKIN, EXTERNAL APPROACH: ICD-10-PCS | Performed by: OBSTETRICS & GYNECOLOGY

## 2019-09-11 RX ORDER — OXYTOCIN/RINGER'S LACTATE 20/1000 ML
125-500 PLASTIC BAG, INJECTION (ML) INTRAVENOUS ONCE
Status: COMPLETED | OUTPATIENT
Start: 2019-09-11 | End: 2019-09-11

## 2019-09-11 RX ORDER — DIPHENHYDRAMINE HCL 25 MG
25 CAPSULE ORAL
Status: DISCONTINUED | OUTPATIENT
Start: 2019-09-11 | End: 2019-09-14 | Stop reason: HOSPADM

## 2019-09-11 RX ORDER — PEPPERMINT OIL
SPIRIT ORAL
Status: DISCONTINUED
Start: 2019-09-11 | End: 2019-09-11

## 2019-09-11 RX ORDER — BUTALBITAL, ACETAMINOPHEN AND CAFFEINE 50; 325; 40 MG/1; MG/1; MG/1
1 TABLET ORAL
Status: DISCONTINUED | OUTPATIENT
Start: 2019-09-11 | End: 2019-09-11

## 2019-09-11 RX ORDER — FENTANYL/BUPIVACAINE/NS/PF 2-1250MCG
1-16 PREFILLED PUMP RESERVOIR EPIDURAL CONTINUOUS
Status: DISCONTINUED | OUTPATIENT
Start: 2019-09-11 | End: 2019-09-11

## 2019-09-11 RX ORDER — ACETAMINOPHEN 325 MG/1
650 TABLET ORAL
Status: DISCONTINUED | OUTPATIENT
Start: 2019-09-11 | End: 2019-09-14 | Stop reason: HOSPADM

## 2019-09-11 RX ORDER — IBUPROFEN 800 MG/1
800 TABLET ORAL EVERY 8 HOURS
Status: DISCONTINUED | OUTPATIENT
Start: 2019-09-11 | End: 2019-09-14 | Stop reason: HOSPADM

## 2019-09-11 RX ORDER — LABETALOL HCL 20 MG/4 ML
20 SYRINGE (ML) INTRAVENOUS ONCE
Status: COMPLETED | OUTPATIENT
Start: 2019-09-11 | End: 2019-09-11

## 2019-09-11 RX ORDER — NALOXONE HYDROCHLORIDE 0.4 MG/ML
0.4 INJECTION, SOLUTION INTRAMUSCULAR; INTRAVENOUS; SUBCUTANEOUS AS NEEDED
Status: DISCONTINUED | OUTPATIENT
Start: 2019-09-11 | End: 2019-09-14 | Stop reason: HOSPADM

## 2019-09-11 RX ORDER — LIDOCAINE HYDROCHLORIDE AND EPINEPHRINE 15; 5 MG/ML; UG/ML
INJECTION, SOLUTION EPIDURAL
Status: SHIPPED | OUTPATIENT
Start: 2019-09-11 | End: 2019-09-11

## 2019-09-11 RX ORDER — BUTALBITAL, ACETAMINOPHEN AND CAFFEINE 50; 325; 40 MG/1; MG/1; MG/1
1 TABLET ORAL
Status: DISCONTINUED | OUTPATIENT
Start: 2019-09-11 | End: 2019-09-14 | Stop reason: HOSPADM

## 2019-09-11 RX ORDER — BUTALBITAL, ACETAMINOPHEN AND CAFFEINE 50; 325; 40 MG/1; MG/1; MG/1
2 TABLET ORAL
Status: DISCONTINUED | OUTPATIENT
Start: 2019-09-11 | End: 2019-09-14 | Stop reason: HOSPADM

## 2019-09-11 RX ORDER — HYDROCORTISONE ACETATE PRAMOXINE HCL 2.5; 1 G/100G; G/100G
CREAM TOPICAL AS NEEDED
Status: DISCONTINUED | OUTPATIENT
Start: 2019-09-11 | End: 2019-09-14 | Stop reason: HOSPADM

## 2019-09-11 RX ORDER — BUPIVACAINE HYDROCHLORIDE 2.5 MG/ML
INJECTION, SOLUTION EPIDURAL; INFILTRATION; INTRACAUDAL AS NEEDED
Status: DISCONTINUED | OUTPATIENT
Start: 2019-09-11 | End: 2019-09-11 | Stop reason: HOSPADM

## 2019-09-11 RX ORDER — SIMETHICONE 80 MG
80 TABLET,CHEWABLE ORAL
Status: DISCONTINUED | OUTPATIENT
Start: 2019-09-11 | End: 2019-09-14 | Stop reason: HOSPADM

## 2019-09-11 RX ORDER — LABETALOL HCL 20 MG/4 ML
SYRINGE (ML) INTRAVENOUS
Status: COMPLETED
Start: 2019-09-11 | End: 2019-09-11

## 2019-09-11 RX ORDER — DOCUSATE SODIUM 100 MG/1
100 CAPSULE, LIQUID FILLED ORAL
Status: DISCONTINUED | OUTPATIENT
Start: 2019-09-11 | End: 2019-09-14 | Stop reason: HOSPADM

## 2019-09-11 RX ORDER — ONDANSETRON 4 MG/1
4 TABLET, ORALLY DISINTEGRATING ORAL
Status: ACTIVE | OUTPATIENT
Start: 2019-09-11 | End: 2019-09-12

## 2019-09-11 RX ORDER — LABETALOL 100 MG/1
100 TABLET, FILM COATED ORAL 2 TIMES DAILY
Status: DISCONTINUED | OUTPATIENT
Start: 2019-09-11 | End: 2019-09-13

## 2019-09-11 RX ORDER — NALBUPHINE HYDROCHLORIDE 10 MG/ML
5 INJECTION, SOLUTION INTRAMUSCULAR; INTRAVENOUS; SUBCUTANEOUS ONCE
Status: COMPLETED | OUTPATIENT
Start: 2019-09-11 | End: 2019-09-11

## 2019-09-11 RX ORDER — HYDROCODONE BITARTRATE AND ACETAMINOPHEN 5; 325 MG/1; MG/1
1 TABLET ORAL
Status: DISCONTINUED | OUTPATIENT
Start: 2019-09-11 | End: 2019-09-13

## 2019-09-11 RX ADMIN — LABETALOL HYDROCHLORIDE 100 MG: 100 TABLET, FILM COATED ORAL at 07:44

## 2019-09-11 RX ADMIN — MAGNESIUM SULFATE HEPTAHYDRATE 2 G/HR: 40 INJECTION, SOLUTION INTRAVENOUS at 03:36

## 2019-09-11 RX ADMIN — MAGNESIUM SULFATE HEPTAHYDRATE 2 G/HR: 40 INJECTION, SOLUTION INTRAVENOUS at 14:10

## 2019-09-11 RX ADMIN — SODIUM CHLORIDE, SODIUM LACTATE, POTASSIUM CHLORIDE, AND CALCIUM CHLORIDE 75 ML/HR: 600; 310; 30; 20 INJECTION, SOLUTION INTRAVENOUS at 07:45

## 2019-09-11 RX ADMIN — NALBUPHINE HYDROCHLORIDE 5 MG: 10 INJECTION, SOLUTION INTRAMUSCULAR; INTRAVENOUS; SUBCUTANEOUS at 09:17

## 2019-09-11 RX ADMIN — SODIUM CHLORIDE, SODIUM LACTATE, POTASSIUM CHLORIDE, AND CALCIUM CHLORIDE 75 ML/HR: 600; 310; 30; 20 INJECTION, SOLUTION INTRAVENOUS at 22:26

## 2019-09-11 RX ADMIN — NALBUPHINE HYDROCHLORIDE 10 MG: 10 INJECTION, SOLUTION INTRAMUSCULAR; INTRAVENOUS; SUBCUTANEOUS at 06:27

## 2019-09-11 RX ADMIN — PENICILLIN G POTASSIUM 2.5 MILLION UNITS: 20000000 POWDER, FOR SOLUTION INTRAVENOUS at 12:14

## 2019-09-11 RX ADMIN — IBUPROFEN 800 MG: 800 TABLET ORAL at 18:42

## 2019-09-11 RX ADMIN — LIDOCAINE HYDROCHLORIDE AND EPINEPHRINE 4 ML: 15; 5 INJECTION, SOLUTION EPIDURAL at 09:47

## 2019-09-11 RX ADMIN — LABETALOL HYDROCHLORIDE 100 MG: 100 TABLET, FILM COATED ORAL at 21:15

## 2019-09-11 RX ADMIN — Medication 20 MG: at 21:37

## 2019-09-11 RX ADMIN — PENICILLIN G POTASSIUM 2.5 MILLION UNITS: 20000000 POWDER, FOR SOLUTION INTRAVENOUS at 07:59

## 2019-09-11 RX ADMIN — BUPIVACAINE HYDROCHLORIDE 5 ML: 2.5 INJECTION, SOLUTION EPIDURAL; INFILTRATION; INTRACAUDAL; PERINEURAL at 09:48

## 2019-09-11 RX ADMIN — ONDANSETRON 4 MG: 2 INJECTION INTRAMUSCULAR; INTRAVENOUS at 06:27

## 2019-09-11 RX ADMIN — PENICILLIN G POTASSIUM 2.5 MILLION UNITS: 20000000 POWDER, FOR SOLUTION INTRAVENOUS at 04:25

## 2019-09-11 RX ADMIN — LABETALOL 20 MG/4 ML (5 MG/ML) INTRAVENOUS SYRINGE 20 MG: at 21:37

## 2019-09-11 RX ADMIN — OXYTOCIN-SODIUM CHLORIDE 0.9% IV SOLN 30 UNIT/500ML 2 MILLI-UNITS/MIN: 30-0.9/5 SOLUTION at 04:25

## 2019-09-11 RX ADMIN — SODIUM CHLORIDE, SODIUM LACTATE, POTASSIUM CHLORIDE, AND CALCIUM CHLORIDE 75 ML/HR: 600; 310; 30; 20 INJECTION, SOLUTION INTRAVENOUS at 09:54

## 2019-09-11 RX ADMIN — Medication 2500 MILLI-UNITS/HR: at 14:10

## 2019-09-11 RX ADMIN — BUPIVACAINE HYDROCHLORIDE 5 ML: 2.5 INJECTION, SOLUTION EPIDURAL; INFILTRATION; INTRACAUDAL; PERINEURAL at 09:50

## 2019-09-11 RX ADMIN — PROMETHAZINE HYDROCHLORIDE 25 MG: 25 INJECTION INTRAMUSCULAR; INTRAVENOUS at 08:53

## 2019-09-11 RX ADMIN — Medication 10 ML/HR: at 10:38

## 2019-09-11 RX ADMIN — ACETAMINOPHEN 650 MG: 325 TABLET ORAL at 05:47

## 2019-09-11 RX ADMIN — BUTALBITAL, ACETAMINOPHEN, AND CAFFEINE 1 TABLET: 50; 325; 40 TABLET ORAL at 17:09

## 2019-09-11 NOTE — PROGRESS NOTES
Delivery Note    Patient C/C/+2, pushed over intact perineum. LBFI  Delayed cord clamping  Spontaneous placenta delivery.   Laceration repaired: small 2nd degree with 3 -- vicryl  Counts correct

## 2019-09-11 NOTE — PROGRESS NOTES
1900 Assumed care of pt at this time from [address] [place] [address], LISA Tyson, RUPERT and ROLF Saavedra RN.    2000 Pt ambulated to bathroom with assistance x1. Able to void without difficulty. Bed linens changed, egg crate placed on bed. Pt returned to bed without difficulty. 2128 Spoke to Dr. Cooper regarding pt's recent elevated bp. Order received for IV labetalol. 0710 Bedside shift change report given to LUZ Nava (oncoming nurse) by MERCEDES King RN (offgoing nurse). Report included the following information SBAR, Kardex, Intake/Output, MAR, Recent Results and Med Rec Status.

## 2019-09-11 NOTE — PROGRESS NOTES
09/11/19 11:24 AM  CM met with patient to complete initial assessment and to begin discharge planning. Demographics were reviewed and confirmed. Patient and her /FOB Carmen Mcnally ([phone]) live together and this is their first child. Patient noted that she works for a [employer] and will have 8 weeks off from work; FOB stated that he will have at least a week. Family supports, including patient's mother Fabrice Monterroso ([phone]), will provide assistance during the postpartum period. Patient has car seat, crib, clothing, and other necessary supplies. [facility] location) will provide medical follow up for the baby. Patient plans to breastfeed and has a pump to use at home. Denied need for [facility] and Medicaid services. Care Management Interventions  PCP Verified by CM:  Yes  Mode of Transport at Discharge: Self  Transition of Care Consult (CM Consult): Discharge Planning  Current Support Network: Lives with Spouse, Family Lives [place], Own Home  Confirm Follow Up Transport: Family  Plan discussed with Pt/Family/Caregiver: Yes  Freedom of Choice Offered: Yes  Discharge Location  Discharge Placement: Home with family assistance  LOIDA Wharton

## 2019-09-11 NOTE — PROGRESS NOTES
PT c/c/ +2  +pressure    Will begin pushing  Anticipate     FSE: 120s, mod jie +accel, jie decels to 90s < 20 sec with spontaneous recovery    IUPC q 3min    Anticipate     Facundo Lizarraga MD

## 2019-09-11 NOTE — PROGRESS NOTES
CTSP to see patient because of difficulty with UC and ? Variable decels. HA improving, more comfortable with epidural, occ pressure. 70-5/-2/vtx  fse and iupc placed with out difficulty.   Some thin blood tinged discharge during exam.    -110, mod jie, +accel, +jie decel to 90s with spont recovery  [place], q 4-6min    Anticipate    Severe preeclampsia on magnesium no s/sx toxicity  gbs pos on pcn

## 2019-09-11 NOTE — ANESTHESIA PROCEDURE NOTES
Epidural Block    Start time: 9/11/2019 9:40 AM  End time: 9/11/2019 9:51 AM  Performed by: Hafsa Marin CRNA  Authorized by: Hafsa Marin CRNA     Pre-Procedure  Indication: labor epidural    Preanesthetic Checklist: patient identified, risks and benefits discussed, anesthesia consent, site marked, patient being monitored, timeout performed and anesthesia consent    Timeout Time: 09:40        Epidural:   Patient position:  Seated  Prep region:  Lumbar  Prep: Chlorhexidine    Location:  L3-4    Needle and Epidural Catheter:   Needle Type:  Tuohy  Needle Gauge:  17 G  Injection Technique:  Loss of resistance using air  Attempts:  1  Catheter Size:  20 G  Catheter at Skin Depth (cm):  12  Events: no blood with aspiration, no cerebrospinal fluid with aspiration, no paresthesia and negative aspiration test    Test Dose:  Negative    Assessment:   Catheter Secured:  Tegaderm and tape  Insertion:  Uncomplicated  Patient tolerance:  Patient tolerated the procedure well with no immediate complications

## 2019-09-11 NOTE — ANESTHESIA PREPROCEDURE EVALUATION
Relevant Problems   No relevant active problems       Anesthetic History   No history of anesthetic complications            Review of Systems / Medical History  Patient summary reviewed, nursing notes reviewed and pertinent labs reviewed    Pulmonary  Within defined limits                 Neuro/Psych              Cardiovascular    Hypertension              Exercise tolerance: >4 METS     GI/Hepatic/Renal     GERD           Endo/Other        Morbid obesity     Other Findings              Physical Exam    Airway  Mallampati: II  TM Distance: 4 - 6 cm  Neck ROM: normal range of motion   Mouth opening: Normal     Cardiovascular  Regular rate and rhythm,  S1 and S2 normal,  no murmur, click, rub, or gallop  Rhythm: regular  Rate: normal         Dental  No notable dental hx       Pulmonary  Breath sounds clear to auscultation               Abdominal  GI exam deferred       Other Findings            Anesthetic Plan    ASA: 2  Anesthesia type: epidural            Anesthetic plan and risks discussed with: Patient

## 2019-09-11 NOTE — PROGRESS NOTES
0700-Received report from MERCEDES Acosta RN  0735-Kaitlynn Lee at bedside  0740-cervical balloon removed by Dr. Alejandro Aguirre. VE 3/50. AROM, clear fluid. MD states pt may get epidural when she is ready. 0750-Pt up to bedside commode at this time-400 ml urine  0800-IV bolus started for epidural  0830-Pt requests something for her headache, stating it feels like it is turning into a migraine and feels like it is going to explode with each contraction. 0833-MD notified pt requesting something for her headache and has requested epidural. Orders received for 5 mg nubain IV now and to give the phenergan 25 mg IV that is already ordered. No further orders received. 0900-Pt up to bedside commode-300 ml urine  Xavier Gonzalez, PATRIC notified pt is ready for her epidural  0940-Timeout for epidural  0946- epidural catheter placed, test dose by Arelis Sendtricia, CRNA  0947-loading dose by Arelis Long, CRNA  1000-Coronado catheter placed by ROLF Saavedra RN with sterile technique and secured to right leg with statlock, witnessed by LISA Houser RN. Pt positioned to left side  1025-Right extreme  1040-peanut ball placed between patient's knees while in right extreme, trendelenburg  1050-Notified Dr. Alejandro Aguirre of fetal decels and difficulty tracing uterine contractions. MD states she will be to unit in a minute to place IUPC and FSE. No orders received. 1055-Dr. Fall at bedside to place IUPC and FSE.   1140-VE 10/100/+2, Dr. Alejandro Aguirre notified pt complete, states she is coming over to see patient. Coronado catheter removed. 1145-Dr. Fall at bedside to see patient, orders to start pushing  1216-Dr. Fall at bedside to see how well patient is pushing, no orders received.   1254-Dr. Fall at bedside for delivery  1256-Female infant delivered vaginally  1301-placenta expressed  1301-QBL AT DELIVERY 75 ml  1500-QBL for 2 hour recovery 169 ml  TOTAL  ml  1830-Up to bedside commode-voided 600 ml  1900-Bedside shift change report given to Salud Noel RN (oncoming nurse) by Layla Telles RN (offgoing nurse). Report included the following information SBAR, Kardex, Intake/Output, MAR and Recent Results.

## 2019-09-11 NOTE — PROGRESS NOTES
Pt c/o GOMEZ, didn't get to eat last night. S/P oliveros for cx ripening by [facility]. Visit Vitals  BP (!) 159/92   Pulse 69   Temp 97.7 °F (36.5 °C)   Resp 14   SpO2 96%   Breastfeeding? Yes         GENERAL: alert, well appearing, and in no distress  PULM: clear to auscultation, no wheezes, rales or rhonchi, symmetric air entry   COR: normal rate and regular rhythm, S1 and S2 normal   ABDOMEN: soft, nontender, nondistended, no masses or organomegaly   NEURO: alert, oriented, normal speech  EXT: scd in place    Cooks oliveros deflated balloons x2. And removed. cervix: 50/3/-3. vtx  arom clear with some clot on golve.     41 y.o.   36w2d  IOL for SIPIH  gbs pos on pcn    Epidural prn  Nubain/phenergan  Notify MD if NI in HA

## 2019-09-11 NOTE — PROGRESS NOTES
NST Inpatient Procedure Note    Rasta Dos Santos presents for fetal non-stress test.    Indication is labor. She is 36w2d. She has been monitored for more than 60 minutes. The FHR was reactive. NST Interpretation:   FHR baseline 120-125 bpm,   Variability moderate  Accelerations present. Decelerations Absent. Uterine contractions were q 4-6 minutes     Assessment  NST is reactive. NST is reassuring. Patient does need admission/observation for further monitoring. Marcus Blanchard was informed of the NST results and her questions were answered.     Plan:    [x] Continue admission to labor and delivery   Titrate pitocin while RFS   [] Continue observation   [] Keep routine OB follow up upon discharge   [] Reviewed fetal movement kick counts, notify MD if decreased   []    []

## 2019-09-11 NOTE — PROGRESS NOTES
1620-Writer susan louis at this time 800cc of urine noted. 5194- Dr. Lj Montenegro called, MD reported that pt c/o headache rating 7/10 on pain scale and that pt requesting medication. New order received that MD putting order in for fioricet. No other orders at this time.

## 2019-09-11 NOTE — LACTATION NOTE
This note was copied from a baby's chart. Baby born at 39. 2 weeks and is under radiant warmer for low temperature. Mother plans to breastfeed. She signed up for breastfeeding classes but baby born early and she was unable to get to classes. EMMANUEL reviewed the following:    Discussed with mother her plan for feeding. Reviewed the benefits of exclusive breast milk feeding during the hospital stay. Informed her of the risks of using formula to supplement in the first few days of life as well as the benefits of successful breast milk feeding; referred her to the Breastfeeding booklet about this information. She acknowledges understanding of information reviewed and states that it is her plan to breastfeed/formula feed her infant. Will support her choice and offer additional information as needed. Encouraged mom to attempt feeding with baby led feeding cues. Just as sucking on fingers, rooting, mouthing. Looking for 8-12 feedings in 24 hours. Don't limit baby at breast, allow baby to come of breast on it's own. Baby may want to feed  often and may increase number of feedings on second day of life. Skin to skin encouraged. If baby doesn't nurse,  Mom should  hand express  10-20 drops of colostrum and drip into baby's mouth, or give to baby by finger feeding, cup feeding, or spoon feeding at least every 2-3 hours. Mother  will successfully establish breastfeeding by feeding in response to early feeding cues   or wake every 3h, will obtain deep latch, and will keep log of feedings/output. Taught to BF at hunger cues and or q 2-3 hrs and to offer 10-20 drops of hand expressed colostrum at any non-feeds.       Breast Assessment  Left Breast: Medium  Left Nipple: Everted, Intact  Right Breast: Medium  Right Nipple: Everted, Intact  Breast- Feeding Assessment  Attends Breast-Feeding Classes: No  Breast-Feeding Experience: No  Breast Trauma/Surgery: No  Lactation Consultant Visits  Breast-Feedings: (Baby born at 36.2 weeks. She is unable to breastfeed at present time. Baby under radiant warmer due to rectal temperature of 94.1)      Mother given LC# and support group info.

## 2019-09-11 NOTE — LACTATION NOTE
This note was copied from a baby's chart. Baby sleepy. Shown mom how to hand express drops. Expressed 14 drops of colostrum and finger fed to baby in curved tip syringe. Pt will successfully establish breastfeeding by feeding in response to early feeding cues   or wake every 3h, will obtain deep latch, and will keep log of feedings/output. Taught to BF at hunger cues and or q 2-3 hrs and to offer 10-20 drops of hand expressed colostrum at any non-feeds.       Breast Assessment  Left Breast: Medium  Left Nipple: Everted, Intact, Short  Right Breast: Medium  Right Nipple: Everted, Short, Intact  Breast- Feeding Assessment  Attends Breast-Feeding Classes: No  Breast-Feeding Experience: No  Breast Trauma/Surgery: No  Lactation Consultant Visits  Breast-Feedings: (hand expressed 14 drops.)

## 2019-09-11 NOTE — LACTATION NOTE
This note was copied from a baby's chart. Mom called and wanted to be shown again how to hand express. Shown how to hand express and encouraged massage prior to expressing if baby won't latch. Collect 8 drops in curved tip syringe.

## 2019-09-12 PROCEDURE — 74011250637 HC RX REV CODE- 250/637: Performed by: OBSTETRICS & GYNECOLOGY

## 2019-09-12 PROCEDURE — 65270000029 HC RM PRIVATE

## 2019-09-12 PROCEDURE — 74011250636 HC RX REV CODE- 250/636: Performed by: OBSTETRICS & GYNECOLOGY

## 2019-09-12 RX ORDER — SWAB
1 SWAB, NON-MEDICATED MISCELLANEOUS DAILY
Status: DISCONTINUED | OUTPATIENT
Start: 2019-09-12 | End: 2019-09-14 | Stop reason: HOSPADM

## 2019-09-12 RX ADMIN — BUTALBITAL, ACETAMINOPHEN, AND CAFFEINE 1 TABLET: 50; 325; 40 TABLET ORAL at 08:50

## 2019-09-12 RX ADMIN — LABETALOL HYDROCHLORIDE 100 MG: 100 TABLET, FILM COATED ORAL at 20:41

## 2019-09-12 RX ADMIN — Medication 1 TABLET: at 08:50

## 2019-09-12 RX ADMIN — IBUPROFEN 800 MG: 800 TABLET ORAL at 21:25

## 2019-09-12 RX ADMIN — SODIUM CHLORIDE, SODIUM LACTATE, POTASSIUM CHLORIDE, AND CALCIUM CHLORIDE 75 ML/HR: 600; 310; 30; 20 INJECTION, SOLUTION INTRAVENOUS at 10:39

## 2019-09-12 RX ADMIN — BUTALBITAL, ACETAMINOPHEN, AND CAFFEINE 1 TABLET: 50; 325; 40 TABLET ORAL at 13:49

## 2019-09-12 RX ADMIN — LABETALOL HYDROCHLORIDE 100 MG: 100 TABLET, FILM COATED ORAL at 08:51

## 2019-09-12 RX ADMIN — MAGNESIUM SULFATE HEPTAHYDRATE 2 G/HR: 40 INJECTION, SOLUTION INTRAVENOUS at 10:39

## 2019-09-12 RX ADMIN — IBUPROFEN 800 MG: 800 TABLET ORAL at 05:31

## 2019-09-12 RX ADMIN — IBUPROFEN 800 MG: 800 TABLET ORAL at 13:48

## 2019-09-12 RX ADMIN — HYDROCODONE BITARTRATE AND ACETAMINOPHEN 1 TABLET: 5; 325 TABLET ORAL at 07:37

## 2019-09-12 RX ADMIN — HYDROCODONE BITARTRATE AND ACETAMINOPHEN 1 TABLET: 5; 325 TABLET ORAL at 01:45

## 2019-09-12 RX ADMIN — MAGNESIUM SULFATE HEPTAHYDRATE 2 G/HR: 40 INJECTION, SOLUTION INTRAVENOUS at 01:42

## 2019-09-12 NOTE — PROGRESS NOTES
42 yo  PPD#0 s/p  after IOL for pre-e with SF, continues on PP Mag Sulfate for sz proph. S:  Pt without complaint. Denies HA/vision changes/pain. Denies N/V.    O:  Sustained severe range BPs over 15 min (165/89, 167/95, 168/76)  Labetalol 20 mg IVP x 1  Now normotensive (127/78, 125/69)  Gen:  NAD  UO: 180 ml/hr    A:  PPD#0 s/p . IOL for pre-e with SF on Mag.    P:  Continue to closely monitor. Manage severe range BP's prn. Continue mag sulfate until 24 hr PP.

## 2019-09-12 NOTE — LACTATION NOTE
This note was copied from a baby's chart. Mother on magnesium sulfate. She has been trying to breastfeed, hand expressing drops of colostrum and formula feeding her 36.2 week infant. Mother tried to breastfeed with [facility] - baby opened her mouth but would not latch on and suckle. Mother able to easily hand express 20 drops of colostrum which was finger fed to baby. Symphony pump set up to help stimulate mother's breast milk supply - instructions for use given. She did not get any colostrum when she pumped. FOB formula fed baby while mother pumped. Baby took 15 ml well. Hand Expression Education:  Mom taught how to manually hand express her colostrum. Emphasized the importance of providing infant with valuable colostrum as infant rests skin to skin at breast.  Aware to avoid extended periods of non-feeding. Aware to offer 10-20+ drops of colostrum every 2-3 hours until infant is latching and nursing effectively. Taught the rationale behind this low tech but highly effective evidence based practice. Pumping:  Guidelines for pumping, milk collection and storage, proper cleaning of pump parts all reviewed. How to establish and maintain breast milk supply through pumping reviewed. Differences between hospital grade rental pumps vs store bought double electric/hand pumps discussed. Set up pumping with double electric set up. Assisted with pump session. List of area pump rental locations and lactation support services provided. Reviewed effects/risks of late  birth on initiation of breastfeeding including infant's sleepiness, ineffective or missed breastfeedings, infant's decreased stamina to sustain prolonged latch and effective breastfeeding, decreased energy reserves related to low birth wt and inability to stimulate milk supply.    Recommended interventions include skin to skin bonding at breast, hand expression of colostrum as infant rests at breast and initiation of breastfeeding as infant is able, initiation of pumping regimen to begin within 6 hours of birth as mom is able; complement/supplement feeding as guided by neonatologist.     Mother  will successfully establish breastfeeding by feeding in response to early feeding cues   or wake every 3h, will obtain deep latch, and will keep log of feedings/output. Taught to BF at hunger cues and or q 2-3 hrs and to offer 10-20 drops of hand expressed colostrum at any non-feeds. Breast Assessment  Left Breast: Medium, Large  Left Nipple: Everted, Inverted, Short  Right Breast: Medium, Large  Right Nipple: Everted, Intact, Short  Breast- Feeding Assessment  Attends Breast-Feeding Classes: No  Breast-Feeding Experience: No  Breast Trauma/Surgery: No  Type/Quality: (Baby born at 36.2 weeks. Mother on Magnesium sulfate which will be discontinued this afternoon. Mother has been hand expressing and formula feeding.)  Lactation Consultant Visits  Breast-Feedings: Attempted breast-feeding(Mother tried to breastfeed baby. Baby opened mouth but would not suck. Mom has short nipples-latch assist and nipple shield used. Mother able to hand express 20 drops-finger fed to baby.  Mom pumped to stimulate-FOB gave 15ml formula. )  Mother/Infant Observation  Mother Observation: Alignment, Holds breast, Close hold  Infant Observation: Opens mouth

## 2019-09-12 NOTE — PROGRESS NOTES
0710: Report received from MERCEDES Acosta RN. Assumed care of pt.     0745: Dr. Brandon Cooley at bedside assessing pt.     0945: Pt assisted up to bathroom with assistance from RN. Pt ambulating without difficulty. 1200: Pt assisted up to the bathroom with assistance from RN. Ambulating without difficulty. Pt voided 700mL. 1259: Dr. Brandon Cooley called, orders to discontinue Magnesium. Pt to stay on L&D for 4 hours to evaluate bp's, if no severe range bp's pt may go to [facility].    1530: Dr. Brandon Cooley at bedside. Orders to transfer pt to [facility]U.

## 2019-09-12 NOTE — PROGRESS NOTES
Post-Partum Progress Note    Patient doing well post-partum without significant complaint. She is voiding without difficulty, she reports normal lochia. Her pain is well controlled with oral pain medication. She is tolerating a general diet. Delivery information:  Information for the patient's :  Radha Cortez [[id number]]   Delivery of a 5 lb 5.7 oz (2.43 kg) female infant via Vaginal, Spontaneous on 2019 at 12:56 PM  by [facility]. Apgars were 9  and 9 .        Vitals:    Patient Vitals for the past 8 hrs:   BP Pulse Resp SpO2   19 0708    99 %   19 0706 158/90 85     19 0703    99 %   19 0657    90 %   19 0653    100 %   19 0648    94 %   19 0643    96 %   19 0638    100 %   19 0633    99 %   19 0628    100 %   19 0623    99 %   19 0618    99 %   19 0613    98 %   19 0608    99 %   19 0606 (!) 158/92 85     19 0603    100 %   19 0602    93 %   19 0600   16    19 0558    99 %   19 0553    100 %   19 0548    97 %   19 0543    100 %   19 0538    100 %   19 0531    98 %   19 0526    100 %   19 0521    99 %   19 0516    99 %   19 0511    99 %   19 0506 136/76 76  98 %   19 0501    99 %   19 0500   16    19 0456    99 %   19 0451    99 %   19 0446    100 %   19 0441    99 %   19 0436    99 %   19 0431    99 %   19 0426    100 %   19 0421    100 %   19 0416    100 %   19 0411    100 %   19 0406    100 %   19 0405 131/76 76     19 0401    98 %   19 0400   14    19 0356    98 %   19 0351    99 %   19 0346    99 %   19 0342    94 %   19 0341    91 % 09/12/19 0337    94 %   09/12/19 0336    100 %   09/12/19 0332    92 %   09/12/19 0326    100 %   09/12/19 0325    92 %   09/12/19 0321    97 %   09/12/19 0316    98 %   09/12/19 0311    97 %   09/12/19 0310    93 %   09/12/19 0306    99 %   09/12/19 0305 145/78 83     09/12/19 0303    94 %   09/12/19 0301    98 %   09/12/19 0300   14    09/12/19 0256    100 %   09/12/19 0251    99 %   09/12/19 0246    94 %   09/12/19 0245    93 %   09/12/19 0241    98 %   09/12/19 0236    98 %   09/12/19 0231    98 %   09/12/19 0226    98 %   09/12/19 0221    98 %   09/12/19 0216    98 %   09/12/19 0211    100 %   09/12/19 0206    99 %   09/12/19 0205 139/73 80     09/12/19 0201    99 %   09/12/19 0200   16    09/12/19 0156    100 %   09/12/19 0151    100 %   09/12/19 0139    100 %   09/12/19 0134    100 %   09/12/19 0129    98 %   09/12/19 0128    91 %   09/12/19 0124    100 %   09/12/19 0119    100 %   09/12/19 0114    100 %   09/12/19 0110    93 %   09/12/19 0109    98 %   09/12/19 0107 128/90 81     09/12/19 0105    92 %   09/12/19 0104    100 %   09/12/19 0100   14    09/12/19 0059    100 %   09/12/19 0056    92 %   09/12/19 0054    99 %   09/12/19 0049    100 %   09/12/19 0044    99 %   09/12/19 0039    100 %   09/12/19 0034    98 %   09/12/19 0029    98 %   09/12/19 0024    98 %   09/12/19 0019    98 %   09/12/19 0014    98 %   09/12/19 0009    98 %   09/12/19 0006 126/72 86     19 0004    98 %   19 0000   14    19 2354    99 %     Temp (24hrs), Av °F (36.7 °C), Min:97.7 °F (36.5 °C), Max:98.3 °F (36.8 °C)    Visit Vitals  /90   Pulse 85   Temp 98 °F (36.7 °C)   Resp 16   SpO2 99%   Breastfeeding? Yes       Exam:    General: Patient without distress.   Abdomen: soft, fundus firm at level of umbilicus, nontender  Lower extremities: negative for cords or tenderness. 1+ le edema b/l\  Labs: No results found for this or any previous visit (from the past 24 hour(s)). Assessment:    1. Postpartum S/P spontaneous vaginal delivery   2. Patient doing well without significant complications  3. Severe range BP requiring iv labetalol    Plan:  1. Continue routine postpartum care  2. Routine perineal care and maternal education   3.  Oral pain medications and bowel regimen as needed              4. Continue pp magnesium 24 hr pp    Joann Alfonso MD

## 2019-09-12 NOTE — PROGRESS NOTES
SBAR OUT Report: Mother    Verbal report given to OSWALDO Live RN on this patient, who is now being transferred to [facility]U routine progression of care. Report consisted of patient's Situation, Background, Assessment and Recommendations (SBAR). [place] ID bands were compared with the identification form, and verified with the patient and receiving nurse. Information from the SBAR, Kardex and [facility] and the [place] Report was reviewed with the receiving nurse; opportunity for questions and clarification provided.

## 2019-09-13 ENCOUNTER — APPOINTMENT (OUTPATIENT)
Dept: VASCULAR SURGERY | Age: 39
End: 2019-09-13
Attending: OBSTETRICS & GYNECOLOGY
Payer: COMMERCIAL

## 2019-09-13 LAB
ALBUMIN SERPL-MCNC: 2.5 G/DL (ref 3.5–5)
ALBUMIN/GLOB SERPL: 0.8 {RATIO} (ref 1.1–2.2)
ALP SERPL-CCNC: 115 U/L (ref 45–117)
ALT SERPL-CCNC: 33 U/L (ref 12–78)
ANION GAP SERPL CALC-SCNC: 6 MMOL/L (ref 5–15)
AST SERPL-CCNC: 34 U/L (ref 15–37)
BILIRUB SERPL-MCNC: 0.2 MG/DL (ref 0.2–1)
BUN SERPL-MCNC: 9 MG/DL (ref 6–20)
BUN/CREAT SERPL: 15 (ref 12–20)
CALCIUM SERPL-MCNC: 8.3 MG/DL (ref 8.5–10.1)
CHLORIDE SERPL-SCNC: 108 MMOL/L (ref 97–108)
CO2 SERPL-SCNC: 27 MMOL/L (ref 21–32)
CREAT SERPL-MCNC: 0.6 MG/DL (ref 0.55–1.02)
ERYTHROCYTE [DISTWIDTH] IN BLOOD BY AUTOMATED COUNT: 13.6 % (ref 11.5–14.5)
GLOBULIN SER CALC-MCNC: 3.1 G/DL (ref 2–4)
GLUCOSE SERPL-MCNC: 81 MG/DL (ref 65–100)
HCT VFR BLD AUTO: 29.9 % (ref 35–47)
HGB BLD-MCNC: 9.4 G/DL (ref 11.5–16)
MCH RBC QN AUTO: 26.9 PG (ref 26–34)
MCHC RBC AUTO-ENTMCNC: 31.4 G/DL (ref 30–36.5)
MCV RBC AUTO: 85.4 FL (ref 80–99)
NRBC # BLD: 0 K/UL (ref 0–0.01)
NRBC BLD-RTO: 0 PER 100 WBC
PLATELET # BLD AUTO: 232 K/UL (ref 150–400)
PMV BLD AUTO: 11 FL (ref 8.9–12.9)
POTASSIUM SERPL-SCNC: 4.2 MMOL/L (ref 3.5–5.1)
PROT SERPL-MCNC: 5.6 G/DL (ref 6.4–8.2)
RBC # BLD AUTO: 3.5 M/UL (ref 3.8–5.2)
SODIUM SERPL-SCNC: 141 MMOL/L (ref 136–145)
WBC # BLD AUTO: 8.7 K/UL (ref 3.6–11)

## 2019-09-13 PROCEDURE — 65270000029 HC RM PRIVATE

## 2019-09-13 PROCEDURE — 74011250637 HC RX REV CODE- 250/637: Performed by: OBSTETRICS & GYNECOLOGY

## 2019-09-13 PROCEDURE — 80053 COMPREHEN METABOLIC PANEL: CPT

## 2019-09-13 PROCEDURE — 93970 EXTREMITY STUDY: CPT

## 2019-09-13 PROCEDURE — 85027 COMPLETE CBC AUTOMATED: CPT

## 2019-09-13 PROCEDURE — 36415 COLL VENOUS BLD VENIPUNCTURE: CPT

## 2019-09-13 RX ORDER — LABETALOL 200 MG/1
200 TABLET, FILM COATED ORAL 2 TIMES DAILY
Status: DISCONTINUED | OUTPATIENT
Start: 2019-09-13 | End: 2019-09-14 | Stop reason: HOSPADM

## 2019-09-13 RX ORDER — FAMOTIDINE 20 MG/1
20 TABLET, FILM COATED ORAL 2 TIMES DAILY
Status: DISCONTINUED | OUTPATIENT
Start: 2019-09-13 | End: 2019-09-14 | Stop reason: HOSPADM

## 2019-09-13 RX ORDER — NIFEDIPINE 10 MG/1
20 CAPSULE ORAL 3 TIMES DAILY
Status: DISCONTINUED | OUTPATIENT
Start: 2019-09-13 | End: 2019-09-13

## 2019-09-13 RX ORDER — NIFEDIPINE 10 MG/1
20 CAPSULE ORAL ONCE
Status: COMPLETED | OUTPATIENT
Start: 2019-09-13 | End: 2019-09-13

## 2019-09-13 RX ORDER — NIFEDIPINE 10 MG/1
10 CAPSULE ORAL ONCE
Status: COMPLETED | OUTPATIENT
Start: 2019-09-13 | End: 2019-09-13

## 2019-09-13 RX ORDER — LABETALOL 100 MG/1
200 TABLET, FILM COATED ORAL 2 TIMES DAILY
Qty: 120 TAB | Refills: 1 | Status: SHIPPED | OUTPATIENT
Start: 2019-09-13 | End: 2020-01-16 | Stop reason: SDUPTHER

## 2019-09-13 RX ORDER — SODIUM CHLORIDE 0.9 % (FLUSH) 0.9 %
5-40 SYRINGE (ML) INJECTION AS NEEDED
Status: DISCONTINUED | OUTPATIENT
Start: 2019-09-13 | End: 2019-09-14 | Stop reason: HOSPADM

## 2019-09-13 RX ORDER — LABETALOL 100 MG/1
100 TABLET, FILM COATED ORAL ONCE
Status: COMPLETED | OUTPATIENT
Start: 2019-09-13 | End: 2019-09-13

## 2019-09-13 RX ORDER — SODIUM CHLORIDE 0.9 % (FLUSH) 0.9 %
5-40 SYRINGE (ML) INJECTION EVERY 8 HOURS
Status: DISCONTINUED | OUTPATIENT
Start: 2019-09-13 | End: 2019-09-14 | Stop reason: HOSPADM

## 2019-09-13 RX ORDER — FAMOTIDINE 20 MG/1
20 TABLET, FILM COATED ORAL 2 TIMES DAILY
Status: DISCONTINUED | OUTPATIENT
Start: 2019-09-13 | End: 2019-09-13

## 2019-09-13 RX ADMIN — IBUPROFEN 800 MG: 800 TABLET ORAL at 13:08

## 2019-09-13 RX ADMIN — IBUPROFEN 800 MG: 800 TABLET ORAL at 21:34

## 2019-09-13 RX ADMIN — Medication 1 TABLET: at 08:42

## 2019-09-13 RX ADMIN — NIFEDIPINE 20 MG: 10 CAPSULE ORAL at 14:04

## 2019-09-13 RX ADMIN — BUTALBITAL, ACETAMINOPHEN, AND CAFFEINE 2 TABLET: 50; 325; 40 TABLET ORAL at 17:14

## 2019-09-13 RX ADMIN — HYDROCODONE BITARTRATE AND ACETAMINOPHEN 1 TABLET: 5; 325 TABLET ORAL at 02:05

## 2019-09-13 RX ADMIN — LABETALOL HYDROCHLORIDE 100 MG: 100 TABLET, FILM COATED ORAL at 13:07

## 2019-09-13 RX ADMIN — BUTALBITAL, ACETAMINOPHEN, AND CAFFEINE 2 TABLET: 50; 325; 40 TABLET ORAL at 21:35

## 2019-09-13 RX ADMIN — NIFEDIPINE 10 MG: 10 CAPSULE ORAL at 13:07

## 2019-09-13 RX ADMIN — LABETALOL HYDROCHLORIDE 200 MG: 200 TABLET, FILM COATED ORAL at 21:35

## 2019-09-13 RX ADMIN — FAMOTIDINE 20 MG: 20 TABLET ORAL at 18:10

## 2019-09-13 RX ADMIN — IBUPROFEN 800 MG: 800 TABLET ORAL at 05:48

## 2019-09-13 RX ADMIN — LABETALOL HYDROCHLORIDE 100 MG: 100 TABLET, FILM COATED ORAL at 08:42

## 2019-09-13 NOTE — PROGRESS NOTES
Bedside and Verbal shift change report given to Johanny Patel RN (oncoming nurse) by Cam Gutiérrez RN (offgoing nurse). Report included the following information SBAR, Kardex, Procedure Summary, Intake/Output, MAR and Recent Results.

## 2019-09-13 NOTE — LACTATION NOTE
This note was copied from a baby's chart. Pt will successfully establish breastfeeding by feeding in response to early feeding cues   or wake every 3h, will obtain deep latch, and will keep log of feedings/output. Taught to BF at hunger cues and or q 2-3 hrs and to offer 10-20 drops of hand expressed colostrum at any non-feeds. Breast Assessment  Left Breast: Medium, Large  Left Nipple: Everted, Intact  Right Breast: Medium, Large  Right Nipple: Everted, Intact, Short  Breast- Feeding Assessment  Attends Breast-Feeding Classes: No  Breast-Feeding Experience: No  Breast Trauma/Surgery: No  Type/Quality: (mother pumping only)  Lactation Consultant Visits  Breast-Feedings: Not breast-feeding(pumping)  Mother/Infant Observation  Mother Observation: Alignment, Holds breast, Close hold  Infant Observation: Opens mouth  LATCH Documentation  Latch: (Breastfeeding attempted)  Audible Swallowing: None  Type of Nipple: Everted (after stimulation)(mother pumping)  Comfort (Breast/Nipple): Soft/non-tender  Hold (Positioning): No assist from staff, mother able to position/hold infant  LATCH Score: 6  Baby is 36.2 weeks, Reviewed effects/risks of late  birth on initiation of breastfeeding including infant's sleepiness, ineffective or missed breastfeedings, infant's decreased stamina to sustain prolonged latch and effective breastfeeding, decreased energy reserves related to low birth wt and inability to stimulate milk supply. Recommended interventions to include skin to skin bonding at breast, hand expression of colostrum as infant rests at breast. Mother states that she has been exclusively pumping as baby has high bilirubin and is very sleepy, wont latch to breast. Mother has pumped only 2 times, discussed the need to pump as often as baby is feeding in order to help establish supply. Assisted mother with pumping session. Hand Expression Education:  Mom taught how to manually hand express her colostrum. Emphasized the importance of providing infant with valuable colostrum as infant rests skin to skin at breast.  Aware to avoid extended periods of non-feeding. Aware to offer 10-20+ drops of colostrum every 2-3 hours until infant is latching and nursing effectively. Taught the rationale behind this low tech but highly effective evidence based practice. Chart shows numerous feedings, void, stool WDL. Importance of monitoring outputs and feedings on first week Breastfeeding log and follow up with pediatrician visit for weight check in 1-2 days reviewed. Encouraged to call warm line number for any questions/problems that arise.

## 2019-09-13 NOTE — PROGRESS NOTES
Bedside shift change report given to Lynn Piña RN (oncoming nurse) by Alexander Mondragon RN (offgoing nurse). Report included the following information SBAR, Kardex, Intake/Output and MAR.

## 2019-09-13 NOTE — DISCHARGE SUMMARY
Obstetrical Discharge Summary     Name: Gianluca Blandon MRN: [id number]  SSN: [id number]    YOB: 1980  Age: 41 y.o. Sex: female      Admit Date: 9/10/2019    Discharge Date: 2019     Admitting Physician: Dayan Carmen MD     Attending Physician:  Binta Blanton MD     Admission Diagnoses: Chronic hypertension with superimposed preeclampsia [O11.9]    Condition on Discharge: Stable    Procedures: IOL, PTSVD    Disposition: to home    Follow up: No orders of the defined types were placed in this encounter. Discharge Diagnoses:   Information for the patient's :  Gayle Valdovinos [[id number]]   Delivery of a 5 lb 5.7 oz (2.43 kg) female infant via Vaginal, Spontaneous on 2019 at 12:56 PM  by [facility]. Apgars were 9  and 9 . Additional Diagnoses:   Hospital Problems  Date Reviewed: 9/10/2019          Codes Class Noted POA    Chronic hypertension with superimposed preeclampsia ICD-10-CM: O11.9  ICD-9-CM: 642.70  9/10/2019 Unknown             Lab Results   Component Value Date/Time    Rubella, External Immune 2019    GrBStrep, External Positive 2019       Hospital Course: The patient was admitted to evaluate elevated BP above baseline. She was diagnosed with severe SIPIH. An induction was begin and magnesium was started. A cooks catheter was placed for cervical ripening followed by AROM and pitocin. She underwent a PTSVD, see delivery note. Her magnesium was continued 24 hours postpartum with some labile BP. Her oral labetalol medication was continued postartum and after discharge. [address] labs were stable except for some mildly elevated LFTs that improved. Patient Instructions:   Current Discharge Medication List      CONTINUE these medications which have NOT CHANGED    Details   labetalol (NORMODYNE) 100 mg tablet TAKE ONE TABLET BY MOUTH TWICE A DAY  Qty: 60 Tab, Refills: 2      esomeprazole magnesium (NEXIUM PO) Take  by mouth. diphenhydramine HCl (BENADRYL PO) Take  by mouth. doxylamine succinate (UNISOM, DOXYLAMINE, PO) Take  by mouth.      pantoprazole (PROTONIX) 20 mg tablet TAKE ONE TABLET BY MOUTH DAILY  Qty: 90 Tab, Refills: 0      loratadine (CLARITIN REDITABS) 10 mg dissolvable tablet Take 10 mg by mouth. ondansetron (ZOFRAN ODT) 4 mg disintegrating tablet Take 1 Tab by mouth every eight (8) hours as needed for Nausea. Qty: 24 Tab, Refills: 2      butalbital-acetaminophen-caffeine (FIORICET, ESGIC) -40 mg per tablet TAKE ONE TABLET BY MOUTH EVERY 6 HOURS AS NEEDED FOR PAIN  Qty: 30 Tab, Refills: 1      prenatal vits62/FA/om3/dha/epa (PRENATAL GUMMY PO) Take  by mouth. Reference my discharge instructions.       Signed By:  Love Norman MD     September 13, 2019

## 2019-09-13 NOTE — PROGRESS NOTES
Called to see pt for cp/sob    ITSP. Pt denies any SOB but co sharp pain right upper chest when she takes a very deep breath and some heaviness in the mid chest.    No n/v/ha/vision changes. +GERD sx, mild. Visit Vitals  /89   Pulse 98   Temp 98 °F (36.7 °C)   Resp 16   SpO2 98%   Breastfeeding? Yes     GENERAL: alert, well appearing, and in no distress  PULM: clear to auscultation, no wheezes, rales or rhonchi, symmetric air entry   COR: normal rate and regular rhythm, S1 and S2 normal   ABDOMEN: soft, nontender, nondistended, no masses or organomegaly   EXT: 1+ le edema b/l , no c/t/erythema  NEURO: alert, oriented, normal speech    41 y.o.  PPD 2  Preeclampsia, SIPIH  LE edema  Chest discomfort. pepcid  LE dopplers  DVT/PE prec  Disc possible MS source.   NSAIDs prn  Repeat BP

## 2019-09-13 NOTE — ROUTINE PROCESS
Bedside and Verbal shift change report given to John Mahajan RN (oncoming nurse) by Violetta Anne RN (offgoing nurse). Report included the following information SBAR, Kardex, Intake/Output and MAR.

## 2019-09-13 NOTE — L&D DELIVERY NOTE
Delivery Summary    Patient: Gianluca Blandon MRN: [id number]  SSN: [id number]    YOB: 1980  Age: 41 y.o. Sex: female       Information for the patient's :  Gayle Valdovinos [[id number]]       Labor Events:    Labor: No    Steroids: None   Cervical Ripening Date/Time: 9/10/2019 5:35 PM   Cervical Ripening Type: Coronado/EASI   Antibiotics During Labor: Yes   Rupture Identifier: Sac 1    Rupture Date/Time: 2019 7:40 AM   Rupture Type: AROM   Amniotic Fluid Volume: Scant    Amniotic Fluid Description: Clear    Amniotic Fluid Odor: None    Induction: AROM; Coronado Bulb (balloon); Oxytocin       Induction Date/Time: 2019 7:40 AM    Indications for Induction: Mild Preeclampsia    Augmentation: None   Augmentation Date/Time:      Indications for Augmentation:     Labor complications: None       Additional complications:        Delivery Events:  Indications For Episiotomy:     Episiotomy: None   Perineal Laceration(s): 2nd   Repaired:     Periurethral Laceration Location:      Repaired:     Labial Laceration Location:     Repaired:     Sulcal Laceration Location:     Repaired:     Vaginal Laceration Location:     Repaired:     Cervical Laceration Location:     Repaired:     Repair Suture: Vicryl 3-0   Number of Repair Packets: 1   Estimated Blood Loss (ml):  ml     Delivery Date: 2019    Delivery Time: 12:56 PM  Delivery Type: Vaginal, Spontaneous  Sex:  Female    Gestational Age: 37w1d   Delivery Clinician:  Little Russo  Living Status: Living   Delivery Location: L&D 211          APGARS  One minute Five minutes Ten minutes   Skin color: 1   1        Heart rate: 2   2        Grimace: 2   2        Muscle tone: 2   2        Breathin   2        Totals: 9   9            Presentation: Vertex    Position:        Resuscitation Method:  Tactile Stimulation;Suctioning-bulb     Meconium Stained: None      Cord Information: 3 Vessels  Complications: Knot  Cord around: Delayed cord clamping? Yes  Cord clamped date/time:2019 12:58 PM  Disposition of Cord Blood: Lab    Blood Gases Sent?: No    Placenta:  Date/Time: 2019  1:01 PM  Removal: Expressed      Appearance: Normal     [place] Measurements:  Birth Weight: 5 lb 5.7 oz (2.43 kg)      Birth Length: 1' 7\" (0.483 m)      Head Circumference: 2' 8.5\" (0.826 m)      Chest Circumference: 2' 5\" (0.737 m)     Abdominal Girth: 11.42\" (0.29 m)    Other Providers:   DAMEON RUTHERFORD;LELA TINOCO;MILTON ROMERO;BETO HARVEY;BRINA SUE;ALLISON OSEI, Obstetrician;Primary Nurse;Primary [place] Nurse;Charge Nurse;Staff Nurse;Nursery Nurse           Group B Strep:   Lab Results   Component Value Date/Time    GrBStrep, External Positive 2019     Information for the patient's :  Paris Sandifer [[id number]]     Lab Results   Component Value Date/Time    ABO/Rh(D) O POSITIVE 2019 02:01 PM    TRINI IgG NEG 2019 02:01 PM    Bilirubin if TRINI pos: IF DIRECT ALISE POSITIVE, BILIRUBIN TO FOLLOW 2019 02:01 PM     No results for input(s): PCO2CB, PO2CB, HCO3I, SO2I, IBD, PTEMPI, SPECTI, PHICB, ISITE, IDEV, IALLEN in the last 72 hours. True knot in cord.

## 2019-09-13 NOTE — PROGRESS NOTES
Post-Partum Progress Note    Patient doing well post-partum without significant complaint. She is voiding without difficulty, she reports normal lochia. Her pain is well controlled with oral pain medication. She is tolerating a general diet. No HA/cp/sob vision changes, feels much better. Delivery information:  Information for the patient's :  Jordyn Everett [[id number]]   Delivery of a 5 lb 5.7 oz (2.43 kg) female infant via Vaginal, Spontaneous on 2019 at 12:56 PM  by Adriana Mullen. Apgars were 9  and 9 . Vitals:    Patient Vitals for the past 8 hrs:   BP Temp Pulse Resp   19 0831 139/83 98.1 °F (36.7 °C) 89 16   19 0546 150/81 98.1 °F (36.7 °C) 87 18   19 0201 150/84 97.8 °F (36.6 °C) 86 18     Temp (24hrs), Av.2 °F (36.8 °C), Min:97.8 °F (36.6 °C), Max:98.4 °F (36.9 °C)    Visit Vitals  /83   Pulse 89   Temp 98.1 °F (36.7 °C)   Resp 16   SpO2 100%   Breastfeeding?  Yes     Patient Vitals for the past 24 hrs:   Temp Pulse Resp BP SpO2   19 0831 98.1 °F (36.7 °C) 89 16 139/83    19 0546 98.1 °F (36.7 °C) 87 18 150/81    19 0201 97.8 °F (36.6 °C) 86 18 150/84    19 2125 98.4 °F (36.9 °C) 89 16 146/79    19 2037 98.4 °F (36.9 °C) 90 16 160/87    19 1803  88  155/87    19 1649  75  142/87    19 1644  75  (!) 188/96    19 1500  93  137/80    19 1430  96  136/76    19 1305  96  141/82    19 1232     100 %   19 1227     96 %   19 1222     95 %   19 1217     100 %   19 1212     100 %   19 1207     98 %   19 1205  80  136/77    19 1153     100 %   19 1148     100 %   19 1147  83  141/86    19 1143     99 %   19 1138     100 %   19 1133     100 %   19 1128     100 %   19 1123     100 %   19 1118     100 %   19 1113     100 %   09/12/19 1108     (!) 81 %   09/12/19 1106  85  164/88    09/12/19 1103     (!) 87 %   09/12/19 1058     100 %   09/12/19 1053     100 %   09/12/19 1050     (!) 77 %   09/12/19 1048     100 %   09/12/19 1043     (!) 75 %   09/12/19 1038     96 %   09/12/19 1033     98 %   09/12/19 1028     99 %   09/12/19 1023     92 %   09/12/19 1018     100 %   09/12/19 1008  83  149/85 100 %   09/12/19 1003     100 %   09/12/19 0958     100 %   09/12/19 0953     100 %         Exam:    General: Patient without distress. Abdomen: soft, fundus firm at level of umbilicus, nontender  Lower extremities: negative for cords or tenderness. Labs: No results found for this or any previous visit (from the past 24 hour(s)). Assessment:    1. Postpartum S/P spontaneous vaginal delivery   2. Patient doing well without significant complications  3. SIPIH sp magnesium    Plan:  1. Continue routine postpartum care  2. Routine perineal care and maternal education   3.  Oral pain medications and bowel regimen as needed              4. Reviewed PIH precautions, bp check one week, continue home antihypertensives      Sowmya Martinez MD

## 2019-09-13 NOTE — DISCHARGE INSTRUCTIONS
[facility]  [email or web address]  [phone]    Tila Albrecht MD, FACOG     POST DELIVERY DISCHARGE INSTRUCTIONS  FROM YOUR PHYSICIAN    Name: Samantha Gibson  YOB: 1980    General:     Read all discharge information provided by the hospital    Diet/Diet Restrictions:  Eat healthy meals and snacks as desired. Eat foods that are high in fiber and low in fat and cholesterol. Drink eight 8-ounce glasses of water daily; avoid excessive caffeine intake. http://www.ethel-tao.org/. html  EliteClients.be    Medications:   See discharge medication list and read instructions carefully. Breast Feeding:  See instructions from your lactation consult. Call [phone] for more information or to locate a lactation consultant. https://www.velazquez.info/    Vaccines:  If you received the MMR vaccine postpartum you should wait three months until you get pregnant again. You, and close contacts, should make sure that the Tdap vaccine is up to date. This vaccine can decrease the risk of your baby getting pertussis or \"whooping cough. \"    You, and close contacts, should receive the influenza vaccine during flu season when appropriate. SalaryStart.tn    Tobacco Use: If you (or other people around the baby) smoke or use tobacco products, please try to  use and quit to improve your health and decrease risk to your baby. LimitBuy.nl. htm    Swelling in your Legs:  There are many fluid changes after delivery and you may have more swelling the first few days after delivery  Continue to drink plenty of water, avoid sitting or standing in one position for too long and elevate your feet above your heart, to help reduce some the pressure you may be feeling in your ankles and legs.      Section Incision:  Steri-strips or tape strips may be removed gently at home approximately 7- 10 days after surgery. Soaking the strips with a warm, wet cloth or taking a shower may make the strips easier to remove. Metal staples are usually removed within 3 to 10 days, either before you leave the hospital or in the office. Make an appointment if needed. Insorb absorbable staples may be used under the skin but you may see small white pieces as they dissolve. Skin glue or dermabond will fall off with time. Abdominal incisions should be kept clean by showering. It is not necessary to put soap on the incision; plain tap water is adequate. Avoid scrubbing the area and pat dry. The way your scar looks will change over time and may not reach its final appearance for up to a year. The area may feel either numb or sensitive to touch, which is normal.         Physical Activity / Restrictions / Safety:     Avoid heavy lifting, no more that 10 pounds, for 2-3 weeks. No driving while taking narcotic pain medication, of if you can not slam on the brakes. No intercourse for 4-6 weeks, no douching or tampon use until seen by your doctor for your postpartum visit. Use condoms as needed for contraception with sexual activity. You may resume normal exercise after you are cleared by your physician at your postpartum check. You may walk for exercise, as tolerated. Discharge Instructions/Special Treatment/Home Care Needs:     Continue your prenatal vitamins while breast feeding or pumping. Continue to use a squirt bottle with warm water on your perineum/bottom/episiotomy after each bathroom use until bleeding stops. Take stool softeners daily. For example, docusate over the counter stool softener. This is especially important if you are taking narcotic pain medications, because they can cause constipation. Call your doctor for the following:      If you have a fever over 100.4 degrees by mouth on two readings. If you have persistent vaginal bleeding heavier than a heavy menstrual period or persistent large clots or if you are bleeding so heavy it is making you feel weak. It is normal to pass larger clots when you first get out of bed: but if they persist, notify your physician. If you have red streaks or increased swelling of legs, painful red streaks on your breast.  If you have painful urination, or increased pain, redness or discharge with your incision. If you have any questions or concerns. Pain Management:     Take Acetaminophen (Tylenol), Ibuprofen (Advil, Motrin), prescribed pain medications as directed for pain. Do not take Perocet with Tylenol, they both contain acetaminophen. Use a warm water Sitz bath 3 times daily to relieve episiotomy, bottom/perineum or hemorrhoidal discomfort. Apply heating pad to  incision as needed. For hemorrhoidal discomfort, you can use Tucks and Anusol cream as needed and directed.     NSAID information for patients:  [email or web address]    Pain medication/narcotic information for patients:   Take your medicine exactly as prescribed   Store your medicine away from children and in a safe  place   Do not give your medicine to others   Do not drink alcohol while taking this medicine    Follow-Up Care:     Appointment with MD:  Dr. Kate Duffy  [phone]  Schedule your postpartum visit for six weeks        Additional Discharge Instructions    Please read all of your discharge instructions  Follow all of your medication instructions carefully  Call our office on the next business day to schedule your follow-up appointment  If you have any questions or concerns, please contact us at [phone] or if the situation is urgent contact   Become cecilia Fitch My Chart user so you can access information, results and appointments: go to [email or web address]. The [facility] is to bring compassion to healthcare and to be good help to those in need. We aim at providing quality healthcare with an emphasis on respect, justice, compassion, stewardship, integrity, growth and innovation.     If you did not receive excellent communication, compassionate care and an outstanding patient experience, please notify Jani Bond at [email or web address] or [phone] or discuss your concerns with me at your next visit so that we can meet our mission and your expectations    Campbell Appiah MD  [address] [place] [address], [address]  [email or web address]   [phone]   Good Help to Those in [address] [place] [address]

## 2019-09-13 NOTE — PROGRESS NOTES
Dr. Vielka Aguilar updated on patient status- plan for patient to stay tonight for monitoring. Dr. Vielka Aguilar will be over to see patient shortly and update on plan of care. Will continue to monitor VS per protocol.

## 2019-09-13 NOTE — PROGRESS NOTES
Visit Vitals  /88   Pulse 100   Temp 98.2 °F (36.8 °C)   Resp 16   SpO2 99%   Breastfeeding? Yes     Receiving ACOG nifedipine IR protocol.     Will monitor closely    Mariana Winkler MD

## 2019-09-13 NOTE — ROUTINE PROCESS
Discharge teaching done at bedside including but not limited to signs and symptoms and who to call; restrictions and limitations; postpartum depression. All questions answered.

## 2019-09-13 NOTE — PROGRESS NOTES
1156  Patient complaining of chest pain and shortness of breath. Will contact  to notify her. 200  Spoke with Dr Hair Busby. Relayed patient's current vital signs. Dr Hair Busby said she will come to evaluate and speak to patient. 46  Dr Hair Busby in to see patient. Lower extremity doppler ordered. Pepcid ordered for patient. 1230  Current vitals relayed to Dr Hair Busby. Dr Hair Busby stated that she will call me back with an order for her blood pressures. 1300  Order acknowledged for 10 mg Nifedipine immediate release Now. Order acknowledged for Laetalol 100 mg Now.    5388  Meds given. ACOG nifedipine protocol initiated, Vitals rechecked 20 minutes after meds given. 1339 Elevated blood pressure results relayed to Dr Hair Busby. Per Dr Hair Busby and ACOG protocol 20 mg Nifedipine Immediate release given to patient. 1400 Nifedipine immediate release given to patient. 1430  Blood pressure results relayed to Dr. Hair Busby. Dr Hair Busby also notified of elevated pulse and MEWS score of 3. Will recheck vitals in 20 minutes.

## 2019-09-13 NOTE — PROGRESS NOTES
Visit Vitals  /86   Pulse 92   Temp 98.1 °F (36.7 °C)   Resp 16   SpO2 96%   Breastfeeding? Yes     Repeat BP still high  Will begin nifedipine now  Inc labetalol. D/w nursing. rec nifedipine protocol. If repeat severe needs to go to L and D for possible PP magnesium vs IV labetalol.     Michelle Mcclendon MD

## 2019-09-14 VITALS
HEART RATE: 97 BPM | DIASTOLIC BLOOD PRESSURE: 80 MMHG | SYSTOLIC BLOOD PRESSURE: 137 MMHG | RESPIRATION RATE: 14 BRPM | OXYGEN SATURATION: 99 % | TEMPERATURE: 98.5 F

## 2019-09-14 PROCEDURE — 74011250637 HC RX REV CODE- 250/637: Performed by: OBSTETRICS & GYNECOLOGY

## 2019-09-14 RX ADMIN — LABETALOL HYDROCHLORIDE 200 MG: 200 TABLET, FILM COATED ORAL at 08:00

## 2019-09-14 RX ADMIN — IBUPROFEN 800 MG: 800 TABLET ORAL at 04:36

## 2019-09-14 RX ADMIN — BUTALBITAL, ACETAMINOPHEN, AND CAFFEINE 2 TABLET: 50; 325; 40 TABLET ORAL at 04:56

## 2019-09-14 NOTE — ROUTINE PROCESS
Reviewed discharge instructions with patient, she verbalized understanding. Documents signed, no prescription provided. Patient to continue to take 200 mg Labetalol at home and follow up with OB this week.

## 2019-09-14 NOTE — PROGRESS NOTES
Post-Partum Day Number 2 Progress Note    [facility]     Information for the patient's :  Teena Olmedo [[id number]]   Vaginal, Spontaneous   Patient doing well without significant complaint. Voiding without difficulty, normal lochia. On severe range BP normal on repeat    Vitals:  Visit Vitals  /80   Pulse 97   Temp 98.5 °F (36.9 °C)   Resp 14   LMP 2018   SpO2 99%   Breastfeeding? Yes     Temp (24hrs), Av.2 °F (36.8 °C), Min:97.9 °F (36.6 °C), Max:98.5 °F (36.9 °C)      Exam:         Patient without distress. Abdomen soft, fundus firm, nontender                 ower extremities are negative for swelling, cords or tenderness.     Labs:     Lab Results   Component Value Date/Time    WBC 8.7 2019 10:21 AM    WBC 8.7 09/10/2019 01:05 PM    WBC 8.8 2019 09:21 AM    WBC 10.4 2019 09:04 PM    WBC 8.8 2019 01:48 PM    WBC 11.3 (H) 2019 01:27 PM    WBC 9.6 08/15/2019 08:52 AM    WBC 9.6 2019 11:14 AM    WBC 9.5 2019 11:12 AM    WBC 9.6 2019 12:32 PM    WBC 6.6 2019 12:04 PM    WBC 8.3 10/24/2018 10:01 AM    WBC 8.5 2017 03:27 PM    HGB 9.4 (L) 2019 10:21 AM    HGB 10.8 (L) 09/10/2019 01:05 PM    HGB 11.4 2019 09:21 AM    HGB 11.4 (L) 2019 09:04 PM    HGB 12.3 2019 01:48 PM    HGB 12.0 2019 01:27 PM    HGB 12.1 08/15/2019 08:52 AM    HGB 11.5 2019 11:14 AM    HGB 11.8 2019 11:12 AM    HGB 11.7 2019 12:32 PM    HGB 12.4 2019 12:04 PM    HGB 14.4 10/24/2018 10:01 AM    HGB 14.1 2017 03:27 PM    HCT 29.9 (L) 2019 10:21 AM    HCT 32.9 (L) 09/10/2019 01:05 PM    HCT 35.8 2019 09:21 AM    HCT 34.9 (L) 2019 09:04 PM    HCT 37.5 2019 01:48 PM    HCT 36.7 2019 01:27 PM    HCT 37.2 08/15/2019 08:52 AM    HCT 35.0 2019 11:14 AM    HCT 35.4 2019 11:12 AM    HCT 36.2 2019 12:32 PM    HCT 37.2 2019 12:04 PM HCT 43.5 10/24/2018 10:01 AM    HCT 42.0 08/14/2017 03:27 PM    PLATELET 230 23/54/1152 10:21 AM    PLATELET 638 64/35/8452 01:05 PM    PLATELET 803 21/34/6725 09:21 AM    PLATELET 795 36/64/5029 09:04 PM    PLATELET 170 15/33/1103 01:48 PM    PLATELET 916 29/59/1518 01:27 PM    PLATELET 045 00/77/0836 08:52 AM    PLATELET 976 40/22/9356 11:14 AM    PLATELET 445 53/94/3676 11:12 AM    PLATELET 283 55/41/8835 12:32 PM    PLATELET 981 48/75/1163 12:04 PM    PLATELET 940 52/15/5218 10:01 AM    PLATELET 503 32/54/6523 03:27 PM    Hgb, External 11.7 07/18/2019    Hgb, External 12.4 02/26/2019    Hct, External 36.2 07/18/2019    Hct, External 37.2 02/26/2019    Platelet cnt., External 278 07/18/2019    Platelet cnt., External 273 02/26/2019       No results found for this or any previous visit (from the past 24 hour(s)). Assessment: Doing well, post partum day 2    Plan:   1. Discharge home today  2. Follow up in office in this week with Corazon Garza MD  3. Post partum activity advised, diet as tolerated  4.  Discharge Medications: ibuprofen, labetalol and medications prior to admission

## 2019-10-07 NOTE — PATIENT INSTRUCTIONS
Postpartum: Care Instructions  Your Care Instructions  After childbirth (postpartum period), your body goes through many changes. Some of these changes happen over several weeks. In the hours after delivery, your body will begin to recover from childbirth while it prepares to breastfeed your . You may feel emotional during this time. Your hormones can shift your mood without warning for no clear reason. In the first couple of weeks after childbirth, many women have emotions that change from happy to sad. You may find it hard to sleep. You may cry a lot. This is called the \"baby blues. \" These overwhelming emotions often go away within a couple of days or weeks. But it's important to discuss your feelings with your doctor. It is easy to get too tired and overwhelmed during the first weeks after childbirth. Don't try to do too much. Get rest whenever you can, accept help from others, and eat well and drink plenty of fluids. In the first couple of weeks after giving birth, your doctor or midwife may want to check in with you and make a plan for any follow-up care you may need. You will likely have a complete postpartum visit in the first 3 months after delivery. At that time, your doctor or midwife will check on your recovery from childbirth. He or she will also see how you are doing with your emotions and talk about your concerns or questions. Follow-up care is a key part of your treatment and safety. Be sure to make and go to all appointments, and call your doctor if you are having problems. It's also a good idea to know your test results and keep a list of the medicines you take. How can you care for yourself at home? · Sleep or rest when your baby sleeps. · Get help with household chores from family or friends, if you can. Do not try to do it all yourself. · If you have hemorrhoids or swelling or pain around the opening of your vagina, try using cold and heat.  You can put ice or a cold pack on the area for 10 to 20 minutes at a time. Put a thin cloth between the ice and your skin. Also try sitting in a few inches of warm water (sitz bath) 3 times a day and after bowel movements. · Take pain medicines exactly as directed. ? If the doctor gave you a prescription medicine for pain, take it as prescribed. ? If you are not taking a prescription pain medicine, ask your doctor if you can take an over-the-counter medicine. · Eat more fiber to avoid constipation. Include foods such as whole-grain breads and cereals, raw vegetables, raw and dried fruits, and beans. · Drink plenty of fluids, enough so that your urine is light yellow or clear like water. If you have kidney, heart, or liver disease and have to limit fluids, talk with your doctor before you increase the amount of fluids you drink. · Do not rinse inside your vagina with fluids (douche). · If you have stitches, keep the area clean by pouring or spraying warm water over the area outside your vagina and anus after you use the toilet. · Keep a list of questions to ask your doctor or midwife. Your questions might be about:  ? Changes in your breasts, such as lumps or soreness. ? When to expect your menstrual period to start again. ? What form of birth control is best for you. ? Weight you have put on during the pregnancy. ? Exercise options. ? What foods and drinks are best for you, especially if you are breastfeeding. ? Problems you might be having with breastfeeding. ? When you can have sex. Some women may want to talk about lubricants for the vagina. ? Any feelings of sadness or restlessness that you are having. When should you call for help? Call 911 anytime you think you may need emergency care.  For example, call if:    · You have thoughts of harming yourself, your baby, or another person.     · You passed out (lost consciousness).     · You have chest pain, are short of breath, or cough up blood.     · You have a seizure.    Call your doctor now or seek immediate medical care if:    · Your vaginal bleeding seems to be getting heavier.     · You are dizzy or lightheaded, or you feel like you may faint.     · You have a fever.     · You have new or more belly pain.     · You have symptoms of a blood clot in your leg (called a deep vein thrombosis), such as:  ? Pain in the calf, back of the knee, thigh, or groin. ? Redness and swelling in your leg or groin.     · You have signs of preeclampsia, such as:  ? Sudden swelling of your face, hands, or feet. ? New vision problems (such as dimness, blurring, or seeing spots). ? A severe headache.    Watch closely for changes in your health, and be sure to contact your doctor if:    · You have new or worse vaginal discharge.     · You feel sad or depressed.     · You are having problems with your breasts or breastfeeding. Where can you learn more? Go to http://kori-estella.info/. Enter A140 in the search box to learn more about \"Postpartum: Care Instructions. \"  Current as of: May 29, 2019  Content Version: 12.2  © 7749-7886 [employer]. Care instructions adapted under license by [employer] (which disclaims liability or warranty for this information). If you have questions about a medical condition or this instruction, always ask your healthcare professional. [address] any warranty or liability for your use of this information. Breastfeeding: Care Instructions  Overview    Breastfeeding has many benefits. It may lower your baby's chances of getting an infection. It also may make it less likely that your baby will have problems such as diabetes and obesity later in life. Breastfeeding also helps you bond with your baby. In the first days after birth, your breasts make a thick, yellow liquid called colostrum. This liquid gives your baby nutrients and antibodies against infection.  It is all that babies need in the first days after birth. Your breasts will fill with milk a few days after the birth. Breastfeeding is a skill that gets better with practice. Be patient with yourself and your baby. If you have trouble, you can get help and keep breastfeeding. Follow-up care is a key part of your treatment and safety. Be sure to make and go to all appointments, and call your doctor if you are having problems. It's also a good idea to know your test results and keep a list of the medicines you take. How can you care for yourself at home? · Breastfeed your baby whenever he or she is hungry. In the first 2 weeks, your baby will feed about every 1 to 3 hours. That often works out to about 8 to 12 times in a 24-hour period. This will help you keep up your supply of milk. Signs that your baby is hungry include:  ? Sucking on his or her hands. ? Bonner his or her lips. ? Turning his or her head toward your breast.  · Put a bed pillow or a nursing pillow on your lap to support your arms and your baby. · Hold your baby in a comfortable position. ? You can hold your baby in several ways. One of the most common positions is the cradle hold. One arm supports your baby, with his or her head in the bend of your elbow. Your open hand supports your baby's bottom or back. Your baby's belly lies against yours. ? If you had your baby by , or , try the football hold. This position keeps your baby off your belly. Tuck your baby under your arm, with his or her body along the side you will be feeding on. Support your baby's upper body with your arm. With that hand you can control your baby's head to bring his or her mouth to your breast.  ? Try different positions with each feeding. If you are having problems, ask for help from your doctor or a lactation consultant. · To get your baby to latch on:  ?  Support and narrow your breast with one hand using a \"U hold,\" with your thumb on the outer side of your breast and your fingers on the inner side. You can also use a \"C hold,\" with all your fingers below the nipple and your thumb above it. Try the different holds to get the deepest latch for whichever breastfeeding position you use. Your other arm is behind your baby's back, with your hand supporting the base of the baby's head. Position your fingers and thumb to point toward your baby's ears. ? You can touch your baby's lower lip with your nipple to get your baby to open his or her mouth. Wait until your baby opens up really wide, like a big yawn. Then be sure to bring the baby quickly to your breast--not your breast to the baby. As you bring your baby toward your breast, use your other hand to support the breast and guide it into his or her mouth. ? Both the nipple and a large portion of the darker area around the nipple (areola) should be in the baby's mouth. The baby's lips should be flared outward, not folded in (inverted). ? Listen for a regular sucking and swallowing pattern while the baby is feeding. If you cannot see or hear a swallowing pattern, watch the baby's ears, which will wiggle slightly when the baby swallows. If the baby's nose appears to be blocked by your breast, bring your baby's body closer to you. This will help tilt the baby's head back slightly, so just the edge of one nostril is clear for breathing. ? When your baby is latched, you can usually remove your hand from supporting your breast and bring it under your baby to cradle him or her. Now just relax and breastfeed your baby. · You will know that your baby is feeding well when:  ? His or her mouth covers a lot of the areola, and the lips are flared out.  ? His or her chin and nose rest against your breast.  ? Sucking is deep and rhythmic, with short pauses. ? You are able to see and hear your baby swallowing. ? You do not feel pain in your nipple. · Offer both breasts to your baby at each feeding.  Each time you breastfeed, switch which breast you start with.  · Anytime you need to remove your baby from the breast, put one finger in the corner of his or her mouth. Push your finger between your baby's gums to gently break the seal. If you do not break the tight seal before you remove your baby, your nipples can become sore, cracked, or bruised. · After feeding your baby, gently pat his or her back to let out any swallowed air. After your baby burps, offer the breast again, or offer the other breast. Sometimes a baby will want to keep feeding after being burped. When should you call for help? Call your doctor now or seek immediate medical care if:    · You have symptoms of a breast infection, such as:  ? Increased pain, swelling, redness, or warmth around a breast.  ? Red streaks extending from the breast.  ? Pus draining from a breast.  ? A fever.     · Your baby has no wet diapers for 6 hours.    Watch closely for changes in your health, and be sure to contact your doctor if:    · Your baby has trouble latching on to your breast.     · You continue to have pain or discomfort when breastfeeding.     · You have other questions or concerns. Where can you learn more? Go to http://kori-estella.info/. Enter P492 in the search box to learn more about \"Breastfeeding: Care Instructions. \"  Current as of: May 29, 2019  Content Version: 12.2  © 3350-2953 [employer]. Care instructions adapted under license by [employer] (which disclaims liability or warranty for this information). If you have questions about a medical condition or this instruction, always ask your healthcare professional. [place] [address] any warranty or liability for your use of this information.

## 2019-10-08 ENCOUNTER — OFFICE VISIT (OUTPATIENT)
Dept: OBGYN CLINIC | Age: 39
End: 2019-10-08

## 2019-10-08 VITALS
HEIGHT: 64 IN | BODY MASS INDEX: 42.51 KG/M2 | SYSTOLIC BLOOD PRESSURE: 130 MMHG | DIASTOLIC BLOOD PRESSURE: 72 MMHG | WEIGHT: 249 LBS

## 2019-10-08 DIAGNOSIS — I10 ESSENTIAL HYPERTENSION: Primary | ICD-10-CM

## 2019-10-08 DIAGNOSIS — Z23 ENCOUNTER FOR IMMUNIZATION: ICD-10-CM

## 2019-10-08 NOTE — PROGRESS NOTES
After obtaining consent, and per orders of Dr Diomedes Yancey, injection of Fluarix given in left deltoid by Evelyne Rodney LPN. Patient instructed to remain in clinic for 20 minutes afterwards, and to report any adverse reaction to me immediately. Lot: [id number] Exp: 06/30/2020 NDC: 85124-657-56 , VIS given.

## 2019-10-08 NOTE — PROGRESS NOTES
[address] [facility]  [email or web address]    Kayla Martinez MD, [address] [facility]       OB/GYN Follow-up visit    Chief Complaint: Follow up visit  Chief Complaint   Patient presents with    Hypertension     postpartum         History of Present Illness: This is a follow up visit from 2019 [facility]. She is having a follow up for Blood pressure check. The patient reports having occasion headaches and been checking oressures at home, \"much better\"  Aggravating factors include none. Alleviating factors include none. She does not have other concerns. LMP: No LMP recorded.     PFSH:  Past Medical History:   Diagnosis Date    ADHD     Dysplasia of cervix, low grade (ORION 1) 12    colpo    Encounter for IUD removal 2016    Mirena    Essential hypertension     10 years ago    GERD (gastroesophageal reflux disease)     trying to control with diet since pregnant    H/O abnormal Pap smear 12;12    LGSIL, HPV positive    Headache     Headache(784.0)     IUD (intrauterine device) in place 2014    Mirena    Kidney disease     kidney stones     Migraine headache     without aura    pap smear for 11;13; 4/1/15;19    neg, HPV neg,no ECC; neg, HPV neg, Negative, HPV negative; neg pap and neg HPV     Past Surgical History:   Procedure Laterality Date    HX COLPOSCOPY  12    ORION 1    HX HEENT      wisdom teeth    HX LITHOTRIPSY      HX WISDOM TEETH EXTRACTION       Family History   Problem Relation Age of Onset    Hypertension Father     Heart Disease Father     Diabetes Father     Elevated Lipids Father     Cancer Father         eye    Cancer Mother         Cervical,Uterine,& Ovarian (pt questions ovarian but got cancer after copper 7 IUD)    Depression Mother     Allergic Rhinitis Brother     Asthma Brother     Other Brother         Chron's    Hypertension Brother     Alcohol abuse Maternal Grandfather     Depression Maternal Grandfather  Alcohol abuse Paternal Grandmother     Stroke Paternal Grandmother     Migraines Brother     Allergic Rhinitis Brother     Breast Cancer Other      Social History     Tobacco Use    Smoking status: Never Smoker    Smokeless tobacco: Never Used   Substance Use Topics    Alcohol use: Not Currently     Comment: social    Drug use: No     No Known Allergies  Current Outpatient Medications   Medication Sig    labetalol (NORMODYNE) 100 mg tablet Take 2 Tabs by mouth two (2) times a day.  loratadine (CLARITIN REDITABS) 10 mg dissolvable tablet Take 10 mg by mouth.  butalbital-acetaminophen-caffeine (FIORICET, ESGIC) -40 mg per tablet TAKE ONE TABLET BY MOUTH EVERY 6 HOURS AS NEEDED FOR PAIN    prenatal vits62/FA/om3/dha/epa (PRENATAL GUMMY PO) Take  by mouth.  esomeprazole magnesium (NEXIUM PO) Take  by mouth.  diphenhydramine HCl (BENADRYL PO) Take  by mouth.  doxylamine succinate (UNISOM, DOXYLAMINE, PO) Take  by mouth.  pantoprazole (PROTONIX) 20 mg tablet TAKE ONE TABLET BY MOUTH DAILY    ondansetron (ZOFRAN ODT) 4 mg disintegrating tablet Take 1 Tab by mouth every eight (8) hours as needed for Nausea. No current facility-administered medications for this visit.         Review of Systems:  History obtained from the patient  Constitutional: negative for fevers, chills and weight loss  ENT ROS: negative for - hearing change, oral lesions or visual changes  Respiratory: negative for cough, wheezing or dyspnea on exertion  Cardiovascular: negative for chest pain, irregular heart beats, exertional chest pressure/discomfort  Gastrointestinal: negative for dysphagia, nausea and vomiting  Genito-Urinary ROS: no dysuria, trouble voiding, or hematuria  Inteument/breast: negative for rash, breast lump and nipple discharge  Musculoskeletal:negative for stiff joints, neck pain and muscle weakness  Endocrine ROS: negative for - breast changes, galactorrhea or temperature intolerance  Hematological and Lymphatic ROS: negative for - blood clots, bruising or swollen lymph nodes      Physical Exam:  Visit Vitals  /72 (BP 1 Location: Left arm, BP Patient Position: Sitting)   Ht 5' 4\" (1.626 m)   Wt 249 lb (112.9 kg)   Breastfeeding? Yes   BMI 42.74 kg/m²       GENERAL: alert, well appearing, and in no distress  PULM: clear to auscultation, no wheezes, rales or rhonchi, symmetric air entry   COR: normal rate and regular rhythm, S1 and S2 normal   ABDOMEN: soft, nontender, nondistended, no masses or organomegaly   EXT no c/t/e  NEURO: alert, oriented, normal speech    Assessment:  Encounter Diagnosis   Name Primary?  Essential hypertension Yes   pp bp check    Plan:  The patient is advised that she should contact the office with any questions or concerns. She should make her routine annual gynecologic appointment if needed. Wean back to original labetalol dose 100 bid  pih precautions  Disc caut with ajovy and BF  Pp check @ 6wk    No orders of the defined types were placed in this encounter. No results found for this visit on 10/08/19.     Simin Stack MD

## 2019-10-23 PROBLEM — O16.9 HYPERTENSION IN PREGNANCY: Status: RESOLVED | Noted: 2019-08-08 | Resolved: 2019-10-23

## 2019-10-23 PROBLEM — O16.9 HYPERTENSION AFFECTING PREGNANCY: Status: RESOLVED | Noted: 2019-08-01 | Resolved: 2019-10-23

## 2019-10-23 PROBLEM — Z34.90 PREGNANCY: Status: RESOLVED | Noted: 2019-02-28 | Resolved: 2019-10-23

## 2019-10-24 ENCOUNTER — OFFICE VISIT (OUTPATIENT)
Dept: OBGYN CLINIC | Age: 39
End: 2019-10-24

## 2019-10-24 VITALS — DIASTOLIC BLOOD PRESSURE: 82 MMHG | WEIGHT: 249 LBS | SYSTOLIC BLOOD PRESSURE: 122 MMHG | BODY MASS INDEX: 42.74 KG/M2

## 2019-10-24 DIAGNOSIS — I10 ESSENTIAL HYPERTENSION: ICD-10-CM

## 2019-10-24 NOTE — PATIENT INSTRUCTIONS
After Your Delivery (the Postpartum Period): Care Instructions  Your Care Instructions    Congratulations on the birth of your baby. Like pregnancy, the  period can be a time of excitement, oumou, and exhaustion. You may look at your wondrous little baby and feel happy. You may also be overwhelmed by your new sleep hours and new responsibilities. At first, babies often sleep during the days and are awake at night. They do not have a pattern or routine. They may make sudden gasps, jerk themselves awake, or look like they have crossed eyes. These are all normal, and they may even make you smile. In these first weeks after delivery, try to take good care of yourself. It may take 4 to 6 weeks to feel like yourself again, and possibly longer if you had a  birth. You will likely feel very tired for several weeks. Your days will be full of ups and downs, but lots of oumou as well. Follow-up care is a key part of your treatment and safety. Be sure to make and go to all appointments, and call your doctor if you are having problems. It's also a good idea to know your test results and keep a list of the medicines you take. How can you care for yourself at home? Take care of your body after delivery  · Use pads instead of tampons for the bloody flow that may last as long as 2 weeks. · Ease cramps with ibuprofen (Advil, Motrin). · Ease soreness of hemorrhoids and the area between your vagina and rectum with ice compresses or witch hazel pads. · Ease constipation by drinking lots of fluid and eating high-fiber foods. Ask your doctor about over-the-counter stool softeners. · Cleanse yourself with a gentle squeeze of warm water from a bottle instead of wiping with toilet paper. · Take a sitz bath in warm water several times a day. · Wear a good nursing bra. Ease sore and swollen breasts with warm, wet washcloths. · If you are not breastfeeding, use ice rather than heat for breast soreness.   · Your period may not start for several months if you are breastfeeding. You may bleed more, and longer at first, than you did before you got pregnant. · Wait until you are healed (about 4 to 6 weeks) before you have sexual intercourse. Your doctor will tell you when it is okay to have sex. · Try not to travel with your baby for 5 or 6 weeks. If you take a long car trip, make frequent stops to walk around and stretch. Avoid exhaustion  · Rest every day. Try to nap when your baby naps. · Ask another adult to be with you for a few days after delivery. · Plan for  if you have other children. · Stay flexible so you can eat at odd hours and sleep when you need to. Both you and your baby are making new schedules. · Plan small trips to get out of the house. Change can make you feel less tired. · Ask for help with housework, cooking, and shopping. Remind yourself that your job is to care for your baby. Know about help for postpartum depression  · \"Baby blues\" are common for the first 1 to 2 weeks after birth. You may cry or feel sad or irritable for no reason. · Rest whenever you can. Being tired makes it harder to handle your emotions. · Go for walks with your baby. · Talk to your partner, friends, and family about your feelings. · If your symptoms last for more than a few weeks, or if you feel very depressed, ask your doctor for help. · Postpartum depression can be treated. Support groups and counseling can help. Sometimes medicine can also help. Stay healthy  · Eat healthy foods so you have more energy and lose extra baby pounds. · If you breastfeed, avoid drugs. If you quit smoking during pregnancy, try to stay smoke-free. If you choose to have a drink now and then, have only one drink, and limit the number of occasions that you have a drink. Wait to breastfeed at least 2 hours after you have a drink to reduce the amount of alcohol the baby may get in the milk.   · Start daily exercise after 4 to 6 weeks, but rest when you feel tired. · Learn exercises to tone your belly. Do Kegel exercises to regain strength in your pelvic muscles. You can do these exercises while you stand or sit. ? Squeeze the same muscles you would use to stop your urine. Your belly and thighs should not move. ? Hold the squeeze for 3 seconds, and then relax for 3 seconds. ? Start with 3 seconds. Then add 1 second each week until you are able to squeeze for 10 seconds. ? Repeat the exercise 10 to 15 times for each session. Do three or more sessions each day. · Find a class for new mothers and new babies that has an exercise time. · If you had a  birth, give yourself a bit more time before you exercise, and be careful. When should you call for help? Call 911 anytime you think you may need emergency care. For example, call if:    · You have thoughts of harming yourself, your baby, or another person.     · You passed out (lost consciousness).     · You have chest pain, are short of breath, or cough up blood.     · You have a seizure.    Call your doctor now or seek immediate medical care if:    · You have severe vaginal bleeding. This means you are passing blood clots and soaking through a pad each hour for 2 or more hours.     · You are dizzy or lightheaded, or you feel like you may faint.     · You have a fever.     · You have new or more belly pain.     · You have signs of a blood clot in your leg (called a deep vein thrombosis), such as:  ? Pain in the calf, back of the knee, thigh, or groin. ? Redness and swelling in your leg or groin.     · You have signs of preeclampsia, such as:  ? Sudden swelling of your face, hands, or feet. ? New vision problems (such as dimness, blurring, or seeing spots).   ? A severe headache.    Watch closely for changes in your health, and be sure to contact your doctor if:    · Your vaginal bleeding seems to be getting heavier.     · You have new or worse vaginal discharge.     · You feel sad, anxious, or hopeless for more than a few days.     · You do not get better as expected. Where can you learn more? Go to http://kori-estella.info/. Enter A461 in the search box to learn more about \"After Your Delivery (the Postpartum Period): Care Instructions. \"  Current as of: May 29, 2019  Content Version: 12.2  © 2940-3773 [employer]. Care instructions adapted under license by [employer] (which disclaims liability or warranty for this information). If you have questions about a medical condition or this instruction, always ask your healthcare professional. [place] [address] any warranty or liability for your use of this information.

## 2019-10-24 NOTE — PROGRESS NOTES
Patterson Burkitt, MD, [facility]     Postpartum visit    Chief Complaint   Patient presents with   [facility] & [address] is a 42 y.o. female G1 [phone] who presents for a postpartum exam.  EPDS Depression Scale Score 1, Dr. Jeana Blizzard notified. She is now 6 weeks from a vaginal delivery delivery. Patient's last menstrual period was 10/15/2019 (exact date). She has had the following significant problems since her delivery: none. Had one cycle? Last week. Pumping inconsistently,     She reports her mood as Excellent. The patient is not breastfeeding/pumping breast milk for her baby. The patient would like to discuss birth control options. She is due for her next AE in 3 months. Past Medical History:   Diagnosis Date    ADHD     Dysplasia of cervix, low grade (ORION 1) 2/23/12    colpo    Encounter for IUD removal 08/19/2016    Mirena    Essential hypertension     10 years ago    GERD (gastroesophageal reflux disease)     trying to control with diet since pregnant    H/O abnormal Pap smear 11/8/12;2/8/12    LGSIL, HPV positive    Headache     Headache(784.0)     IUD (intrauterine device) in place 03/11/2014    Mirena    Kidney disease     kidney stones 2015    Migraine headache     without aura    pap smear for 2/1/11;2/5/13; 4/1/15;2/26/19    neg, HPV neg,no ECC; neg, HPV neg, Negative, HPV negative; neg pap and neg HPV     Past Surgical History:   Procedure Laterality Date    HX COLPOSCOPY  2/23/12    ORION 1    HX HEENT      wisdom teeth    HX LITHOTRIPSY      HX WISDOM TEETH EXTRACTION       Current Outpatient Medications   Medication Sig    labetalol (NORMODYNE) 100 mg tablet Take 2 Tabs by mouth two (2) times a day.  loratadine (CLARITIN REDITABS) 10 mg dissolvable tablet Take 10 mg by mouth.  prenatal vits62/FA/om3/dha/epa (PRENATAL GUMMY PO) Take  by mouth.     butalbital-acetaminophen-caffeine (FIORICET, ESGIC) -40 mg per tablet TAKE ONE TABLET BY MOUTH EVERY 6 HOURS AS NEEDED FOR PAIN     No current facility-administered medications for this visit.       No Known Allergies  Family History   Problem Relation Age of Onset    Hypertension Father     Heart Disease Father     Diabetes Father    [employer] Elevated Lipids Father     Cancer Father         eye    Cancer Mother         Cervical,Uterine,& Ovarian (pt questions ovarian but got cancer after copper 7 IUD)    Depression Mother     Allergic Rhinitis Brother     Asthma Brother     Other Brother         Chron's    Hypertension Brother     Alcohol abuse Maternal Grandfather     Depression Maternal Grandfather     Alcohol abuse Paternal Grandmother     Stroke Paternal Grandmother     Migraines Brother     Allergic Rhinitis Brother     Breast Cancer Other      Social History     Socioeconomic History    Marital status:      Spouse name: Not on file    Number of children: Not on file    Years of education: Not on file    Highest education level: Not on file   Occupational History    Not on file   Social Needs    Financial resource strain: Not on file    Food insecurity:     Worry: Not on file     Inability: Not on file    Transportation needs:     Medical: Not on file     Non-medical: Not on file   Tobacco Use    Smoking status: Never Smoker    Smokeless tobacco: Never Used   Substance and Sexual Activity    Alcohol use: Not Currently     Comment: social    Drug use: No    Sexual activity: Yes     Partners: Male   Lifestyle    Physical activity:     Days per week: Not on file     Minutes per session: Not on file    Stress: Not on file   Relationships    Social connections:     Talks on phone: Not on file     Gets together: Not on file     Attends Nondenominational service: Not on file     Active member of club or organization: Not on file     Attends meetings of clubs or organizations: Not on file     Relationship status: Not on file    Intimate partner violence:     Fear of current or ex partner: Not on file     Emotionally abused: Not on file     Physically abused: Not on file     Forced sexual activity: Not on file   Other Topics Concern     Service Not Asked    Blood Transfusions Not Asked    Caffeine Concern Not Asked    Occupational Exposure Not Asked    Hobby Hazards Not Asked    Sleep Concern Not Asked    Stress Concern Not Asked    Weight Concern Not Asked    Special Diet Not Asked    Back Care Not Asked    Exercise Not Asked    Bike Helmet Not Asked    Seat Belt Not Asked    Self-Exams Not Asked   Social History Narrative    Not on file     OB History        1    Para   1    Term   0       1    AB   0    Living   1       SAB   0    TAB   0    Ectopic   0    Molar        Multiple   0    Live Births   1          Obstetric Comments   Menarche:  17. LMP: 20. # of Children:  Currently pregnant. Age at Delivery of First Child:  n/a. Hysterectomy/oophorectomy:  NO/NO. Breast Bx:  No.  Hx of Breast Feeding:  n/a. BCP:  Yes. Hormone therapy:  No.                Immunization History   Administered Date(s) Administered    Influenza Vaccine (Quad) PF 2019, 10/08/2019    Influenza Vaccine Split 2012    Tdap 2019       Review of Systems:  History obtained from the patient  General ROS: negative for - chills, fever or weight loss  Respiratory ROS: no cough, shortness of breath, or wheezing  Cardiovascular ROS: no chest pain or dyspnea on exertion  Gastrointestinal ROS: negative for - appetite loss, change in bowel habits or nausea/vomiting  Genito-Urinary ROS: negative except for as noted in HPI    PHYSICAL EXAMINATION  Visit Vitals  /82   Wt 249 lb (112.9 kg)   Breastfeeding?  No   BMI 42.74 kg/m²       Constitutional  · Appearance: well-nourished, well developed, alert, in no acute distress    HENT  · Head and Face: appears normal    Neck  · Inspection/Palpation: normal appearance, no masses or tenderness  · Lymph Nodes: no lymphadenopathy present  · Thyroid: gland size normal, nontender, no nodules or masses present on palpation    Chest  clear to auscultation, no wheezes, rales or rhonchi, symmetric air entry. Cardiac/CVS  normal rate, regular rhythm, normal S1, S2, no murmurs, rubs, clicks or gallops. Breasts  · Inspection of Breasts: breasts symmetrical, no skin changes, no discharge present, nipple appearance normal, no skin retraction present  · Palpation of Breasts and Axillae: no masses present on palpation, no breast tenderness  · Axillary Lymph Nodes: no lymphadenopathy present    Gastrointestinal  · Abdominal Examination: abdomen non-tender to palpation, normal bowel sounds, no masses present  · Liver and spleen: no hepatomegaly present, spleen not palpable  · Hernias: no hernias identified    Genitourinary  · External Genitalia: normal appearance for age, no discharge present, no tenderness present, no inflammatory lesions present, no masses present, no atrophy present  · Vagina: normal vaginal vault without central or paravaginal defects, no discharge present, no inflammatory lesions present, no masses present  · Bladder: non-tender to palpation  · Urethra: appears normal  · Cervix: normal   · Uterus: normal size, shape and consistency  · Adnexa: no adnexal tenderness present, no adnexal masses present  · Perineum: perineum within normal limits, no evidence of trauma, no rashes or skin lesions present  · Anus: anus within normal limits  · Inguinal Lymph Nodes: no lymphadenopathy present    Skin  · General Inspection: no rash, no lesions identified    Neurologic/Psychiatric  · Mental Status:  · Orientation: grossly oriented to person, place and time  · Mood and Affect: mood normal, affect appropriate    Assessment:  Normal postpartum check  Encounter Diagnoses   Name Primary?  Postpartum exam Yes    Essential hypertension        Plan:  RTO for AE or sooner prn  Discussed contraception options; r/b/a. Planned contraception: Mirena  We discussed progesterone only and non hormonal options for contraception including but not limited to condoms, IUDs, Nexplanon, and depo provera. LARC h/o given  It is preferred to place at the beginning of cycle. Confirm AE up to date, pap up to date, no unprotected intercourse x 2 weeks prior to visit, and that she has checked with her insurance about coverage. She should return to normal activity  We recommend healthy balanced diet, regular exercise  We discussed safer sex practices, condom use and risk factors for sexually transmitted diseases.    Patient should notify MD if she cannot resume normal activity and exercise  Recommended continuing prenatal vitamins/folic acid  Rec PCP fu for [facility] management in future

## 2019-12-11 ENCOUNTER — TELEPHONE (OUTPATIENT)
Dept: INTERNAL MEDICINE CLINIC | Age: 39
End: 2019-12-11

## 2019-12-11 NOTE — TELEPHONE ENCOUNTER
Called, spoke to pt. Two pt identifiers confirmed. Patient states she has an ear infection, sore throat and cough (chest hurts). Patient states she went to urgent care on 12-8-19 and is now feeling worse. Patient offered appointment 12/16/2019  at 9:00 am with Dr. Carlitos Wilson. Patient declined appointment. Patient states she will call back in the morning to see if we have openings. Pt verbalized understanding of information discussed w/ no further questions at this time.

## 2019-12-11 NOTE — TELEPHONE ENCOUNTER
#[phone] pt states she has an ear infection, sore throat and cough (chest hurts). Pt states she went to urgent care on 12-8-19 and is now feeling worse. Pt needs to be seen and will see any physician tomorrow morning if possible.    Please call pt today

## 2019-12-12 ENCOUNTER — OFFICE VISIT (OUTPATIENT)
Dept: INTERNAL MEDICINE CLINIC | Age: 39
End: 2019-12-12

## 2019-12-12 VITALS
HEIGHT: 64 IN | TEMPERATURE: 99.2 F | OXYGEN SATURATION: 98 % | RESPIRATION RATE: 18 BRPM | HEART RATE: 77 BPM | SYSTOLIC BLOOD PRESSURE: 138 MMHG | DIASTOLIC BLOOD PRESSURE: 96 MMHG | BODY MASS INDEX: 42.85 KG/M2 | WEIGHT: 251 LBS

## 2019-12-12 DIAGNOSIS — J32.9 SINUSITIS, UNSPECIFIED CHRONICITY, UNSPECIFIED LOCATION: Primary | ICD-10-CM

## 2019-12-12 DIAGNOSIS — R05.9 COUGH: ICD-10-CM

## 2019-12-12 DIAGNOSIS — H92.03 OTALGIA OF BOTH EARS: ICD-10-CM

## 2019-12-12 RX ORDER — GUAIFENESIN 600 MG/1
600 TABLET, EXTENDED RELEASE ORAL 2 TIMES DAILY
COMMUNITY
End: 2020-01-23

## 2019-12-12 RX ORDER — BENZONATATE 200 MG/1
200 CAPSULE ORAL
Qty: 21 CAP | Refills: 0 | Status: SHIPPED | OUTPATIENT
Start: 2019-12-12 | End: 2019-12-19

## 2019-12-12 RX ORDER — PREDNISONE 20 MG/1
TABLET ORAL
Qty: 9 TAB | Refills: 0 | Status: SHIPPED | OUTPATIENT
Start: 2019-12-12 | End: 2020-01-23

## 2019-12-12 NOTE — PROGRESS NOTES
Sally Smyth is a 42 y.o. female who presents today for Ear Pain and Sore Throat  . She has a history of   Patient Active Problem List   Diagnosis Code    Chronic daily headache R51    H/O abnormal Pap smear Z87.898    History of kidney stones Z87.442    Weight gain R63.5    Menorrhagia with regular cycle N92.0    Obesity (BMI 30-39. 9) E66.9    GERD (gastroesophageal reflux disease) K21.9    Irritable bowel syndrome with diarrhea K58.0    Gastroesophageal reflux disease K21.9    Severe obesity (HCC) E66.01    Chronic hypertension with superimposed preeclampsia O11.9   . Today patient is here for an acute visit. I see her  in my practice. Cindy Girard Upper respiratory illness:  Sally Smyth presents with complaints of congestion, sore throat, nasal blockage, dry cough, L ear pain and hoarseness for 5 days. no nausea and no vomiting . Seen at Urgent care, Strep negative. On augmentin for 10 days with Medrol dose pack(just finished). Main complaint is pain/pressure to L ear. She seems to be having coughing fits. These are somewhat more prominent at night. Symptoms are moderate. Over-the-counter remedies including: taking advil. Drinking plenty of fluids: yes  Asthma?:  no  non-smoker  Contacts with similar infections: yes, . Still on labetalol since [place] ROS  Review of Systems   Constitutional: Positive for malaise/fatigue. Negative for chills, fever and weight loss. HENT: Positive for congestion and ear pain. Negative for ear discharge, hearing loss, sore throat and tinnitus. Eyes: Negative for blurred vision, double vision, photophobia, pain and discharge. Respiratory: Positive for cough. Negative for hemoptysis, sputum production, shortness of breath, wheezing and stridor. Cardiovascular: Negative for chest pain, palpitations and leg swelling. Gastrointestinal: Negative for abdominal pain, constipation, diarrhea, heartburn, nausea and vomiting. Genitourinary: Negative for dysuria, frequency and urgency. Musculoskeletal: Negative for joint pain and myalgias. Skin: Negative for rash. Neurological: Negative. Negative for headaches. Endo/Heme/Allergies: Does not bruise/bleed easily. Psychiatric/Behavioral: Negative for depression, hallucinations, memory loss, substance abuse and suicidal ideas. The patient is not nervous/anxious. Visit Vitals  BP (!) 138/96 (BP 1 Location: Left arm, BP Patient Position: Sitting)   Pulse 77   Temp 99.2 °F (37.3 °C) (Oral)   Resp 18   Ht 5' 4\" (1.626 m)   Wt 251 lb (113.9 kg)   SpO2 98%   BMI 43.08 kg/m²       Physical Exam  Constitutional:       General: She is not in acute distress. Appearance: She is well-developed. HENT:      Head: Normocephalic and atraumatic. Ears:      Comments: Bilateral bulging tympanic membranes. Ear canal bilaterally normal     Nose: Nose normal.      Mouth/Throat:      Mouth: Mucous membranes are dry. Neck:      Musculoskeletal: Normal range of motion and neck supple. Thyroid: No thyromegaly. Cardiovascular:      Rate and Rhythm: Normal rate and regular rhythm. Heart sounds: No murmur. Pulmonary:      Effort: Pulmonary effort is normal.      Breath sounds: Normal breath sounds. No wheezing. Comments: Clear no wheezing no egophony  Abdominal:      General: Bowel sounds are normal. There is no distension. Palpations: Abdomen is soft. Skin:     General: Skin is warm and dry. Neurological:      Mental Status: She is alert and oriented to person, place, and time. Cranial Nerves: No cranial nerve deficit. Psychiatric:         Behavior: Behavior normal.           Current Outpatient Medications   Medication Sig    labetalol (NORMODYNE) 100 mg tablet Take 2 Tabs by mouth two (2) times a day.  loratadine (CLARITIN REDITABS) 10 mg dissolvable tablet Take 10 mg by mouth.     butalbital-acetaminophen-caffeine (FIORICET, ESGIC) -40 mg per tablet TAKE ONE TABLET BY MOUTH EVERY 6 HOURS AS NEEDED FOR PAIN    prenatal vits62/FA/om3/dha/epa (PRENATAL GUMMY PO) Take  by mouth. No current facility-administered medications for this visit.          Past Medical History:   Diagnosis Date    ADHD     Dysplasia of cervix, low grade (ORION 1) 2/23/12    colpo    Encounter for IUD removal 08/19/2016    Mirena    Essential hypertension     10 years ago    GERD (gastroesophageal reflux disease)     trying to control with diet since pregnant    H/O abnormal Pap smear 11/8/12;2/8/12    LGSIL, HPV positive    Headache     Headache(784.0)     IUD (intrauterine device) in place 03/11/2014    Mirena    Kidney disease     kidney stones 2015    Migraine headache     without aura    pap smear for 2/1/11;2/5/13; 4/1/15;2/26/19    neg, HPV neg,no ECC; neg, HPV neg, Negative, HPV negative; neg pap and neg HPV      Past Surgical History:   Procedure Laterality Date    HX COLPOSCOPY  2/23/12    ORION 1    HX HEENT      wisdom teeth    HX LITHOTRIPSY      HX WISDOM TEETH EXTRACTION        Social History     Tobacco Use    Smoking status: Never Smoker    Smokeless tobacco: Never Used   Substance Use Topics    Alcohol use: Not Currently     Comment: social      Family History   Problem Relation Age of Onset    Hypertension Father     Heart Disease Father     Diabetes Father     Elevated Lipids Father     Cancer Father         eye    Cancer Mother         Cervical,Uterine,& Ovarian (pt questions ovarian but got cancer after copper 7 IUD)    Depression Mother     Allergic Rhinitis Brother     Asthma Brother     Other Brother         Chron's    Hypertension Brother     Alcohol abuse Maternal Grandfather     Depression Maternal Grandfather     Alcohol abuse Paternal Grandmother     Stroke Paternal Grandmother     Migraines Brother     Allergic Rhinitis Brother     Breast Cancer Other         No Known Allergies     Assessment/Plan  Diagnoses and all orders for this visit:    1. Sinusitis, unspecified chronicity, unspecified location-patient on appropriate treatment for sinus infection. She is only 5 days into therapy. Given her symptoms we will re-dose her prednisone and provide her with Desi Pleas for cough. Lungs are clear. Suggest taken Mucinex 600 mg twice daily  -     predniSONE (DELTASONE) 20 mg tablet; Take two tablets for 3 days then one for 3 days then stop. 2. Otalgia of both ears    3. Cough  -     benzonatate (TESSALON) 200 mg capsule; Take 1 Cap by mouth three (3) times daily as needed for Cough for up to 7 days. Isael Nur MD  12/12/2019    This note was created with the help of speech recognition software [employer]) and may contain some 'sound alike' errors.

## 2019-12-12 NOTE — PATIENT INSTRUCTIONS
Sinusitis: Care Instructions Your Care Instructions Sinusitis is an infection of the lining of the sinus cavities in your head. Sinusitis often follows a cold. It causes pain and pressure in your head and face. In most cases, sinusitis gets better on its own in 1 to 2 weeks. But some mild symptoms may last for several weeks. Sometimes antibiotics are needed. Follow-up care is a key part of your treatment and safety. Be sure to make and go to all appointments, and call your doctor if you are having problems. It's also a good idea to know your test results and keep a list of the medicines you take. How can you care for yourself at home? · Take an over-the-counter pain medicine, such as acetaminophen (Tylenol), ibuprofen (Advil, Motrin), or naproxen (Aleve). Read and follow all instructions on the label. · If the doctor prescribed antibiotics, take them as directed. Do not stop taking them just because you feel better. You need to take the full course of antibiotics. · Be careful when taking over-the-counter cold or flu medicines and Tylenol at the same time. Many of these medicines have acetaminophen, which is Tylenol. Read the labels to make sure that you are not taking more than the recommended dose. Too much acetaminophen (Tylenol) can be harmful. · Breathe warm, moist air from a steamy shower, a hot bath, or a sink filled with hot water. Avoid cold, dry air. Using a humidifier in your home may help. Follow the directions for cleaning the machine. · Use saline (saltwater) nasal washes to help keep your nasal passages open and wash out mucus and bacteria. You can buy saline nose drops at a grocery store or drugstore. Or you can make your own at home by adding 1 teaspoon of salt and 1 teaspoon of baking soda to 2 cups of distilled water. If you make your own, fill a bulb syringe with the solution, insert the tip into your nostril, and squeeze gently. Reyes Gray your nose. · Put a hot, wet towel or a warm gel pack on your face 3 or 4 times a day for 5 to 10 minutes each time. · Try a decongestant nasal spray like oxymetazoline (Afrin). Do not use it for more than 3 days in a row. Using it for more than 3 days can make your congestion worse. When should you call for help? Call your doctor now or seek immediate medical care if: 
  · You have new or worse swelling or redness in your face or around your eyes.  
  · You have a new or higher fever.  
 Watch closely for changes in your health, and be sure to contact your doctor if: 
  · You have new or worse facial pain.  
  · The mucus from your nose becomes thicker (like pus) or has new blood in it.  
  · You are not getting better as expected. Where can you learn more? Go to http://kori-estella.info/. Enter C620 in the search box to learn more about \"Sinusitis: Care Instructions. \" Current as of: October 21, 2018 Content Version: 12.2 © 7403-6795 [employer]. Care instructions adapted under license by [employer] (which disclaims liability or warranty for this information). If you have questions about a medical condition or this instruction, always ask your healthcare professional. [address] any warranty or liability for your use of this information.

## 2020-01-09 ENCOUNTER — TELEPHONE (OUTPATIENT)
Dept: OBGYN CLINIC | Age: 40
End: 2020-01-09

## 2020-01-09 NOTE — TELEPHONE ENCOUNTER
Patient of TP  Calling to say that she is on her cycle and wants to be seen for Mirena insert today. 3 pm appt offered. Asked her to be here 10 minutes earlier for urine check. She will need to schedule 6 week us check.

## 2020-01-16 ENCOUNTER — TELEPHONE (OUTPATIENT)
Dept: OBGYN CLINIC | Age: 40
End: 2020-01-16

## 2020-01-16 RX ORDER — LABETALOL 100 MG/1
200 TABLET, FILM COATED ORAL 2 TIMES DAILY
Qty: 120 TAB | Refills: 0 | Status: SHIPPED | OUTPATIENT
Start: 2020-01-16 | End: 2020-03-04

## 2020-01-16 NOTE — TELEPHONE ENCOUNTER
Patient states she has appointment on 1/23/2020  This nurse sees that the appointment is cancelled    Patient sent another my chart message

## 2020-01-16 NOTE — TELEPHONE ENCOUNTER
Prescription refill sent as per MD order to get patient to her scheduled appointment.     Patient sent a my chart message

## 2020-01-16 NOTE — TELEPHONE ENCOUNTER
42year old patient last seen in the office on 10/24/19 for post partum visit. Patient requesting refill on the labetalol. This nurse sent a message for patient of need for AE.     ?ok to refill till appointment when made    Please advise

## 2020-01-22 NOTE — PATIENT INSTRUCTIONS
Well Visit, Ages 25 to 48: Care Instructions Your Care Instructions Physical exams can help you stay healthy. Your doctor has checked your overall health and may have suggested ways to take good care of yourself. He or she also may have recommended tests. At home, you can help prevent illness with healthy eating, regular exercise, and other steps. Follow-up care is a key part of your treatment and safety. Be sure to make and go to all appointments, and call your doctor if you are having problems. It's also a good idea to know your test results and keep a list of the medicines you take. How can you care for yourself at home? · Reach and stay at a healthy weight. This will lower your risk for many problems, such as obesity, diabetes, heart disease, and high blood pressure. · Get at least 30 minutes of physical activity on most days of the week. Walking is a good choice. You also may want to do other activities, such as running, swimming, cycling, or playing tennis or team sports. Discuss any changes in your exercise program with your doctor. · Do not smoke or allow others to smoke around you. If you need help quitting, talk to your doctor about stop-smoking programs and medicines. These can increase your chances of quitting for good. · Talk to your doctor about whether you have any risk factors for sexually transmitted infections (STIs). Having one sex partner (who does not have STIs and does not have sex with anyone else) is a good way to avoid these infections. · Use birth control if you do not want to have children at this time. Talk with your doctor about the choices available and what might be best for you. · Protect your skin from too much sun. When you're outdoors from 10 a.m. to 4 p.m., stay in the shade or cover up with clothing and a hat with a wide brim. Wear sunglasses that block UV rays. Even when it's cloudy, put broad-spectrum sunscreen (SPF 30 or higher) on any exposed skin. · See a dentist one or two times a year for checkups and to have your teeth cleaned. · Wear a seat belt in the car. Follow your doctor's advice about when to have certain tests. These tests can spot problems early. For everyone · Cholesterol. Have the fat (cholesterol) in your blood tested after age 21. Your doctor will tell you how often to have this done based on your age, family history, or other things that can increase your risk for heart disease. · Blood pressure. Have your blood pressure checked during a routine doctor visit. Your doctor will tell you how often to check your blood pressure based on your age, your blood pressure results, and other factors. · Vision. Talk with your doctor about how often to have a glaucoma test. 
· Diabetes. Ask your doctor whether you should have tests for diabetes. · Colon cancer. Your risk for colorectal cancer gets higher as you get older. Some experts say that adults should start regular screening at age 48 and stop at age 76. Others say to start before age 48 or continue after age 76. Talk with your doctor about your risk and when to start and stop screening. For women · Breast exam and mammogram. Talk to your doctor about when you should have a clinical breast exam and a mammogram. Medical experts differ on whether and how often women under 50 should have these tests. Your doctor can help you decide what is right for you. · Cervical cancer screening test and pelvic exam. Begin with a Pap test at age 24. The test often is part of a pelvic exam. Starting at age 27, you may choose to have a Pap test, an HPV test, or both tests at the same time (called co-testing). Talk with your doctor about how often to have testing. · Tests for sexually transmitted infections (STIs). Ask whether you should have tests for STIs. You may be at risk if you have sex with more than one person, especially if your partners do not wear condoms. For men · Tests for sexually transmitted infections (STIs). Ask whether you should have tests for STIs. You may be at risk if you have sex with more than one person, especially if you do not wear a condom. · Testicular cancer exam. Ask your doctor whether you should check your testicles regularly. · Prostate exam. Talk to your doctor about whether you should have a blood test (called a PSA test) for prostate cancer. Experts differ on whether and when men should have this test. Some experts suggest it if you are older than 39 and are -American or have a father or brother who got prostate cancer when he was younger than 72. When should you call for help? Watch closely for changes in your health, and be sure to contact your doctor if you have any problems or symptoms that concern you. Where can you learn more? Go to http://kori-estella.info/. Enter P072 in the search box to learn more about \"Well Visit, Ages 25 to 48: Care Instructions. \" Current as of: December 13, 2018 Content Version: 12.2 © 6451-4099 [employer]. Care instructions adapted under license by [employer] (which disclaims liability or warranty for this information). If you have questions about a medical condition or this instruction, always ask your healthcare professional. [address] any warranty or liability for your use of this information.

## 2020-01-23 ENCOUNTER — OFFICE VISIT (OUTPATIENT)
Dept: OBGYN CLINIC | Age: 40
End: 2020-01-23

## 2020-01-23 VITALS
DIASTOLIC BLOOD PRESSURE: 82 MMHG | BODY MASS INDEX: 42.85 KG/M2 | HEIGHT: 64 IN | WEIGHT: 251 LBS | SYSTOLIC BLOOD PRESSURE: 124 MMHG

## 2020-01-23 DIAGNOSIS — N89.8 VAGINAL DISCHARGE: ICD-10-CM

## 2020-01-23 DIAGNOSIS — Z01.411 ENCOUNTER FOR GYNECOLOGICAL EXAMINATION (GENERAL) (ROUTINE) WITH ABNORMAL FINDINGS: Primary | ICD-10-CM

## 2020-01-23 DIAGNOSIS — R10.2 PELVIC PAIN IN FEMALE: ICD-10-CM

## 2020-01-23 LAB — WET MOUNT POCT, WMPOCT: NORMAL

## 2020-01-23 NOTE — PROGRESS NOTES
[facility]  [email or web address]  [phone]    Magan Graham MD, FACOG       Annual Gynecologic Exam:  WWE <40  Chief Complaint   Patient presents with    Well Woman         Otf Russell is a 42 y.o.  WHITE OR  female who presents for an annual well woman exam.  Patient's last menstrual period was 2020. .    With regard to the Gardisil vaccine, she declines to receive it. She does report additional concerns today. She reports cramping in between her cycle, vaginal discharge and bloating. Menstrual status:  Her periods are regular cycles. She does not report dysmenorrhea/painful menses. She does not report irregular bleeding. Sexual history and Contraception:  Social History     Substance and Sexual Activity   Sexual Activity Yes    Partners: Male    Birth control/protection: Condom     She sometimes use condoms with sexual activity  She does not reports new sexual partner(s) in the last year. The patient does not request STD testing. We recommended testing per CDC guidelines and at patient request.     Preventive Medicine History:  Her most recent Pap smear result: normal was obtained in 2019  Her most recent HR HPV screen was Negative obtained in   She does not have a history of ORION 2, 3 or cervical cancer.      Past Medical History:   Diagnosis Date    ADHD     Dysplasia of cervix, low grade (ORION 1) 12    colpo    Encounter for IUD removal 2016    Mirena    Essential hypertension     10 years ago    GERD (gastroesophageal reflux disease)     trying to control with diet since pregnant    H/O abnormal Pap smear 12;12    LGSIL, HPV positive    Headache     Headache(784.0)     IUD (intrauterine device) in place 2014    Mirena    Kidney disease     kidney stones     Migraine headache     without aura    pap smear for 11;13; 4/1/15;19    neg, HPV neg,no ECC; neg, HPV neg, Negative, HPV negative; neg pap and neg HPV     OB History    Para Term  AB Living   1 1 0 1 0 1   SAB TAB Ectopic Molar Multiple Live Births   0 0 0   0 1      # Outcome Date GA Lbr Sonido/2nd Weight Sex Delivery Anes PTL Lv   1  19 36w2d / 01:16 5 lb 5.7 oz (2.43 kg) F Vag-Spont EPI N ANTHONY      Obstetric Comments   Menarche:  14. LMP: 20. # of Children:  Currently pregnant. Age at Delivery of First Child:  n/a. Hysterectomy/oophorectomy:  NO/NO. Breast Bx:  No.  Hx of Breast Feeding:  n/a. BCP:  Yes.  Hormone therapy:  No.      Past Surgical History:   Procedure Laterality Date    HX COLPOSCOPY  12    ORION 1    HX HEENT      wisdom teeth    HX LITHOTRIPSY      HX WISDOM TEETH EXTRACTION       Family History   Problem Relation Age of Onset    Hypertension Father     Heart Disease Father     Diabetes Father    [place] Elevated Lipids Father     Cancer Father         eye    Cancer Mother         Cervical,Uterine,& Ovarian (pt questions ovarian but got cancer after copper 7 IUD)    Depression Mother     Allergic Rhinitis Brother     Asthma Brother     Other Brother         Chron's    Hypertension Brother     Alcohol abuse Maternal Grandfather     Depression Maternal Grandfather     Alcohol abuse Paternal Grandmother     Stroke Paternal Grandmother     Migraines Brother     Allergic Rhinitis Brother     Breast Cancer Other      Social History     Socioeconomic History    Marital status:      Spouse name: Not on file    Number of children: Not on file    Years of education: Not on file    Highest education level: Not on file   Occupational History    Not on file   Social Needs    Financial resource strain: Not on file    Food insecurity:     Worry: Not on file     Inability: Not on file    Transportation needs:     Medical: Not on file     Non-medical: Not on file   Tobacco Use    Smoking status: Never Smoker    Smokeless tobacco: Never Used Substance and Sexual Activity    Alcohol use: Not Currently     Comment: social    Drug use: No    Sexual activity: Yes     Partners: Male     Birth control/protection: Condom   Lifestyle    Physical activity:     Days per week: Not on file     Minutes per session: Not on file    Stress: Not on file   Relationships    Social connections:     Talks on phone: Not on file     Gets together: Not on file     Attends Mor[employer] service: Not on file     Active member of club or organization: Not on file     Attends meetings of clubs or organizations: Not on file     Relationship status: Not on file    Intimate partner violence:     Fear of current or ex partner: Not on file     Emotionally abused: Not on file     Physically abused: Not on file     Forced sexual activity: Not on file   Other Topics Concern     Service Not Asked    Blood Transfusions Not Asked    Caffeine Concern Not Asked    Occupational Exposure Not Asked   [place] Hazards Not Asked    Sleep Concern Not Asked    Stress Concern Not Asked    Weight Concern Not Asked    Special Diet Not Asked    Back Care Not Asked    Exercise Not Asked    Bike Helmet Not Asked   [address] [facility] Not Asked    Self-Exams Not Asked   Social History Narrative    Not on file       No Known Allergies    Current Outpatient Medications   Medication Sig    labetaloL (NORMODYNE) 100 mg tablet Take 2 Tabs by mouth two (2) times a day. No current facility-administered medications for this visit. Patient Active Problem List   Diagnosis Code    Chronic daily headache R51    H/O abnormal Pap smear Z87.898    History of kidney stones Z87.442    Weight gain R63.5    Menorrhagia with regular cycle N92.0    Obesity (BMI 30-39. 9) E66.9    GERD (gastroesophageal reflux disease) K21.9    Irritable bowel syndrome with diarrhea K58.0    Gastroesophageal reflux disease K21.9    Severe obesity (HCC) E66.01    Chronic hypertension with superimposed preeclampsia O11.9       Review of Systems - History obtained from the patient  Constitutional: negative for weight loss, fever, night sweats  HEENT: negative for hearing loss, earache, congestion, snoring, sorethroat  CV: negative for chest pain, palpitations, edema  Resp: negative for cough, shortness of breath, wheezing  GI: negative for change in bowel habits, abdominal pain, black or bloody stools  : negative for frequency, dysuria, hematuria  GYN: see HPI  MSK: negative for back pain, joint pain, muscle pain  Breast: negative for breast lumps, nipple discharge, galactorrhea  Skin :negative for itching, rash, hives  Neuro: negative for dizziness, headache, confusion, weakness  Psych: negative for anxiety, depression, change in mood  Heme/lymph: negative for bleeding, bruising, pallor      (WWE continued)     Physical Exam  Visit Vitals  /82   Ht 5' 4\" (1.626 m)   Wt 251 lb (113.9 kg)   LMP 01/07/2020   Breastfeeding No   BMI 43.08 kg/m²       Constitutional  · Appearance: well-nourished, well developed, alert, in no acute distress    HENT  · Head and Face: appears normal    Neck  · Inspection/Palpation: normal appearance, no masses or tenderness  · Lymph Nodes: no lymphadenopathy present  · Thyroid: gland size normal, nontender, no nodules or masses present on palpation    Chest  · Respiratory Effort: breathing unlabored  · Auscultation: normal breath sounds    Cardiovascular  · Heart:  · Auscultation: regular rate and rhythm without murmur    Breasts  · Inspection of Breasts: breasts symmetrical, no skin changes, no discharge present, nipple appearance normal, no skin retraction present  · Palpation of Breasts and Axillae: no masses present on palpation, no breast tenderness  · Axillary Lymph Nodes: no lymphadenopathy present    Gastrointestinal  · Abdominal Examination: abdomen non-tender to palpation, normal bowel sounds, no masses present  · Liver and spleen: no hepatomegaly present, spleen not palpable  · Hernias: no hernias identified    Genitourinary  · External Genitalia: normal appearance for age, no discharge present, no tenderness present, no inflammatory lesions present, no masses present  · Vagina: normal vaginal vault without central or paravaginal defects, thin white discharge present, no inflammatory lesions present, no masses present  · Bladder: non-tender to palpation  · Urethra: appears normal  · Cervix: normal   · Uterus: normal size, shape and consistency  · Adnexa: no adnexal tenderness present, no adnexal masses present  · Perineum: perineum within normal limits, no evidence of trauma, no rashes or skin lesions present  · Anus: anus within normal limits, no hemorrhoids present  · Inguinal Lymph Nodes: no lymphadenopathy present    Skin  · General Inspection: no rash, no lesions identified    Neurologic/Psychiatric  · Mental Status:  · Orientation: grossly oriented to person, place and time  · Mood and Affect: mood normal, affect appropriate    Assessment:  42 y.o.  for well woman exam  Encounter Diagnoses   Name Primary?  Encounter for gynecological examination (general) (routine) with abnormal findings Yes    Pelvic pain in female     Vaginal discharge        Plan:  The patient was counseled about diet, exercise, healthy lifestyle  We discussed self breast exam  We discussed safer sex practices, condom use and risk factors for sexually transmitted diseases. We discussed current pap smear and HR HPV testing guidelines. We recommend follow up one year for routine annual gynecologic exam or sooner prn  We recommend routine follow up with her primary care doctor for management of chronic medical problems and non-gynecologic concerns  Handouts were given to the patient  We discussed calcium/vitamin D/weight bearing exercise and osteoporosis prevention  rec fu and US for bloating or can do after iud insertion  It is preferred to place at the beginning of cycle.   Confirm AE up to date, pap up to date, no unprotected intercourse x 2 weeks prior to visit, and that she has checked with her insurance about coverage. We discussed potential causes of vaginal discharge/irritation. We discussed good vulvar hygiene. Recommended avoid vaginal irritants. Discussed use of mild soaps/detergents. Follow up if NI. We reviewed wet prep findings with the patient at her visit. Patient will be notified about swab results and prescription sent, if indicated.    Plan mirena on next cycle    Folllow up:  [x] return for annual well woman exam in one year or sooner if she is having problems  [] follow up and ultrasound  [] 6 months  [] 3 months  [] 6 weeks   [] 1 month    Orders Placed This Encounter    NUSWAB VAGINITIS    AMB POC WET PREP (AKA STAIN, INTERPRET, WET MOUNT)       Results for orders placed or performed in visit on 01/23/20   AMB POC SMEAR, STAIN & INTERPRET, WET MOUNT   Result Value Ref Range    Wet mount (POC)      Narrative    AYANNA    Hypae: negative  Buds: negative    Wet Prep:  Trich: negative  Clue cells: negative  Hyphae: negative  Buds: negative  WBC's: normal

## 2020-01-27 LAB
A VAGINAE DNA VAG QL NAA+PROBE: NORMAL SCORE
BVAB2 DNA VAG QL NAA+PROBE: NORMAL SCORE
C ALBICANS DNA VAG QL NAA+PROBE: NEGATIVE
C GLABRATA DNA VAG QL NAA+PROBE: NEGATIVE
MEGA1 DNA VAG QL NAA+PROBE: NORMAL SCORE
T VAGINALIS RRNA SPEC QL NAA+PROBE: NEGATIVE

## 2020-02-18 DIAGNOSIS — G43.909 MIGRAINE WITHOUT STATUS MIGRAINOSUS, NOT INTRACTABLE, UNSPECIFIED MIGRAINE TYPE: ICD-10-CM

## 2020-02-19 RX ORDER — FREMANEZUMAB-VFRM 225 MG/1.5ML
INJECTION SUBCUTANEOUS
Qty: 1.5 UNSPECIFIED | Refills: 2 | Status: SHIPPED | OUTPATIENT
Start: 2020-02-19 | End: 2020-03-04

## 2020-03-04 ENCOUNTER — OFFICE VISIT (OUTPATIENT)
Dept: NEUROLOGY | Age: 40
End: 2020-03-04

## 2020-03-04 VITALS
HEART RATE: 107 BPM | HEIGHT: 64 IN | SYSTOLIC BLOOD PRESSURE: 124 MMHG | WEIGHT: 251 LBS | BODY MASS INDEX: 42.85 KG/M2 | DIASTOLIC BLOOD PRESSURE: 92 MMHG | OXYGEN SATURATION: 98 %

## 2020-03-04 DIAGNOSIS — G43.909 MIGRAINE WITHOUT STATUS MIGRAINOSUS, NOT INTRACTABLE, UNSPECIFIED MIGRAINE TYPE: Primary | ICD-10-CM

## 2020-03-04 NOTE — PROGRESS NOTES
Date:  20     Name:  Cassandra Maldonado  :  1980  MRN:  [id number]     PCP:  Rere Cosme MD    Chief Complaint   Patient presents with    Headache     HISTORY OF PRESENT ILLNESS: Follow up evaluation of migraine. At her last office visit, she was using Ajovy for migraine prevention and doing well but had to stop taking this due to an unexpected pregnancy. While pregnant, she did very well and only had one migraine the entire time. Now she would like to resume taking something for the migraines as they have resumed to having them 3-4 times per week. Except as noted above, denies  fever, chills, cough. No CP or SOB. No dysuria, loss of bowel or bladder control. No Weight loss. Appetite good. Sleeping well. No sweats. No edema. No bruising or bleeding. No nausea or vomit. No diarrhea. No frequency, urgency, No depressive sxs. No anxiety. Denies sore throat, nasal congestion, nasal discharge, epistaxis, tinnitus, hearing loss, back pain, muscle pain, or joint pain. No current outpatient medications on file. No current facility-administered medications for this visit.       No Known Allergies  Past Medical History:   Diagnosis Date    ADHD     Dysplasia of cervix, low grade (ORION 1) 12    colpo    Encounter for IUD removal 2016    Mirena    Essential hypertension     10 years ago    GERD (gastroesophageal reflux disease)     trying to control with diet since pregnant    H/O abnormal Pap smear 12;12    LGSIL, HPV positive    Headache     Headache(784.0)     IUD (intrauterine device) in place 2014    Mirena    Kidney disease     kidney stones     Migraine headache     without aura    pap smear for 11;13; 4/1/15;19    neg, HPV neg,no ECC; neg, HPV neg, Negative, HPV negative; neg pap and neg HPV     Past Surgical History:   Procedure Laterality Date    HX COLPOSCOPY  12    ORION 1    HX MATT angelo teeth    HX LITHOTRIPSY      HX WISDOM TEETH EXTRACTION       Social History     Socioeconomic History    Marital status:      Spouse name: Not on file    Number of children: Not on file    Years of education: Not on file    Highest education level: Not on file   Occupational History    Not on file   Social Needs    Financial resource strain: Not on file    Food insecurity:     Worry: Not on file     Inability: Not on file    Transportation needs:     Medical: Not on file     Non-medical: Not on file   Tobacco Use    Smoking status: Never Smoker    Smokeless tobacco: Never Used   Substance and Sexual Activity    Alcohol use: Not Currently     Comment: social    Drug use: No    Sexual activity: Yes     Partners: Male     Birth control/protection: Condom   Lifestyle    Physical activity:     Days per week: Not on file     Minutes per session: Not on file    Stress: Not on file   Relationships    Social connections:     Talks on phone: Not on file     Gets together: Not on file     Attends Nondenominational service: Not on file     Active member of club or organization: Not on file     Attends meetings of clubs or organizations: Not on file     Relationship status: Not on file    Intimate partner violence:     Fear of current or ex partner: Not on file     Emotionally abused: Not on file     Physically abused: Not on file     Forced sexual activity: Not on file   Other Topics Concern     Service Not Asked    Blood Transfusions Not Asked    Caffeine Concern Not Asked    Occupational Exposure Not Asked   [place] Hazards Not Asked    Sleep Concern Not Asked    Stress Concern Not Asked    Weight Concern Not Asked    Special Diet Not Asked    Back Care Not Asked    Exercise Not Asked    Bike Helmet Not Asked    Seat Belt Not Asked    Self-Exams Not Asked   Social History Narrative    Not on file     Family History   Problem Relation Age of Onset    Hypertension Father     Heart Disease Father     Diabetes Father    [place] Elevated Lipids Father     Cancer Father         eye    Cancer Mother         Cervical,Uterine,& Ovarian (pt questions ovarian but got cancer after copper 7 IUD)    Depression Mother     Allergic Rhinitis Brother     Asthma Brother     Other Brother         Chron's    Hypertension Brother     Alcohol abuse Maternal Grandfather     Depression Maternal Grandfather     Alcohol abuse Paternal Grandmother     Stroke Paternal Grandmother     Migraines Brother     Allergic Rhinitis Brother     Breast Cancer Other        PHYSICAL EXAMINATION:    Visit Vitals  BP (!) 124/92   Pulse (!) 107   Ht 5' 4\" (1.626 m)   Wt 113.9 kg (251 lb)   SpO2 98%   BMI 43.08 kg/m²     General:  Well defined, nourished, and groomed individual in no acute distress. Neck: Supple, nontender, no bruits, no pain with resistance to active range of motion. Heart: Regular rate and rhythm, no murmurs, rub, or gallop. Normal S1S2. Lungs:  Clear to auscultation bilaterally with equal chest expansion, no cough, no wheeze  Musculoskeletal:  Extremities revealed no edema and had full range of motion of joints. Psych:  Good mood and bright affect    NEUROLOGICAL EXAMINATION:     Mental Status:   Alert and oriented to person, place, and time with recent and remote memory intact. Attention span and concentration are normal. Speech is fluent with a full fund of knowledge. Cranial Nerves:    II, III, IV, VI:  Visual acuity grossly intact. Visual fields are normal.    Pupils are equal, round, and reactive to light and accommodation. Extra-ocular movements are full and fluid. Fundoscopic exam was benign, no ptosis or nystagmus. V-XII: Hearing is grossly intact. Facial features are symmetric, with normal sensation and strength. The palate rises symmetrically and the tongue protrudes midline. Sternocleidomastoids 5/5.       Motor Examination: Normal tone, bulk, and strength, 5/5 muscle strength throughout. N      Sensory exam:  Normal throughout to temperature    Coordination:  Finger to nose was normal.   No resting or intention tremor    Gait and Station:  Steady while walking. Normal arm swing. No Rhomberg or pronator drift. No muscle wasting or fasiculations noted. Reflexes:  DTRs 2+ throughout. ASSESSMENT AND PLAN    ICD-10-CM ICD-9-CM    1. Migraine without status migrainosus, not intractable, unspecified migraine type G43.909 346.90 ubrogepant (UBRELVY) 50 mg tablet      galcanezumab-gnlm (EMGALITY PEN) 120 mg/mL injection      Since delivery of her daughter, Cordell Mathews, she has started to have the migraines with increased regularity. Discussed preventative strategies and will start Emgality. We also discussed acute abortive therapy that she might be able to use aside from the Fiorinal. She has been on triptans in the past and had severe triptan side effects. Provided with samples of Ubrelvy. Purpose, potential side effects, storage, and administration of these medications were discussed and she has verbalized understanding. Follow up in three months. Idania Mcnair [facility]

## 2020-03-04 NOTE — PROGRESS NOTES
1-2 headaches per week lasting 24 hours. Takes fioricet and ibuprofen. Baby in September. Not breast feeding.

## 2020-03-05 ENCOUNTER — TELEPHONE (OUTPATIENT)
Dept: INTERNAL MEDICINE CLINIC | Age: 40
End: 2020-03-05

## 2020-03-05 ENCOUNTER — PATIENT MESSAGE (OUTPATIENT)
Dept: NEUROLOGY | Age: 40
End: 2020-03-05

## 2020-03-05 ENCOUNTER — TELEPHONE (OUTPATIENT)
Dept: NEUROLOGY | Age: 40
End: 2020-03-05

## 2020-03-05 DIAGNOSIS — G43.909 MIGRAINE WITHOUT STATUS MIGRAINOSUS, NOT INTRACTABLE, UNSPECIFIED MIGRAINE TYPE: Primary | ICD-10-CM

## 2020-03-05 NOTE — TELEPHONE ENCOUNTER
----- Message from Katie Castellano sent at 3/5/2020 11:59 AM EST -----  Regarding: Dr. Jim Shaper: [phone]  Pt only seen once as \"same day\", no NP appt on file. . did not schedule because unsure if pt needs to have NP appt first     Appointment Request From: Nessa Ibrahim    With Provider: MD [facility]]    Preferred Date Range: 3/9/2020 - 3/20/2020    Preferred Times: Any time    Reason for visit: Request an Appointment    Comments:  I wanted to schedule a physical, I have a few issues I want to discuss, and I would also like to switch to [facility] for my PCP, my  sees him also. I do have a physical scheduled for may with my old PCP but she never has availability that works anymore, plus is far away and I have a new baby, I want to change to this practice.    I have been having some chest pain- which I think is anxiety, just want to get checked out as it is time for a full physical.    thank you very much

## 2020-03-05 NOTE — TELEPHONE ENCOUNTER
----- Message from Bruna Graff sent at 3/4/2020  8:00 AM EST -----  Regarding: Emgality Sample  Patient needs emgality loading dose sample

## 2020-04-15 ENCOUNTER — VIRTUAL VISIT (OUTPATIENT)
Dept: INTERNAL MEDICINE CLINIC | Age: 40
End: 2020-04-15

## 2020-04-15 DIAGNOSIS — E66.01 CLASS 3 SEVERE OBESITY WITHOUT SERIOUS COMORBIDITY WITH BODY MASS INDEX (BMI) OF 40.0 TO 44.9 IN ADULT, UNSPECIFIED OBESITY TYPE (HCC): ICD-10-CM

## 2020-04-15 DIAGNOSIS — K58.9 IRRITABLE BOWEL SYNDROME, UNSPECIFIED TYPE: ICD-10-CM

## 2020-04-15 DIAGNOSIS — R63.5 WEIGHT GAIN: Primary | ICD-10-CM

## 2020-04-15 RX ORDER — PHENTERMINE HYDROCHLORIDE 37.5 MG/1
37.5 TABLET ORAL
Qty: 30 TAB | Refills: 2 | Status: SHIPPED | OUTPATIENT
Start: 2020-04-15 | End: 2020-06-11

## 2020-04-30 ENCOUNTER — APPOINTMENT (OUTPATIENT)
Dept: CT IMAGING | Age: 40
End: 2020-04-30
Attending: EMERGENCY MEDICINE
Payer: COMMERCIAL

## 2020-04-30 ENCOUNTER — HOSPITAL ENCOUNTER (EMERGENCY)
Age: 40
Discharge: HOME | End: 2020-04-30
Attending: EMERGENCY MEDICINE
Payer: COMMERCIAL

## 2020-04-30 VITALS
HEART RATE: 70 BPM | WEIGHT: 253.09 LBS | SYSTOLIC BLOOD PRESSURE: 157 MMHG | RESPIRATION RATE: 20 BRPM | HEIGHT: 64 IN | BODY MASS INDEX: 43.21 KG/M2 | OXYGEN SATURATION: 100 % | DIASTOLIC BLOOD PRESSURE: 90 MMHG | TEMPERATURE: 97.5 F

## 2020-04-30 DIAGNOSIS — N20.0 KIDNEY STONE: Primary | ICD-10-CM

## 2020-04-30 LAB
ALBUMIN SERPL-MCNC: 4.4 G/DL (ref 3.5–5)
ALBUMIN/GLOB SERPL: 1.3 {RATIO} (ref 1.1–2.2)
ALP SERPL-CCNC: 67 U/L (ref 45–117)
ALT SERPL-CCNC: 34 U/L (ref 12–78)
ANION GAP SERPL CALC-SCNC: 13 MMOL/L (ref 5–15)
APPEARANCE UR: ABNORMAL
AST SERPL-CCNC: 19 U/L (ref 15–37)
BACTERIA URNS QL MICRO: ABNORMAL /HPF
BASOPHILS # BLD: 0 K/UL (ref 0–0.1)
BASOPHILS NFR BLD: 0 % (ref 0–1)
BILIRUB SERPL-MCNC: 0.2 MG/DL (ref 0.2–1)
BILIRUB UR QL: NEGATIVE
BUN SERPL-MCNC: 21 MG/DL (ref 6–20)
BUN/CREAT SERPL: 18 (ref 12–20)
CALCIUM SERPL-MCNC: 9.3 MG/DL (ref 8.5–10.1)
CHLORIDE SERPL-SCNC: 104 MMOL/L (ref 97–108)
CO2 SERPL-SCNC: 24 MMOL/L (ref 21–32)
COLOR UR: ABNORMAL
COMMENT, HOLDF: NORMAL
CREAT SERPL-MCNC: 1.16 MG/DL (ref 0.55–1.02)
DIFFERENTIAL METHOD BLD: ABNORMAL
EOSINOPHIL # BLD: 0.2 K/UL (ref 0–0.4)
EOSINOPHIL NFR BLD: 1 % (ref 0–7)
EPITH CASTS URNS QL MICRO: ABNORMAL /LPF
ERYTHROCYTE [DISTWIDTH] IN BLOOD BY AUTOMATED COUNT: 13.4 % (ref 11.5–14.5)
GLOBULIN SER CALC-MCNC: 3.3 G/DL (ref 2–4)
GLUCOSE SERPL-MCNC: 138 MG/DL (ref 65–100)
GLUCOSE UR STRIP.AUTO-MCNC: NEGATIVE MG/DL
GRAN CASTS URNS QL MICRO: ABNORMAL /LPF
HCG UR QL: NEGATIVE
HCT VFR BLD AUTO: 40.7 % (ref 35–47)
HGB BLD-MCNC: 13.4 G/DL (ref 11.5–16)
HGB UR QL STRIP: NEGATIVE
HYALINE CASTS URNS QL MICRO: ABNORMAL /LPF (ref 0–5)
IMM GRANULOCYTES # BLD AUTO: 0 K/UL (ref 0–0.04)
IMM GRANULOCYTES NFR BLD AUTO: 0 % (ref 0–0.5)
KETONES UR QL STRIP.AUTO: NEGATIVE MG/DL
LEUKOCYTE ESTERASE UR QL STRIP.AUTO: NEGATIVE
LYMPHOCYTES # BLD: 2.5 K/UL (ref 0.8–3.5)
LYMPHOCYTES NFR BLD: 21 % (ref 12–49)
MCH RBC QN AUTO: 27.6 PG (ref 26–34)
MCHC RBC AUTO-ENTMCNC: 32.9 G/DL (ref 30–36.5)
MCV RBC AUTO: 83.9 FL (ref 80–99)
MONOCYTES # BLD: 1.1 K/UL (ref 0–1)
MONOCYTES NFR BLD: 9 % (ref 5–13)
NEUTS SEG # BLD: 8.3 K/UL (ref 1.8–8)
NEUTS SEG NFR BLD: 68 % (ref 32–75)
NITRITE UR QL STRIP.AUTO: NEGATIVE
NRBC # BLD: 0 K/UL (ref 0–0.01)
NRBC BLD-RTO: 0 PER 100 WBC
PH UR STRIP: 6 [PH] (ref 5–8)
PLATELET # BLD AUTO: 315 K/UL (ref 150–400)
PMV BLD AUTO: 10.7 FL (ref 8.9–12.9)
POTASSIUM SERPL-SCNC: 3.7 MMOL/L (ref 3.5–5.1)
PROT SERPL-MCNC: 7.7 G/DL (ref 6.4–8.2)
PROT UR STRIP-MCNC: ABNORMAL MG/DL
RBC # BLD AUTO: 4.85 M/UL (ref 3.8–5.2)
RBC #/AREA URNS HPF: ABNORMAL /HPF (ref 0–5)
SAMPLES BEING HELD,HOLD: NORMAL
SODIUM SERPL-SCNC: 141 MMOL/L (ref 136–145)
SP GR UR REFRACTOMETRY: 1.02 (ref 1–1.03)
UR CULT HOLD, URHOLD: NORMAL
UROBILINOGEN UR QL STRIP.AUTO: 0.2 EU/DL (ref 0.2–1)
WBC # BLD AUTO: 12.1 K/UL (ref 3.6–11)
WBC URNS QL MICRO: ABNORMAL /HPF (ref 0–4)

## 2020-04-30 PROCEDURE — 74011250637 HC RX REV CODE- 250/637: Performed by: EMERGENCY MEDICINE

## 2020-04-30 PROCEDURE — 81001 URINALYSIS AUTO W/SCOPE: CPT

## 2020-04-30 PROCEDURE — 74176 CT ABD & PELVIS W/O CONTRAST: CPT

## 2020-04-30 PROCEDURE — 96374 THER/PROPH/DIAG INJ IV PUSH: CPT

## 2020-04-30 PROCEDURE — 81025 URINE PREGNANCY TEST: CPT

## 2020-04-30 PROCEDURE — 74011250636 HC RX REV CODE- 250/636: Performed by: EMERGENCY MEDICINE

## 2020-04-30 PROCEDURE — 36415 COLL VENOUS BLD VENIPUNCTURE: CPT

## 2020-04-30 PROCEDURE — 96361 HYDRATE IV INFUSION ADD-ON: CPT

## 2020-04-30 PROCEDURE — 80053 COMPREHEN METABOLIC PANEL: CPT

## 2020-04-30 PROCEDURE — 85025 COMPLETE CBC W/AUTO DIFF WBC: CPT

## 2020-04-30 PROCEDURE — 99284 EMERGENCY DEPT VISIT MOD MDM: CPT

## 2020-04-30 RX ORDER — TAMSULOSIN HYDROCHLORIDE 0.4 MG/1
0.4 CAPSULE ORAL DAILY
Qty: 15 CAP | Refills: 0 | Status: SHIPPED | OUTPATIENT
Start: 2020-04-30 | End: 2020-05-15

## 2020-04-30 RX ORDER — ONDANSETRON 4 MG/1
4 TABLET, ORALLY DISINTEGRATING ORAL
Qty: 20 TAB | Refills: 0 | Status: SHIPPED | OUTPATIENT
Start: 2020-04-30 | End: 2020-06-11

## 2020-04-30 RX ORDER — TAMSULOSIN HYDROCHLORIDE 0.4 MG/1
0.4 CAPSULE ORAL
Status: COMPLETED | OUTPATIENT
Start: 2020-04-30 | End: 2020-04-30

## 2020-04-30 RX ORDER — HYDROCODONE BITARTRATE AND ACETAMINOPHEN 5; 325 MG/1; MG/1
1 TABLET ORAL
Qty: 10 TAB | Refills: 0 | Status: SHIPPED | OUTPATIENT
Start: 2020-04-30 | End: 2020-05-03

## 2020-04-30 RX ORDER — KETOROLAC TROMETHAMINE 30 MG/ML
30 INJECTION, SOLUTION INTRAMUSCULAR; INTRAVENOUS
Status: COMPLETED | OUTPATIENT
Start: 2020-04-30 | End: 2020-04-30

## 2020-04-30 RX ADMIN — KETOROLAC TROMETHAMINE 30 MG: 30 INJECTION INTRAMUSCULAR; INTRAVENOUS at 22:10

## 2020-04-30 RX ADMIN — TAMSULOSIN HYDROCHLORIDE 0.4 MG: 0.4 CAPSULE ORAL at 23:30

## 2020-04-30 RX ADMIN — SODIUM CHLORIDE 1000 ML: 900 INJECTION, SOLUTION INTRAVENOUS at 22:23

## 2020-05-01 NOTE — ED TRIAGE NOTES
Pt rpts sudden onset of left flank pain with left groin radiation starting approx 2 hours PTA.   Pt took 28 [facility] for pain.  + nausea

## 2020-05-01 NOTE — DISCHARGE INSTRUCTIONS
Patient Education        Kidney Stone: Care Instructions  Your Care Instructions    Kidney stones are formed when salts, minerals, and other substances normally found in the urine clump together. They can be as small as grains of sand or, rarely, as large as golf balls. While the stone is traveling through the ureter, which is the tube that carries urine from the kidney to the bladder, you will probably feel pain. The pain may be mild or very severe. You may also have some blood in your urine. As soon as the stone reaches the bladder, any intense pain should go away. If a stone is too large to pass on its own, you may need a medical procedure to help you pass the stone. The doctor has checked you carefully, but problems can develop later. If you notice any problems or new symptoms, get medical treatment right away. Follow-up care is a key part of your treatment and safety. Be sure to make and go to all appointments, and call your doctor if you are having problems. It's also a good idea to know your test results and keep a list of the medicines you take. How can you care for yourself at home? · Drink plenty of fluids, enough so that your urine is light yellow or clear like water. If you have kidney, heart, or liver disease and have to limit fluids, talk with your doctor before you increase the amount of fluids you drink. · Take pain medicines exactly as directed. Call your doctor if you think you are having a problem with your medicine. ? If the doctor gave you a prescription medicine for pain, take it as prescribed. ? If you are not taking a prescription pain medicine, ask your doctor if you can take an over-the-counter medicine. Read and follow all instructions on the label. · Your doctor may ask you to strain your urine so that you can collect your kidney stone when it passes. You can use a kitchen strainer or a tea strainer to catch the stone.  Store it in a plastic bag until you see your doctor again.  Preventing future kidney stones  Some changes in your diet may help prevent kidney stones. Depending on the cause of your stones, your doctor may recommend that you:  · Drink plenty of fluids, enough so that your urine is light yellow or clear like water. If you have kidney, heart, or liver disease and have to limit fluids, talk with your doctor before you increase the amount of fluids you drink. · Limit coffee, tea, and alcohol. Also avoid grapefruit juice. · Do not take more than the recommended daily dose of vitamins C and D.  · Avoid antacids such as Gaviscon, Maalox, Mylanta, or Tums. · Limit the amount of salt (sodium) in your diet. · Eat a balanced diet that is not too high in protein. · Limit foods that are high in a substance called oxalate, which can cause kidney stones. These foods include dark green vegetables, rhubarb, chocolate, wheat bran, nuts, cranberries, and beans. When should you call for help? Call your doctor now or seek immediate medical care if:    · You cannot keep down fluids.     · Your pain gets worse.     · You have a fever or chills.     · You have new or worse pain in your back just below your rib cage (the flank area).     · You have new or more blood in your urine.    Watch closely for changes in your health, and be sure to contact your doctor if:    · You do not get better as expected. Where can you learn more? Go to http://kori-estella.info/  Enter X887 in the search box to learn more about \"Kidney Stone: Care Instructions. \"  Current as of: August 11, 2019Content Version: 12.4  © 5828-5297 [employer], Incorporated. Care instructions adapted under license by [employer] (which disclaims liability or warranty for this information).  If you have questions about a medical condition or this instruction, always ask your healthcare professional. [address] any warranty or liability for your use of this information.

## 2020-05-01 NOTE — ED NOTES
Discharge note: The patient was discharged home in stable condition. The patient is alert and oriented, is in no respiratory distress. The patient's diagnosis, condition and treatment were explained to patient by Dr Rashida Miguel and reinforced by nurse. The patient expressed understanding of discharge instructions, prescriptions, and plan of care. A discharge plan has been developed. A  was not involved in the process. Patient offered a wheelchair to ED lobby for discharge but declined at this time. Patient ambulatory to ED lobby to go home.

## 2020-05-01 NOTE — ED PROVIDER NOTES
Emanuel Valladares is a 43 yo F with history of kidney stone who developed left flank pain tonight around 7pm.  Pain is severe, 9/10 and is accompanied by nausea. She took zofran for nausea but has not taken anything for pain because she was afraid that she would vomit. She denies dysuria or hematuria but states that pain feels very similar to previous kidney stone.              Past Medical History:   Diagnosis Date    ADHD     Dysplasia of cervix, low grade (ORION 1) 2/23/12    colpo    Encounter for IUD removal 08/19/2016    Mirena    Essential hypertension     10 years ago    GERD (gastroesophageal reflux disease)     trying to control with diet since pregnant    H/O abnormal Pap smear 11/8/12;2/8/12    LGSIL, HPV positive    Headache     Headache(784.0)     IUD (intrauterine device) in place 03/11/2014    Mirena    Kidney disease     kidney stones 2015    Migraine headache     without aura    pap smear for 2/1/11;2/5/13; 4/1/15;2/26/19    neg, HPV neg,no ECC; neg, HPV neg, Negative, HPV negative; neg pap and neg HPV       Past Surgical History:   Procedure Laterality Date    HX COLPOSCOPY  2/23/12    ORION 1    HX HEENT      wisdom teeth    HX LITHOTRIPSY      HX WISDOM TEETH EXTRACTION           Family History:   Problem Relation Age of Onset    Hypertension Father     Heart Disease Father     Diabetes Father     Elevated Lipids Father     Cancer Father         eye    Cancer Mother         Cervical,Uterine,& Ovarian (pt questions ovarian but got cancer after copper 7 IUD)    Depression Mother     Allergic Rhinitis Brother     Asthma Brother     Other Brother         Chron's    Hypertension Brother     Alcohol abuse Maternal Grandfather     Depression Maternal Grandfather     Alcohol abuse Paternal Grandmother     Stroke Paternal Grandmother     Migraines Brother     Allergic Rhinitis Brother     Breast Cancer Other        Social History     Socioeconomic History    Marital status:      Spouse name: Not on file    Number of children: Not on file    Years of education: Not on file    Highest education level: Not on file   Occupational History    Not on file   Social Needs    Financial resource strain: Not on file    Food insecurity     Worry: Not on file     Inability: Not on file    Transportation needs     Medical: Not on file     Non-medical: Not on file   Tobacco Use    Smoking status: Never Smoker    Smokeless tobacco: Never Used   Substance and Sexual Activity    Alcohol use: Not Currently     Comment: social    Drug use: No    Sexual activity: Yes     Partners: Male     Birth control/protection: Condom   Lifestyle    Physical activity     Days per week: Not on file     Minutes per session: Not on file    Stress: Not on file   Relationships    Social connections     Talks on phone: Not on file     Gets together: Not on file     Attends Quaker service: Not on file     Active member of club or organization: Not on file     Attends meetings of clubs or organizations: Not on file     Relationship status: Not on file    Intimate partner violence     Fear of current or ex partner: Not on file     Emotionally abused: Not on file     Physically abused: Not on file     Forced sexual activity: Not on file   Other Topics Concern     Service Not Asked    Blood Transfusions Not Asked    Caffeine Concern Not Asked    Occupational Exposure Not Asked   Onalee Colon Hazards Not Asked    Sleep Concern Not Asked    Stress Concern Not Asked    Weight Concern Not Asked    Special Diet Not Asked    Back Care Not Asked    Exercise Not Asked    Bike Helmet Not Asked    Seat Belt Not Asked    Self-Exams Not Asked   Social History Narrative    Not on file         ALLERGIES: Patient has no known allergies. Review of Systems   Constitutional: Negative for fever. HENT: Negative for sore throat. Eyes: Negative for visual disturbance. Respiratory: Negative for cough. Cardiovascular: Negative for chest pain. Gastrointestinal: Negative for abdominal pain. Genitourinary: Positive for flank pain. Negative for dysuria and hematuria. Musculoskeletal: Negative for back pain. Skin: Negative for rash. Neurological: Negative for headaches. Vitals:    04/30/20 2125   BP: (!) 174/103   Pulse: 70   Resp: 18   Temp: 97.5 °F (36.4 °C)   SpO2: 99%   Weight: 114.8 kg (253 lb 1.4 oz)   Height: 5' 4\" (1.626 m)            Physical Exam  Vitals signs and nursing note reviewed. Constitutional:       General: She is in acute distress (due to pain, pacing in room and moaning). Appearance: She is well-developed. HENT:      Head: Normocephalic and atraumatic. Eyes:      Conjunctiva/sclera: Conjunctivae normal.   Neck:      Musculoskeletal: Normal range of motion. Trachea: Phonation normal.   Cardiovascular:      Rate and Rhythm: Normal rate. Pulmonary:      Effort: Pulmonary effort is normal. No respiratory distress. Abdominal:      General: There is no distension. Tenderness: There is no abdominal tenderness. There is no right CVA tenderness or left CVA tenderness. Musculoskeletal: Normal range of motion. General: No tenderness. Skin:     General: Skin is warm and dry. Neurological:      Mental Status: She is alert. She is not disoriented. Motor: No abnormal muscle tone. MDM       11:12 PM  Patient reassessed and pain is resolved. CT abd/pelvis reveals mild left hydroureteronephrosis and punctate UVJ stone. Flomax ordered. Will discharge home. With prescription for flomax, zofran, norco,  And [facility] kidney stone hotline.   Procedures

## 2020-05-19 RX ORDER — BUTALBITAL, ACETAMINOPHEN AND CAFFEINE 50; 325; 40 MG/1; MG/1; MG/1
TABLET ORAL
Qty: 30 TAB | Refills: 1 | Status: SHIPPED | OUTPATIENT
Start: 2020-05-19 | End: 2020-09-14

## 2020-06-11 ENCOUNTER — OFFICE VISIT (OUTPATIENT)
Dept: NEUROLOGY | Age: 40
End: 2020-06-11

## 2020-06-11 VITALS
TEMPERATURE: 96.9 F | DIASTOLIC BLOOD PRESSURE: 80 MMHG | WEIGHT: 249 LBS | RESPIRATION RATE: 17 BRPM | HEIGHT: 64 IN | HEART RATE: 87 BPM | OXYGEN SATURATION: 98 % | BODY MASS INDEX: 42.51 KG/M2 | SYSTOLIC BLOOD PRESSURE: 138 MMHG

## 2020-06-11 DIAGNOSIS — E34.8 PINEAL GLAND CYST: ICD-10-CM

## 2020-06-11 DIAGNOSIS — G43.909 MIGRAINE WITHOUT STATUS MIGRAINOSUS, NOT INTRACTABLE, UNSPECIFIED MIGRAINE TYPE: Primary | ICD-10-CM

## 2020-06-11 NOTE — PROGRESS NOTES
Pt is here for follow up for headaches, pt stated she has had at least 10 headaches in the last month.

## 2020-06-11 NOTE — PROGRESS NOTES
Date:  20     Name:  Kim Cox  :  1980  MRN:  [id number]     PCP:  Yunior Pro MD    Chief Complaint   Patient presents with    Migraine     HISTORY OF PRESENT ILLNESS: Follow up evaluation of migraine. At her last office visit, she was switched to [facility] as her insurance would not cover the Ajovy and she had started having migraines again 3-4 times per week. Since starting Emgality, she indicates that she really has not had any migraines. She has had to use the Fiorinal a couple of times for more minor headaches but this seems to work very well for her. As an alternative to triptan therapy for acute abortive therapy, she was given Ubrelvy samples to try but has not needed to use them. The only issue that she has had with the Emgality is that she may have some flushing for a couple of minutes and then a metallic taste in her mouth for about 10 to 15 minutes. It does resolve without any residual side effects. She is not having any injection site reactions. Recap from last office visit:  Since delivery of her daughter, London Gutierrez, she has started to have the migraines with increased regularity. Discussed preventative strategies and will start Emgality. We also discussed acute abortive therapy that she might be able to use aside from the Fiorinal. She has been on triptans in the past and had severe triptan side effects. Provided with samples of Ubrelvy. Purpose, potential side effects, storage, and administration of these medications were discussed and she has verbalized understanding. Follow up in three months. Current Outpatient Medications   Medication Sig    butalbital-acetaminophen-caffeine (FIORICET, ESGIC) -40 mg per tablet TAKE ONE TABLET BY MOUTH EVERY 6 HOURS AS NEEDED FOR PAIN    galcanezumab-gnlm (EMGALITY PEN) 120 mg/mL injection 2 mL by SubCUTAneous route every thirty (30) days.      No current facility-administered medications for this visit.       No Known Allergies  Past Medical History:   Diagnosis Date    ADHD     Dysplasia of cervix, low grade (ORION 1) 2/23/12    colpo    Encounter for IUD removal 08/19/2016    Mirena    Essential hypertension     10 years ago    GERD (gastroesophageal reflux disease)     trying to control with diet since pregnant    H/O abnormal Pap smear 11/8/12;2/8/12    LGSIL, HPV positive    Headache     Headache(784.0)     IUD (intrauterine device) in place 03/11/2014    Mirena    Kidney disease     kidney stones 2015    Migraine headache     without aura    pap smear for 2/1/11;2/5/13; 4/1/15;2/26/19    neg, HPV neg,no ECC; neg, HPV neg, Negative, HPV negative; neg pap and neg HPV     Past Surgical History:   Procedure Laterality Date    HX COLPOSCOPY  2/23/12    ORION 1    HX HEENT      wisdom teeth    HX LITHOTRIPSY      HX WISDOM TEETH EXTRACTION       Social History     Socioeconomic History    Marital status:      Spouse name: Not on file    Number of children: Not on file    Years of education: Not on file    Highest education level: Not on file   Occupational History    Not on file   Social Needs    Financial resource strain: Not on file    Food insecurity     Worry: Not on file     Inability: Not on file   [employer] needs     Medical: Not on file     Non-medical: Not on file   Tobacco Use    Smoking status: Never Smoker    Smokeless tobacco: Never Used   Substance and Sexual Activity    Alcohol use: Not Currently     Comment: social    Drug use: No    Sexual activity: Yes     Partners: Male     Birth control/protection: Condom   Lifestyle    Physical activity     Days per week: Not on file     Minutes per session: Not on file    Stress: Not on file   Relationships    Social connections     Talks on phone: Not on file     Gets together: Not on file     Attends Sikhism service: Not on file     Active member of club or organization: Not on file     Attends meetings of clubs or organizations: Not on file     Relationship status: Not on file    Intimate partner violence     Fear of current or ex partner: Not on file     Emotionally abused: Not on file     Physically abused: Not on file     Forced sexual activity: Not on file   Other Topics Concern     Service Not Asked    Blood Transfusions Not Asked    Caffeine Concern Not Asked    Occupational Exposure Not Asked    Hobby Hazards Not Asked    Sleep Concern Not Asked    Stress Concern Not Asked    Weight Concern Not Asked    Special Diet Not Asked    Back Care Not Asked    Exercise Not Asked    Bike Helmet Not Asked   [address] Not Asked    Self-Exams Not Asked   Social History Narrative    Not on file     Family History   Problem Relation Age of Onset    Hypertension Father     Heart Disease Father     Diabetes Father     Elevated Lipids Father     Cancer Father         eye    Cancer Mother         Cervical,Uterine,& Ovarian (pt questions ovarian but got cancer after copper 7 IUD)    Depression Mother     Allergic Rhinitis Brother     Asthma Brother     Other Brother         Chron's    Hypertension Brother     Alcohol abuse Maternal Grandfather     Depression Maternal Grandfather     Alcohol abuse Paternal Grandmother     Stroke Paternal Grandmother     Migraines Brother     Allergic Rhinitis Brother     Breast Cancer Other        PHYSICAL EXAMINATION:    Visit Vitals  /80   Pulse 87   Temp 96.9 °F (36.1 °C)   Resp 17   Ht 5' 4\" (1.626 m)   Wt 112.9 kg (249 lb)   SpO2 98%   BMI 42.74 kg/m²     General:  Well defined, nourished, and groomed individual in no acute distress. Neck: Supple, nontender, no bruits, no pain with resistance to active range of motion. Heart: Regular rate and rhythm, no murmurs, rub, or gallop. Normal S1S2.   Lungs:  Clear to auscultation bilaterally with equal chest expansion, no cough, no wheeze  Musculoskeletal:  Extremities revealed no edema and had full range of motion of joints. Psych:  Good mood and bright affect    NEUROLOGICAL EXAMINATION:     Mental Status:   Alert and oriented to person, place, and time with recent and remote memory intact. Attention span and concentration are normal. Speech is fluent with a full fund of knowledge. Cranial Nerves:    II, III, IV, VI:  Visual acuity grossly intact. Visual fields are normal.    Pupils are equal, round, and reactive to light and accommodation. Extra-ocular movements are full and fluid. Fundoscopic exam was benign, no ptosis or nystagmus. V-XII: Hearing is grossly intact. Facial features are symmetric, with normal sensation and strength. The palate rises symmetrically and the tongue protrudes midline. Sternocleidomastoids 5/5. Motor Examination: Normal tone, bulk, and strength, 5/5 muscle strength throughout. N      Sensory exam:  Normal throughout to temperature    Coordination:  Finger to nose was normal.   No resting or intention tremor    Gait and Station:  Steady while walking. Normal arm swing. No Rhomberg or pronator drift. No muscle wasting or fasiculations noted. Reflexes:  DTRs 2+ throughout. ASSESSMENT AND PLAN    ICD-10-CM ICD-9-CM    1. Migraine without status migrainosus, not intractable, unspecified migraine type G43.909 346.90    2. 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  She was instructed to let me know if they become bothersome or if she experiences any other side effects. Certainly she did well on Ajovy in the past with only some minor injection site reactions so we could certainly go back to that if the side effects of the Emgality become more bothersome. Since the Fiorinal does seem to work for her when she has severe migraine, she can continue using this as previously prescribed.   It sounds as though she only needs this maybe once or twice a month anyway. Follow-up in 6 months or sooner if needed. Idania Tian

## 2020-07-28 ENCOUNTER — TELEPHONE (OUTPATIENT)
Dept: INTERNAL MEDICINE CLINIC | Age: 40
End: 2020-07-28

## 2020-07-28 ENCOUNTER — VIRTUAL VISIT (OUTPATIENT)
Dept: INTERNAL MEDICINE CLINIC | Age: 40
End: 2020-07-28

## 2020-07-28 DIAGNOSIS — J01.90 ACUTE SINUSITIS, RECURRENCE NOT SPECIFIED, UNSPECIFIED LOCATION: Primary | ICD-10-CM

## 2020-07-28 RX ORDER — AMOXICILLIN AND CLAVULANATE POTASSIUM 875; 125 MG/1; MG/1
1 TABLET, FILM COATED ORAL EVERY 12 HOURS
Qty: 20 TAB | Refills: 0 | Status: SHIPPED | OUTPATIENT
Start: 2020-07-28 | End: 2020-08-03 | Stop reason: SDUPTHER

## 2020-07-28 NOTE — PROGRESS NOTES
Eve Ponce is a 42 y.o. female who was seen by synchronous (real-time) audio-video technology on 7/28/2020 for URI (pt c.o the following symptoms: started on Sunday w/sore throat, post nasal drip, headache, coughing (green mucous), & full sensation in ears)        Assessment & Plan:     Diagnoses and all orders for this visit:    1. Acute sinusitis, recurrence not specified, unspecified location  -     amoxicillin-clavulanate (AUGMENTIN) 875-125 mg per tablet; Take 1 Tab by mouth every twelve (12) hours for 10 days. Follow-up and Dispositions    · Return if symptoms worsen or fail to improve. Subjective:     Chief Complaint   Patient presents with    URI     pt c.o the following symptoms: started on Sunday w/sore throat, post nasal drip, headache, coughing (green mucous), & full sensation in ears     She has been coughing up green and bloody mucus. +Sore throat. +Nasal congestion. +Pain in frontal and maxillary areas. \"I generally get 2 sinus     Prior to Admission medications    Medication Sig Start Date End Date Taking? Authorizing Provider   butalbital-acetaminophen-caffeine (FIORICET, ESGIC) -40 mg per tablet TAKE ONE TABLET BY MOUTH EVERY 6 HOURS AS NEEDED FOR PAIN 5/19/20  Yes Roxanna RUSSELL NP   galcanezumab-gnlm (EMGALITY PEN) 120 mg/mL injection 2 mL by SubCUTAneous route every thirty (30) days. 3/9/20  Yes Erlinda Krause NP         Objective:   No flowsheet data found.      [INSTRUCTIONS:  \"[x]\" Indicates a positive item  \"[]\" Indicates a negative item  -- DELETE ALL ITEMS NOT EXAMINED]    Constitutional: [x] Appears well-developed and well-nourished [x] No apparent distress      [] Abnormal -     Mental status: [x] Alert and awake  [x] Oriented to person/place/time [x] Able to follow commands    [] Abnormal -     Eyes:   EOM    [x]  Normal    [] Abnormal -   Sclera  [x]  Normal    [] Abnormal -          Discharge [x]  None visible   [] Abnormal -     HENT: [x] Normocephalic, atraumatic  [] Abnormal -   [x] Mouth/Throat: Mucous membranes are moist    External Ears [x] Normal  [] Abnormal -    Neck: [x] No visualized mass [] Abnormal -     Pulmonary/Chest: [x] Respiratory effort normal   [x] No visualized signs of difficulty breathing or respiratory distress        [] Abnormal -      Musculoskeletal:   [x] Normal gait with no signs of ataxia         [x] Normal range of motion of neck        [] Abnormal -     Neurological:        [x] No Facial Asymmetry (Cranial nerve 7 motor function) (limited exam due to video visit)          [x] No gaze palsy        [] Abnormal -          Skin:        [x] No significant exanthematous lesions or discoloration noted on facial skin         [] Abnormal -            Psychiatric:       [x] Normal Affect [] Abnormal -        [x] No Hallucinations    Other pertinent observable physical exam findings:-        We discussed the expected course, resolution and complications of the diagnosis(es) in detail. Medication risks, benefits, costs, interactions, and alternatives were discussed as indicated. I advised her to contact the office if her condition worsens, changes or fails to improve as anticipated. She expressed understanding with the diagnosis(es) and plan. Sergei Holloway, who was evaluated through a patient-initiated, synchronous (real-time) audio-video encounter, and/or her healthcare decision maker, is aware that it is a billable service, with coverage as determined by her insurance carrier. She provided verbal consent to proceed: Yes, and patient identification was verified. It was conducted pursuant to the emergency declaration under the [address] [place] [address] and the [employer] Act. A caregiver was present when appropriate. Ability to conduct physical exam was limited. I was in the office. The patient was at home.       Shantal Cook Nilda Mathews MD

## 2020-07-28 NOTE — TELEPHONE ENCOUNTER
Patient states she needs a call back to get an Acute Virtual Visit with anyone today for symptoms of Sinus Infection. Please call.  Thank you

## 2020-07-28 NOTE — TELEPHONE ENCOUNTER
Spoke with patient. Two pt identifiers confirmed. Patient states that she would like to schedule an appointment for a virtual visit. Patient states that she thinks that she may have a sinus infection. Patient states that she is willing to schedule with any provider. Patient offered an appointment today, 07/28/2020 with Dr. Megan Rodriguez. Appointment accepted. Pt verbalized understanding of information discussed w/ no further questions at this time.

## 2020-07-28 NOTE — PATIENT INSTRUCTIONS
Office Policies Phone calls/patient messages: Please allow up to 24 hours for someone in the office to contact you about your call or message. Be mindful your provider may be out of the office or your message may require further review. We encourage you to use [facility] for your messages as this is a faster, more efficient way to communicate with our office Medication Refills: 
         
Prescription medications require 48-72 business hours to process. We encourage you to use [facility] for your refills. For controlled medications: Please allow 72 business hours to process. Certain medications may require you to  a written prescription at our office. NO narcotic/controlled medications will be prescribed after 4pm Monday through Friday or on weekends Form/Paperwork Completion: 
         
Please note a $25 fee may incur for all paperwork for completed by our providers. We ask that you allow 7-10 business days. Pre-payment is due prior to picking up/faxing the completed form. You may also download your forms to [facility] to have your doctor print off. 
 
 
1. Have you been to the ER, urgent care clinic since your last visit? Hospitalized since your last visit?no 2. Have you seen or consulted any other health care providers outside of the [address] [place] [address] since your last visit? Include any pap smears or colon screening.  no

## 2020-08-03 DIAGNOSIS — J01.90 ACUTE SINUSITIS, RECURRENCE NOT SPECIFIED, UNSPECIFIED LOCATION: ICD-10-CM

## 2020-08-03 RX ORDER — AMOXICILLIN AND CLAVULANATE POTASSIUM 875; 125 MG/1; MG/1
1 TABLET, FILM COATED ORAL EVERY 12 HOURS
Qty: 20 TAB | Refills: 0 | Status: SHIPPED | OUTPATIENT
Start: 2020-08-03 | End: 2020-08-13

## 2020-08-07 RX ORDER — LEVOFLOXACIN 750 MG/1
750 TABLET ORAL DAILY
Qty: 5 TAB | Refills: 0 | Status: SHIPPED | OUTPATIENT
Start: 2020-08-07 | End: 2020-08-12

## 2020-09-14 RX ORDER — BUTALBITAL, ACETAMINOPHEN AND CAFFEINE 50; 325; 40 MG/1; MG/1; MG/1
TABLET ORAL
Qty: 30 TAB | Refills: 0 | Status: SHIPPED | OUTPATIENT
Start: 2020-09-14 | End: 2020-12-28 | Stop reason: SDUPTHER

## 2020-11-02 NOTE — PATIENT INSTRUCTIONS
Intrauterine Device (IUD) Insertion: Care Instructions Your Care Instructions An intrauterine device (IUD) is a very effective method of birth control. It is a small, plastic, T-shaped device that contains copper or hormones. The doctor inserts the IUD into your uterus. You can have an IUD inserted at any time, as long as you aren't pregnant and you don't have a pelvic infection. If you and your doctor discuss it before you give birth, this can be done right after you have your baby. A plastic string tied to the end of the IUD hangs down through the cervix into the vagina. Your doctor may have you feel for the IUD string right after insertion, to be sure you know what it feels like. There are two types of IUDs. The copper IUD works for up to 10 years. The hormonal IUD works for either 3 years or 6 years, depending on which IUD is used. But your doctor may talk to you about leaving it in for longer. The hormonal IUD also reduces menstrual bleeding and cramping. Follow-up care is a key part of your treatment and safety. Be sure to make and go to all appointments, and call your doctor if you are having problems. It's also a good idea to know your test results and keep a list of the medicines you take. How can you care for yourself at home? · It's safe to use while breastfeeding. · You may experience some mild cramping and light bleeding (spotting) for 1 or 2 days. Use a hot water bottle or a heating pad set on low on your belly for pain. · Take an over-the-counter pain medicine, such as acetaminophen (Tylenol), ibuprofen (Advil, Motrin), and naproxen (Aleve) if needed. Read and follow all instructions on the label. · Do not take two or more pain medicines at the same time unless the doctor told you to. Many pain medicines have acetaminophen, which is Tylenol. Too much acetaminophen (Tylenol) can be harmful.  
· If you want to check the string of your IUD, insert a finger into your vagina and feel for the cervix, which is at the top of the vagina and feels harder than the rest of your vagina. You should be able to feel the thin, plastic string coming out of the opening of your cervix. If you cannot feel the string, use another form of birth control and make an appointment with your doctor to have the string checked. · If the IUD comes out, save it and call your doctor. Be sure to use another form of birth control while the IUD is out. · Use latex condoms to protect against sexually transmitted infections (STIs), such as gonorrhea and chlamydia. An IUD does not protect you from STIs. Having one sex partner (who does not have STIs and does not have sex with anyone else) is a good way to avoid STIs. When should you call for help? Call 911 anytime you think you may need emergency care. For example, call if: 
  · You passed out (lost consciousness).  
  · You have sudden, severe pain in your belly or pelvis. Call your doctor now or seek immediate medical care if: 
  · You have new belly or pelvic pain.  
  · You have severe vaginal bleeding. This means that you are soaking through your usual pads or tampons each hour for 2 or more hours.  
  · You are dizzy or lightheaded, or you feel like you may faint.  
  · You have a fever and pelvic pain or vaginal discharge.  
  · You have pelvic pain that is getting worse. Watch closely for changes in your health, and be sure to contact your doctor if: 
  · You cannot feel the string, or the IUD comes out.  
  · You feel sick to your stomach, or you vomit.  
  · You think you may be pregnant. Where can you learn more? Go to http://www.gray.com/ Enter A432 in the search box to learn more about \"Intrauterine Device (IUD) Insertion: Care Instructions. \" Current as of: February 11, 2020               Content Version: 12.6 © 7805-2824 [employer]. Care instructions adapted under license by [employer] (which disclaims liability or warranty for this information). If you have questions about a medical condition or this instruction, always ask your healthcare professional. [address] any warranty or liability for your use of this information.

## 2020-11-03 ENCOUNTER — OFFICE VISIT (OUTPATIENT)
Dept: OBGYN CLINIC | Age: 40
End: 2020-11-03
Payer: COMMERCIAL

## 2020-11-03 VITALS
DIASTOLIC BLOOD PRESSURE: 88 MMHG | WEIGHT: 245 LBS | HEIGHT: 64 IN | SYSTOLIC BLOOD PRESSURE: 130 MMHG | BODY MASS INDEX: 41.83 KG/M2

## 2020-11-03 DIAGNOSIS — N92.4 EXCESSIVE BLEEDING IN PREMENOPAUSAL PERIOD: ICD-10-CM

## 2020-11-03 DIAGNOSIS — Z76.89 ENCOUNTER FOR MENSTRUAL REGULATION: ICD-10-CM

## 2020-11-03 DIAGNOSIS — Z30.430 ENCOUNTER FOR IUD INSERTION: Primary | ICD-10-CM

## 2020-11-03 LAB
HCG URINE, QL. (POC): NEGATIVE
VALID INTERNAL CONTROL?: YES

## 2020-11-03 PROCEDURE — 81025 URINE PREGNANCY TEST: CPT | Performed by: OBSTETRICS & GYNECOLOGY

## 2020-11-03 PROCEDURE — 58300 INSERT INTRAUTERINE DEVICE: CPT | Performed by: OBSTETRICS & GYNECOLOGY

## 2020-11-03 NOTE — PROGRESS NOTES
[facility]  [email or web address]  [address] [place], FACOG       Mirena IUD INSERTION  OFFICE PROCEDURE PROGRESS NOTE    [facility]  OFFICE PROCEDURE PROGRESS NOTE    Chart reviewed for the following:   Trell HENNESSY MD, have reviewed the History, Physical and updated the Allergic reactions for Elsy Slater performed immediately prior to start of procedure:   Trell HENNESSY MD, have performed the following reviews on Hitesh Stephens prior to the start of the procedure:            * Patient was identified by name and date of birth   * Agreement on procedure being performed was verified  * Risks and Benefits explained to the patient  * Procedure site verified and marked as necessary  * Patient was positioned for comfort  * Consent was signed and verified     Time: 01:42 pm     Date of procedure: 11/3/2020    Procedure performed by: Trell Aceves MD    Provider assisted by:   Keesha Schulz CMA    Patient assisted by: self    How tolerated by patient: tolerated the procedure well with no complications    Post Procedural Pain Scale: 0 - No Hurt    Comments: none        IUD INSERTION  Indications:  Hitesh Stephens is a ,  47 y.o. female [facility]     LMP: Patient's last menstrual period was 2020 (exact date). , normal    She has a menstrual history positive for heavy cycles  She  presents for insertion of an IUD. The risks, benefits and alternatives of IUD insertion were discussed in detail and all questions were answered. The patient has reviewed the IUD  provided information and consent. She has elected to proceed with the insertion today and she states she has no further questions. A urine pregnancy test was Negative. Procedure: On bimanual exam the uterus was anteverted and normal in size with no tenderness present.  A speculum was inserted into the vagina and the cervix was visualized. The cervix was prepped with zephiran solution. The anterior lip of the cervix was grasped with a single toothed tenaculum. It was necessary to dilate the cervix. The uterus was sounded with a pipelle to 7 centimeters and 2cc 1% lidocaine was injected into the cavity. The IUD was then inserted with some difficulty that resolved with placing tenaculum and straightening cervical canal. The strings were cut to approximately 3 centimeters and demonstrated to the patient. She experienced a moderate  amount of cramping. Post Procedure Status:   She tolerated the procedure with mild discomfort. The patient was observed for 15 minutes after the insertion. There were no complications. Patient was discharged in stable condition. Patient was given routine post-IUD insertion instructions. She was advised to contact the MD for worsening pain, heavy bleeding, unexplained fever, if she cannot feel the IUD strings, if she thinks she may be pregnant, or if she is concerned she may have an STD. We recommend nothing per vagina x 7 days. We recommend follow up in six weeks with US or sooner if needed. The patient received  Mirena IUD, lot number [id number]. The IUD did not come from a [employer].      Results for orders placed or performed in visit on 11/03/20   AMB POC URINE PREGNANCY TEST, VISUAL COLOR COMPARISON   Result Value Ref Range    VALID INTERNAL CONTROL POC Yes     HCG urine, Ql. (POC) Negative Negative

## 2020-12-09 ENCOUNTER — TELEPHONE (OUTPATIENT)
Dept: NEUROLOGY | Age: 40
End: 2020-12-09

## 2020-12-09 ENCOUNTER — OFFICE VISIT (OUTPATIENT)
Dept: NEUROLOGY | Age: 40
End: 2020-12-09
Payer: COMMERCIAL

## 2020-12-09 VITALS
WEIGHT: 245 LBS | BODY MASS INDEX: 42.05 KG/M2 | SYSTOLIC BLOOD PRESSURE: 128 MMHG | OXYGEN SATURATION: 99 % | DIASTOLIC BLOOD PRESSURE: 88 MMHG | HEART RATE: 80 BPM | TEMPERATURE: 97.3 F

## 2020-12-09 DIAGNOSIS — G43.909 MIGRAINE WITHOUT STATUS MIGRAINOSUS, NOT INTRACTABLE, UNSPECIFIED MIGRAINE TYPE: Primary | ICD-10-CM

## 2020-12-09 PROCEDURE — 99214 OFFICE O/P EST MOD 30 MIN: CPT | Performed by: NURSE PRACTITIONER

## 2020-12-09 RX ORDER — LEVONORGESTREL 52 MG/1
1 INTRAUTERINE DEVICE INTRAUTERINE ONCE
COMMUNITY

## 2020-12-09 RX ORDER — FREMANEZUMAB-VFRM 225 MG/1.5ML
1 INJECTION SUBCUTANEOUS
Qty: 1 PEN NEEDLE | Refills: 0 | Status: SHIPPED | COMMUNITY
Start: 2020-12-09 | End: 2021-03-04

## 2020-12-09 RX ORDER — FREMANEZUMAB-VFRM 225 MG/1.5ML
1.5 INJECTION SUBCUTANEOUS
Qty: 1.5 ML | Refills: 3 | Status: SHIPPED | OUTPATIENT
Start: 2020-12-09 | End: 2021-03-04

## 2020-12-09 NOTE — PROGRESS NOTES
Date:  20     Name:  Amy Maldonado  :  1980  MRN:  [id number]     PCP:  Mahamed Aguilar MD    Chief Complaint   Patient presents with    Headache     HISTORY OF PRESENT ILLNESS: Follow up evaluation of migraine. Since her last office visit, she did stop the Emgality due to fear of accidentally getting pregnant on the medication. Prior to this, she was doing well in terms of intense migraines. She indicates that she did get a metallic taste change with the medication. It was like she had a shawn in her mouth all night. It would get better through the day but remained to be a bit obnoxious. She did not have any side effect from the Ajovy. Recap from last office visit:  Migraines are now stable on present therapy of Emgality with very minor side effects that are transient and only last about 10 to 15 minutes total.  She was instructed to let me know if they become bothersome or if she experiences any other side effects. Certainly she did well on Ajovy in the past with only some minor injection site reactions so we could certainly go back to that if the side effects of the Emgality become more bothersome. Since the Fiorinal does seem to work for her when she has severe migraine, she can continue using this as previously prescribed. It sounds as though she only needs this maybe once or twice a month anyway. Follow-up in 6 months or sooner if needed. Current Outpatient Medications   Medication Sig    levonorgestreL (Mirena) 20 mcg/24 hours (6 yrs) 52 mg IUD 1 Device by IntraUTERine route once.  fremanezumab-vfrm (Ajovy Autoinjector) 225 mg/1.5 mL atIn 1.5 mL by SubCUTAneous route every thirty (30) days.  fremanezumab-vfrm (Ajovy Autoinjector) 225 mg/1.5 mL atIn 1 Pen Needle by SubCUTAneous route every thirty (30) days.     butalbital-acetaminophen-caffeine (FIORICET, ESGIC) -40 mg per tablet TAKE ONE TABLET BY MOUTH EVERY 6 HOURS AS NEEDED FOR PAIN     No current facility-administered medications for this visit. No Known Allergies  Past Medical History:   Diagnosis Date    ADHD     Dysplasia of cervix, low grade (ORION 1) 2/23/12    colpo    Encounter for IUD removal 08/19/2016    Mirena    Essential hypertension     10 years ago    GERD (gastroesophageal reflux disease)     trying to control with diet since pregnant    H/O abnormal Pap smear 11/8/12;2/8/12    LGSIL, HPV positive    Headache     Headache(784.0)     IUD (intrauterine device) in place 03/11/2014    Mirena    Kidney disease     kidney stones 2015    Migraine headache     without aura    pap smear for 2/1/11;2/5/13; 4/1/15;2/26/19    neg, HPV neg,no ECC; neg, HPV neg, Negative, HPV negative; neg pap and neg HPV     Past Surgical History:   Procedure Laterality Date    HX COLPOSCOPY  2/23/12    ORION 1    HX HEENT      wisdom teeth    HX LITHOTRIPSY      HX WISDOM TEETH EXTRACTION       Social History     Socioeconomic History    Marital status:      Spouse name: Not on file    Number of children: Not on file    Years of education: Not on file    Highest education level: Not on file   Occupational History    Not on file   Social Needs    Financial resource strain: Not on file    Food insecurity     Worry: Not on file     Inability: Not on file    Transportation needs     Medical: Not on file     Non-medical: Not on file   Tobacco Use    Smoking status: Never Smoker    Smokeless tobacco: Never Used   Substance and Sexual Activity    Alcohol use:  Yes     Alcohol/week: 2.0 standard drinks     Types: 1 Cans of beer, 1 Shots of liquor per week     Comment: social    Drug use: No    Sexual activity: Yes     Partners: Male     Birth control/protection: Condom   Lifestyle    Physical activity     Days per week: Not on file     Minutes per session: Not on file    Stress: Not on file   Relationships    Social connections     Talks on phone: Not on file     Gets together: Not on file     Attends Yazidi service: Not on file     Active member of club or organization: Not on file     Attends meetings of clubs or organizations: Not on file     Relationship status: Not on file    Intimate partner violence     Fear of current or ex partner: Not on file     Emotionally abused: Not on file     Physically abused: Not on file     Forced sexual activity: Not on file   Other Topics Concern     Service Not Asked    Blood Transfusions Not Asked    Caffeine Concern Not Asked    Occupational Exposure Not Asked    Hobby Hazards Not Asked    Sleep Concern Not Asked    Stress Concern Not Asked    Weight Concern Not Asked    Special Diet Not Asked    Back Care Not Asked    Exercise Not Asked    Bike Helmet Not Asked   [address] Not Asked    Self-Exams Not Asked   Social History Narrative    Not on file     Family History   Problem Relation Age of Onset    Hypertension Father     Heart Disease Father     Diabetes Father     Elevated Lipids Father     Cancer Father         eye    Cancer Mother         Cervical,Uterine,& Ovarian (pt questions ovarian but got cancer after copper 7 IUD)    Depression Mother     Allergic Rhinitis Brother     Asthma Brother     Other Brother         Chron's    Hypertension Brother     Alcohol abuse Maternal Grandfather     Depression Maternal Grandfather     Alcohol abuse Paternal Grandmother     Stroke Paternal Grandmother     Migraines Brother     Allergic Rhinitis Brother     Breast Cancer Other        PHYSICAL EXAMINATION:    Visit Vitals  Temp 97.3 °F (36.3 °C)     General:  Well defined, nourished, and groomed individual in no acute distress. Neck: Supple, nontender, no bruits, no pain with resistance to active range of motion. Heart: Regular rate and rhythm, no murmurs, rub, or gallop. Normal S1S2.   Lungs:  Clear to auscultation bilaterally with equal chest expansion, no cough, no wheeze  Musculoskeletal:  Extremities revealed no edema and had full range of motion of joints. Psych:  Good mood and bright affect    NEUROLOGICAL EXAMINATION:     Mental Status:   Alert and oriented to person, place, and time with recent and remote memory intact. Attention span and concentration are normal. Speech is fluent with a full fund of knowledge. Cranial Nerves:    II, III, IV, VI:  Visual acuity grossly intact. Visual fields are normal.    Pupils are equal, round, and reactive to light and accommodation. Extra-ocular movements are full and fluid. Fundoscopic exam was benign, no ptosis or nystagmus. V-XII: Hearing is grossly intact. Facial features are symmetric, with normal sensation and strength. The palate rises symmetrically and the tongue protrudes midline. Sternocleidomastoids 5/5. Motor Examination: Normal tone, bulk, and strength, 5/5 muscle strength throughout. N      Sensory exam:  Normal throughout to temperature    Coordination:  Finger to nose was normal.   No resting or intention tremor    Gait and Station:  Steady while walking. Normal arm swing. No Rhomberg or pronator drift. No muscle wasting or fasiculations noted. Reflexes:  DTRs 2+ throughout. ASSESSMENT AND PLAN    ICD-10-CM ICD-9-CM    1. Migraine without status migrainosus, not intractable, unspecified migraine type  G43.909 346.90 fremanezumab-vfrm (Ajovy Autoinjector) 225 mg/1.5 mL atIn     Migraine headaches which have worsened since she stopped the Emgality. As she had fewer side effects with the Ajovy and had as good a result as she has seen with the Emgality, we will try to resume the Ajovy at this point. We did discuss the theoretical role of CGRP in pregnancy and the general recommendation of discontinuation of the medication six months prior to trying to become pregnant if her plans should change. She verbalized understanding. Follow up in six months or sooner if needed. Idania Orta

## 2020-12-09 NOTE — PROGRESS NOTES
Last couple headaches turned into migraines that were terrible.     Has not used emgality in the past 4 months

## 2020-12-10 NOTE — TELEPHONE ENCOUNTER
Re: Kell Marroquin denied     PA request sent to [facility] via [facility]  Key: [id number]     Per plan: The requested medication is not a covered benefit and is excluded from the plan. Denial letter scanned into media.

## 2020-12-14 NOTE — PATIENT INSTRUCTIONS
Intrauterine Device (IUD) Insertion: Care Instructions Your Care Instructions An intrauterine device (IUD) is a very effective method of birth control. It is a small, plastic, T-shaped device that contains copper or hormones. The doctor inserts the IUD into your uterus. You can have an IUD inserted at any time, as long as you aren't pregnant and you don't have a pelvic infection. If you and your doctor discuss it before you give birth, this can be done right after you have your baby. A plastic string tied to the end of the IUD hangs down through the cervix into the vagina. Your doctor may have you feel for the IUD string right after insertion, to be sure you know what it feels like. There are two types of IUDs. The copper IUD works for up to 10 years. The hormonal IUD works for either 3 years or 6 years, depending on which IUD is used. But your doctor may talk to you about leaving it in for longer. The hormonal IUD also reduces menstrual bleeding and cramping. Follow-up care is a key part of your treatment and safety. Be sure to make and go to all appointments, and call your doctor if you are having problems. It's also a good idea to know your test results and keep a list of the medicines you take. How can you care for yourself at home? · It's safe to use while breastfeeding. · You may experience some mild cramping and light bleeding (spotting) for 1 or 2 days. Use a hot water bottle or a heating pad set on low on your belly for pain. · Take an over-the-counter pain medicine, such as acetaminophen (Tylenol), ibuprofen (Advil, Motrin), and naproxen (Aleve) if needed. Read and follow all instructions on the label. · Do not take two or more pain medicines at the same time unless the doctor told you to. Many pain medicines have acetaminophen, which is Tylenol. Too much acetaminophen (Tylenol) can be harmful.  
· If you want to check the string of your IUD, insert a finger into your vagina and feel for the cervix, which is at the top of the vagina and feels harder than the rest of your vagina. You should be able to feel the thin, plastic string coming out of the opening of your cervix. If you cannot feel the string, use another form of birth control and make an appointment with your doctor to have the string checked. · If the IUD comes out, save it and call your doctor. Be sure to use another form of birth control while the IUD is out. · Use latex condoms to protect against sexually transmitted infections (STIs), such as gonorrhea and chlamydia. An IUD does not protect you from STIs. Having one sex partner (who does not have STIs and does not have sex with anyone else) is a good way to avoid STIs. When should you call for help? Call 911 anytime you think you may need emergency care. For example, call if: 
  · You passed out (lost consciousness).  
  · You have sudden, severe pain in your belly or pelvis. Call your doctor now or seek immediate medical care if: 
  · You have new belly or pelvic pain.  
  · You have severe vaginal bleeding. This means that you are soaking through your usual pads or tampons each hour for 2 or more hours.  
  · You are dizzy or lightheaded, or you feel like you may faint.  
  · You have a fever and pelvic pain or vaginal discharge.  
  · You have pelvic pain that is getting worse. Watch closely for changes in your health, and be sure to contact your doctor if: 
  · You cannot feel the string, or the IUD comes out.  
  · You feel sick to your stomach, or you vomit.  
  · You think you may be pregnant. Where can you learn more? Go to http://www.What's Trending.com/ Enter V954 in the search box to learn more about \"Intrauterine Device (IUD) Insertion: Care Instructions. \" Current as of: February 11, 2020               Content Version: 12.6 © 9624-4335 [employer]. Care instructions adapted under license by [employer] (which disclaims liability or warranty for this information). If you have questions about a medical condition or this instruction, always ask your healthcare professional. [address] any warranty or liability for your use of this information.

## 2020-12-15 ENCOUNTER — OFFICE VISIT (OUTPATIENT)
Dept: OBGYN CLINIC | Age: 40
End: 2020-12-15
Payer: COMMERCIAL

## 2020-12-15 VITALS — BODY MASS INDEX: 40.85 KG/M2 | SYSTOLIC BLOOD PRESSURE: 125 MMHG | WEIGHT: 238 LBS | DIASTOLIC BLOOD PRESSURE: 90 MMHG

## 2020-12-15 DIAGNOSIS — N93.9 ABNORMAL UTERINE BLEEDING: ICD-10-CM

## 2020-12-15 DIAGNOSIS — Z97.5 IUD (INTRAUTERINE DEVICE) IN PLACE: Primary | ICD-10-CM

## 2020-12-15 PROCEDURE — 99213 OFFICE O/P EST LOW 20 MIN: CPT | Performed by: OBSTETRICS & GYNECOLOGY

## 2020-12-15 NOTE — PROGRESS NOTES
[facility]  [email or web address]    Ricardo Girard MD, [address] [facility]     OB/GYN Follow-up visit, IUD check with ultrasound    Chief Complaint: Follow up visit  Chief Complaint   Patient presents with    Checkup IUD       History of Present Illness: This is a follow up visit from an IUD insertion. The IUD was inserted on November 3, 2020. She does not complain about abnormal bleeding. She does not not complain about pain/cramping. She does not have other concerns.     PFSH:  Past Medical History:   Diagnosis Date    ADHD     Dysplasia of cervix, low grade (ORION 1) 2/23/12    colpo    Encounter for IUD removal 08/19/2016    Mirena    Essential hypertension     10 years ago    GERD (gastroesophageal reflux disease)     trying to control with diet since pregnant    H/O abnormal Pap smear 11/8/12;2/8/12    LGSIL, HPV positive    Headache     Headache(784.0)     IUD (intrauterine device) in place 03/11/2014    Mirena    Kidney disease     kidney stones 2015    Migraine headache     without aura    pap smear for 2/1/11;2/5/13; 4/1/15;2/26/19    neg, HPV neg,no ECC; neg, HPV neg, Negative, HPV negative; neg pap and neg HPV     Past Surgical History:   Procedure Laterality Date    HX COLPOSCOPY  2/23/12    ORION 1    HX HEENT      wisdom teeth    HX LITHOTRIPSY      HX WISDOM TEETH EXTRACTION       Family History   Problem Relation Age of Onset    Hypertension Father     Heart Disease Father     Diabetes Father     Elevated Lipids Father     Cancer Father         eye    Cancer Mother         Cervical,Uterine,& Ovarian (pt questions ovarian but got cancer after copper 7 IUD)    Depression Mother     Allergic Rhinitis Brother     Asthma Brother     Other Brother         Chron's    Hypertension Brother     Alcohol abuse Maternal Grandfather     Depression Maternal Grandfather     Alcohol abuse Paternal Grandmother     Stroke Paternal Grandmother     Migraines Brother     Allergic Rhinitis Brother     Breast Cancer Other      Social History     Tobacco Use    Smoking status: Never Smoker    Smokeless tobacco: Never Used   Substance Use Topics    Alcohol use: Yes     Alcohol/week: 2.0 standard drinks     Types: 1 Cans of beer, 1 Shots of liquor per week     Comment: social    Drug use: No     No Known Allergies  Current Outpatient Medications   Medication Sig    levonorgestreL (Mirena) 20 mcg/24 hours (6 yrs) 52 mg IUD 1 Device by IntraUTERine route once.  fremanezumab-vfrm (Ajovy Autoinjector) 225 mg/1.5 mL atIn 1.5 mL by SubCUTAneous route every thirty (30) days.  fremanezumab-vfrm (Ajovy Autoinjector) 225 mg/1.5 mL atIn 1 Pen Needle by SubCUTAneous route every thirty (30) days.  butalbital-acetaminophen-caffeine (FIORICET, ESGIC) -40 mg per tablet TAKE ONE TABLET BY MOUTH EVERY 6 HOURS AS NEEDED FOR PAIN     No current facility-administered medications for this visit.         Review of Systems:  History obtained from the patient  Constitutional: negative for fevers, chills and weight loss  ENT ROS: negative for - hearing change, oral lesions or visual changes  Respiratory: negative for cough, wheezing or dyspnea on exertion  Cardiovascular: negative for chest pain, irregular heart beats, exertional chest pressure/discomfort  Gastrointestinal: negative for dysphagia, nausea and vomiting  Genito-Urinary ROS: no dysuria, trouble voiding, or hematuria  Inteument/breast: negative for rash, breast lump and nipple discharge  Musculoskeletal:negative for stiff joints, neck pain and muscle weakness  Endocrine ROS: negative for - breast changes, galactorrhea or temperature intolerance  Hematological and Lymphatic ROS: negative for - blood clots, bruising or swollen lymph nodes    Physical Exam:  Visit Vitals  BP (!) 125/90   Wt 238 lb (108 kg)   BMI 40.85 kg/m²       GENERAL: alert, well appearing, and in no distress  ABDOMEN: soft, nontender, nondistended, no masses or organomegaly   EGBUS: no lesions, no inflammation, no masses  VULVA: normal appearing vulva with no masses, tenderness or lesions  VAGINA: normal appearing vagina with normal color, no lesions, no discharge  CERVIX: normal appearing cervix without discharge or lesions, non tender  · IUD strings seen and appropriate length  UTERUS: uterus is normal size, shape, consistency and nontender   ADNEXA: normal adnexa in size, nontender and no masses  NEURO: alert, oriented, normal speech    Assessment:  Encounter Diagnoses   Name Primary?  IUD (intrauterine device) in place Yes    Abnormal uterine bleeding        Plan:  The patient is advised that she should contact the office with any questions or concerns. She should make her routine annual gynecologic appointment if needed. Disc typical sx/bleeding patterns with IUD. Disc how to check IUD strings and screening with AE. Notify MD if any concerns. Rec BP log, PCP fu      No orders of the defined types were placed in this encounter. No results found for this visit on 12/15/20. Jillian Nunez MD    Physician review of ultrasound performed by technician    Today's ultrasound report and images were reviewed and discussed with the patient. Please see images and imaging report entered by technician in PACS for more detail and progress note and diagnosis entered by MD.    Haleigh Jules MD        UTERUS IS ANTEVERTED, NORMAL IN SIZE AND ECHOGENICITY. ENDOMETRIUM MEASURES 7-8MM IN THICKNESS. NO EVIDENCE OF MASS OR ABNORMALITY SEEN  WITHIN THE ENDOMETRIAL CAVITY. IUD IS SEEN IN THE PROPER POSITION WITHIN THE ENDOMETRIAL CAVITY IN THE UTERINE FUNDUS. RIGHT OVARY APPEARS WITHIN NORMAL LIMITS. LEFT OVARY APPEARS TO HAVE A SIMPLE CYST THAT MEASURES 4.3 X 4.1 X 4.2CM. NO FREE FLUID SEEN IN THE CDS.

## 2020-12-28 RX ORDER — BUTALBITAL, ACETAMINOPHEN AND CAFFEINE 50; 325; 40 MG/1; MG/1; MG/1
TABLET ORAL
Qty: 30 TAB | Refills: 0 | Status: SHIPPED | OUTPATIENT
Start: 2020-12-28 | End: 2021-03-05 | Stop reason: SDUPTHER

## 2021-01-04 ENCOUNTER — NURSE TRIAGE (OUTPATIENT)
Dept: OTHER | Facility: CLINIC | Age: 41
End: 2021-01-04

## 2021-01-04 NOTE — TELEPHONE ENCOUNTER
Reason for Disposition   Wheezing is present    Answer Assessment - Initial Assessment Questions  1. ONSET: \"When did the cough begin? \"       4 days    2. SEVERITY: \"How bad is the cough today? \"       Constant    3. RESPIRATORY DISTRESS: \"Describe your breathing. \"       No problem    4. FEVER: \"Do you have a fever? \" If so, ask: \"What is your temperature, how was it measured, and when did it start? \"      No    5. HEMOPTYSIS: \"Are you coughing up any blood? \" If so ask: \"How much? \" (flecks, streaks, tablespoons, etc.)      No    6. TREATMENT: \"What have you done so far to treat the cough? \" (e.g., meds, fluids, humidifier)      Dayquil, nyquil, antibiotic, tessalon perles    7. CARDIAC HISTORY: \"Do you have any history of heart disease? \" (e.g., heart attack, congestive heart failure)       No    8. LUNG HISTORY: \"Do you have any history of lung disease? \"  (e.g., pulmonary embolus, asthma, emphysema)      No    9. PE RISK FACTORS: \"Do you have a history of blood clots? \" (or: recent major surgery, recent prolonged travel, bedridden)      No    10. OTHER SYMPTOMS: \"Do you have any other symptoms? (e.g., runny nose, wheezing, chest pain)        Hoarseness in voice    11. PREGNANCY: \"Is there any chance you are pregnant? \" \"When was your last menstrual period? \"        No    12. TRAVEL: \"Have you traveled out of the country in the last month? \" (e.g., travel history, exposures)        No    Protocols used: COUGH-ADULT-OH    Transferred to [facility] to schedule appt today. Neg covid result.

## 2021-01-05 ENCOUNTER — VIRTUAL VISIT (OUTPATIENT)
Dept: INTERNAL MEDICINE CLINIC | Age: 41
End: 2021-01-05
Payer: COMMERCIAL

## 2021-01-05 DIAGNOSIS — R05.9 COUGH: Primary | ICD-10-CM

## 2021-01-05 PROCEDURE — 99213 OFFICE O/P EST LOW 20 MIN: CPT | Performed by: FAMILY MEDICINE

## 2021-01-14 ENCOUNTER — HOSPITAL ENCOUNTER (EMERGENCY)
Age: 41
Discharge: HOME | End: 2021-01-14
Attending: EMERGENCY MEDICINE
Payer: COMMERCIAL

## 2021-01-14 ENCOUNTER — APPOINTMENT (OUTPATIENT)
Dept: GENERAL RADIOLOGY | Age: 41
End: 2021-01-14
Attending: EMERGENCY MEDICINE
Payer: COMMERCIAL

## 2021-01-14 VITALS
OXYGEN SATURATION: 94 % | HEART RATE: 112 BPM | SYSTOLIC BLOOD PRESSURE: 117 MMHG | WEIGHT: 232.81 LBS | DIASTOLIC BLOOD PRESSURE: 68 MMHG | HEIGHT: 64 IN | RESPIRATION RATE: 15 BRPM | TEMPERATURE: 99.8 F | BODY MASS INDEX: 39.75 KG/M2

## 2021-01-14 DIAGNOSIS — J34.89 SINUS PRESSURE: ICD-10-CM

## 2021-01-14 DIAGNOSIS — R11.0 NAUSEA WITHOUT VOMITING: ICD-10-CM

## 2021-01-14 DIAGNOSIS — R19.7 DIARRHEA, UNSPECIFIED TYPE: ICD-10-CM

## 2021-01-14 DIAGNOSIS — J18.9 PNEUMONIA DUE TO INFECTIOUS ORGANISM, UNSPECIFIED LATERALITY, UNSPECIFIED PART OF LUNG: Primary | ICD-10-CM

## 2021-01-14 LAB
ANION GAP SERPL CALC-SCNC: 10 MMOL/L (ref 5–15)
APPEARANCE UR: CLEAR
BACTERIA URNS QL MICRO: NEGATIVE /HPF
BASOPHILS # BLD: 0 K/UL (ref 0–0.1)
BASOPHILS NFR BLD: 0 % (ref 0–1)
BILIRUB UR QL CFM: NEGATIVE
BUN SERPL-MCNC: 8 MG/DL (ref 6–20)
BUN/CREAT SERPL: 11 (ref 12–20)
CALCIUM SERPL-MCNC: 9.1 MG/DL (ref 8.5–10.1)
CHLORIDE SERPL-SCNC: 103 MMOL/L (ref 97–108)
CO2 SERPL-SCNC: 26 MMOL/L (ref 21–32)
COLOR UR: ABNORMAL
COMMENT, HOLDF: NORMAL
CREAT SERPL-MCNC: 0.73 MG/DL (ref 0.55–1.02)
DIFFERENTIAL METHOD BLD: ABNORMAL
EOSINOPHIL # BLD: 0.1 K/UL (ref 0–0.4)
EOSINOPHIL NFR BLD: 1 % (ref 0–7)
EPITH CASTS URNS QL MICRO: NORMAL /LPF
ERYTHROCYTE [DISTWIDTH] IN BLOOD BY AUTOMATED COUNT: 13.5 % (ref 11.5–14.5)
GLUCOSE SERPL-MCNC: 116 MG/DL (ref 65–100)
GLUCOSE UR STRIP.AUTO-MCNC: NEGATIVE MG/DL
HCT VFR BLD AUTO: 42 % (ref 35–47)
HGB BLD-MCNC: 14 G/DL (ref 11.5–16)
HGB UR QL STRIP: ABNORMAL
IMM GRANULOCYTES # BLD AUTO: 0.1 K/UL (ref 0–0.04)
IMM GRANULOCYTES NFR BLD AUTO: 0 % (ref 0–0.5)
KETONES UR QL STRIP.AUTO: ABNORMAL MG/DL
LEUKOCYTE ESTERASE UR QL STRIP.AUTO: NEGATIVE
LYMPHOCYTES # BLD: 2.1 K/UL (ref 0.8–3.5)
LYMPHOCYTES NFR BLD: 13 % (ref 12–49)
MCH RBC QN AUTO: 28.1 PG (ref 26–34)
MCHC RBC AUTO-ENTMCNC: 33.3 G/DL (ref 30–36.5)
MCV RBC AUTO: 84.3 FL (ref 80–99)
MONOCYTES # BLD: 1.3 K/UL (ref 0–1)
MONOCYTES NFR BLD: 8 % (ref 5–13)
NEUTS SEG # BLD: 12.9 K/UL (ref 1.8–8)
NEUTS SEG NFR BLD: 78 % (ref 32–75)
NITRITE UR QL STRIP.AUTO: NEGATIVE
NRBC # BLD: 0 K/UL (ref 0–0.01)
NRBC BLD-RTO: 0 PER 100 WBC
PH UR STRIP: 6.5 [PH] (ref 5–8)
PLATELET # BLD AUTO: 272 K/UL (ref 150–400)
PMV BLD AUTO: 10.1 FL (ref 8.9–12.9)
POTASSIUM SERPL-SCNC: 3.4 MMOL/L (ref 3.5–5.1)
PROT UR STRIP-MCNC: 30 MG/DL
RBC # BLD AUTO: 4.98 M/UL (ref 3.8–5.2)
RBC #/AREA URNS HPF: NORMAL /HPF (ref 0–5)
SAMPLES BEING HELD,HOLD: NORMAL
SODIUM SERPL-SCNC: 139 MMOL/L (ref 136–145)
SP GR UR REFRACTOMETRY: 1.02 (ref 1–1.03)
UROBILINOGEN UR QL STRIP.AUTO: 0.2 EU/DL (ref 0.2–1)
WBC # BLD AUTO: 16.5 K/UL (ref 3.6–11)
WBC URNS QL MICRO: NORMAL /HPF (ref 0–4)

## 2021-01-14 PROCEDURE — 36415 COLL VENOUS BLD VENIPUNCTURE: CPT

## 2021-01-14 PROCEDURE — 99284 EMERGENCY DEPT VISIT MOD MDM: CPT

## 2021-01-14 PROCEDURE — 71045 X-RAY EXAM CHEST 1 VIEW: CPT

## 2021-01-14 PROCEDURE — 80048 BASIC METABOLIC PNL TOTAL CA: CPT

## 2021-01-14 PROCEDURE — 85025 COMPLETE CBC W/AUTO DIFF WBC: CPT

## 2021-01-14 PROCEDURE — 96374 THER/PROPH/DIAG INJ IV PUSH: CPT

## 2021-01-14 PROCEDURE — 87635 SARS-COV-2 COVID-19 AMP PRB: CPT

## 2021-01-14 PROCEDURE — 96361 HYDRATE IV INFUSION ADD-ON: CPT

## 2021-01-14 PROCEDURE — 81001 URINALYSIS AUTO W/SCOPE: CPT

## 2021-01-14 PROCEDURE — 74011250636 HC RX REV CODE- 250/636: Performed by: EMERGENCY MEDICINE

## 2021-01-14 RX ORDER — ONDANSETRON 4 MG/1
4 TABLET, ORALLY DISINTEGRATING ORAL
Qty: 12 TAB | Refills: 0 | Status: SHIPPED | OUTPATIENT
Start: 2021-01-14 | End: 2021-08-18 | Stop reason: SDUPTHER

## 2021-01-14 RX ORDER — ONDANSETRON 2 MG/ML
4 INJECTION INTRAMUSCULAR; INTRAVENOUS
Status: COMPLETED | OUTPATIENT
Start: 2021-01-14 | End: 2021-01-14

## 2021-01-14 RX ORDER — DOXYCYCLINE HYCLATE 100 MG
100 TABLET ORAL 2 TIMES DAILY
Qty: 20 TAB | Refills: 0 | Status: SHIPPED | OUTPATIENT
Start: 2021-01-14 | End: 2021-01-24

## 2021-01-14 RX ADMIN — ONDANSETRON 4 MG: 2 INJECTION INTRAMUSCULAR; INTRAVENOUS at 08:55

## 2021-01-14 RX ADMIN — SODIUM CHLORIDE 1000 ML: 9 INJECTION, SOLUTION INTRAVENOUS at 08:55

## 2021-01-14 NOTE — ED NOTES
Patient only has one IV site, to left [facility] area. But did have an unsuccessful stick to top of right hand at 0850.  Bandage applied

## 2021-01-14 NOTE — ED NOTES
Swabs obtained per orders. \"I feel much better than when I first arrived. \" Sent home with a specimen hat and cup for stool collection. She will follow up with her family doctor. The patient was discharged home by Dr Imani Schaefer in stable condition. The patient is alert and oriented, in no respiratory distress and discharge vital signs obtained. The patient's diagnosis, condition and treatment were explained. The patient expressed understanding. A discharge plan has been developed. A  was not involved in the process. Aftercare instructions were given. Pt ambulatory out of the ED.

## 2021-01-14 NOTE — ED TRIAGE NOTES
On December 29th patient began with bronchitis. New Years Day started augmentin, but got diarrhea after 4 days, so she stopped the medication. \"My daughter had Chris Gorman. And my  had same symptoms as the patient and he got better. \" \"Now I think I have a sinus infection. \" Has only taken 1 imodium capsule.

## 2021-01-14 NOTE — ED NOTES
Patient tolerating IVF's well. Very pleasant disposition. \"I feel a little cooler now. \" Noted sweat on the bed under the back of her hair

## 2021-01-15 LAB
COVID-19, XGCOVT: NOT DETECTED
HEALTH STATUS, XMCV2T: NORMAL
SOURCE, COVRS: NORMAL
SPECIMEN SOURCE, FCOV2M: NORMAL
SPECIMEN TYPE, XMCV1T: NORMAL

## 2021-01-26 ENCOUNTER — TELEPHONE (OUTPATIENT)
Dept: INTERNAL MEDICINE CLINIC | Age: 41
End: 2021-01-26

## 2021-01-26 ENCOUNTER — OFFICE VISIT (OUTPATIENT)
Dept: OBGYN CLINIC | Age: 41
End: 2021-01-26
Payer: COMMERCIAL

## 2021-01-26 VITALS
BODY MASS INDEX: 39.61 KG/M2 | SYSTOLIC BLOOD PRESSURE: 132 MMHG | WEIGHT: 232 LBS | HEART RATE: 81 BPM | HEIGHT: 64 IN | DIASTOLIC BLOOD PRESSURE: 98 MMHG

## 2021-01-26 DIAGNOSIS — Z01.419 ENCOUNTER FOR GYNECOLOGICAL EXAMINATION (GENERAL) (ROUTINE) WITHOUT ABNORMAL FINDINGS: Primary | ICD-10-CM

## 2021-01-26 DIAGNOSIS — I10 ESSENTIAL HYPERTENSION: ICD-10-CM

## 2021-01-26 PROCEDURE — 99395 PREV VISIT EST AGE 18-39: CPT | Performed by: OBSTETRICS & GYNECOLOGY

## 2021-01-26 RX ORDER — ALBUTEROL SULFATE 90 UG/1
AEROSOL, METERED RESPIRATORY (INHALATION)
COMMUNITY
Start: 2021-01-07 | End: 2021-03-04 | Stop reason: ALTCHOICE

## 2021-01-26 RX ORDER — ASCORBIC ACID 250 MG
TABLET ORAL
COMMUNITY
End: 2022-05-20 | Stop reason: ALTCHOICE

## 2021-01-26 RX ORDER — CHOLECALCIFEROL (VITAMIN D3) 50 MCG
CAPSULE ORAL
COMMUNITY

## 2021-01-26 NOTE — TELEPHONE ENCOUNTER
Called, spoke to pt. Two pt identifiers confirmed. Patient states she needs appointment for Cpe and HTN issues. Patient offered and accepted appointment 02/10/2021 at 12:15. Pt verbalized understanding of information discussed w/ no further questions at this time.

## 2021-01-26 NOTE — PROGRESS NOTES
[facility]  [email or web address]  [phone]    Freddi Burkitt, MD, FACOG       Annual Gynecologic Exam:  WWE <40  Chief Complaint   Patient presents with    Well Woman         Juan R Umana is a 47 y.o.  WHITE OR  female who presents for an annual well woman exam.  No LMP recorded (lmp unknown). Patient has had an implant. .     With regard to the Gardisil vaccine, she has received all 3 injections. She does not report additional concerns today. Pt is getting over pneumonia. Pt declines covid exposure. Menstrual status:  Her periods are spotting, irregular, sporadic, without cramping. She does not report dysmenorrhea/painful menses. She does not report irregular bleeding. Sexual history and Contraception:  Social History     Substance and Sexual Activity   Sexual Activity Yes    Partners: Male    Birth control/protection: I.U.D. She does not reports new sexual partner(s) in the last year. Preventive Medicine History:  Her most recent Pap smear result: normal was obtained in 2019  Her most recent HR HPV screen was Negative obtained in     She does not have a history of ORION 2, 3 or cervical cancer.      Past Medical History:   Diagnosis Date    ADHD     Dysplasia of cervix, low grade (ORION 1) 12    colpo    Encounter for IUD removal 2016    Mirena    Essential hypertension     10 years ago    GERD (gastroesophageal reflux disease)     trying to control with diet since pregnant    H/O abnormal Pap smear 12;12    LGSIL, HPV positive    Headache     Headache(784.0)     IUD (intrauterine device) in place 2014    Mirena    Kidney disease     kidney stones     Migraine headache     without aura    pap smear for 11;13; 4/1/15;19    neg, HPV neg,no ECC; neg, HPV neg, Negative, HPV negative; neg pap and neg HPV     OB History    Para Term  AB Living   1 1 0 1 0 1 With splenomegaly   No suggestion of Cirrhosis , or liver decompensation  Platelet count is normal   Suggested follow up   SAB TAB Ectopic Molar Multiple Live Births   0 0 0   0 1      # Outcome Date GA Lbr Sonido/2nd Weight Sex Delivery Anes PTL Lv   1  19 36w2d / 01:16 5 lb 5.7 oz (2.43 kg) F Vag-Spont EPI N ANTHONY      Obstetric Comments   Menarche:  14. LMP: 20. # of Children:  Currently pregnant. Age at Delivery of First Child:  n/a. Hysterectomy/oophorectomy:  NO/NO. Breast Bx:  No.  Hx of Breast Feeding:  n/a. BCP:  Yes. Hormone therapy:  No.      Past Surgical History:   Procedure Laterality Date    HX COLPOSCOPY  12    ORION 1    HX HEENT      wisdom teeth    HX LITHOTRIPSY      HX WISDOM TEETH EXTRACTION       Family History   Problem Relation Age of Onset    Hypertension Father     Heart Disease Father     Diabetes Father    Sandoval Elevated Lipids Father     Cancer Father         eye    Cancer Mother         Cervical,Uterine,& Ovarian (pt questions ovarian but got cancer after copper 7 IUD)    Depression Mother     Allergic Rhinitis Brother     Asthma Brother     Other Brother         Chron's    Hypertension Brother     Alcohol abuse Maternal Grandfather     Depression Maternal Grandfather     Alcohol abuse Paternal Grandmother     Stroke Paternal Grandmother     Migraines Brother     Allergic Rhinitis Brother     Breast Cancer Other      Social History     Socioeconomic History    Marital status:      Spouse name: Not on file    Number of children: Not on file    Years of education: Not on file    Highest education level: Not on file   Occupational History    Not on file   Social Needs    Financial resource strain: Not on file    Food insecurity     Worry: Not on file     Inability: Not on file    Transportation needs     Medical: Not on file     Non-medical: Not on file   Tobacco Use    Smoking status: Never Smoker    Smokeless tobacco: Never Used   Substance and Sexual Activity    Alcohol use:  Yes     Alcohol/week: 2.0 standard drinks     Types: 1 Cans of beer, 1 Shots of liquor per week     Comment: social    Drug use: No    Sexual activity: Yes     Partners: Male     Birth control/protection: I.U.D. Lifestyle    Physical activity     Days per week: Not on file     Minutes per session: Not on file    Stress: Not on file   Relationships    Social connections     Talks on phone: Not on file     Gets together: Not on file     Attends Gnosticist service: Not on file     Active member of club or organization: Not on file     Attends meetings of clubs or organizations: Not on file     Relationship status: Not on file    Intimate partner violence     Fear of current or ex partner: Not on file     Emotionally abused: Not on file     Physically abused: Not on file     Forced sexual activity: Not on file   Other Topics Concern     Service Not Asked    Blood Transfusions Not Asked    Caffeine Concern Not Asked    Occupational Exposure Not Asked   Areatha Seals Hazards Not Asked    Sleep Concern Not Asked    Stress Concern Not Asked    Weight Concern Not Asked    Special Diet Not Asked    Back Care Not Asked    Exercise Not Asked    Bike Helmet Not Asked    Seat Belt Not Asked    Self-Exams Not Asked   Social History Narrative    Not on file       No Known Allergies    Current Outpatient Medications   Medication Sig    albuterol (PROVENTIL HFA, VENTOLIN HFA, PROAIR HFA) 90 mcg/actuation inhaler 1 2 INHALATIONS EVERY 4 TO 6 HOURS AS NEEDED FOR WHEEZING    B.infantis-B.ani-B.long-B.bifi (Probiotic 4X) 10-15 mg TbEC Take  by mouth.  ascorbic acid, vitamin C, (Vitamin C) 250 mg tablet Take  by mouth.  ondansetron (ZOFRAN ODT) 4 mg disintegrating tablet Take 1 Tab by mouth every eight (8) hours as needed for Nausea for up to 12 doses.     butalbital-acetaminophen-caffeine (FIORICET, ESGIC) -40 mg per tablet TAKE ONE TABLET BY MOUTH EVERY 6 HOURS AS NEEDED FOR PAIN    levonorgestreL (Mirena) 20 mcg/24 hours (6 yrs) 52 mg IUD 1 Device by IntraUTERine route once.  fremanezumab-vfrm (Ajovy Autoinjector) 225 mg/1.5 mL atIn 1.5 mL by SubCUTAneous route every thirty (30) days.  fremanezumab-vfrm (Ajovy Autoinjector) 225 mg/1.5 mL atIn 1 Pen Needle by SubCUTAneous route every thirty (30) days. No current facility-administered medications for this visit. Patient Active Problem List   Diagnosis Code    Chronic daily headache R51.9    H/O abnormal Pap smear Z87.898    History of kidney stones Z87.442    Weight gain R63.5    Menorrhagia with regular cycle N92.0    Obesity (BMI 30-39. 9) E66.9    GERD (gastroesophageal reflux disease) K21.9    Irritable bowel syndrome with diarrhea K58.0    Gastroesophageal reflux disease K21.9    Severe obesity (HCC) E66.01    Chronic hypertension with superimposed preeclampsia O11.9         Review of Systems - History obtained from the patient and patient filled out questionnaire   Constitutional/general, HEENT, CV, Resp, GI, MSK, Neuro, Psych, Heme/lymph, Skin, Breast ROS: no significant complaints except as noted on HPI    Physical Exam  Visit Vitals  BP (!) 132/98 (BP 1 Location: Left arm, BP Patient Position: Sitting) Comment: manual   Pulse 81   Ht 5' 4\" (1.626 m)   Wt 232 lb (105.2 kg)   LMP  (LMP Unknown)   Breastfeeding No   BMI 39.82 kg/m²       Constitutional  · Appearance: well-nourished, well developed, alert, in no acute distress    HENT  · Head and Face: appears normal    Neck  · Inspection/Palpation: normal appearance, no masses or tenderness  · Lymph Nodes: no lymphadenopathy present  · Thyroid: gland size normal, nontender, no nodules or masses present on palpation    Chest  · Respiratory Effort: breathing unlabored  · Auscultation: normal breath sounds    Cardiovascular  · Heart:  · Auscultation: regular rate and rhythm without murmur    Breasts  · Inspection of Breasts: breasts symmetrical, no skin changes, no discharge present, nipple appearance normal, no skin retraction present  · Palpation of Breasts and Axillae: no masses present on palpation, no breast tenderness  · Axillary Lymph Nodes: no lymphadenopathy present    Gastrointestinal  · Abdominal Examination: abdomen non-tender to palpation, normal bowel sounds, no masses present  · Liver and spleen: no hepatomegaly present, spleen not palpable  · Hernias: no hernias identified    Genitourinary  · External Genitalia: normal appearance for age, no discharge present, no tenderness present, no inflammatory lesions present, no masses present  · Vagina: normal vaginal vault without central or paravaginal defects, no discharge present, no inflammatory lesions present, no masses present  · Bladder: non-tender to palpation  · Urethra: appears normal  · Cervix: normal   IUD strings seen and appropriate length  ·   · Uterus: normal size, shape and consistency  · Adnexa: no adnexal tenderness present, no adnexal masses present  · Perineum: perineum within normal limits, no evidence of trauma, no rashes or skin lesions present  · Anus: anus within normal limits, no hemorrhoids present  · Inguinal Lymph Nodes: no lymphadenopathy present    Skin  · General Inspection: no rash, no lesions identified    Neurologic/Psychiatric  · Mental Status:  · Orientation: grossly oriented to person, place and time  · Mood and Affect: mood normal, affect appropriate    Assessment:  47 y.o.  for well woman exam  Encounter Diagnoses   Name Primary?  Encounter for gynecological examination (general) (routine) without abnormal findings Yes    Essential hypertension        Plan:  The patient was counseled about diet, exercise, healthy lifestyle  We discussed current pap smear and HR HPV testing guidelines.    We recommend follow up one year for routine annual gynecologic exam or sooner prn  Handouts were given to the patient  We recommend follow up with a primary care physician for chronic medical problems and evaluation of non-gynecologic concerns and to please contact our office with any GYN questions or concerns. We recommended testing per CDC guidelines and at patient request.   Disc typical bleeding with IUD. rec PCP fu for CHTN, numbers given     Folllow up:  [x] return for annual well woman exam in one year or sooner if she is having problems  [] follow up and ultrasound  [] 6 months  [] 3 months  [] 6 weeks   [] 1 month    No orders of the defined types were placed in this encounter. No results found for any visits on 01/26/21.

## 2021-01-26 NOTE — PATIENT INSTRUCTIONS
Well Visit, Ages [address] [facility] 9 to 48: Care Instructions Your Care Instructions Physical exams can help you stay healthy. Your doctor has checked your overall health and may have suggested ways to take good care of yourself. He or she also may have recommended tests. At home, you can help prevent illness with healthy eating, regular exercise, and other steps. Follow-up care is a key part of your treatment and safety. Be sure to make and go to all appointments, and call your doctor if you are having problems. It's also a good idea to know your test results and keep a list of the medicines you take. How can you care for yourself at home? · Reach and stay at a healthy weight. This will lower your risk for many problems, such as obesity, diabetes, heart disease, and high blood pressure. · Get at least 30 minutes of physical activity on most days of the week. Walking is a good choice. You also may want to do other activities, such as running, swimming, cycling, or playing tennis or team sports. Discuss any changes in your exercise program with your doctor. · Do not smoke or allow others to smoke around you. If you need help quitting, talk to your doctor about stop-smoking programs and medicines. These can increase your chances of quitting for good. · Talk to your doctor about whether you have any risk factors for sexually transmitted infections (STIs). Having one sex partner (who does not have STIs and does not have sex with anyone else) is a good way to avoid these infections. · Use birth control if you do not want to have children at this time. Talk with your doctor about the choices available and what might be best for you. · Protect your skin from too much sun. When you're outdoors from 10 a.m. to 4 p.m., stay in the shade or cover up with clothing and a hat with a wide brim. Wear sunglasses that block UV rays. Even when it's cloudy, put broad-spectrum sunscreen (SPF 30 or higher) on any exposed skin. · See a dentist one or two times a year for checkups and to have your teeth cleaned. · Wear a seat belt in the car. Follow your doctor's advice about when to have certain tests. These tests can spot problems early. For everyone · Cholesterol. Have the fat (cholesterol) in your blood tested after age 21. Your doctor will tell you how often to have this done based on your age, family history, or other things that can increase your risk for heart disease. · Blood pressure. Have your blood pressure checked during a routine doctor visit. Your doctor will tell you how often to check your blood pressure based on your age, your blood pressure results, and other factors. · Vision. Talk with your doctor about how often to have a glaucoma test. 
· Diabetes. Ask your doctor whether you should have tests for diabetes. · Colon cancer. Your risk for colorectal cancer gets higher as you get older. Some experts say that adults should start regular screening at age 48 and stop at age 76. Others say to start before age 48 or continue after age 76. Talk with your doctor about your risk and when to start and stop screening. For women · Breast exam and mammogram. Talk to your doctor about when you should have a clinical breast exam and a mammogram. Medical experts differ on whether and how often women under 50 should have these tests. Your doctor can help you decide what is right for you. · Cervical cancer screening test and pelvic exam. Begin with a Pap test at age 24. The test often is part of a pelvic exam. Starting at age 27, you may choose to have a Pap test, an HPV test, or both tests at the same time (called co-testing). Talk with your doctor about how often to have testing. · Tests for sexually transmitted infections (STIs). Ask whether you should have tests for STIs. You may be at risk if you have sex with more than one person, especially if your partners do not wear condoms. For men · Tests for sexually transmitted infections (STIs). Ask whether you should have tests for STIs. You may be at risk if you have sex with more than one person, especially if you do not wear a condom. · Testicular cancer exam. Ask your doctor whether you should check your testicles regularly. · Prostate exam. Talk to your doctor about whether you should have a blood test (called a PSA test) for prostate cancer. Experts differ on whether and when men should have this test. Some experts suggest it if you are older than 39 and are -American or have a father or brother who got prostate cancer when he was younger than 72. When should you call for help? Watch closely for changes in your health, and be sure to contact your doctor if you have any problems or symptoms that concern you. Where can you learn more? Go to http://www.seo.com/ Enter P072 in the search box to learn more about \"Well Visit, Ages 25 to 48: Care Instructions. \" Current as of: May 27, 2020               Content Version: 12.6 © 2006-2020 [employer]. Care instructions adapted under license by [employer] (which disclaims liability or warranty for this information). If you have questions about a medical condition or this instruction, always ask your healthcare professional. [address] any warranty or liability for your use of this information.

## 2021-01-26 NOTE — TELEPHONE ENCOUNTER
Patient states she needs a call back Asap in reference to getting an In Office appt for her elevated Blood Pressure she has had at recent Doctors appts & also when she was seen in ER & diagnosed with Pneumonia recently. Patient declined appt for Virtual Visit. Please call to discuss.  Thank you

## 2021-02-01 ENCOUNTER — TELEPHONE (OUTPATIENT)
Dept: INTERNAL MEDICINE CLINIC | Age: 41
End: 2021-02-01

## 2021-02-01 NOTE — TELEPHONE ENCOUNTER
----- Message from Joie Mcknight sent at 2021  4:31 PM EST -----  Regarding: Dr. Cody [phone]  Appointment not available    Caller's first and last name and relationship to patient (if not the patient):pt      Best contact number: [phone]      Preferred date and time: 21 in office      Scheduled appointment date and time: n/a      Reason for appointment: cough and f/u on pneumonia      Details to clarify the request: Pt refused virtual visit and is requesting in office visit only to listen to chest. Pt was seen in ER and instructed to return to PCP for sick appt.         Message from [facility]

## 2021-02-04 ENCOUNTER — TELEPHONE (OUTPATIENT)
Dept: INTERNAL MEDICINE CLINIC | Age: 41
End: 2021-02-04

## 2021-02-12 ENCOUNTER — HOSPITAL ENCOUNTER (OUTPATIENT)
Dept: GENERAL RADIOLOGY | Age: 41
Discharge: HOME | End: 2021-02-12
Attending: INTERNAL MEDICINE
Payer: COMMERCIAL

## 2021-02-12 ENCOUNTER — OFFICE VISIT (OUTPATIENT)
Dept: INTERNAL MEDICINE CLINIC | Age: 41
End: 2021-02-12
Attending: INTERNAL MEDICINE
Payer: COMMERCIAL

## 2021-02-12 VITALS
WEIGHT: 233 LBS | TEMPERATURE: 97.8 F | RESPIRATION RATE: 18 BRPM | BODY MASS INDEX: 39.78 KG/M2 | SYSTOLIC BLOOD PRESSURE: 124 MMHG | HEIGHT: 64 IN | DIASTOLIC BLOOD PRESSURE: 82 MMHG | OXYGEN SATURATION: 97 % | HEART RATE: 64 BPM

## 2021-02-12 DIAGNOSIS — R05.9 COUGH: ICD-10-CM

## 2021-02-12 DIAGNOSIS — R51.9 CHRONIC DAILY HEADACHE: ICD-10-CM

## 2021-02-12 DIAGNOSIS — Z87.01 HISTORY OF PNEUMONIA: ICD-10-CM

## 2021-02-12 DIAGNOSIS — Z87.59 HISTORY OF PRE-ECLAMPSIA: ICD-10-CM

## 2021-02-12 DIAGNOSIS — Z76.89 ESTABLISHING CARE WITH NEW DOCTOR, ENCOUNTER FOR: Primary | ICD-10-CM

## 2021-02-12 PROCEDURE — 99214 OFFICE O/P EST MOD 30 MIN: CPT | Performed by: INTERNAL MEDICINE

## 2021-02-12 PROCEDURE — 71046 X-RAY EXAM CHEST 2 VIEWS: CPT

## 2021-02-12 RX ORDER — BENZONATATE 200 MG/1
200 CAPSULE ORAL
Qty: 21 CAP | Refills: 0 | Status: SHIPPED | OUTPATIENT
Start: 2021-02-12 | End: 2021-02-19

## 2021-02-12 RX ORDER — LABETALOL 100 MG/1
100 TABLET, FILM COATED ORAL 2 TIMES DAILY
COMMUNITY
End: 2021-03-04

## 2021-02-12 RX ORDER — PREDNISONE 20 MG/1
TABLET ORAL
Qty: 12 TAB | Refills: 0 | Status: SHIPPED | OUTPATIENT
Start: 2021-02-12 | End: 2021-03-04 | Stop reason: ALTCHOICE

## 2021-02-12 NOTE — PROGRESS NOTES
Mio Hull is a 47 y.o. female who presents today for Establish Care  . She has a history of   Patient Active Problem List   Diagnosis Code    Chronic daily headache R51.9    H/O abnormal Pap smear Z87.898    History of kidney stones Z87.442    Weight gain R63.5    Menorrhagia with regular cycle N92.0    Obesity (BMI 30-39. 9) E66.9    GERD (gastroesophageal reflux disease) K21.9    Irritable bowel syndrome with diarrhea K58.0    Gastroesophageal reflux disease K21.9    Severe obesity (HCC) E66.01    Chronic hypertension with superimposed preeclampsia O11.9   . Today patient is here to establish care. Previous PCP Dr. Gaurav Painter. she is switching because personal preference. Records are not available for me to review. Pneumonia: Right lower lobe pneumonia seen on imaging on the 14th. Patient was experiencing some symptoms of pneumonia. She did get Covid tested which was negative. Notes that overall she improved, but still having a cough. Pre-E: with pregnancy, denies any hypertension before. Recently her blood pressures were elevated and therefore she has resumed labetalol. Migraines: on Ajovy. Doing much better with these therapies. HTN: on labeltalol for a couple weeks. Social: Lives with  an 15month old. No Smoking. Works with . Works in transportation. Quite active. Family History: reviewed, HTN in family. ROS  Review of Systems   Constitutional: Negative for chills, fever and weight loss. HENT: Negative for congestion and sore throat. Eyes: Negative for blurred vision, double vision and photophobia. Respiratory: Positive for cough and sputum production. Negative for shortness of breath. Cardiovascular: Positive for chest pain. Negative for palpitations and leg swelling. Gastrointestinal: Negative for abdominal pain, constipation, diarrhea, heartburn, nausea and vomiting.    Genitourinary: Negative for dysuria, frequency and urgency. Musculoskeletal: Negative for joint pain and myalgias. Skin: Negative for rash. Neurological: Negative. Negative for headaches. Endo/Heme/Allergies: Does not bruise/bleed easily. Psychiatric/Behavioral: Negative for memory loss and suicidal ideas. Visit Vitals  /82   Pulse 64   Temp 97.8 °F (36.6 °C) (Oral)   Resp 18   Ht 5' 4\" (1.626 m)   Wt 233 lb (105.7 kg)   SpO2 97%   BMI 39.99 kg/m²       Physical Exam  Constitutional:       General: She is not in acute distress. HENT:      Head: Normocephalic and atraumatic. Mouth/Throat:      Pharynx: No oropharyngeal exudate. Eyes:      General: No scleral icterus. Conjunctiva/sclera: Conjunctivae normal.      Pupils: Pupils are equal, round, and reactive to light. Neck:      Musculoskeletal: Normal range of motion. Thyroid: No thyromegaly. Vascular: No JVD. Cardiovascular:      Rate and Rhythm: Normal rate and regular rhythm. Heart sounds: No murmur. No friction rub. No gallop. Pulmonary:      Effort: Pulmonary effort is normal. No respiratory distress. Breath sounds: Normal breath sounds. No wheezing. Comments: Coughing during exam but all lung fields are clear. Abdominal:      General: Bowel sounds are normal. There is no distension. Palpations: Abdomen is soft. Tenderness: There is no guarding or rebound. Musculoskeletal: Normal range of motion. Skin:     General: Skin is dry. Findings: No rash. Neurological:      Mental Status: She is alert and oriented to person, place, and time. Cranial Nerves: No cranial nerve deficit. Psychiatric:         Mood and Affect: Mood and affect normal.         Cognition and Memory: Memory normal.         Judgment: Judgment normal.         Current Outpatient Medications   Medication Sig    labetaloL (NORMODYNE) 100 mg tablet Take 100 mg by mouth two (2) times a day.  esomeprazole magnesium (NEXIUM PO) Take  by mouth.  albuterol (PROVENTIL HFA, VENTOLIN HFA, PROAIR HFA) 90 mcg/actuation inhaler 1 2 INHALATIONS EVERY 4 TO 6 HOURS AS NEEDED FOR WHEEZING    B.infantis-B.ani-B.long-B.bifi (Probiotic 4X) 10-15 mg TbEC Take  by mouth.  ascorbic acid, vitamin C, (Vitamin C) 250 mg tablet Take  by mouth.  ondansetron (ZOFRAN ODT) 4 mg disintegrating tablet Take 1 Tab by mouth every eight (8) hours as needed for Nausea for up to 12 doses.  butalbital-acetaminophen-caffeine (FIORICET, ESGIC) -40 mg per tablet TAKE ONE TABLET BY MOUTH EVERY 6 HOURS AS NEEDED FOR PAIN    levonorgestreL (Mirena) 20 mcg/24 hours (6 yrs) 52 mg IUD 1 Device by IntraUTERine route once.  fremanezumab-vfrm (Ajovy Autoinjector) 225 mg/1.5 mL atIn 1.5 mL by SubCUTAneous route every thirty (30) days.  fremanezumab-vfrm (Ajovy Autoinjector) 225 mg/1.5 mL atIn 1 Pen Needle by SubCUTAneous route every thirty (30) days. No current facility-administered medications for this visit.          Past Medical History:   Diagnosis Date    ADHD     Dysplasia of cervix, low grade (ORION 1) 2/23/12    colpo    Encounter for IUD removal 08/19/2016    Mirena    Essential hypertension     10 years ago    GERD (gastroesophageal reflux disease)     trying to control with diet since pregnant    H/O abnormal Pap smear 11/8/12;2/8/12    LGSIL, HPV positive    Headache     Headache(784.0)     IUD (intrauterine device) in place 03/11/2014    Mirena    Kidney disease     kidney stones 2015    Migraine headache     without aura    pap smear for 2/1/11;2/5/13; 4/1/15;2/26/19    neg, HPV neg,no ECC; neg, HPV neg, Negative, HPV negative; neg pap and neg HPV      Past Surgical History:   Procedure Laterality Date    HX COLPOSCOPY  2/23/12    ORION 1    HX HEENT      wisdom teeth    HX LITHOTRIPSY      HX WISDOM TEETH EXTRACTION        Social History     Tobacco Use    Smoking status: Never Smoker    Smokeless tobacco: Never Used   Substance Use Topics    Alcohol use: Yes     Alcohol/week: 2.0 standard drinks     Types: 1 Cans of beer, 1 Shots of liquor per week     Comment: social      Family History   Problem Relation Age of Onset    Hypertension Father     Heart Disease Father     Diabetes Father     Elevated Lipids Father     Cancer Father         eye and pancreatic     Cancer Mother         Cervical,Uterine,& Ovarian (pt questions ovarian but got cancer after copper 7 IUD)    Depression Mother     Allergic Rhinitis Brother     Asthma Brother     Other Brother         Chron's    Hypertension Brother     Depression Brother     Alcohol abuse Maternal Grandfather     Depression Maternal Grandfather     Alcohol abuse Paternal Grandmother     Stroke Paternal Grandmother     Migraines Brother     Allergic Rhinitis Brother     Depression Brother     Breast Cancer Other         No Known Allergies     Assessment/Plan  Diagnoses and all orders for this visit:    1. Establishing care with new doctor, encounter for-reviewed past medical history. 2. History of pre-eclampsia-currently taking labetalol, but likely does not need this. She will stop this and monitor her blood pressures over the next 3 to 4 weeks. She will let us know if these remain elevated    3. Cough-likely postinfectious cough. Will check chest x-ray will place her on a short course of steroids. -     predniSONE (DELTASONE) 20 mg tablet; Take two tablets for 4 days, then one for 4 days. 4. History of pneumonia  -     predniSONE (DELTASONE) 20 mg tablet; Take two tablets for 4 days, then one for 4 days. -     XR CHEST PA LAT; Future    Other orders  -     benzonatate (TESSALON) 200 mg capsule; Take 1 Cap by mouth three (3) times daily as needed for Cough for up to 7 days. 5. H/A -seeing neurology          Carole Cruz MD  2/12/2021    This note was created with the help of speech recognition software [employer]) and may contain some 'sound alike' errors.

## 2021-02-12 NOTE — PROGRESS NOTES
Previous PCP: Dr. Domitila Montgomery  Pt is still having some cough following having pnuemonia   Pt said she had some issues with her BP, she started taking  labetalol 100mg which she was prescribed previously

## 2021-03-04 ENCOUNTER — OFFICE VISIT (OUTPATIENT)
Dept: INTERNAL MEDICINE CLINIC | Age: 41
End: 2021-03-04
Payer: COMMERCIAL

## 2021-03-04 VITALS
DIASTOLIC BLOOD PRESSURE: 86 MMHG | HEIGHT: 64 IN | BODY MASS INDEX: 40.29 KG/M2 | RESPIRATION RATE: 18 BRPM | HEART RATE: 82 BPM | OXYGEN SATURATION: 98 % | WEIGHT: 236 LBS | SYSTOLIC BLOOD PRESSURE: 128 MMHG | TEMPERATURE: 98.1 F

## 2021-03-04 DIAGNOSIS — R07.9 CHEST PAIN, UNSPECIFIED TYPE: ICD-10-CM

## 2021-03-04 DIAGNOSIS — R03.0 ELEVATED BP WITHOUT DIAGNOSIS OF HYPERTENSION: ICD-10-CM

## 2021-03-04 DIAGNOSIS — J30.9 ALLERGIC RHINITIS, UNSPECIFIED SEASONALITY, UNSPECIFIED TRIGGER: ICD-10-CM

## 2021-03-04 DIAGNOSIS — M79.18 INTERCOSTAL MUSCLE PAIN: Primary | ICD-10-CM

## 2021-03-04 PROCEDURE — 99214 OFFICE O/P EST MOD 30 MIN: CPT | Performed by: INTERNAL MEDICINE

## 2021-03-04 RX ORDER — CYCLOBENZAPRINE HCL 10 MG
10 TABLET ORAL
Qty: 10 TAB | Refills: 0 | Status: SHIPPED | OUTPATIENT
Start: 2021-03-04 | End: 2021-05-28 | Stop reason: ALTCHOICE

## 2021-03-04 RX ORDER — DICLOFENAC SODIUM 75 MG/1
75 TABLET, DELAYED RELEASE ORAL 2 TIMES DAILY
Qty: 28 TAB | Refills: 0 | Status: SHIPPED | OUTPATIENT
Start: 2021-03-04 | End: 2021-03-18

## 2021-03-04 NOTE — PROGRESS NOTES
Satya Sebastian is a 47 y.o. female who presents today for Rib Pain  . She has a history of   Patient Active Problem List   Diagnosis Code    Chronic daily headache R51.9    H/O abnormal Pap smear Z87.898    History of kidney stones Z87.442    Weight gain R63.5    Menorrhagia with regular cycle N92.0    Obesity (BMI 30-39. 9) E66.9    GERD (gastroesophageal reflux disease) K21.9    Irritable bowel syndrome with diarrhea K58.0    Gastroesophageal reflux disease K21.9    Severe obesity (HCC) E66.01    Chronic hypertension with superimposed preeclampsia O11.9   . Today patient is here for an acute visit. Chest pain history:  Patient reports CP. she. reports 2 weeks onset of chest discomfort described as sharp in the right and lower  chest.  The discomfort is intermittent (<1 minute). she is currently having chest discomfort. she denies radiation of discomfort to the bilateral  arm. Associated symptoms include: none. Cardiac risk factors include:  none. she this occurred after a pneumonia. Tends to be worse when she coughs or sneezes. Taking a deep breath does not reproduce the pain. Certain positions of laying down does amplify the pain. She contacted us on the first and reported that this was worsening. She does note that she has been coughing and having some mucus production. They have recently moved their office and there has been some lifting that was involved with this. Notes that allergy symptoms have continued. No breathing issues. Hypertension- off BB.   reports that she has never smoked. She has never used smokeless tobacco.    reports current alcohol use of about 2.0 standard drinks of alcohol per week. BP Readings from Last 2 Encounters:   03/04/21 128/86   02/12/21 124/82       ROS  Review of Systems   Constitutional: Negative for chills, fever and weight loss. HENT: Positive for congestion. Negative for sore throat.     Eyes: Negative for blurred vision, double vision and photophobia. Respiratory: Negative for cough and shortness of breath. Cardiovascular: Positive for chest pain. Negative for palpitations and leg swelling. Gastrointestinal: Negative for abdominal pain, constipation, diarrhea, heartburn, nausea and vomiting. Genitourinary: Negative for dysuria, frequency and urgency. Musculoskeletal: Negative for joint pain and myalgias. Skin: Negative for rash. Neurological: Negative. Negative for headaches. Endo/Heme/Allergies: Does not bruise/bleed easily. Psychiatric/Behavioral: Negative for memory loss and suicidal ideas. Visit Vitals  /86 (BP 1 Location: Left upper arm, BP Patient Position: Sitting, BP Cuff Size: Large adult)   Pulse 82   Temp 98.1 °F (36.7 °C) (Oral)   Resp 18   Ht 5' 4\" (1.626 m)   Wt 236 lb (107 kg)   SpO2 98%   BMI 40.51 kg/m²       Physical Exam  Constitutional:       General: She is not in acute distress. Appearance: She is well-developed. HENT:      Head: Normocephalic and atraumatic. Neck:      Musculoskeletal: Normal range of motion and neck supple. Thyroid: No thyromegaly. Cardiovascular:      Rate and Rhythm: Normal rate and regular rhythm. Heart sounds: No murmur. Pulmonary:      Effort: Pulmonary effort is normal.      Breath sounds: Normal breath sounds. No wheezing. Comments: Good breath sounds in all lung fields. No wheezing. No egophony  Chest:          Comments: Very localized reproducible pain were noted. No mass noted. Abdominal:      General: Bowel sounds are normal. There is no distension. Palpations: Abdomen is soft. Skin:     General: Skin is warm and dry. Neurological:      Mental Status: She is alert and oriented to person, place, and time. Cranial Nerves: No cranial nerve deficit.    Psychiatric:         Behavior: Behavior normal.           Current Outpatient Medications   Medication Sig    diclofenac EC (VOLTAREN) 75 mg EC tablet Take 1 Tab by mouth two (2) times a day for 14 days.  cyclobenzaprine (FLEXERIL) 10 mg tablet Take 1 Tab by mouth nightly as needed for Muscle Spasm(s).  esomeprazole magnesium (NEXIUM PO) Take  by mouth.  B.infantis-B.ani-B.long-B.bifi (Probiotic 4X) 10-15 mg TbEC Take  by mouth.  ascorbic acid, vitamin C, (Vitamin C) 250 mg tablet Take  by mouth.  ondansetron (ZOFRAN ODT) 4 mg disintegrating tablet Take 1 Tab by mouth every eight (8) hours as needed for Nausea for up to 12 doses.  butalbital-acetaminophen-caffeine (FIORICET, ESGIC) -40 mg per tablet TAKE ONE TABLET BY MOUTH EVERY 6 HOURS AS NEEDED FOR PAIN    levonorgestreL (Mirena) 20 mcg/24 hours (6 yrs) 52 mg IUD 1 Device by IntraUTERine route once. No current facility-administered medications for this visit. Past Medical History:   Diagnosis Date    ADHD     Dysplasia of cervix, low grade (ORION 1) 2/23/12    colpo    Encounter for IUD removal 08/19/2016    Mirena    Essential hypertension     10 years ago    GERD (gastroesophageal reflux disease)     trying to control with diet since pregnant    H/O abnormal Pap smear 11/8/12;2/8/12    LGSIL, HPV positive    Headache     Headache(784.0)     IUD (intrauterine device) in place 03/11/2014    Mirena    Kidney disease     kidney stones 2015    Migraine headache     without aura    pap smear for 2/1/11;2/5/13; 4/1/15;2/26/19    neg, HPV neg,no ECC; neg, HPV neg, Negative, HPV negative; neg pap and neg HPV      Past Surgical History:   Procedure Laterality Date    HX COLPOSCOPY  2/23/12    ORION 1    HX HEENT      wisdom teeth    HX LITHOTRIPSY      HX WISDOM TEETH EXTRACTION        Social History     Tobacco Use    Smoking status: Never Smoker    Smokeless tobacco: Never Used   Substance Use Topics    Alcohol use:  Yes     Alcohol/week: 2.0 standard drinks     Types: 1 Cans of beer, 1 Shots of liquor per week     Comment: social      Family History   Problem Relation Age of Onset    Hypertension Father     Heart Disease Father     Diabetes Father     Elevated Lipids Father     Cancer Father         eye and pancreatic     Cancer Mother         Cervical,Uterine,& Ovarian (pt questions ovarian but got cancer after copper 7 IUD)    Depression Mother     Allergic Rhinitis Brother     Asthma Brother     Other Brother         Chron's    Hypertension Brother     Depression Brother     Alcohol abuse Maternal Grandfather     Depression Maternal Grandfather     Alcohol abuse Paternal Grandmother     Stroke Paternal Grandmother     Migraines Brother     Allergic Rhinitis Brother     Depression Brother     Breast Cancer Other         No Known Allergies     Assessment/Plan  Diagnoses and all orders for this visit:    1. Intercostal muscle pain-pain reproducible with palpitation. Likely intercostal muscular strain. Patient to use topical lidocaine cream.  Will place on a 14-day course of anti-inflammatories. As needed muscle relaxers at night for discomfort.  -     diclofenac EC (VOLTAREN) 75 mg EC tablet; Take 1 Tab by mouth two (2) times a day for 14 days. -     cyclobenzaprine (FLEXERIL) 10 mg tablet; Take 1 Tab by mouth nightly as needed for Muscle Spasm(s). 2. Chest pain, unspecified type  -     diclofenac EC (VOLTAREN) 75 mg EC tablet; Take 1 Tab by mouth two (2) times a day for 14 days. -     cyclobenzaprine (FLEXERIL) 10 mg tablet; Take 1 Tab by mouth nightly as needed for Muscle Spasm(s). 3. Elevated BP without diagnosis of hypertension-off labetalol and doing well    4. Allergic rhinitis, unspecified seasonality, unspecified trigger-likely cause of congestion and mucus. Lung exam negative. Patient to continue antihistamines. Tierra Moore MD  3/4/2021    This note was created with the help of speech recognition software [employer]) and may contain some 'sound alike' errors.

## 2021-03-04 NOTE — PROGRESS NOTES
Pt was moving offices she feels like she might have pulled a muscle  It hurts most when she coughs and sneezes, but now it hurts just sitting still  Pt is out of the [address] but its too expensive (insurance does not cover)

## 2021-03-05 RX ORDER — BUTALBITAL, ACETAMINOPHEN AND CAFFEINE 50; 325; 40 MG/1; MG/1; MG/1
TABLET ORAL
Qty: 30 TAB | Refills: 0 | Status: SHIPPED | OUTPATIENT
Start: 2021-03-05 | End: 2021-05-21 | Stop reason: SDUPTHER

## 2021-05-04 DIAGNOSIS — G43.909 MIGRAINE WITHOUT STATUS MIGRAINOSUS, NOT INTRACTABLE, UNSPECIFIED MIGRAINE TYPE: ICD-10-CM

## 2021-05-04 RX ORDER — GALCANEZUMAB 120 MG/ML
120 INJECTION, SOLUTION SUBCUTANEOUS
Qty: 1 ML | Refills: 3 | Status: SHIPPED | OUTPATIENT
Start: 2021-05-04 | End: 2021-07-06 | Stop reason: SDUPTHER

## 2021-05-04 RX ORDER — GALCANEZUMAB 120 MG/ML
240 INJECTION, SOLUTION SUBCUTANEOUS
Qty: 2 SYRINGE | Refills: 0 | Status: SHIPPED | OUTPATIENT
Start: 2021-05-04 | End: 2021-07-06 | Stop reason: SDUPTHER

## 2021-05-14 ENCOUNTER — TELEPHONE (OUTPATIENT)
Dept: INTERNAL MEDICINE CLINIC | Age: 41
End: 2021-05-14

## 2021-05-14 NOTE — TELEPHONE ENCOUNTER
----- Message from Meenakshi Pizano sent at 5/14/2021  8:22 AM EDT -----  Regarding: Dr. Denice Arnold  Appointment not available    Caller's first and last name and relationship to patient (if not the patient):      Best contact number: [phone]      Preferred date and time: Today anytime      Scheduled appointment date and time: No appt scheduled      Reason for appointment: sinus infection      Details to clarify the request: Pt is requesting an appt today for sore throat, drainage down the back of her throat and colored mucus.  Pt can do telephone or virtual appt      Meenakshi Pizano

## 2021-05-14 NOTE — TELEPHONE ENCOUNTER
[facility] as Dr Lavonne Dubin out of the office and no additional availability with other providers. Pt in agreement and appreciative of the call back.

## 2021-05-25 RX ORDER — BUTALBITAL, ACETAMINOPHEN AND CAFFEINE 50; 325; 40 MG/1; MG/1; MG/1
TABLET ORAL
Qty: 30 TABLET | Refills: 0 | Status: SHIPPED | OUTPATIENT
Start: 2021-05-25 | End: 2021-08-18 | Stop reason: SDUPTHER

## 2021-05-28 ENCOUNTER — OFFICE VISIT (OUTPATIENT)
Dept: INTERNAL MEDICINE CLINIC | Age: 41
End: 2021-05-28
Payer: COMMERCIAL

## 2021-05-28 VITALS
DIASTOLIC BLOOD PRESSURE: 80 MMHG | HEIGHT: 64 IN | BODY MASS INDEX: 40.97 KG/M2 | WEIGHT: 240 LBS | SYSTOLIC BLOOD PRESSURE: 132 MMHG | HEART RATE: 96 BPM | OXYGEN SATURATION: 97 % | TEMPERATURE: 99 F | RESPIRATION RATE: 14 BRPM

## 2021-05-28 DIAGNOSIS — J01.41 ACUTE RECURRENT PANSINUSITIS: Primary | ICD-10-CM

## 2021-05-28 DIAGNOSIS — J03.90 TONSILLITIS: ICD-10-CM

## 2021-05-28 DIAGNOSIS — R03.0 ELEVATED BP WITHOUT DIAGNOSIS OF HYPERTENSION: ICD-10-CM

## 2021-05-28 LAB
S PYO AG THROAT QL: NEGATIVE
VALID INTERNAL CONTROL?: YES

## 2021-05-28 PROCEDURE — 87880 STREP A ASSAY W/OPTIC: CPT | Performed by: NURSE PRACTITIONER

## 2021-05-28 PROCEDURE — 99214 OFFICE O/P EST MOD 30 MIN: CPT | Performed by: NURSE PRACTITIONER

## 2021-05-28 RX ORDER — FLUTICASONE PROPIONATE 50 MCG
SPRAY, SUSPENSION (ML) NASAL
COMMUNITY
Start: 2021-05-14 | End: 2021-12-16 | Stop reason: ALTCHOICE

## 2021-05-28 RX ORDER — LEVOFLOXACIN 500 MG/1
500 TABLET, FILM COATED ORAL DAILY
Qty: 10 TABLET | Refills: 0 | Status: SHIPPED | OUTPATIENT
Start: 2021-05-28 | End: 2021-07-02

## 2021-05-28 RX ORDER — GUAIFENESIN 600 MG/1
600 TABLET, EXTENDED RELEASE ORAL 2 TIMES DAILY
Qty: 30 TABLET | Refills: 0
Start: 2021-05-28 | End: 2021-07-02

## 2021-05-28 NOTE — PATIENT INSTRUCTIONS
Sinusitis: Care Instructions Your Care Instructions Sinusitis is an infection of the lining of the sinus cavities in your head. Sinusitis often follows a cold. It causes pain and pressure in your head and face. In most cases, sinusitis gets better on its own in 1 to 2 weeks. But some mild symptoms may last for several weeks. Sometimes antibiotics are needed. Follow-up care is a key part of your treatment and safety. Be sure to make and go to all appointments, and call your doctor if you are having problems. It's also a good idea to know your test results and keep a list of the medicines you take. How can you care for yourself at home? · Take an over-the-counter pain medicine, such as acetaminophen (Tylenol), ibuprofen (Advil, Motrin), or naproxen (Aleve). Read and follow all instructions on the label. · If the doctor prescribed antibiotics, take them as directed. Do not stop taking them just because you feel better. You need to take the full course of antibiotics. · Be careful when taking over-the-counter cold or flu medicines and Tylenol at the same time. Many of these medicines have acetaminophen, which is Tylenol. Read the labels to make sure that you are not taking more than the recommended dose. Too much acetaminophen (Tylenol) can be harmful. · Breathe warm, moist air from a steamy shower, a hot bath, or a sink filled with hot water. Avoid cold, dry air. Using a humidifier in your home may help. Follow the directions for cleaning the machine. · Use saline (saltwater) nasal washes. This can help keep your nasal passages open and wash out mucus and bacteria. You can buy saline nose drops at a grocery store or drugstore. Or you can make your own at home by adding 1 teaspoon of salt and 1 teaspoon of baking soda to 2 cups of distilled water. If you make your own, fill a bulb syringe with the solution, insert the tip into your nostril, and squeeze gently. [place]  your nose.  
· Put a hot, wet towel or a warm gel pack on your face 3 or 4 times a day for 5 to 10 minutes each time. · Try a decongestant nasal spray like oxymetazoline (Afrin). Do not use it for more than 3 days in a row. Using it for more than 3 days can make your congestion worse. When should you call for help? Call your doctor now or seek immediate medical care if: 
  · You have new or worse swelling or redness in your face or around your eyes.  
  · You have a new or higher fever. Watch closely for changes in your health, and be sure to contact your doctor if: 
  · You have new or worse facial pain.  
  · The mucus from your nose becomes thicker (like pus) or has new blood in it.  
  · You are not getting better as expected. Where can you learn more? Go to http://www.gray.com/ Enter D057 in the search box to learn more about \"Sinusitis: Care Instructions. \" Current as of: December 2, 2020               Content Version: 12.8 © 2006-2021 [employer]. Care instructions adapted under license by [employer] (which disclaims liability or warranty for this information). If you have questions about a medical condition or this instruction, always ask your healthcare professional. [place] [address] any warranty or liability for your use of this information. Tonsillitis: Care Instructions Overview Tonsillitis is an infection of the tonsils that is caused by bacteria or a virus. The tonsils are in the back of the throat and are part of the immune system. Tonsillitis typically lasts from a few days up to a couple of weeks. Tonsillitis caused by a virus goes away on its own. Tonsillitis caused by the bacteria that causes strep throat is treated with antibiotics. You and your doctor may consider surgery to remove the tonsils (tonsillectomy) if you have serious complications or repeat infections. Follow-up care is a key part of your treatment and safety.  Be sure to make and go to all appointments, and call your doctor if you are having problems. It's also a good idea to know your test results and keep a list of the medicines you take. How can you care for yourself at home? Home care can help with a sore throat and other symptoms. Here are some things you can do to help yourself feel better. · If your doctor prescribed antibiotics, take them as directed. Do not stop taking them just because you feel better. You need to take the full course of antibiotics. · Gargle with warm salt water. This helps reduce swelling and relieve discomfort. Gargle once an hour with 1 teaspoon of salt mixed in 8 fluid ounces of warm water. · Take an over-the-counter pain medicine, such as acetaminophen (Tylenol), ibuprofen (Advil, Motrin), or naproxen (Aleve). Be safe with medicines. Read and follow all instructions on the label. No one younger than 20 should take aspirin. It has been linked to Reye syndrome, a serious illness. · Be careful when taking over-the-counter cold or flu medicines and Tylenol at the same time. Many of these medicines have acetaminophen, which is Tylenol. Read the labels to make sure that you are not taking more than the recommended dose. Too much acetaminophen (Tylenol) can be harmful. · Try lozenges or an over-the-counter throat spray to relieve throat pain. · Drink plenty of fluids. Fluids may help soothe an irritated throat. Drink warm or cool liquids (whichever feels better). These include tea, soup, and juice. · Do not smoke, and avoid secondhand smoke. Smoking can make tonsillitis worse. If you need help quitting, talk to your doctor about stop-smoking programs and medicines. These can increase your chances of quitting for good. · Use a vaporizer or humidifier to add moisture to your bedroom. Follow the directions for cleaning the machine. · Get plenty of rest. 
When should you call for help?  
 Call your doctor now or seek immediate medical care if: 
  · Your pain gets worse on one side of your throat.  
  · You have a new or higher fever.  
  · You notice changes in your voice.  
  · You have trouble opening your mouth.  
  · You have any trouble breathing.  
  · You have much more trouble swallowing.  
  · You have a fever with a stiff neck or a severe headache.  
  · You are sensitive to light or feel very sleepy or confused. Watch closely for changes in your health, and be sure to contact your doctor if: 
  · You do not get better after 2 days. Where can you learn more? Go to http://www.gray.com/ Enter L100 in the search box to learn more about \"Tonsillitis: Care Instructions. \" Current as of: December 2, 2020               Content Version: 12.8 © 5199-0993 [employer]. Care instructions adapted under license by [employer] (which disclaims liability or warranty for this information). If you have questions about a medical condition or this instruction, always ask your healthcare professional. [place] [address] any warranty or liability for your use of this information.

## 2021-05-28 NOTE — PROGRESS NOTES
Kemal Kerns (: 1980) is a 47 y.o. female, established patient, here for evaluation of the following chief complaint(s):  Follow-up (sinus infection - medication did not help - headache, facial sonreness, runny nose, and nasal congestion.)       ASSESSMENT/PLAN:  Below is the assessment and plan developed based on review of pertinent history, physical exam, labs, studies, and medications. 1. Acute recurrent pansinusitis -- has not resolved with 7 day course of Augmentin; has taken Levaquin in past without issues  -     levoFLOXacin (LEVAQUIN) 500 mg tablet; Take 1 Tablet by mouth daily. , Normal, Disp-10 Tablet, R-0  -     guaiFENesin ER (MUCINEX) 600 mg ER tablet; Take 1 Tablet by mouth two (2) times a day. Can take up to 1200 mg twice daily as needed, No Print, Disp-30 Tablet, R-0    2. Tonsillitis -- rapid strep in office negative. Warm saline throat gargles as needed. -     levoFLOXacin (LEVAQUIN) 500 mg tablet; Take 1 Tablet by mouth daily. , Normal, Disp-10 Tablet, R-0  -     benzocaine-menthoL (Cepacol Sore Throat, colleen-men,) 15-2.6 mg lozg lozenge; Take 1 Lozenge by mouth every two (2) hours as needed for Sore throat., No Print, Disp-30 Lozenge, R-0  -     AMB POC RAPID STREP A -- negative    3. Elevated BP without diagnosis of hypertension -- better on recheck; has been running in 130's/mid 80's at home. Continue to monitor. Discussed decreasing salt added to food while cooking. SUBJECTIVE/OBJECTIVE:  HPI    Patient of Dr Judge Magallanes presents with complaints of persistent sinus pressure and pain for the past 2+ weeks. Reports she was seen at [facility] clinic on  for sinus infection and given prescription for Augmentin 875 mg twice daily for 7 days which she completed but has not had much improvement after completing course.   Now she is starting to feel worse again with sinus pain/pressure, nasal congestion, post nasal drainage, right sided sore throat, swollen glands, fatigue. Has been using Flonase nasal spray and taking Nyquil with minimal improvement. Reports she occasionally checks her blood pressures at home and obtains readings in 130's/mid 80's. Denies dizziness, chest pain. 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 Has a toddler at home who is attending . History of gestational hypertension with superimposed preeclampsia. Her father and brother are both being treated for hypertension. She does not add extra salt to her food but does cook with salt. Patient Active Problem List   Diagnosis Code    Chronic daily headache R51.9    H/O abnormal Pap smear Z87.898    History of kidney stones Z87.442    Weight gain R63.5    Menorrhagia with regular cycle N92.0    Obesity (BMI 30-39. 9) E66.9    GERD (gastroesophageal reflux disease) K21.9    Irritable bowel syndrome with diarrhea K58.0    Gastroesophageal reflux disease K21.9    Severe obesity (HCC) E66.01    Chronic hypertension with superimposed preeclampsia O11.9     Past Surgical History:   Procedure Laterality Date    HX COLPOSCOPY  2/23/12    ORION 1    HX HEENT      wisdom teeth    HX LITHOTRIPSY      HX WISDOM TEETH EXTRACTION       Social History     Socioeconomic History    Marital status:      Spouse name: Not on file    Number of children: Not on file    Years of education: Not on file    Highest education level: Not on file   Occupational History    Not on file   Tobacco Use    Smoking status: Never Smoker    Smokeless tobacco: Never Used   Substance and Sexual Activity    Alcohol use: Yes     Alcohol/week: 2.0 standard drinks     Types: 1 Cans of beer, 1 Shots of liquor per week     Comment: social    Drug use: No    Sexual activity: Yes     Partners: Male     Birth control/protection: I.U.D.    Other Topics Concern     Service Not Asked    Blood Transfusions Not Asked    Caffeine Concern Not Asked    Occupational Exposure Not Asked    Hobby Hazards Not Asked    Sleep Concern Not Asked    Stress Concern Not Asked    Weight Concern Not Asked    Special Diet Not Asked    Back Care Not Asked    Exercise Not Asked    Bike Helmet Not Asked   [address] Not Asked    Self-Exams Not Asked   Social History Narrative    Not on file     Social Determinants of Health     Financial Resource Strain:     Difficulty of Paying Living Expenses:    Food Insecurity:     Worried About Running Out of Food in the Last Year:     [address] in the Last Year:    Transportation Needs:     Lack of Transportation (Medical):      Lack of Transportation (Non-Medical):    Physical Activity:     Days of Exercise per Week:     Minutes of Exercise per Session:    Stress:     Feeling of Stress :    Social Connections:     Frequency of Communication with Friends and Family:     Frequency of Social Gatherings with Friends and Family:     Attends Congregational Services:     Active Member of Clubs or Organizations:     Attends Club or Organization Meetings:     Marital Status:    Intimate Partner Violence:     Fear of Current or Ex-Partner:     Emotionally Abused:     Physically Abused:     Sexually Abused:      Family History   Problem Relation Age of Onset    Hypertension Father     Heart Disease Father     Diabetes Father     Elevated Lipids Father     Cancer Father         eye and pancreatic     Cancer Mother         Cervical,Uterine,& Ovarian (pt questions ovarian but got cancer after copper 7 IUD)    Depression Mother     Allergic Rhinitis Brother     Asthma Brother     Other Brother         Chron's    Hypertension Brother     Depression Brother     Alcohol abuse Maternal Grandfather     Depression Maternal Grandfather     Alcohol abuse Paternal Grandmother     Stroke Paternal Grandmother     Migraines Brother     Allergic Rhinitis Brother     Depression Brother     Breast Cancer Other      Current Outpatient Medications   Medication Sig    fluticasone propionate (FLONASE) 50 mcg/actuation nasal spray     levoFLOXacin (LEVAQUIN) 500 mg tablet Take 1 Tablet by mouth daily.  guaiFENesin ER (MUCINEX) 600 mg ER tablet Take 1 Tablet by mouth two (2) times a day. Can take up to 1200 mg twice daily as needed    benzocaine-menthoL (Cepacol Sore Throat, colleen-men,) 15-2.6 mg lozg lozenge Take 1 Lozenge by mouth every two (2) hours as needed for Sore throat.  butalbital-acetaminophen-caffeine (FIORICET, ESGIC) -40 mg per tablet TAKE ONE TABLET BY MOUTH EVERY 6 HOURS AS NEEDED FOR PAIN    galcanezumab-gnlm (Emgality Pen) 120 mg/mL injection 2 mL by SubCUTAneous route every thirty (30) days.  galcanezumab-gnlm (Emgality Pen) 120 mg/mL injection 1 mL by SubCUTAneous route every thirty (30) days.  esomeprazole magnesium (NEXIUM PO) Take  by mouth.  B.infantis-B.ani-B.long-B.bifi (Probiotic 4X) 10-15 mg TbEC Take  by mouth.  ascorbic acid, vitamin C, (Vitamin C) 250 mg tablet Take  by mouth.  ondansetron (ZOFRAN ODT) 4 mg disintegrating tablet Take 1 Tab by mouth every eight (8) hours as needed for Nausea for up to 12 doses.  levonorgestreL (Mirena) 20 mcg/24 hours (6 yrs) 52 mg IUD 1 Device by IntraUTERine route once. No current facility-administered medications for this visit. No Known Allergies  Immunization History   Administered Date(s) Administered    Influenza Vaccine Affine) PF (>6 Mo Flulaval, Fluarix, and >3 Yrs Afluria, Fluzone 79500) 02/26/2019, 10/08/2019    Influenza Vaccine Split 01/17/2012    Tdap 08/01/2019       Review of Systems   Constitutional: Positive for fatigue and fever. Negative for chills. HENT: Positive for congestion, postnasal drip, sinus pressure, sinus pain and sore throat. Respiratory: Negative for cough and shortness of breath. Cardiovascular: Negative for chest pain.    Gastrointestinal: Negative for nausea and vomiting. Musculoskeletal: Negative for myalgias. Neurological: Positive for headaches. Negative for dizziness, light-headedness and numbness. Psychiatric/Behavioral: Negative for dysphoric mood. The patient is not nervous/anxious. BP (!) 138/95 (BP 1 Location: Left upper arm, BP Patient Position: Sitting, BP Cuff Size: Adult)   Pulse 96   Temp 99 °F (37.2 °C) (Temporal)   Resp 14   Ht 5' 4\" (1.626 m)   Wt 240 lb (108.9 kg)   SpO2 97%   BMI 41.20 kg/m²    Blood pressure rechecked with manual cuff left arm sitting 132/80  Physical Exam  Vitals and nursing note reviewed. Constitutional:       Appearance: Normal appearance. HENT:      Head: Normocephalic and atraumatic. Right Ear: Tympanic membrane, ear canal and external ear normal.      Left Ear: Tympanic membrane, ear canal and external ear normal.      Nose: Congestion present. Right Turbinates: Enlarged and swollen. Left Turbinates: Swollen. Right Sinus: Maxillary sinus tenderness and frontal sinus tenderness present. Left Sinus: Maxillary sinus tenderness and frontal sinus tenderness present. Mouth/Throat:      Mouth: Mucous membranes are moist.      Pharynx: Posterior oropharyngeal erythema present. Tonsils: Tonsillar exudate present. 1+ on the right. Cardiovascular:      Rate and Rhythm: Normal rate and regular rhythm. Pulmonary:      Effort: Pulmonary effort is normal.      Breath sounds: Normal breath sounds. Musculoskeletal:         General: Normal range of motion. Cervical back: Normal range of motion and neck supple. Lymphadenopathy:      Cervical: Cervical adenopathy present. Skin:     General: Skin is warm and dry. Neurological:      General: No focal deficit present. Mental Status: She is alert and oriented to person, place, and time.    Psychiatric:         Mood and Affect: Mood normal.         Behavior: Behavior normal.               An electronic signature was used to authenticate this note.   -- Matthew Sales, NP

## 2021-06-17 ENCOUNTER — OFFICE VISIT (OUTPATIENT)
Dept: NEUROLOGY | Age: 41
End: 2021-06-17
Payer: COMMERCIAL

## 2021-06-17 VITALS
RESPIRATION RATE: 18 BRPM | HEART RATE: 76 BPM | OXYGEN SATURATION: 99 % | DIASTOLIC BLOOD PRESSURE: 66 MMHG | WEIGHT: 240 LBS | BODY MASS INDEX: 40.97 KG/M2 | SYSTOLIC BLOOD PRESSURE: 112 MMHG | HEIGHT: 64 IN

## 2021-06-17 DIAGNOSIS — E34.8 PINEAL GLAND CYST: ICD-10-CM

## 2021-06-17 DIAGNOSIS — G43.909 MIGRAINE WITHOUT STATUS MIGRAINOSUS, NOT INTRACTABLE, UNSPECIFIED MIGRAINE TYPE: Primary | ICD-10-CM

## 2021-06-17 PROCEDURE — 99213 OFFICE O/P EST LOW 20 MIN: CPT | Performed by: NURSE PRACTITIONER

## 2021-06-17 NOTE — PROGRESS NOTES
Chief Complaint   Patient presents with    Migraine     migraines     1. Have you been to the ER, urgent care clinic since your last visit? Hospitalized since your last visit? No    2. Have you seen or consulted any other health care providers outside of the [address] [place] [address] since your last visit? Include any pap smears or colon screening.  No    Visit Vitals  /66 (BP 1 Location: Left upper arm, BP Patient Position: Sitting, BP Cuff Size: Adult)   Pulse 76   Resp 18   Ht 5' 4\" (1.626 m)   Wt 108.9 kg (240 lb)   SpO2 99%   BMI 41.20 kg/m²

## 2021-06-17 NOTE — PROGRESS NOTES
Date:  21     Name:  Sushma Valadez  :  1980  MRN:  [id number]     PCP:  Bianca Gipson MD    Chief Complaint   Patient presents with    Migraine     migraines     HISTORY OF PRESENT ILLNESS: Follow up evaluation of migraine. Since her last office visit, she did resume the Emgality and reports that her headaches are significantly better. Unfortunately she does continue to develop a metallic taste in her mouth after the injection with the Emgality which will last a few days. She did not have the side effects with Ajovy but unfortunately her insurance will not cover it. Recap from last office visit:  Migraine headaches which have worsened since she stopped the Emgality. As she had fewer side effects with the Ajovy and had as good a result as she has seen with the Emgality, we will try to resume the Ajovy at this point. We did discuss the theoretical role of CGRP in pregnancy and the general recommendation of discontinuation of the medication six months prior to trying to become pregnant if her plans should change. She verbalized understanding. Follow up in six months or sooner if needed. Current Outpatient Medications   Medication Sig    fluticasone propionate (FLONASE) 50 mcg/actuation nasal spray     levoFLOXacin (LEVAQUIN) 500 mg tablet Take 1 Tablet by mouth daily.  guaiFENesin ER (MUCINEX) 600 mg ER tablet Take 1 Tablet by mouth two (2) times a day. Can take up to 1200 mg twice daily as needed    benzocaine-menthoL (Cepacol Sore Throat, colleen-men,) 15-2.6 mg lozg lozenge Take 1 Lozenge by mouth every two (2) hours as needed for Sore throat.  butalbital-acetaminophen-caffeine (FIORICET, ESGIC) -40 mg per tablet TAKE ONE TABLET BY MOUTH EVERY 6 HOURS AS NEEDED FOR PAIN    galcanezumab-gnlm (Emgality Pen) 120 mg/mL injection 2 mL by SubCUTAneous route every thirty (30) days.     galcanezumab-gnlm (Emgality Pen) 120 mg/mL injection 1 mL by SubCUTAneous route every thirty (30) days.  esomeprazole magnesium (NEXIUM PO) Take  by mouth.  B.infantis-B.ani-B.long-B.bifi (Probiotic 4X) 10-15 mg TbEC Take  by mouth.  ascorbic acid, vitamin C, (Vitamin C) 250 mg tablet Take  by mouth.  ondansetron (ZOFRAN ODT) 4 mg disintegrating tablet Take 1 Tab by mouth every eight (8) hours as needed for Nausea for up to 12 doses.  levonorgestreL (Mirena) 20 mcg/24 hours (6 yrs) 52 mg IUD 1 Device by IntraUTERine route once. No current facility-administered medications for this visit. No Known Allergies  Past Medical History:   Diagnosis Date    ADHD     Dysplasia of cervix, low grade (ORION 1) 2/23/12    colpo    Encounter for IUD removal 08/19/2016    Mirena    Essential hypertension     10 years ago    GERD (gastroesophageal reflux disease)     trying to control with diet since pregnant    H/O abnormal Pap smear 11/8/12;2/8/12    LGSIL, HPV positive    Headache     Headache(784.0)     IUD (intrauterine device) in place 03/11/2014    Mirena    Kidney disease     kidney stones 2015    Migraine headache     without aura    pap smear for 2/1/11;2/5/13; 4/1/15;2/26/19    neg, HPV neg,no ECC; neg, HPV neg, Negative, HPV negative; neg pap and neg HPV     Past Surgical History:   Procedure Laterality Date    HX COLPOSCOPY  2/23/12    ORION 1    HX HEENT      wisdom teeth    HX LITHOTRIPSY      HX WISDOM TEETH EXTRACTION       Social History     Socioeconomic History    Marital status:      Spouse name: Not on file    Number of children: Not on file    Years of education: Not on file    Highest education level: Not on file   Occupational History    Not on file   Tobacco Use    Smoking status: Never Smoker    Smokeless tobacco: Never Used   Substance and Sexual Activity    Alcohol use:  Yes     Alcohol/week: 2.0 standard drinks     Types: 1 Cans of beer, 1 Shots of liquor per week     Comment: social    Drug use: No    Sexual activity: Yes Partners: Male     Birth control/protection: I.U.D. Other Topics Concern     Service Not Asked    Blood Transfusions Not Asked    Caffeine Concern Not Asked    Occupational Exposure Not Asked    Hobby Hazards Not Asked    Sleep Concern Not Asked    Stress Concern Not Asked    Weight Concern Not Asked    Special Diet Not Asked    Back Care Not Asked    Exercise Not Asked    Bike Helmet Not Asked   [address] Not Asked    Self-Exams Not Asked   Social History Narrative    Not on file     Social Determinants of Health     Financial Resource Strain:     Difficulty of Paying Living Expenses:    Food Insecurity:     Worried About Running Out of Food in the Last Year:     [address] in the Last Year:    Transportation Needs:     Lack of Transportation (Medical):      Lack of Transportation (Non-Medical):    Physical Activity:     Days of Exercise per Week:     Minutes of Exercise per Session:    Stress:     Feeling of Stress :    Social Connections:     Frequency of Communication with Friends and Family:     Frequency of Social Gatherings with Friends and Family:     Attends [employer] Services:     Active Member of Clubs or Organizations:     Attends Club or Organization Meetings:     Marital Status:    Intimate Partner Violence:     Fear of Current or Ex-Partner:     Emotionally Abused:     Physically Abused:     Sexually Abused:      Family History   Problem Relation Age of Onset    Hypertension Father     Heart Disease Father     Diabetes Father     Elevated Lipids Father     Cancer Father         eye and pancreatic     Cancer Mother         Cervical,Uterine,& Ovarian (pt questions ovarian but got cancer after copper 7 IUD)    Depression Mother     Allergic Rhinitis Brother     Asthma Brother     Other Brother         Chron's    Hypertension Brother     Depression Brother     Alcohol abuse Maternal Grandfather     Depression Maternal Grandfather     Alcohol abuse Paternal Grandmother     Stroke Paternal Grandmother     Migraines Brother     Allergic Rhinitis Brother     Depression Brother     Breast Cancer Other        PHYSICAL EXAMINATION:    Visit Vitals  /66 (BP 1 Location: Left upper arm, BP Patient Position: Sitting, BP Cuff Size: Adult)   Pulse 76   Resp 18   Ht 5' 4\" (1.626 m)   Wt 108.9 kg (240 lb)   SpO2 99%   BMI 41.20 kg/m²     General:  Well defined, nourished, and groomed individual in no acute distress. Neck: Supple, nontender, no bruits, no pain with resistance to active range of motion. Heart: Regular rate and rhythm, no murmurs, rub, or gallop. Normal S1S2. Lungs:  Clear to auscultation bilaterally with equal chest expansion, no cough, no wheeze  Musculoskeletal:  Extremities revealed no edema and had full range of motion of joints. Psych:  Good mood and bright affect    NEUROLOGICAL EXAMINATION:     Mental Status:   Alert and oriented to person, place, and time with recent and remote memory intact. Attention span and concentration are normal. Speech is fluent with a full fund of knowledge. Cranial Nerves:    II, III, IV, VI:  Visual acuity grossly intact. Visual fields are normal.    Pupils are equal, round, and reactive to light and accommodation. Extra-ocular movements are full and fluid. Fundoscopic exam was benign, no ptosis or nystagmus. V-XII: Hearing is grossly intact. Facial features are symmetric, with normal sensation and strength. The palate rises symmetrically and the tongue protrudes midline. Sternocleidomastoids 5/5. Motor Examination: Normal tone, bulk, and strength, 5/5 muscle strength throughout. N      Sensory exam:  Normal throughout to temperature    Coordination:  Finger to nose was normal.   No resting or intention tremor    Gait and Station:  Steady while walking. Normal arm swing. No Rhomberg or pronator drift. No muscle wasting or fasiculations noted.       Reflexes:  DTRs 2+ throughout. ASSESSMENT AND PLAN    ICD-10-CM ICD-9-CM    1. Migraine without status migrainosus, not intractable, unspecified migraine type  G43.909 346.90    2. Pineal gland cyst  E34.8 259.8      Migraine headaches which are well managed with Emgality. Unfortunately, she continues to have a side effect of a strong metallic taste in her mouth following the injection which lasted few days. This is described as though she is sucking on a shawn. She did not have these issues when using the Ajovy. I would like to see if there is any chance that we can get the Ajovy approved as this worked just as well without the side effect. She did have some questions about the pineal gland cyst which I do not feel needs to be monitored significantly with serial imaging. Idania Warner Stack

## 2021-06-30 ENCOUNTER — PATIENT MESSAGE (OUTPATIENT)
Dept: NEUROLOGY | Age: 41
End: 2021-06-30

## 2021-06-30 DIAGNOSIS — G43.909 MIGRAINE WITHOUT STATUS MIGRAINOSUS, NOT INTRACTABLE, UNSPECIFIED MIGRAINE TYPE: ICD-10-CM

## 2021-07-02 ENCOUNTER — OFFICE VISIT (OUTPATIENT)
Dept: OBGYN CLINIC | Age: 41
End: 2021-07-02
Payer: COMMERCIAL

## 2021-07-02 VITALS
BODY MASS INDEX: 41.48 KG/M2 | HEART RATE: 73 BPM | SYSTOLIC BLOOD PRESSURE: 136 MMHG | HEIGHT: 64 IN | DIASTOLIC BLOOD PRESSURE: 90 MMHG | WEIGHT: 243 LBS

## 2021-07-02 DIAGNOSIS — Z97.5 IUD (INTRAUTERINE DEVICE) IN PLACE: ICD-10-CM

## 2021-07-02 DIAGNOSIS — N93.9 ABNORMAL UTERINE BLEEDING (AUB): Primary | ICD-10-CM

## 2021-07-02 DIAGNOSIS — R31.9 HEMATURIA, UNSPECIFIED TYPE: ICD-10-CM

## 2021-07-02 DIAGNOSIS — R82.90 ABNORMAL URINE SEDIMENT: ICD-10-CM

## 2021-07-02 LAB
BILIRUB UR QL STRIP: NEGATIVE
GLUCOSE UR-MCNC: NEGATIVE MG/DL
HCG URINE, QL. (POC): NEGATIVE
KETONES P FAST UR STRIP-MCNC: NEGATIVE MG/DL
PH UR STRIP: 7.5 [PH] (ref 4.6–8)
PROT UR QL STRIP: NEGATIVE
SP GR UR STRIP: 1.02 (ref 1–1.03)
UA UROBILINOGEN AMB POC: NORMAL (ref 0.2–1)
URINALYSIS CLARITY POC: CLEAR
URINALYSIS COLOR POC: YELLOW
URINE BLOOD POC: NORMAL
URINE LEUKOCYTES POC: NORMAL
URINE NITRITES POC: NEGATIVE
VALID INTERNAL CONTROL?: YES

## 2021-07-02 PROCEDURE — 81002 URINALYSIS NONAUTO W/O SCOPE: CPT | Performed by: OBSTETRICS & GYNECOLOGY

## 2021-07-02 PROCEDURE — 99213 OFFICE O/P EST LOW 20 MIN: CPT | Performed by: OBSTETRICS & GYNECOLOGY

## 2021-07-02 PROCEDURE — 81025 URINE PREGNANCY TEST: CPT | Performed by: OBSTETRICS & GYNECOLOGY

## 2021-07-02 RX ORDER — METRONIDAZOLE 7.5 MG/G
CREAM TOPICAL
COMMUNITY
Start: 2021-06-16 | End: 2021-12-16 | Stop reason: ALTCHOICE

## 2021-07-02 NOTE — PROGRESS NOTES
[facility]  [email or web address]  [phone]    Mathew Meeks MD, FACOG       OB/GYN Problem visit    Chief Complaint:   Chief Complaint   Patient presents with    Vaginal Bleeding    Contraception       Last or next WWE is: 2021    History of Present Illness: This is a new problem being evaluated by this provider. The patient is a 47 y.o.  female who reports having her Mirena IUD inserted 2020. Pt reports she had bleeding for 2 weeks after her last cycle    Pt reports she had a heavy cycle ~3wks ago that she went though 10 pads per day for 2-3 days, then she had lighter bleeding since. Denies clots. Had mild cramping. She reports sometimes she see's pieces of \"skin\" in her urine. She had only had a cycle once since getting the IUD inserted. She would like to have an ultrasound to get the IUD checked to make sure it is in the right spot. When the appt was scheduled our  did not put her down for an ultrasound too unfortunately. She reports the symptoms are is unchanged. Aggravating factors include none. Alleviating factors include none. She does not have other concerns. LMP: Patient's last menstrual period was 2021.     PFSH:  Past Medical History:   Diagnosis Date    ADHD     Dysplasia of cervix, low grade (ORION 1) 12    colpo    Encounter for IUD removal 2016    Mirena    Essential hypertension     10 years ago    GERD (gastroesophageal reflux disease)     trying to control with diet since pregnant    H/O abnormal Pap smear 12;12    LGSIL, HPV positive    Headache     Headache(784.0)     IUD (intrauterine device) in place 2014    Mirena    Kidney disease     kidney stones     Migraine headache     without aura    pap smear for 11;13; 4/1/15;19    neg, HPV neg,no ECC; neg, HPV neg, Negative, HPV negative; neg pap and neg HPV     Past Surgical History:   Procedure Laterality Date    HX COLPOSCOPY  2/23/12    ORION 1    HX HEENT      wisdom teeth    HX LITHOTRIPSY      HX WISDOM TEETH EXTRACTION       Family History   Problem Relation Age of Onset    Hypertension Father     Heart Disease Father     Diabetes Father     Elevated Lipids Father     Cancer Father         eye and pancreatic     Cancer Mother         Cervical,Uterine,& Ovarian (pt questions ovarian but got cancer after copper 7 IUD)    Depression Mother     Allergic Rhinitis Brother     Asthma Brother     Other Brother         Chron's    Hypertension Brother     Depression Brother     Alcohol abuse Maternal Grandfather     Depression Maternal Grandfather     Alcohol abuse Paternal Grandmother     Stroke Paternal Grandmother     Migraines Brother     Allergic Rhinitis Brother     Depression Brother     Breast Cancer Other      Social History     Tobacco Use    Smoking status: Never Smoker    Smokeless tobacco: Never Used   Substance Use Topics    Alcohol use: Yes     Alcohol/week: 2.0 standard drinks     Types: 1 Cans of beer, 1 Shots of liquor per week     Comment: social    Drug use: No     No Known Allergies  Current Outpatient Medications   Medication Sig    metroNIDAZOLE (METROCREAM) 0.75 % topical cream APPLY TO FACE TOPICALLY 1 TO 2 TIMES PER DAY    fluticasone propionate (FLONASE) 50 mcg/actuation nasal spray     butalbital-acetaminophen-caffeine (FIORICET, ESGIC) -40 mg per tablet TAKE ONE TABLET BY MOUTH EVERY 6 HOURS AS NEEDED FOR PAIN    galcanezumab-gnlm (Emgality Pen) 120 mg/mL injection 2 mL by SubCUTAneous route every thirty (30) days.  galcanezumab-gnlm (Emgality Pen) 120 mg/mL injection 1 mL by SubCUTAneous route every thirty (30) days.  esomeprazole magnesium (NEXIUM PO) Take  by mouth.  B.infantis-B.ani-B.long-B.bifi (Probiotic 4X) 10-15 mg TbEC Take  by mouth.  ascorbic acid, vitamin C, (Vitamin C) 250 mg tablet Take  by mouth.     ondansetron (ZOFRAN ODT) 4 mg disintegrating tablet Take 1 Tab by mouth every eight (8) hours as needed for Nausea for up to 12 doses.  levonorgestreL (Mirena) 20 mcg/24 hours (6 yrs) 52 mg IUD 1 Device by IntraUTERine route once. No current facility-administered medications for this visit. Review of Systems:  History obtained from the patient  Constitutional: negative for fevers, chills and weight loss  ENT ROS: negative for - hearing change, oral lesions or visual changes  Respiratory: negative for cough, wheezing or dyspnea on exertion  Cardiovascular: negative for chest pain, irregular heart beats, exertional chest pressure/discomfort  Gastrointestinal: negative for dysphagia, nausea and vomiting  Genito-Urinary ROS:  see HPI  Inteument/breast: negative for rash, breast lump and nipple discharge  Musculoskeletal:negative for stiff joints, neck pain and muscle weakness  Endocrine ROS: negative for - breast changes, galactorrhea or temperature intolerance  Hematological and Lymphatic ROS: negative for - blood clots, bruising or swollen lymph nodes    Physical Exam:  Visit Vitals  BP (!) 136/90   Pulse 73   Ht 5' 4\" (1.626 m)   Wt 243 lb (110.2 kg)   Breastfeeding No   BMI 41.71 kg/m²       GENERAL: alert, well appearing, and in no distress  HEAD: normocephalic, atraumatic.    PULM: clear to auscultation, no wheezes, rales or rhonchi, symmetric air entry   COR: normal rate and regular rhythm, S1 and S2 normal   ABDOMEN: soft, nontender, nondistended, no masses or organomegaly   EGBUS: no lesions, no inflammation, no masses  VULVA: normal appearing vulva with no masses, tenderness or lesions  VAGINA: normal appearing vagina with normal color, no lesions, white discharge  CERVIX: normal appearing cervix without discharge or lesions, non tender  · IUD strings seen and appropriate length  UTERUS: uterus is normal size, shape, consistency and nontender   ADNEXA: normal adnexa in size, nontender and no masses  NEURO: alert, oriented, normal speech    Assessment:  Encounter Diagnoses   Name Primary?  Abnormal uterine bleeding (AUB) Yes    IUD (intrauterine device) in place     Abnormal urine sediment     Hematuria, unspecified type        Plan:  The patient is advised that she should contact the office if she does not note improvement or if symptoms recur  Recommend follow up with PCP for non-gynecologic complaints and chronic medical problems. She should contact our office with any questions or concerns  She could keep her routine annual exam appointment. Fu and us check IUD (appt made for this pm)  uti precautions reviewed    US reviewed with pt in afternoon,  Plan observation for now  Disc options for bleeding if recurrence    On this date, 7/2/2021,  I have spent 20 minutes reviewing previous notes, test results and face to face with the patient discussing the diagnosis and importance of compliance with the treatment plan as well as documenting on the day of the visit. Rec BP log, PCP fu for HTN      Physician review of ultrasound performed by technician    Today's ultrasound report and images were reviewed and discussed with the patient.   Please see images and imaging report entered by technician in PACS for more detail and progress note and diagnosis entered by MD.    Gavin Vera MD      Orders Placed This Encounter    CULTURE, URINE    [address]    AMB POC URINE PREGNANCY TEST, VISUAL COLOR COMPARISON    AMB POC URINALYSIS DIP STICK MANUAL W/O MICRO       Results for orders placed or performed in visit on 07/02/21   AMB POC URINE PREGNANCY TEST, VISUAL COLOR COMPARISON   Result Value Ref Range    VALID INTERNAL CONTROL POC Yes     HCG urine, Ql. (POC) Negative Negative   AMB POC URINALYSIS DIP STICK MANUAL W/O MICRO   Result Value Ref Range    Color (UA POC) Yellow     Clarity (UA POC) Clear     Glucose (UA POC) Negative Negative    Bilirubin (UA POC) Negative Negative    Ketones (UA POC) Negative Negative Specific gravity (UA POC) 1.025 1.001 - 1.035    Blood (UA POC) 3+ Negative    pH (UA POC) 7.5 4.6 - 8.0    Protein (UA POC) Negative Negative    Urobilinogen (UA POC) normal 0.2 - 1    Nitrites (UA POC) Negative Negative    Leukocyte esterase (UA POC) Trace Negative

## 2021-07-05 LAB
A VAGINAE DNA VAG QL NAA+PROBE: NORMAL SCORE
BACTERIA UR CULT: NORMAL
BVAB2 DNA VAG QL NAA+PROBE: NORMAL SCORE
C ALBICANS DNA VAG QL NAA+PROBE: NEGATIVE
C GLABRATA DNA VAG QL NAA+PROBE: NEGATIVE
C TRACH DNA VAG QL NAA+PROBE: NEGATIVE
MEGA1 DNA VAG QL NAA+PROBE: NORMAL SCORE
N GONORRHOEA DNA VAG QL NAA+PROBE: NEGATIVE
SPECIMEN STATUS REPORT, ROLRST: NORMAL
T VAGINALIS DNA VAG QL NAA+PROBE: NEGATIVE

## 2021-07-06 RX ORDER — GALCANEZUMAB 120 MG/ML
240 INJECTION, SOLUTION SUBCUTANEOUS ONCE
Qty: 2 SYRINGE | Refills: 0 | Status: SHIPPED | OUTPATIENT
Start: 2021-07-06 | End: 2021-07-06

## 2021-07-06 RX ORDER — GALCANEZUMAB 120 MG/ML
120 INJECTION, SOLUTION SUBCUTANEOUS
Qty: 1 ML | Refills: 3 | Status: SHIPPED | OUTPATIENT
Start: 2021-07-27 | End: 2021-08-18 | Stop reason: SDUPTHER

## 2021-07-06 NOTE — PROGRESS NOTES
The results are normal, no clear evidence of vaginal infection or UTI  [facility] message sent if active. Recommend f/u if still having symptoms/problems or has additional concerns.

## 2021-07-06 NOTE — TELEPHONE ENCOUNTER
From: Loretta Borrego  To: Leigh Krabbe, NP  Sent: 6/30/2021 8:54 AM EDT  Subject: Prescription Question    Donavan Pathak,    Would it be possible for you to send the emgality to [facility] instead of [facility], for some reason [facility] keeps saying they are out of it, and I have not had it since beginning of may. Also, in may it was only 1 shot, I know the first one is usually 2- dont know if that makes a difference or not for this one you are calling in. thanks so much, sorry for the trouble, it is the [facility] on [address]

## 2021-07-07 ENCOUNTER — VIRTUAL VISIT (OUTPATIENT)
Dept: INTERNAL MEDICINE CLINIC | Age: 41
End: 2021-07-07
Payer: COMMERCIAL

## 2021-07-07 DIAGNOSIS — J01.00 ACUTE NON-RECURRENT MAXILLARY SINUSITIS: Primary | ICD-10-CM

## 2021-07-07 PROCEDURE — 99213 OFFICE O/P EST LOW 20 MIN: CPT | Performed by: INTERNAL MEDICINE

## 2021-07-07 RX ORDER — AMOXICILLIN AND CLAVULANATE POTASSIUM 875; 125 MG/1; MG/1
1 TABLET, FILM COATED ORAL EVERY 12 HOURS
Qty: 10 TABLET | Refills: 0 | Status: SHIPPED | OUTPATIENT
Start: 2021-07-07 | End: 2021-07-12

## 2021-07-07 NOTE — PROGRESS NOTES
Consent: Marialuisa Garcia, who was seen by synchronous (real-time) audio-video technology, and/or her healthcare decision maker, is aware that this patient-initiated, Telehealth encounter on 7/7/2021 is a billable service, with coverage as determined by her insurance carrier. She is aware that she may receive a bill and has provided verbal consent to proceed: Yes. I was in the office while conducting this encounter. Patient identification was verified at the start of the visit: YES  This visit was done with doxy. me  The patient is located in [place] during this visit    Note   Chief Complaint   Sinus pressure    Marialuisa Garcia is a 47 y.o. female     1. Acute non-recurrent maxillary sinusitis  -     amoxicillin-clavulanate (AUGMENTIN) 875-125 mg per tablet; Take 1 Tablet by mouth every twelve (12) hours for 5 days. , Normal, Disp-10 Tablet, R-0DO NOT FILL - pt will call if needed  Presents to clinic for a 3-day history of sinus congestion, pressure, left ear pain, loss of taste and smell, throat pain, mild productive cough, malaise, upper teeth pain.  and daughter are also sick. No muscle aches, fever, chills. Has not had the Covid vaccine. Recommend getting Covid tested. Given time course, still likely a virus. Recommend symptomatic treatment. Augmentin sent to pharmacyadvised to use if symptoms not improving by Friday or if symptoms improve then worsen. We discussed the expected course, resolution and complications of the diagnosis(es) in detail. Medication risks, benefits, costs, interactions, and alternatives were discussed as indicated. I advised her to contact the office if her condition worsens, changes or fails to improve as anticipated. She expressed understanding with the diagnosis(es) and plan.      Return to clinic: As needed    Liz Rocha MD  [facility]  7/7/2021    Future Appointments   Date Time Provider Department [place]   12/20/2021  7:30 AM Annamarie Kothari NP [facility] BS AMB   1/27/2022  7:50 AM Joseph Fall MD [facility] BS AMB        Objective   Vitals:       No flowsheet data found. Physical Exam  Constitutional:       Appearance: Normal appearance. She is not ill-appearing. HENT:      Nose:      Comments: Nasally voice  Pulmonary:      Effort: No respiratory distress. Neurological:      Mental Status: She is alert. Current Outpatient Medications   Medication Sig    amoxicillin-clavulanate (AUGMENTIN) 875-125 mg per tablet Take 1 Tablet by mouth every twelve (12) hours for 5 days.  [START ON 7/27/2021] galcanezumab-gnlm (Emgality Pen) 120 mg/mL injection 1 mL by SubCUTAneous route every thirty (30) days.  metroNIDAZOLE (METROCREAM) 0.75 % topical cream APPLY TO FACE TOPICALLY 1 TO 2 TIMES PER DAY    fluticasone propionate (FLONASE) 50 mcg/actuation nasal spray     butalbital-acetaminophen-caffeine (FIORICET, ESGIC) -40 mg per tablet TAKE ONE TABLET BY MOUTH EVERY 6 HOURS AS NEEDED FOR PAIN    esomeprazole magnesium (NEXIUM PO) Take  by mouth.  B.infantis-B.ani-B.long-B.bifi (Probiotic 4X) 10-15 mg TbEC Take  by mouth.  ascorbic acid, vitamin C, (Vitamin C) 250 mg tablet Take  by mouth.  ondansetron (ZOFRAN ODT) 4 mg disintegrating tablet Take 1 Tab by mouth every eight (8) hours as needed for Nausea for up to 12 doses.  levonorgestreL (Mirena) 20 mcg/24 hours (6 yrs) 52 mg IUD 1 Device by IntraUTERine route once. No current facility-administered medications for this visit. Lynda Summers is a 47 y.o. female being evaluated by a video visit encounter for concerns as above. A caregiver was present when appropriate. Due to this being a TeleHealth encounter ([facility]), evaluation of the following organ systems was limited: Vitals/Constitutional/EENT/Resp/CV/GI//MS/Neuro/Skin/Heme-Lymph-Imm.   Pursuant to the emergency declaration under the [facility], [facility] waiver authority and the [employer] Act, this Virtual  Visit was conducted, with patient's (and/or legal guardian's) consent, to reduce the patient's risk of exposure to COVID-19 and provide necessary medical care. Services were provided through a video synchronous discussion virtually to substitute for in-person clinic visit.

## 2021-07-15 ENCOUNTER — OFFICE VISIT (OUTPATIENT)
Dept: INTERNAL MEDICINE CLINIC | Age: 41
End: 2021-07-15
Payer: COMMERCIAL

## 2021-07-15 VITALS
WEIGHT: 243 LBS | HEART RATE: 83 BPM | DIASTOLIC BLOOD PRESSURE: 82 MMHG | BODY MASS INDEX: 41.48 KG/M2 | OXYGEN SATURATION: 96 % | HEIGHT: 64 IN | SYSTOLIC BLOOD PRESSURE: 130 MMHG | RESPIRATION RATE: 16 BRPM | TEMPERATURE: 98 F

## 2021-07-15 DIAGNOSIS — K21.9 GASTROESOPHAGEAL REFLUX DISEASE WITHOUT ESOPHAGITIS: ICD-10-CM

## 2021-07-15 DIAGNOSIS — R30.0 DYSURIA: Primary | ICD-10-CM

## 2021-07-15 LAB
BILIRUB UR QL STRIP: NEGATIVE
GLUCOSE UR-MCNC: NEGATIVE MG/DL
KETONES P FAST UR STRIP-MCNC: NEGATIVE MG/DL
PH UR STRIP: 6 [PH] (ref 4.6–8)
PROT UR QL STRIP: NEGATIVE
SP GR UR STRIP: 1.03 (ref 1–1.03)
UA UROBILINOGEN AMB POC: NORMAL (ref 0.2–1)
URINALYSIS CLARITY POC: CLEAR
URINALYSIS COLOR POC: YELLOW
URINE BLOOD POC: NEGATIVE
URINE LEUKOCYTES POC: NORMAL
URINE NITRITES POC: NEGATIVE

## 2021-07-15 PROCEDURE — 81001 URINALYSIS AUTO W/SCOPE: CPT | Performed by: INTERNAL MEDICINE

## 2021-07-15 PROCEDURE — 99213 OFFICE O/P EST LOW 20 MIN: CPT | Performed by: INTERNAL MEDICINE

## 2021-07-15 RX ORDER — PANTOPRAZOLE SODIUM 40 MG/1
40 TABLET, DELAYED RELEASE ORAL DAILY
Qty: 90 TABLET | Refills: 1 | Status: SHIPPED | OUTPATIENT
Start: 2021-07-15 | End: 2022-01-24 | Stop reason: SDUPTHER

## 2021-07-15 RX ORDER — ACETAMINOPHEN 325 MG/1
TABLET ORAL
COMMUNITY

## 2021-07-15 NOTE — PROGRESS NOTES
Alfie Hinton is a 47 y.o. female who presents today for Blood in Urine (tea colored urine for two days, some back pain, dysuria, no frequency)  . She has a history of   Patient Active Problem List   Diagnosis Code    Chronic daily headache R51.9    H/O abnormal Pap smear Z87.898    History of kidney stones Z87.442    Weight gain R63.5    Menorrhagia with regular cycle N92.0    Obesity (BMI 30-39. 9) E66.9    GERD (gastroesophageal reflux disease) K21.9    Irritable bowel syndrome with diarrhea K58.0    Gastroesophageal reflux disease K21.9    Severe obesity (HCC) E66.01    Chronic hypertension with superimposed preeclampsia O11.9   . Today patient is here for an acute visit. Urinary Problems:  Alfie Hinton is a 47 y.o. female who complains of dysuria x 2 days, without flank pain, fever, chills, nausea or vomiting. Urine has been cloudy/foul smelling.  her symptoms are moderate. she is not drinking plenty of fluids for hydration. her history is significant for: Stones, but not many UTI's.       reports being sexually active and has had partner(s) who are Male. She reports using the following method of birth control/protection: I.U.D.. Chris Alexandre No LMP recorded. (Menstrual status: IUD). This started with back pain last week and worsened until yesterday. Urine has been dark. Today dysuria has started. Back pain is overall better today. Does have a history of kidney stones. None in several years. Chris Alexandre GERD: previously on PPI, now on OTC nexium. Having a lot of breakthrough GERD. We discussed resuming prescription strength PPI. ROS  Review of Systems   Constitutional: Negative for chills, fever and weight loss. HENT: Negative for congestion and sore throat. Eyes: Negative for blurred vision, double vision and photophobia. Respiratory: Negative for cough and shortness of breath. Cardiovascular: Negative for chest pain, palpitations and leg swelling. Gastrointestinal: Negative for abdominal pain, constipation, diarrhea, heartburn, nausea and vomiting. Genitourinary: Positive for dysuria. Negative for frequency and urgency. Musculoskeletal: Positive for back pain. Negative for joint pain and myalgias. Skin: Negative for rash. Neurological: Negative. Negative for headaches. Endo/Heme/Allergies: Does not bruise/bleed easily. Psychiatric/Behavioral: Negative for depression, hallucinations, memory loss, substance abuse and suicidal ideas. The patient is not nervous/anxious and does not have insomnia. Visit Vitals  /82 (BP 1 Location: Left upper arm, BP Patient Position: Sitting, BP Cuff Size: Adult)   Pulse 83   Temp 98 °F (36.7 °C) (Oral)   Resp 16   Ht 5' 4\" (1.626 m)   Wt 243 lb (110.2 kg)   SpO2 96%   BMI 41.71 kg/m²       Physical Exam  Constitutional:       General: She is not in acute distress. Appearance: She is well-developed. HENT:      Head: Normocephalic and atraumatic. Neck:      Thyroid: No thyromegaly. Cardiovascular:      Rate and Rhythm: Normal rate and regular rhythm. Heart sounds: No murmur heard. Pulmonary:      Effort: Pulmonary effort is normal.      Breath sounds: Normal breath sounds. No wheezing. Abdominal:      General: Bowel sounds are normal. There is no distension. Palpations: Abdomen is soft. Comments: No obvious CVA tenderness   Musculoskeletal:      Cervical back: Normal range of motion and neck supple. Skin:     General: Skin is warm and dry. Neurological:      Mental Status: She is alert and oriented to person, place, and time. Cranial Nerves: No cranial nerve deficit. Psychiatric:         Behavior: Behavior normal.           Current Outpatient Medications   Medication Sig    acetaminophen (TylenoL) 325 mg tablet Take  by mouth every four (4) hours as needed for Pain.     [START ON 7/27/2021] galcanezumab-gnlm (Emgality Pen) 120 mg/mL injection 1 mL by SubCUTAneous route every thirty (30) days.  metroNIDAZOLE (METROCREAM) 0.75 % topical cream APPLY TO FACE TOPICALLY 1 TO 2 TIMES PER DAY    butalbital-acetaminophen-caffeine (FIORICET, ESGIC) -40 mg per tablet TAKE ONE TABLET BY MOUTH EVERY 6 HOURS AS NEEDED FOR PAIN    esomeprazole magnesium (NEXIUM PO) Take  by mouth.  B.infantis-B.ani-B.long-B.bifi (Probiotic 4X) 10-15 mg TbEC Take  by mouth.  ascorbic acid, vitamin C, (Vitamin C) 250 mg tablet Take  by mouth.  ondansetron (ZOFRAN ODT) 4 mg disintegrating tablet Take 1 Tab by mouth every eight (8) hours as needed for Nausea for up to 12 doses.  levonorgestreL (Mirena) 20 mcg/24 hours (6 yrs) 52 mg IUD 1 Device by IntraUTERine route once.  fluticasone propionate (FLONASE) 50 mcg/actuation nasal spray  (Patient not taking: Reported on 7/15/2021)     No current facility-administered medications for this visit. Past Medical History:   Diagnosis Date    ADHD     Dysplasia of cervix, low grade (ORION 1) 2/23/12    colpo    Encounter for IUD removal 08/19/2016    Mirena    Essential hypertension     10 years ago    GERD (gastroesophageal reflux disease)     trying to control with diet since pregnant    H/O abnormal Pap smear 11/8/12;2/8/12    LGSIL, HPV positive    Headache     Headache(784.0)     IUD (intrauterine device) in place 03/11/2014    Mirena    Kidney disease     kidney stones 2015    Migraine headache     without aura    pap smear for 2/1/11;2/5/13; 4/1/15;2/26/19    neg, HPV neg,no ECC; neg, HPV neg, Negative, HPV negative; neg pap and neg HPV      Past Surgical History:   Procedure Laterality Date    HX COLPOSCOPY  2/23/12    ORION 1    HX HEENT      wisdom teeth    HX LITHOTRIPSY      HX WISDOM TEETH EXTRACTION        Social History     Tobacco Use    Smoking status: Never Smoker    Smokeless tobacco: Never Used   Substance Use Topics    Alcohol use:  Yes     Alcohol/week: 2.0 standard drinks     Types: 1 Cans of beer, 1 Shots of liquor per week     Comment: social      Family History   Problem Relation Age of Onset    Hypertension Father     Heart Disease Father     Diabetes Father     Elevated Lipids Father     Cancer Father         eye and pancreatic     Cancer Mother         Cervical,Uterine,& Ovarian (pt questions ovarian but got cancer after copper 7 IUD)    Depression Mother     Allergic Rhinitis Brother     Asthma Brother     Other Brother         Chron's    Hypertension Brother     Depression Brother     Alcohol abuse Maternal Grandfather     Depression Maternal Grandfather     Alcohol abuse Paternal Grandmother     Stroke Paternal Grandmother     Migraines Brother     Allergic Rhinitis Brother     Depression Brother     Breast Cancer Other         No Known Allergies     Assessment/Plan  Diagnoses and all orders for this visit:    1. Dysuria-positive for leuks but overall better today. We will hold off on antibiotics and culture urine. Low threshold for treatment if this persists. -     AMB POC URINALYSIS DIP STICK AUTO W/ MICRO  -     CULTURE, URINE; Future    2. Gastroesophageal reflux disease without esophagitis-having breakthrough reflux. On over-the-counter Nexium. We will resume full dose PPI. -     pantoprazole (PROTONIX) 40 mg tablet; Take 1 Tablet by mouth daily. Raegan Avina MD  7/15/2021    This note was created with the help of speech recognition software Juan J Hernandez) and may contain some 'sound alike' errors.

## 2021-07-17 LAB
BACTERIA SPEC CULT: NORMAL
SERVICE CMNT-IMP: NORMAL

## 2021-08-03 PROBLEM — K21.9 GERD (GASTROESOPHAGEAL REFLUX DISEASE): Status: RESOLVED | Noted: 2017-08-14 | Resolved: 2021-08-03

## 2021-08-18 ENCOUNTER — TELEPHONE (OUTPATIENT)
Dept: NEUROLOGY | Age: 41
End: 2021-08-18

## 2021-08-18 DIAGNOSIS — G43.909 MIGRAINE WITHOUT STATUS MIGRAINOSUS, NOT INTRACTABLE, UNSPECIFIED MIGRAINE TYPE: Primary | ICD-10-CM

## 2021-08-18 RX ORDER — GALCANEZUMAB 120 MG/ML
120 INJECTION, SOLUTION SUBCUTANEOUS
Qty: 1 ML | Refills: 3 | Status: SHIPPED | OUTPATIENT
Start: 2021-08-18 | End: 2021-12-16 | Stop reason: ALTCHOICE

## 2021-08-18 RX ORDER — ONDANSETRON 4 MG/1
4 TABLET, ORALLY DISINTEGRATING ORAL
Qty: 12 TABLET | Refills: 0 | Status: SHIPPED | OUTPATIENT
Start: 2021-08-18 | End: 2022-02-21 | Stop reason: SDUPTHER

## 2021-08-18 RX ORDER — BUTALBITAL, ACETAMINOPHEN AND CAFFEINE 50; 325; 40 MG/1; MG/1; MG/1
TABLET ORAL
Qty: 30 TABLET | Refills: 0 | Status: SHIPPED | OUTPATIENT
Start: 2021-08-18 | End: 2022-02-21 | Stop reason: SDUPTHER

## 2021-09-02 NOTE — LETTER
8/16/2017 3:30 PM 
 
Ms. Michael Spencer [address] [facility] [id number] Dear Michael Spencer: Please find your most recent results below. Resulted Orders CBC WITH AUTOMATED DIFF Result Value Ref Range WBC 8.5 3.4 - 10.8 x10E3/uL  
 RBC 4.73 3.77 - 5.28 x10E6/uL HGB 14.1 11.1 - 15.9 g/dL HCT 42.0 34.0 - 46.6 % MCV 89 79 - 97 fL  
 MCH 29.8 26.6 - 33.0 pg  
 MCHC 33.6 31.5 - 35.7 g/dL  
 RDW 13.7 12.3 - 15.4 % PLATELET 226 298 - 087 x10E3/uL NEUTROPHILS 52 % Lymphocytes 37 % MONOCYTES 9 % EOSINOPHILS 2 % BASOPHILS 0 %  
 ABS. NEUTROPHILS 4.4 1.4 - 7.0 x10E3/uL Abs Lymphocytes 3.1 0.7 - 3.1 x10E3/uL  
 ABS. MONOCYTES 0.8 0.1 - 0.9 x10E3/uL  
 ABS. EOSINOPHILS 0.2 0.0 - 0.4 x10E3/uL  
 ABS. BASOPHILS 0.0 0.0 - 0.2 x10E3/uL IMMATURE GRANULOCYTES 0 %  
 ABS. IMM. GRANS. 0.0 0.0 - 0.1 x10E3/uL Narrative Performed at:  [address]  [id number] : Joanie Nichols MD, Phone:  [phone] METABOLIC PANEL, COMPREHENSIVE Result Value Ref Range Glucose 78 65 - 99 mg/dL BUN 13 6 - 20 mg/dL Creatinine 0.66 0.57 - 1.00 mg/dL GFR est non- >59 mL/min/1.73 GFR est  >59 mL/min/1.73  
 BUN/Creatinine ratio 20 9 - 23 Sodium 140 134 - 144 mmol/L Potassium 4.8 3.5 - 5.2 mmol/L Chloride 99 96 - 106 mmol/L  
 CO2 23 18 - 29 mmol/L Calcium 9.7 8.7 - 10.2 mg/dL Protein, total 7.0 6.0 - 8.5 g/dL Albumin 4.9 3.5 - 5.5 g/dL GLOBULIN, TOTAL 2.1 1.5 - 4.5 g/dL A-G Ratio 2.3 (H) 1.2 - 2.2 Bilirubin, total 0.3 0.0 - 1.2 mg/dL Alk. phosphatase 56 39 - 117 IU/L  
 AST (SGOT) 16 0 - 40 IU/L  
 ALT (SGPT) 19 0 - 32 IU/L Narrative Performed at:  [address]  [id number] : Joanie Nichols MD, Phone:  [phone] LIPASE Result Value Ref Range Lipase 29 0 - 59 U/L Narrative Performed at:  [address]  [id number] : Kayla Villanueva MD, Phone:  [phone] TSH 3RD GENERATION Result Value Ref Range TSH 2.420 0.450 - 4.500 uIU/mL Narrative Performed at:  [address]  [id number] : Kayla Villanueva MD, Phone:  [phone] RECOMMENDATIONS: 
Labs all normal.  Hopefully protonix will do the trick.  Work on managing stress--exercise usually helps. Please call me if you have any questions: [phone] Sincerely, Chayito Rosario III, DO 
 I will START or STAY ON the medications listed below when I get home from the hospital:    acetaminophen 325 mg oral tablet  -- 3 tab(s) by mouth   -- Indication: For mild pain    ibuprofen 600 mg oral tablet  -- 1 tab(s) by mouth every 6 hours  -- Indication: For cramping

## 2021-10-25 ENCOUNTER — OFFICE VISIT (OUTPATIENT)
Dept: INTERNAL MEDICINE CLINIC | Age: 41
End: 2021-10-25
Payer: COMMERCIAL

## 2021-10-25 VITALS
HEART RATE: 78 BPM | TEMPERATURE: 98.3 F | DIASTOLIC BLOOD PRESSURE: 94 MMHG | SYSTOLIC BLOOD PRESSURE: 143 MMHG | HEIGHT: 64 IN | WEIGHT: 246.6 LBS | RESPIRATION RATE: 18 BRPM | BODY MASS INDEX: 42.1 KG/M2 | OXYGEN SATURATION: 98 %

## 2021-10-25 DIAGNOSIS — I10 HYPERTENSION, UNSPECIFIED TYPE: Primary | ICD-10-CM

## 2021-10-25 DIAGNOSIS — R00.2 PALPITATION: ICD-10-CM

## 2021-10-25 DIAGNOSIS — K21.9 GASTROESOPHAGEAL REFLUX DISEASE WITHOUT ESOPHAGITIS: ICD-10-CM

## 2021-10-25 DIAGNOSIS — F41.9 ANXIETY: ICD-10-CM

## 2021-10-25 PROCEDURE — 99214 OFFICE O/P EST MOD 30 MIN: CPT | Performed by: INTERNAL MEDICINE

## 2021-10-25 RX ORDER — HYDROCHLOROTHIAZIDE 12.5 MG/1
12.5 TABLET ORAL DAILY
Qty: 30 TABLET | Refills: 1 | Status: SHIPPED | OUTPATIENT
Start: 2021-10-25 | End: 2021-11-22 | Stop reason: SDUPTHER

## 2021-10-25 RX ORDER — CLONAZEPAM 0.5 MG/1
0.5 TABLET ORAL
Qty: 20 TABLET | Refills: 0 | Status: SHIPPED | OUTPATIENT
Start: 2021-10-25 | End: 2021-11-22 | Stop reason: SDUPTHER

## 2021-10-25 NOTE — PROGRESS NOTES
Laura Sr  Identified pt with two pt identifiers(name and ). Chief Complaint   Patient presents with    Elevated Blood Pressure     RM21// pt presenting today with elevated Bp  and frequent headaches       1. Have you been to the ER, urgent care clinic since your last visit? Hospitalized since your last visit? NO    2. Have you seen or consulted any other health care providers outside of the [address] [place] [address] since your last visit? Include any pap smears or colon screening. NO      Provider notified of reason for visit, vitals and flowsheets obtained on patients.      Patient received paperwork for advance directive during previous visit but has not completed at this time     Reviewed record In preparation for visit, huddled with provider and have obtained necessary documentation      Health Maintenance Due   Topic    Hepatitis C Screening     Flu Vaccine (1)       Wt Readings from Last 3 Encounters:   10/25/21 246 lb 9.6 oz (111.9 kg)   07/15/21 243 lb (110.2 kg)   21 243 lb (110.2 kg)     Temp Readings from Last 3 Encounters:   10/25/21 98.3 °F (36.8 °C) (Oral)   07/15/21 98 °F (36.7 °C) (Oral)   21 99 °F (37.2 °C) (Temporal)     BP Readings from Last 3 Encounters:   10/25/21 (!) 143/94   07/15/21 130/82   21 (!) 136/90     Pulse Readings from Last 3 Encounters:   10/25/21 78   07/15/21 83   21 73     Vitals:    10/25/21 1505   BP: (!) 143/94   Pulse: 78   Resp: 18   Temp: 98.3 °F (36.8 °C)   TempSrc: Oral   SpO2: 98%   Weight: 246 lb 9.6 oz (111.9 kg)   Height: 5' 4\" (1.626 m)         Learning Assessment:  :     Learning Assessment 2018   PRIMARY LEARNER Patient Patient   HIGHEST LEVEL OF EDUCATION - PRIMARY LEARNER  - 4 Carolin LEARNER - NONE   CO-LEARNER CAREGIVER - No   PRIMARY LANGUAGE ENGLISH ENGLISH   LEARNER PREFERENCE PRIMARY READING LISTENING     - READING   ANSWERED BY Hoda Second self   RELATIONSHIP SELF SELF Depression Screening:  :     3 most recent PHQ Screens 7/15/2021   Little interest or pleasure in doing things Not at all   Feeling down, depressed, irritable, or hopeless Not at all   Total Score PHQ 2 0       Fall Risk Assessment:  :     Fall Risk Assessment, last 12 mths 2/12/2021   Able to walk? Yes   Fall in past 12 months? 0   Do you feel unsteady? 0   Are you worried about falling 0       Abuse Screening:  :     Abuse Screening Questionnaire 2/12/2021 4/15/2019 11/13/2017   Do you ever feel afraid of your partner? N N N   Are you in a relationship with someone who physically or mentally threatens you? N N N   Is it safe for you to go home? Y Y Y       ADL Screening:  :     ADL Assessment 4/15/2019   Feeding yourself No Help Needed   Getting from bed to chair No Help Needed   Getting dressed No Help Needed   Bathing or showering No Help Needed   Walk across the room (includes cane/walker) No Help Needed   Using the telphone No Help Needed   Taking your medications No Help Needed   Preparing meals No Help Needed   Managing money (expenses/bills) No Help Needed   Moderately strenuous housework (laundry) No Help Needed   Shopping for personal items (toiletries/medicines) No Help Needed   Shopping for groceries No Help Needed   Driving No Help Needed   Climbing a flight of stairs No Help Needed   Getting to places beyond walking distances No Help Needed         Medication reconciliation up to date and corrected with patient at this time.

## 2021-10-25 NOTE — PROGRESS NOTES
Rey Croft is a 46 y.o. female who presents today for Elevated Blood Pressure (RM21// pt presenting today with elevated Bp  and frequent headaches)  . She has a history of   Patient Active Problem List   Diagnosis Code    Chronic daily headache R51.9    H/O abnormal Pap smear Z87.898    History of kidney stones Z87.442    Weight gain R63.5    Menorrhagia with regular cycle N92.0    Obesity (BMI 30-39. 9) E66.9    Irritable bowel syndrome with diarrhea K58.0    Gastroesophageal reflux disease K21.9    Severe obesity (HCC) E66.01    Chronic hypertension with superimposed preeclampsia O11.9   . Today patient is here for an acute visit. Anxiety and stress has been up. She does get some palpitations at times some chest pressures. Sleep has become a bigger issue. They are in the process of moving. Overall she feels as though this will be better once stressors are down a bit. We discussed using as needed Klonopin. Hypertension-has been borderline for some time. Quite elevated at home    reports that she has never smoked. She has never used smokeless tobacco.    reports current alcohol use of about 2.0 standard drinks of alcohol per week. BP Readings from Last 2 Encounters:   10/25/21 (!) 143/94   07/15/21 130/82     H/A: seeing neurology for this. These have still been ongoing. Wondering if blood pressure is causing some of this. ROS  Review of Systems   Constitutional: Negative for chills, fever and weight loss. HENT: Negative for congestion, ear discharge, ear pain, hearing loss, sore throat and tinnitus. Eyes: Negative for blurred vision, double vision and photophobia. Respiratory: Negative for cough, hemoptysis, sputum production and shortness of breath. Cardiovascular: Positive for chest pain and palpitations. Negative for orthopnea, claudication and leg swelling.    Gastrointestinal: Negative for abdominal pain, constipation, diarrhea, heartburn, nausea and vomiting. Genitourinary: Negative for dysuria, frequency, hematuria and urgency. Musculoskeletal: Positive for myalgias. Negative for back pain, joint pain and neck pain. Skin: Negative for rash. Neurological: Positive for headaches. Negative for dizziness, tingling, tremors, speech change, focal weakness and weakness. Endo/Heme/Allergies: Does not bruise/bleed easily. Psychiatric/Behavioral: Negative for memory loss and suicidal ideas. Visit Vitals  BP (!) 143/94 (BP 1 Location: Left upper arm, BP Patient Position: Sitting, BP Cuff Size: Large adult)   Pulse 78   Temp 98.3 °F (36.8 °C) (Oral)   Resp 18   Ht 5' 4\" (1.626 m)   Wt 246 lb 9.6 oz (111.9 kg)   SpO2 98%   BMI 42.33 kg/m²       Physical Exam  Constitutional:       Appearance: She is well-developed. HENT:      Head: Normocephalic and atraumatic. Cardiovascular:      Rate and Rhythm: Normal rate and regular rhythm. Heart sounds: No murmur heard. Pulmonary:      Effort: Pulmonary effort is normal. No respiratory distress. Skin:     General: Skin is warm and dry. Neurological:      Mental Status: She is alert and oriented to person, place, and time. Psychiatric:         Behavior: Behavior normal.           Current Outpatient Medications   Medication Sig    elderberry fruit (ELDERBERRY PO) Take  by mouth.  ondansetron (ZOFRAN ODT) 4 mg disintegrating tablet Take 1 Tablet by mouth every eight (8) hours as needed for Nausea for up to 12 doses.  galcanezumab-gnlm (Emgality Pen) 120 mg/mL injection 1 mL by SubCUTAneous route every thirty (30) days.  butalbital-acetaminophen-caffeine (FIORICET, ESGIC) -40 mg per tablet TAKE ONE TABLET BY MOUTH EVERY 6 HOURS AS NEEDED FOR PAIN    acetaminophen (TylenoL) 325 mg tablet Take  by mouth every four (4) hours as needed for Pain.  pantoprazole (PROTONIX) 40 mg tablet Take 1 Tablet by mouth daily.     B.infantis-B.ani-B.long-B.bifi (Probiotic 4X) 10-15 mg TbEC Take  by mouth.    ascorbic acid, vitamin C, (Vitamin C) 250 mg tablet Take  by mouth.  levonorgestreL (Mirena) 20 mcg/24 hours (6 yrs) 52 mg IUD 1 Device by IntraUTERine route once.  metroNIDAZOLE (METROCREAM) 0.75 % topical cream APPLY TO FACE TOPICALLY 1 TO 2 TIMES PER DAY (Patient not taking: Reported on 10/25/2021)    fluticasone propionate (FLONASE) 50 mcg/actuation nasal spray  (Patient not taking: Reported on 7/15/2021)     No current facility-administered medications for this visit. Past Medical History:   Diagnosis Date    ADHD     Dysplasia of cervix, low grade (ORION 1) 2/23/12    colpo    Encounter for IUD removal 08/19/2016    Mirena    Essential hypertension     10 years ago    GERD (gastroesophageal reflux disease)     trying to control with diet since pregnant    H/O abnormal Pap smear 11/8/12;2/8/12    LGSIL, HPV positive    Headache     Headache(784.0)     IUD (intrauterine device) in place 03/11/2014    Mirena    Kidney disease     kidney stones 2015    Migraine headache     without aura    pap smear for 2/1/11;2/5/13; 4/1/15;2/26/19    neg, HPV neg,no ECC; neg, HPV neg, Negative, HPV negative; neg pap and neg HPV      Past Surgical History:   Procedure Laterality Date    HX COLPOSCOPY  2/23/12    ORION 1    HX HEENT      wisdom teeth    HX LITHOTRIPSY      HX WISDOM TEETH EXTRACTION        Social History     Tobacco Use    Smoking status: Never Smoker    Smokeless tobacco: Never Used   Substance Use Topics    Alcohol use:  Yes     Alcohol/week: 2.0 standard drinks     Types: 1 Cans of beer, 1 Shots of liquor per week     Comment: social      Family History   Problem Relation Age of Onset    Hypertension Father     Heart Disease Father     Diabetes Father     Elevated Lipids Father     Cancer Father         eye and pancreatic     Cancer Mother         Cervical,Uterine,& Ovarian (pt questions ovarian but got cancer after copper 7 IUD)    Depression Mother     Allergic Rhinitis Brother     Asthma Brother     Other Brother         Chron's    Hypertension Brother     Depression Brother     Alcohol abuse Maternal Grandfather     Depression Maternal Grandfather     Alcohol abuse Paternal Grandmother     Stroke Paternal Grandmother     Migraines Brother     Allergic Rhinitis Brother     Depression Brother     Breast Cancer Other         No Known Allergies     Assessment/Plan  Diagnoses and all orders for this visit:    1. Hypertension, unspecified type-unsure if this is causing her any of her issues. We will try to lower blood pressure to goal and see if she feels any better. -     hydroCHLOROthiazide (HYDRODIURIL) 12.5 mg tablet; Take 1 Tablet by mouth daily. 2. Gastroesophageal reflux disease without esophagitis- Needs to be on a PPI. 3. Palpitation-  Likely associated with stress and anxiety. We will see how she does over time. For now as needed clonazepam  -     CBC WITH AUTOMATED DIFF; Future  -     METABOLIC PANEL, COMPREHENSIVE; Future  -     TSH 3RD GENERATION; Future    4. Anxiety- Sleep has become a bigger issue. They are in the process of moving. Overall she feels as though this will be better once stressors are down a bit. We discussed using as needed Klonopin. -     clonazePAM (KlonoPIN) 0.5 mg tablet; Take 1 Tablet by mouth two (2) times daily as needed for Anxiety. Max Daily Amount: 1 mg. Janene Denson MD  10/25/2021    This note was created with the help of speech recognition software Diogenes Villavicencio) and may contain some 'sound alike' errors.

## 2021-10-26 LAB
ALBUMIN SERPL-MCNC: 4.7 G/DL (ref 3.8–4.8)
ALBUMIN/GLOB SERPL: 2.1 {RATIO} (ref 1.2–2.2)
ALP SERPL-CCNC: 78 IU/L (ref 44–121)
ALT SERPL-CCNC: 127 IU/L (ref 0–32)
AST SERPL-CCNC: 43 IU/L (ref 0–40)
BASOPHILS # BLD AUTO: 0 X10E3/UL (ref 0–0.2)
BASOPHILS NFR BLD AUTO: 1 %
BILIRUB SERPL-MCNC: 0.3 MG/DL (ref 0–1.2)
BUN SERPL-MCNC: 8 MG/DL (ref 6–20)
BUN/CREAT SERPL: 13 (ref 9–23)
CALCIUM SERPL-MCNC: 9.3 MG/DL (ref 8.7–10.2)
CHLORIDE SERPL-SCNC: 103 MMOL/L (ref 96–106)
CO2 SERPL-SCNC: 24 MMOL/L (ref 20–29)
CREAT SERPL-MCNC: 0.63 MG/DL (ref 0.57–1)
EOSINOPHIL # BLD AUTO: 0.1 X10E3/UL (ref 0–0.4)
EOSINOPHIL NFR BLD AUTO: 2 %
ERYTHROCYTE [DISTWIDTH] IN BLOOD BY AUTOMATED COUNT: 13.2 % (ref 11.7–15.4)
GLOBULIN SER CALC-MCNC: 2.2 G/DL (ref 1.5–4.5)
GLUCOSE SERPL-MCNC: 90 MG/DL (ref 65–99)
HCT VFR BLD AUTO: 41 % (ref 34–46.6)
HGB BLD-MCNC: 13.9 G/DL (ref 11.1–15.9)
IMM GRANULOCYTES # BLD AUTO: 0 X10E3/UL (ref 0–0.1)
IMM GRANULOCYTES NFR BLD AUTO: 0 %
LYMPHOCYTES # BLD AUTO: 2.6 X10E3/UL (ref 0.7–3.1)
LYMPHOCYTES NFR BLD AUTO: 40 %
MCH RBC QN AUTO: 29.5 PG (ref 26.6–33)
MCHC RBC AUTO-ENTMCNC: 33.9 G/DL (ref 31.5–35.7)
MCV RBC AUTO: 87 FL (ref 79–97)
MONOCYTES # BLD AUTO: 0.5 X10E3/UL (ref 0.1–0.9)
MONOCYTES NFR BLD AUTO: 8 %
NEUTROPHILS # BLD AUTO: 3.2 X10E3/UL (ref 1.4–7)
NEUTROPHILS NFR BLD AUTO: 49 %
PLATELET # BLD AUTO: 277 X10E3/UL (ref 150–450)
POTASSIUM SERPL-SCNC: 4.3 MMOL/L (ref 3.5–5.2)
PROT SERPL-MCNC: 6.9 G/DL (ref 6–8.5)
RBC # BLD AUTO: 4.71 X10E6/UL (ref 3.77–5.28)
SODIUM SERPL-SCNC: 142 MMOL/L (ref 134–144)
TSH SERPL DL<=0.005 MIU/L-ACNC: 0.98 UIU/ML (ref 0.45–4.5)
WBC # BLD AUTO: 6.5 X10E3/UL (ref 3.4–10.8)

## 2021-10-27 DIAGNOSIS — R79.89 ELEVATED LFTS: Primary | ICD-10-CM

## 2021-11-22 ENCOUNTER — PATIENT MESSAGE (OUTPATIENT)
Dept: INTERNAL MEDICINE CLINIC | Age: 41
End: 2021-11-22

## 2021-11-22 DIAGNOSIS — I10 HYPERTENSION, UNSPECIFIED TYPE: ICD-10-CM

## 2021-11-22 DIAGNOSIS — F41.9 ANXIETY: ICD-10-CM

## 2021-11-22 RX ORDER — HYDROCHLOROTHIAZIDE 25 MG/1
25 TABLET ORAL DAILY
Qty: 90 TABLET | Refills: 1 | Status: SHIPPED | OUTPATIENT
Start: 2021-11-22 | End: 2022-05-20 | Stop reason: SDUPTHER

## 2021-11-22 RX ORDER — CLONAZEPAM 0.5 MG/1
0.5 TABLET ORAL
Qty: 20 TABLET | Refills: 0 | Status: SHIPPED | OUTPATIENT
Start: 2021-11-22 | End: 2022-01-24 | Stop reason: SDUPTHER

## 2021-12-16 ENCOUNTER — VIRTUAL VISIT (OUTPATIENT)
Dept: INTERNAL MEDICINE CLINIC | Age: 41
End: 2021-12-16
Payer: COMMERCIAL

## 2021-12-16 DIAGNOSIS — J32.9 SINUSITIS, UNSPECIFIED CHRONICITY, UNSPECIFIED LOCATION: Primary | ICD-10-CM

## 2021-12-16 DIAGNOSIS — E66.01 OBESITY, CLASS III, BMI 40-49.9 (MORBID OBESITY) (HCC): ICD-10-CM

## 2021-12-16 PROCEDURE — 99213 OFFICE O/P EST LOW 20 MIN: CPT | Performed by: INTERNAL MEDICINE

## 2021-12-16 RX ORDER — AMOXICILLIN AND CLAVULANATE POTASSIUM 875; 125 MG/1; MG/1
1 TABLET, FILM COATED ORAL EVERY 12 HOURS
Qty: 20 TABLET | Refills: 0 | Status: SHIPPED | OUTPATIENT
Start: 2021-12-16 | End: 2021-12-26

## 2021-12-16 NOTE — PROGRESS NOTES
Yuli Hart was seen on 12/16/2021 using synchronous (real-time) audio-video technology; doxy. me. Consent: Yuli Hart, who was seen by synchronous (real-time) audio-video technology, and/or her healthcare decision maker, is aware that this patient-initiated, Telehealth encounter on 12/16/2021 is a billable service, with coverage as determined by her insurance carrier. She is aware that she may receive a bill and has provided verbal consent to proceed: Yes. I was in the office while conducting this encounter. Yuli Hart is a 46 y.o. female who presents today for Sore Throat and Sinus Pain  . She has a history of   Patient Active Problem List   Diagnosis Code    Chronic daily headache R51.9    H/O abnormal Pap smear Z87.898    History of kidney stones Z87.442    Weight gain R63.5    Menorrhagia with regular cycle N92.0    Obesity (BMI 30-39. 9) E66.9    Irritable bowel syndrome with diarrhea K58.0    Gastroesophageal reflux disease K21.9    Severe obesity (HCC) E66.01    Chronic hypertension with superimposed preeclampsia O11.9   . Today patient is being seen for an acute visit. Upper respiratory illness:  Yuli Hart presents with complaints of congestion, sore throat, post nasal drip, dry cough, hoarseness and fatiguefor 2 weeks. no nausea and no vomiting . she has not had  fever and chills. Symptoms are moderate. Over-the-counter remedies including. Drinking plenty of fluids: no  Asthma?:  no  non-smoker  Contacts with similar infections: yes     ROS  ROS    There were no vitals taken for this visit. No flowsheet data found. Physical Exam      Current Outpatient Medications   Medication Sig    clonazePAM (KlonoPIN) 0.5 mg tablet Take 1 Tablet by mouth two (2) times daily as needed for Anxiety. Max Daily Amount: 1 mg.  hydroCHLOROthiazide (HYDRODIURIL) 25 mg tablet Take 1 Tablet by mouth daily.     ondansetron (ZOFRAN ODT) 4 mg disintegrating tablet Take 1 Tablet by mouth every eight (8) hours as needed for Nausea for up to 12 doses.  butalbital-acetaminophen-caffeine (FIORICET, ESGIC) -40 mg per tablet TAKE ONE TABLET BY MOUTH EVERY 6 HOURS AS NEEDED FOR PAIN    acetaminophen (TylenoL) 325 mg tablet Take  by mouth every four (4) hours as needed for Pain.  pantoprazole (PROTONIX) 40 mg tablet Take 1 Tablet by mouth daily.  B.infantis-B.ani-B.long-B.bifi (Probiotic 4X) 10-15 mg TbEC Take  by mouth.  ascorbic acid, vitamin C, (Vitamin C) 250 mg tablet Take  by mouth.  levonorgestreL (Mirena) 20 mcg/24 hours (6 yrs) 52 mg IUD 1 Device by IntraUTERine route once.  elderberry fruit (ELDERBERRY PO) Take  by mouth. (Patient not taking: Reported on 12/16/2021)    galcanezumab-gnlm (Emgality Pen) 120 mg/mL injection 1 mL by SubCUTAneous route every thirty (30) days. (Patient not taking: Reported on 12/16/2021)    metroNIDAZOLE (METROCREAM) 0.75 % topical cream APPLY TO FACE TOPICALLY 1 TO 2 TIMES PER DAY (Patient not taking: Reported on 10/25/2021)    fluticasone propionate (FLONASE) 50 mcg/actuation nasal spray  (Patient not taking: Reported on 7/15/2021)     No current facility-administered medications for this visit.         Past Medical History:   Diagnosis Date    ADHD     Dysplasia of cervix, low grade (ORION 1) 2/23/12    colpo    Encounter for IUD removal 08/19/2016    Mirena    Essential hypertension     10 years ago    GERD (gastroesophageal reflux disease)     trying to control with diet since pregnant    H/O abnormal Pap smear 11/8/12;2/8/12    LGSIL, HPV positive    Headache     Headache(784.0)     IUD (intrauterine device) in place 03/11/2014    Mirena    Kidney disease     kidney stones 2015    Migraine headache     without aura    pap smear for 2/1/11;2/5/13; 4/1/15;2/26/19    neg, HPV neg,no ECC; neg, HPV neg, Negative, HPV negative; neg pap and neg HPV      Past Surgical History:   Procedure Laterality Date    HX COLPOSCOPY  2/23/12    ORION 1    HX HEENT      wisdom teeth    HX LITHOTRIPSY      HX WISDOM TEETH EXTRACTION        Social History     Tobacco Use    Smoking status: Never Smoker    Smokeless tobacco: Never Used   Substance Use Topics    Alcohol use: Yes     Alcohol/week: 2.0 standard drinks     Types: 1 Cans of beer, 1 Shots of liquor per week     Comment: social      Family History   Problem Relation Age of Onset    Hypertension Father     Heart Disease Father     Diabetes Father     Elevated Lipids Father     Cancer Father         eye and pancreatic     Cancer Mother         Cervical,Uterine,& Ovarian (pt questions ovarian but got cancer after copper 7 IUD)    Depression Mother     Allergic Rhinitis Brother     Asthma Brother     Other Brother         Chron's    Hypertension Brother     Depression Brother     Alcohol abuse Maternal Grandfather     Depression Maternal Grandfather     Alcohol abuse Paternal Grandmother     Stroke Paternal Grandmother     Migraines Brother     Allergic Rhinitis Brother     Depression Brother     Breast Cancer Other         No Known Allergies     Assessment/Plan  Diagnoses and all orders for this visit:    1. Sinusitis, unspecified chronicity, unspecified location -given 2 weeks of symptoms will treat for bacterial sinus infection. -     amoxicillin-clavulanate (AUGMENTIN) 875-125 mg per tablet; Take 1 Tablet by mouth every twelve (12) hours for 10 days. 2. Obesity, Class III, BMI 40-49.9 (morbid obesity) ([facility]    Patient will be coming in to do blood work as she is on hydrochlorothiazide. Kirstin Vickers is a 46 y.o. female being evaluated by a video visit encounter for concerns as above. A caregiver was present when appropriate.  Due to this being a TeleHealth encounter ([facility]), evaluation of the following organ systems was limited: Vitals/Constitutional/EENT/Resp/CV/GI//MS/Neuro/Skin/Heme-Lymph-Imm. Pursuant to the emergency declaration under the [address] [place] [address], [facility] waiver authority and the [employer] and [employer] Act, this Virtual  Visit was conducted, with patient's (and/or legal guardian's) consent, to reduce the patient's risk of exposure to COVID-19 and provide necessary medical care. Services were provided through a video synchronous discussion virtually to substitute for in-person clinic visit. Patient and provider were located at their individual homes. Mirta Schuler MD  12/16/2021    This note was created with the help of speech recognition software [employer]) and may contain some 'sound alike' errors.

## 2021-12-21 RX ORDER — DOXYCYCLINE 100 MG/1
100 TABLET ORAL 2 TIMES DAILY
Qty: 10 TABLET | Refills: 0 | Status: SHIPPED | OUTPATIENT
Start: 2021-12-21 | End: 2021-12-26

## 2021-12-27 ENCOUNTER — PATIENT MESSAGE (OUTPATIENT)
Dept: NEUROLOGY | Age: 41
End: 2021-12-27

## 2022-01-04 ENCOUNTER — VIRTUAL VISIT (OUTPATIENT)
Dept: INTERNAL MEDICINE CLINIC | Age: 42
End: 2022-01-04
Payer: COMMERCIAL

## 2022-01-04 DIAGNOSIS — J06.9 UPPER RESPIRATORY TRACT INFECTION, UNSPECIFIED TYPE: Primary | ICD-10-CM

## 2022-01-04 PROCEDURE — 99213 OFFICE O/P EST LOW 20 MIN: CPT | Performed by: INTERNAL MEDICINE

## 2022-01-04 RX ORDER — PREDNISONE 20 MG/1
40 TABLET ORAL
Qty: 10 TABLET | Refills: 0 | Status: SHIPPED | OUTPATIENT
Start: 2022-01-04 | End: 2022-01-09

## 2022-01-04 RX ORDER — AZITHROMYCIN 250 MG/1
250 TABLET, FILM COATED ORAL SEE ADMIN INSTRUCTIONS
Qty: 6 TABLET | Refills: 0 | Status: SHIPPED | OUTPATIENT
Start: 2022-01-04 | End: 2022-01-09

## 2022-01-04 NOTE — PROGRESS NOTES
Ирина Blanca was seen on 1/4/2022 using synchronous (real-time) audio-video technology; doxy. me. Consent: Ирина Blanca, who was seen by synchronous (real-time) audio-video technology, and/or her healthcare decision maker, is aware that this patient-initiated, Telehealth encounter on 1/4/2022 is a billable service, with coverage as determined by her insurance carrier. She is aware that she may receive a bill and has provided verbal consent to proceed: Yes. I was in the office while conducting this encounter. Ирина Blanca is a 46 y.o. female who presents today for No chief complaint on file. .    She has a history of   Patient Active Problem List   Diagnosis Code    Chronic daily headache R51.9    H/O abnormal Pap smear Z87.898    History of kidney stones Z87.442    Weight gain R63.5    Menorrhagia with regular cycle N92.0    Obesity (BMI 30-39. 9) E66.9    Irritable bowel syndrome with diarrhea K58.0    Gastroesophageal reflux disease K21.9    Severe obesity (HCC) E66.01    Chronic hypertension with superimposed preeclampsia O11.9   . Today patient is being seen for an acute visit. Upper respiratory illness:  Ирина Blanca presents with complaints of congestion, productive cough and cough described as productive for 3 days. no nausea and no vomiting . she has not had  fever and chills. Symptoms are moderate. Over-the-counter remedies including advil and tylenol. Drinking plenty of fluids: yes  Asthma?:  No, but does have some chronic UR/sinus issues. non-smoker  Contacts with similar infections: yes,  did not feel well for a couple days around new years. Patient was recently treated for a sinus infection and completed doxycycline the day after [place]. She reports that that time she was feeling much better. This started about 5 days later. They did travel to [place] but did not have any sick contacts at that time.     ROS  Review of Systems   Constitutional: Positive for malaise/fatigue. Negative for chills, fever and weight loss. HENT: Positive for congestion and sinus pain. Negative for sore throat. Eyes: Negative for blurred vision, double vision and photophobia. Respiratory: Positive for cough and sputum production. Negative for hemoptysis, shortness of breath and wheezing. Cardiovascular: Negative for chest pain, palpitations, orthopnea and leg swelling. Gastrointestinal: Negative for abdominal pain, constipation, diarrhea, heartburn, nausea and vomiting. Genitourinary: Negative for dysuria, frequency and urgency. Musculoskeletal: Negative for joint pain and myalgias. Skin: Negative for rash. Neurological: Negative. Negative for headaches. Endo/Heme/Allergies: Does not bruise/bleed easily. Psychiatric/Behavioral: Negative for memory loss and suicidal ideas. There were no vitals taken for this visit. No flowsheet data found. Physical Exam  Constitutional:       Appearance: She is ill-appearing. HENT:      Head: Normocephalic and atraumatic. Pulmonary:      Effort: Pulmonary effort is normal.      Comments: Speaking in full sentences. No coughing during visit. Neurological:      General: No focal deficit present. Mental Status: She is alert. Psychiatric:         Mood and Affect: Mood normal.         Behavior: Behavior normal.           Current Outpatient Medications   Medication Sig    clonazePAM (KlonoPIN) 0.5 mg tablet Take 1 Tablet by mouth two (2) times daily as needed for Anxiety. Max Daily Amount: 1 mg.  hydroCHLOROthiazide (HYDRODIURIL) 25 mg tablet Take 1 Tablet by mouth daily.  ondansetron (ZOFRAN ODT) 4 mg disintegrating tablet Take 1 Tablet by mouth every eight (8) hours as needed for Nausea for up to 12 doses.     butalbital-acetaminophen-caffeine (FIORICET, ESGIC) -40 mg per tablet TAKE ONE TABLET BY MOUTH EVERY 6 HOURS AS NEEDED FOR PAIN    acetaminophen (TylenoL) 325 mg tablet Take  by mouth every four (4) hours as needed for Pain.  pantoprazole (PROTONIX) 40 mg tablet Take 1 Tablet by mouth daily.  B.infantis-B.ani-B.long-B.bifi (Probiotic 4X) 10-15 mg TbEC Take  by mouth.  ascorbic acid, vitamin C, (Vitamin C) 250 mg tablet Take  by mouth.  levonorgestreL (Mirena) 20 mcg/24 hours (6 yrs) 52 mg IUD 1 Device by IntraUTERine route once. No current facility-administered medications for this visit. Past Medical History:   Diagnosis Date    ADHD     Dysplasia of cervix, low grade (ORION 1) 2/23/12    colpo    Encounter for IUD removal 08/19/2016    Mirena    Essential hypertension     10 years ago    GERD (gastroesophageal reflux disease)     trying to control with diet since pregnant    H/O abnormal Pap smear 11/8/12;2/8/12    LGSIL, HPV positive    Headache     Headache(784.0)     IUD (intrauterine device) in place 03/11/2014    Mirena    Kidney disease     kidney stones 2015    Migraine headache     without aura    pap smear for 2/1/11;2/5/13; 4/1/15;2/26/19    neg, HPV neg,no ECC; neg, HPV neg, Negative, HPV negative; neg pap and neg HPV      Past Surgical History:   Procedure Laterality Date    HX COLPOSCOPY  2/23/12    ORION 1    HX HEENT      wisdom teeth    HX LITHOTRIPSY      HX WISDOM TEETH EXTRACTION        Social History     Tobacco Use    Smoking status: Never Smoker    Smokeless tobacco: Never Used   Substance Use Topics    Alcohol use:  Yes     Alcohol/week: 2.0 standard drinks     Types: 1 Cans of beer, 1 Shots of liquor per week     Comment: social      Family History   Problem Relation Age of Onset    Hypertension Father     Heart Disease Father     Diabetes Father     Elevated Lipids Father     Cancer Father         eye and pancreatic     Cancer Mother         Cervical,Uterine,& Ovarian (pt questions ovarian but got cancer after copper 7 IUD)    Depression Mother     Allergic Rhinitis Brother     Asthma Brother     Other Brother         Chron's    Hypertension Brother     Depression Brother     Alcohol abuse Maternal Grandfather     Depression Maternal Grandfather     Alcohol abuse Paternal Grandmother     Stroke Paternal Grandmother     Migraines Brother     Allergic Rhinitis Brother     Depression Brother     Breast Cancer Other         No Known Allergies     Assessment/Plan  Diagnoses and all orders for this visit:    1. Upper respiratory tract infection, unspecified type-reports that this is separate from previous upper respiratory infection. Has been did have several days of feeling poorly. Mainly chest congestion. Will cover with macrolide and give her a 5-day taper of steroids. Patient will let us know if this persists or worsens we can get a chest x-ray. -     predniSONE (DELTASONE) 20 mg tablet; Take 40 mg by mouth daily (with breakfast) for 5 days. -     azithromycin (ZITHROMAX) 250 mg tablet; Take 1 Tablet by mouth See Admin Instructions for 5 days. Terrance Mclaughlin is a 46 y.o. female being evaluated by a video visit encounter for concerns as above. A caregiver was present when appropriate. Due to this being a TeleHealth encounter ([facility]), evaluation of the following organ systems was limited: Vitals/Constitutional/EENT/Resp/CV/GI//MS/Neuro/Skin/Heme-Lymph-Imm. Pursuant to the emergency declaration under the [address], [facility] waiver authority and the [employer] and [employer] Act, this Virtual  Visit was conducted, with patient's (and/or legal guardian's) consent, to reduce the patient's risk of exposure to COVID-19 and provide necessary medical care. Services were provided through a video synchronous discussion virtually to substitute for in-person clinic visit. Patient and provider were located at their individual homes.     Dino Hatfield, MD  1/4/2022    This note was created with the help of speech recognition software ([employer]) and may contain some 'sound alike' errors.

## 2022-01-21 ENCOUNTER — VIRTUAL VISIT (OUTPATIENT)
Dept: INTERNAL MEDICINE CLINIC | Age: 42
End: 2022-01-21
Payer: COMMERCIAL

## 2022-01-21 DIAGNOSIS — J45.21 MILD INTERMITTENT REACTIVE AIRWAY DISEASE WITH ACUTE EXACERBATION: ICD-10-CM

## 2022-01-21 DIAGNOSIS — J40 BRONCHITIS: ICD-10-CM

## 2022-01-21 DIAGNOSIS — J98.8 RESPIRATORY TRACT INFECTION DUE TO COVID-19 VIRUS: Primary | ICD-10-CM

## 2022-01-21 DIAGNOSIS — U07.1 RESPIRATORY TRACT INFECTION DUE TO COVID-19 VIRUS: Primary | ICD-10-CM

## 2022-01-21 PROCEDURE — 99213 OFFICE O/P EST LOW 20 MIN: CPT | Performed by: NURSE PRACTITIONER

## 2022-01-21 RX ORDER — ALBUTEROL SULFATE 90 UG/1
2 AEROSOL, METERED RESPIRATORY (INHALATION)
Qty: 18 G | Refills: 0 | Status: SHIPPED | OUTPATIENT
Start: 2022-01-21 | End: 2022-03-17

## 2022-01-21 RX ORDER — PREDNISONE 20 MG/1
TABLET ORAL
Qty: 9 TABLET | Refills: 0 | Status: SHIPPED | OUTPATIENT
Start: 2022-01-21 | End: 2022-03-17 | Stop reason: ALTCHOICE

## 2022-01-21 RX ORDER — AZITHROMYCIN 250 MG/1
250 TABLET, FILM COATED ORAL SEE ADMIN INSTRUCTIONS
Qty: 6 TABLET | Refills: 0 | Status: SHIPPED | OUTPATIENT
Start: 2022-01-21 | End: 2022-01-26

## 2022-01-21 RX ORDER — CODEINE PHOSPHATE AND GUAIFENESIN 10; 100 MG/5ML; MG/5ML
5 SOLUTION ORAL
Qty: 105 ML | Refills: 0 | Status: SHIPPED | OUTPATIENT
Start: 2022-01-21 | End: 2022-01-28

## 2022-01-23 NOTE — PROGRESS NOTES
Trev Barba (: 1980) is a 46 y.o. female, established patient, here for evaluation of the following chief complaint(s):   Positive For Covid-19 (tested positive yesterday)       ASSESSMENT/PLAN:  Below is the assessment and plan developed based on review of pertinent history, labs, studies, and medications. 1. Respiratory tract infection due to COVID-19 virus     2. Bronchitis -- treat for secondary infection   -     azithromycin (ZITHROMAX) 250 mg tablet; Take 1 Tablet by mouth See Admin Instructions for 5 days. , Normal, Disp-6 Tablet, R-0  -     guaiFENesin-codeine (ROBITUSSIN AC) 100-10 mg/5 mL solution; Take 5 mL by mouth three (3) times daily as needed for Cough for up to 7 days. Max Daily Amount: 15 mL., Normal, Disp-105 mL, R-0    3. Mild intermittent reactive airway disease with acute exacerbation -- cautioned regarding sedation with Codeine cough syrup.  -     albuterol (PROVENTIL HFA, VENTOLIN HFA, PROAIR HFA) 90 mcg/actuation inhaler; Take 2 Puffs by inhalation every four (4) hours as needed for Wheezing., Normal, Disp-18 g, R-0  -     predniSONE (DELTASONE) 20 mg tablet; Take 2 tabs daily x 3 days then 1 tab daily x 2 days then 1/2 tab daily x 2 days, Normal, Disp-9 Tablet, R-0      SUBJECTIVE/OBJECTIVE:  HPI    Visit conducted via [employer] me platform. Patient of Dr Bridgette Carlton who presents with complaints of headache, myalgias, chest tightness, productive cough of thick green sputum for the past 4 days. Reports cough has become more tight and she has been noting more coughing spasms and coughing with speech and laughter. Her sleep has been disturbed by cough. Reports her  tested positive for Covid 4 days ago and she tested positive yesterday. Has been taking Mucinex, Zinc, Vitamin C, Tylenol, Advil with temporary improvement of symptoms. She was recently treated for a sinus infection in December with Augmentin and then with Zithromax Zpack and oral steroids on  for URI. Reports she felt like symptoms improved but then she began feeling ill again. Denies fever but has felt chilled with body aches. Patient Active Problem List   Diagnosis Code    Chronic daily headache R51.9    H/O abnormal Pap smear Z87.898    History of kidney stones Z87.442    Weight gain R63.5    Menorrhagia with regular cycle N92.0    Obesity (BMI 30-39. 9) E66.9    Irritable bowel syndrome with diarrhea K58.0    Gastroesophageal reflux disease K21.9    Severe obesity (HCC) E66.01    Chronic hypertension with superimposed preeclampsia O11.9     Past Surgical History:   Procedure Laterality Date    HX COLPOSCOPY  2/23/12    ORION 1    HX HEENT      wisdom teeth    HX LITHOTRIPSY      HX WISDOM TEETH EXTRACTION       Social History     Socioeconomic History    Marital status:      Spouse name: Not on file    Number of children: Not on file    Years of education: Not on file    Highest education level: Not on file   Occupational History    Not on file   Tobacco Use    Smoking status: Never Smoker    Smokeless tobacco: Never Used   Vaping Use    Vaping Use: Never used   Substance and Sexual Activity    Alcohol use: Yes     Alcohol/week: 2.0 standard drinks     Types: 1 Cans of beer, 1 Shots of liquor per week     Comment: social    Drug use: No    Sexual activity: Yes     Partners: Male     Birth control/protection: I.U.D.    Other Topics Concern     Service Not Asked    Blood Transfusions Not Asked    Caffeine Concern Not Asked    Occupational Exposure Not Asked    Hobby Hazards Not Asked    Sleep Concern Not Asked    Stress Concern Not Asked    Weight Concern Not Asked    Special Diet Not Asked    Back Care Not Asked    Exercise Not Asked    Bike Helmet Not Asked   [address] Not Asked    Self-Exams Not Asked   Social History Narrative    Not on file     Social Determinants of Health     Financial Resource Strain:     Difficulty of Paying Living Expenses: Not on file   Food Insecurity:     Worried About [address] in the Last Year: Not on file    Edie of Food in the Last Year: Not on file   Transportation Needs:     Lack of Transportation (Medical): Not on file    Lack of Transportation (Non-Medical):  Not on file   Physical Activity:     Days of Exercise per Week: Not on file    Minutes of Exercise per Session: Not on file   Stress:     Feeling of Stress : Not on file   Social Connections:     Frequency of Communication with Friends and Family: Not on file    Frequency of Social Gatherings with Friends and Family: Not on file    Attends Taoism Services: Not on file    Active Member of [address] [place] [address] or Organizations: Not on file    Attends Club or Organization Meetings: Not on file    Marital Status: Not on file   Intimate Partner Violence:     Fear of Current or Ex-Partner: Not on file    Emotionally Abused: Not on file    Physically Abused: Not on file    Sexually Abused: Not on file   Housing Stability:     Unable to Pay for Housing in the Last Year: Not on file    Number of [place] in the Last Year: Not on file    Unstable Housing in the Last Year: Not on file     Family History   Problem Relation Age of Onset    Hypertension Father     Heart Disease Father     Diabetes Father     Elevated Lipids Father     Cancer Father         eye and pancreatic     Cancer Mother         Cervical,Uterine,& Ovarian (pt questions ovarian but got cancer after copper 7 IUD)    Depression Mother     Allergic Rhinitis Brother     Asthma Brother     Other Brother         Chron's    Hypertension Brother     Depression Brother     Alcohol abuse Maternal Grandfather     Depression Maternal Grandfather     Alcohol abuse Paternal Grandmother     Stroke Paternal Grandmother     Migraines Brother     Allergic Rhinitis Brother     Depression Brother     Breast Cancer Other      Current Outpatient Medications   Medication Sig    azithromycin (ZITHROMAX) 250 mg tablet Take 1 Tablet by mouth See Admin Instructions for 5 days.  albuterol (PROVENTIL HFA, VENTOLIN HFA, PROAIR HFA) 90 mcg/actuation inhaler Take 2 Puffs by inhalation every four (4) hours as needed for Wheezing.  predniSONE (DELTASONE) 20 mg tablet Take 2 tabs daily x 3 days then 1 tab daily x 2 days then 1/2 tab daily x 2 days    guaiFENesin-codeine (ROBITUSSIN AC) 100-10 mg/5 mL solution Take 5 mL by mouth three (3) times daily as needed for Cough for up to 7 days. Max Daily Amount: 15 mL.  clonazePAM (KlonoPIN) 0.5 mg tablet Take 1 Tablet by mouth two (2) times daily as needed for Anxiety. Max Daily Amount: 1 mg.  hydroCHLOROthiazide (HYDRODIURIL) 25 mg tablet Take 1 Tablet by mouth daily.  ondansetron (ZOFRAN ODT) 4 mg disintegrating tablet Take 1 Tablet by mouth every eight (8) hours as needed for Nausea for up to 12 doses.  butalbital-acetaminophen-caffeine (FIORICET, ESGIC) -40 mg per tablet TAKE ONE TABLET BY MOUTH EVERY 6 HOURS AS NEEDED FOR PAIN    acetaminophen (TylenoL) 325 mg tablet Take  by mouth every four (4) hours as needed for Pain.  pantoprazole (PROTONIX) 40 mg tablet Take 1 Tablet by mouth daily.  B.infantis-B.ani-B.long-B.bifi (Probiotic 4X) 10-15 mg TbEC Take  by mouth.  ascorbic acid, vitamin C, (Vitamin C) 250 mg tablet Take  by mouth.  levonorgestreL (Mirena) 20 mcg/24 hours (6 yrs) 52 mg IUD 1 Device by IntraUTERine route once. No current facility-administered medications for this visit. No Known Allergies  Immunization History   Administered Date(s) Administered    COVID-19, Pfizer Purple top, DILUTE for use, 12+ yrs, 30mcg/0.3mL dose 08/01/2021, 08/22/2021    Influenza Vaccine [employer]) PF (>6 Mo Flulaval, Fluarix, and >3 Yrs Afluria, Fluzone 84452) 02/26/2019, 10/08/2019    Influenza Vaccine Split 01/17/2012    Tdap 08/01/2019       Review of Systems   Constitutional: Positive for chills and fatigue.  Negative for fever.   HENT: Positive for congestion and postnasal drip. Negative for sinus pressure and sinus pain. Respiratory: Positive for cough, chest tightness and shortness of breath. Cardiovascular: Negative for chest pain. Musculoskeletal: Positive for myalgias. Neurological: Positive for headaches. Negative for dizziness. No data recorded     Physical Exam    [INSTRUCTIONS:  \"[x]\" Indicates a positive item  \"[]\" Indicates a negative item  -- DELETE ALL ITEMS NOT EXAMINED]    Constitutional: [x] Appears well-developed and well-nourished [x] No apparent distress      [x] Abnormal - appears fatigued    Mental status: [x] Alert and awake  [x] Oriented to person/place/time [x] Able to follow commands    [] Abnormal -     Eyes:   EOM    [x]  Normal    [] Abnormal -   Sclera  [x]  Normal    [] Abnormal -          Discharge [x]  None visible   [] Abnormal -     HENT: [x] Normocephalic, atraumatic  [] Abnormal -   [x] Mouth/Throat: Mucous membranes are moist    External Ears [x] Normal  [] Abnormal -    Neck: [x] No visualized mass [] Abnormal -     Pulmonary/Chest: [x] Respiratory effort normal   [x] No visualized signs of difficulty breathing or respiratory distress        [x] Abnormal - loose cough with bronchospastic quality.      Musculoskeletal:   [x] Normal gait with no signs of ataxia         [x] Normal range of motion of neck        [] Abnormal -     Neurological:        [x] No Facial Asymmetry (Cranial nerve 7 motor function) (limited exam due to video visit)          [x] No gaze palsy        [] Abnormal -          Skin:        [x] No significant exanthematous lesions or discoloration noted on facial skin         [] Abnormal -            Psychiatric:       [x] Normal Affect [] Abnormal -        [x] No Hallucinations        On this date 01/21/2022 I have spent 25 minutes reviewing previous notes, test results and face to face (virtual) with the patient discussing the diagnosis and importance of compliance with the treatment plan as well as documenting on the day of the visit. Shala Macias, was evaluated through a synchronous (real-time) audio-video encounter. The patient (or guardian if applicable) is aware that this is a billable service, which includes applicable co-pays. Verbal consent to proceed has been obtained. The visit was conducted pursuant to the emergency declaration under the [address] [place] [address] authority and the [employer] and [employer] General Act. Patient identification was verified, and a caregiver was present when appropriate. The patient was located at home in a state where the provider was licensed to provide care. An electronic signature was used to authenticate this note.   -- Lulu Mahoney NP

## 2022-01-24 DIAGNOSIS — F41.9 ANXIETY: ICD-10-CM

## 2022-01-24 DIAGNOSIS — K21.9 GASTROESOPHAGEAL REFLUX DISEASE WITHOUT ESOPHAGITIS: ICD-10-CM

## 2022-01-25 RX ORDER — CLONAZEPAM 0.5 MG/1
0.5 TABLET ORAL
Qty: 20 TABLET | Refills: 0 | Status: SHIPPED | OUTPATIENT
Start: 2022-01-25 | End: 2022-02-21 | Stop reason: SDUPTHER

## 2022-01-25 RX ORDER — PANTOPRAZOLE SODIUM 40 MG/1
40 TABLET, DELAYED RELEASE ORAL DAILY
Qty: 90 TABLET | Refills: 1 | Status: SHIPPED | OUTPATIENT
Start: 2022-01-25 | End: 2022-08-21

## 2022-01-31 ENCOUNTER — PATIENT MESSAGE (OUTPATIENT)
Dept: OBGYN CLINIC | Age: 42
End: 2022-01-31

## 2022-02-03 NOTE — PATIENT INSTRUCTIONS
Well Visit, Ages 25 to 48: Care Instructions  Overview     Well visits can help you stay healthy. Your doctor has checked your overall health and may have suggested ways to take good care of yourself. Your doctor also may have recommended tests. At home, you can help prevent illness with healthy eating, regular exercise, and other steps. Follow-up care is a key part of your treatment and safety. Be sure to make and go to all appointments, and call your doctor if you are having problems. It's also a good idea to know your test results and keep a list of the medicines you take. How can you care for yourself at home? · Get screening tests that you and your doctor decide on. Screening helps find diseases before any symptoms appear. · Eat healthy foods. Choose fruits, vegetables, whole grains, protein, and low-fat dairy foods. Limit fat, especially saturated fat. Reduce salt in your diet. · Limit alcohol. If you are a man, have no more than 2 drinks a day or 14 drinks a week. If you are a woman, have no more than 1 drink a day or 7 drinks a week. · Get at least 30 minutes of physical activity on most days of the week. Walking is a good choice. You also may want to do other activities, such as running, swimming, cycling, or playing tennis or team sports. Discuss any changes in your exercise program with your doctor. · Reach and stay at a healthy weight. This will lower your risk for many problems, such as obesity, diabetes, heart disease, and high blood pressure. · Do not smoke or allow others to smoke around you. If you need help quitting, talk to your doctor about stop-smoking programs and medicines. These can increase your chances of quitting for good. · Care for your mental health. It is easy to get weighed down by worry and stress. Learn strategies to manage stress, like deep breathing and mindfulness, and stay connected with your family and community.  If you find you often feel sad or hopeless, talk with your doctor. Treatment can help. · Talk to your doctor about whether you have any risk factors for sexually transmitted infections (STIs). You can help prevent STIs if you wait to have sex with a new partner (or partners) until you've each been tested for STIs. It also helps if you use condoms (male or female condoms) and if you limit your sex partners to one person who only has sex with you. Vaccines are available for some STIs, such as HPV. · Use birth control if it's important to you to prevent pregnancy. Talk with your doctor about the choices available and what might be best for you. · If you think you may have a problem with alcohol or drug use, talk to your doctor. This includes prescription medicines (such as amphetamines and opioids) and illegal drugs (such as cocaine and methamphetamine). Your doctor can help you figure out what type of treatment is best for you. · Protect your skin from too much sun. When you're outdoors from 10 a.m. to 4 p.m., stay in the shade or cover up with clothing and a hat with a wide brim. Wear sunglasses that block UV rays. Even when it's cloudy, put broad-spectrum sunscreen (SPF 30 or higher) on any exposed skin. · See a dentist one or two times a year for checkups and to have your teeth cleaned. · Wear a seat belt in the car. When should you call for help? Watch closely for changes in your health, and be sure to contact your doctor if you have any problems or symptoms that concern you. Where can you learn more? Go to http://www.[employer].com/  Enter P072 in the search box to learn more about \"Well Visit, Ages 25 to 48: Care Instructions. \"  Current as of: February 11, 2021               Content Version: 13.0  © 4383-7663 [employer]. Care instructions adapted under license by [employer] (which disclaims liability or warranty for this information).  If you have questions about a medical condition or this instruction, always ask your healthcare professional. [place] [address] any warranty or liability for your use of this information.

## 2022-02-04 ENCOUNTER — OFFICE VISIT (OUTPATIENT)
Dept: OBGYN CLINIC | Age: 42
End: 2022-02-04
Payer: COMMERCIAL

## 2022-02-04 VITALS
SYSTOLIC BLOOD PRESSURE: 131 MMHG | BODY MASS INDEX: 42.17 KG/M2 | HEART RATE: 89 BPM | HEIGHT: 64 IN | DIASTOLIC BLOOD PRESSURE: 85 MMHG | WEIGHT: 247 LBS

## 2022-02-04 DIAGNOSIS — Z01.419 ENCOUNTER FOR GYNECOLOGICAL EXAMINATION (GENERAL) (ROUTINE) WITHOUT ABNORMAL FINDINGS: Primary | ICD-10-CM

## 2022-02-04 DIAGNOSIS — Z12.4 CERVICAL CANCER SCREENING: ICD-10-CM

## 2022-02-04 PROCEDURE — 99395 PREV VISIT EST AGE 18-39: CPT | Performed by: OBSTETRICS & GYNECOLOGY

## 2022-02-04 NOTE — PROGRESS NOTES
[facility]  [email or web address]  [phone]    Royal Lila MD, [address] [facility]       Annual Gynecologic Exam:  WWE <40  Chief Complaint   Patient presents with    Well Woman         Brianna Wan is a 46 y.o.  [address] [facility] [place] [place] who presents for an annual well woman exam.  No LMP recorded. (Menstrual status: IUD). She reports the following additional concerns: she would like to discuss family planning within the next 3-6 months; she has the IUD. She noticed some aches in her left breast for the past month, denies lumps, she had breast ultrasounds showed a clogged duct per pt, she tries to watch her caffeine intake, denies smoking. Menstrual status:  She does not report dysmenorrhea/painful menses. She does not report heavy menses. She does not report irregular bleeding. Sexual history and Contraception:  Social History     Substance and Sexual Activity   Sexual Activity Yes    Partners: Male    Birth control/protection: I.U.D. She does not reports new sexual partner(s) in the last year. Preventive Medicine History:  Her most recent Pap smear result: normal was obtained in 2019  Her most recent HR HPV screen was Negative obtained in 2019    She does not have a history of ORION 2, 3 or cervical cancer.      Past Medical History:   Diagnosis Date    ADHD     Dysplasia of cervix, low grade (ORION 1) 12    colpo    Encounter for IUD removal 2016    Mirena    Essential hypertension     10 years ago    GERD (gastroesophageal reflux disease)     trying to control with diet since pregnant    H/O abnormal Pap smear 12;12    LGSIL, HPV positive    Headache     Headache(784.0)     IUD (intrauterine device) in place 2014    Mirena    Kidney disease     kidney stones     Migraine headache     without aura    pap smear for 11;13; 4/1/15;19    neg, HPV neg,no ECC; neg, HPV neg, Negative, HPV negative; neg pap and neg HPV     OB History    Para Term  AB Living   1 1 0 1 0 1   SAB IAB Ectopic Molar Multiple Live Births   0 0 0   0 1      # Outcome Date GA Lbr Sonido/2nd Weight Sex Delivery Anes PTL Lv   1  19 36w2d / 01:16 5 lb 5.7 oz (2.43 kg) F Vag-Spont EPI N ANHTONY      Obstetric Comments   Menarche:  14. LMP: 20. # of Children:  Currently pregnant. Age at Delivery of First Child:  n/a. Hysterectomy/oophorectomy:  NO/NO. Breast Bx:  No.  Hx of Breast Feeding:  n/a. BCP:  Yes. Hormone therapy:  No.      Past Surgical History:   Procedure Laterality Date    HX COLPOSCOPY  12    ORION 1    HX HEENT      wisdom teeth    HX LITHOTRIPSY      HX WISDOM TEETH EXTRACTION       Family History   Problem Relation Age of Onset    Hypertension Father     Heart Disease Father     Diabetes Father     Elevated Lipids Father     Cancer Father         eye and pancreatic     Cancer Mother         Cervical,Uterine,& Ovarian (pt questions ovarian but got cancer after copper 7 IUD)    Depression Mother     Allergic Rhinitis Brother     Asthma Brother     Other Brother         Chron's    Hypertension Brother     Depression Brother     Alcohol abuse Maternal Grandfather     Depression Maternal Grandfather     Alcohol abuse Paternal Grandmother     Stroke Paternal Grandmother     Migraines Brother     Allergic Rhinitis Brother     Depression Brother     Breast Cancer Other      Social History     Socioeconomic History    Marital status:      Spouse name: Not on file    Number of children: Not on file    Years of education: Not on file    Highest education level: Not on file   Occupational History    Not on file   Tobacco Use    Smoking status: Never Smoker    Smokeless tobacco: Never Used   Vaping Use    Vaping Use: Never used   Substance and Sexual Activity    Alcohol use:  Yes     Alcohol/week: 2.0 standard drinks     Types: 1 Cans of beer, 1 Shots of liquor per week     Comment: social    Drug use: No    Sexual activity: Yes     Partners: Male     Birth control/protection: I.U.D. Other Topics Concern     Service Not Asked    Blood Transfusions Not Asked    Caffeine Concern Not Asked    Occupational Exposure Not Asked    Hobby Hazards Not Asked    Sleep Concern Not Asked    Stress Concern Not Asked    Weight Concern Not Asked    Special Diet Not Asked    Back Care Not Asked    Exercise Not Asked    Bike Helmet Not Asked   [address] Not Asked    Self-Exams Not Asked   Social History Narrative    Not on file     Social Determinants of Health     Financial Resource Strain:     Difficulty of Paying Living Expenses: Not on file   Food Insecurity:     Worried About Running Out of Food in the Last Year: Not on file    Edie of Food in the Last Year: Not on file   Transportation Needs:     Lack of Transportation (Medical): Not on file    Lack of Transportation (Non-Medical):  Not on file   Physical Activity:     Days of Exercise per Week: Not on file    Minutes of Exercise per Session: Not on file   Stress:     Feeling of Stress : Not on file   Social Connections:     Frequency of Communication with Friends and Family: Not on file    Frequency of Social Gatherings with Friends and Family: Not on file    Attends Confucianist Services: Not on file    Active Member of [address] [place] [address] [employer] or Organizations: Not on file    Attends Club or Organization Meetings: Not on file    Marital Status: Not on file   Intimate Partner Violence:     Fear of Current or Ex-Partner: Not on file    Emotionally Abused: Not on file    Physically Abused: Not on file    Sexually Abused: Not on file   Housing Stability:     Unable to Pay for Housing in the Last Year: Not on file    Number of [place] in the Last Year: Not on file    Unstable Housing in the Last Year: Not on file       No Known Allergies    Current Outpatient Medications   Medication Sig    pantoprazole (PROTONIX) 40 mg tablet Take 1 Tablet by mouth daily.  clonazePAM (KlonoPIN) 0.5 mg tablet Take 1 Tablet by mouth two (2) times daily as needed for Anxiety. Max Daily Amount: 1 mg.  hydroCHLOROthiazide (HYDRODIURIL) 25 mg tablet Take 1 Tablet by mouth daily.  ondansetron (ZOFRAN ODT) 4 mg disintegrating tablet Take 1 Tablet by mouth every eight (8) hours as needed for Nausea for up to 12 doses.  butalbital-acetaminophen-caffeine (FIORICET, ESGIC) -40 mg per tablet TAKE ONE TABLET BY MOUTH EVERY 6 HOURS AS NEEDED FOR PAIN    acetaminophen (TylenoL) 325 mg tablet Take  by mouth every four (4) hours as needed for Pain.    B.infantis-B.ani-B.long-B.bifi (Probiotic 4X) 10-15 mg TbEC Take  by mouth.  ascorbic acid, vitamin C, (Vitamin C) 250 mg tablet Take  by mouth.  levonorgestreL (Mirena) 20 mcg/24 hours (6 yrs) 52 mg IUD 1 Device by IntraUTERine route once.  albuterol (PROVENTIL HFA, VENTOLIN HFA, PROAIR HFA) 90 mcg/actuation inhaler Take 2 Puffs by inhalation every four (4) hours as needed for Wheezing. (Patient not taking: Reported on 2/4/2022)    predniSONE (DELTASONE) 20 mg tablet Take 2 tabs daily x 3 days then 1 tab daily x 2 days then 1/2 tab daily x 2 days (Patient not taking: Reported on 2/4/2022)     No current facility-administered medications for this visit. Patient Active Problem List   Diagnosis Code    Chronic daily headache R51.9    H/O abnormal Pap smear Z87.898    History of kidney stones Z87.442    Weight gain R63.5    Menorrhagia with regular cycle N92.0    Obesity (BMI 30-39. 9) E66.9    Irritable bowel syndrome with diarrhea K58.0    Gastroesophageal reflux disease K21.9    Severe obesity (HCC) E66.01    Chronic hypertension with superimposed preeclampsia O11.9         Review of Systems - History obtained from the patient and patient filled out questionnaire   Constitutional/general, HEENT, CV, Resp, GI, MSK, Neuro, Psych, Heme/lymph, Skin, Breast ROS: no significant complaints except as noted on HPI    Physical Exam  Visit Vitals  /85 (BP 1 Location: Right arm)   Pulse 89   Ht 5' 4\" (1.626 m)   Wt 247 lb (112 kg)   Breastfeeding No   BMI 42.40 kg/m²       Constitutional  · Appearance: well-nourished, well developed, alert, in no acute distress    HENT  · Head and Face: appears normal    Neck  · Inspection/Palpation: normal appearance, no masses or tenderness  · Lymph Nodes: no lymphadenopathy present  · Thyroid: gland size normal, nontender, no nodules or masses present on palpation    Chest  · Respiratory Effort: breathing unlabored  · Auscultation: normal breath sounds    Cardiovascular  · Heart:  · Auscultation: regular rate and rhythm without murmur    Breasts  · Inspection of Breasts: breasts symmetrical, no skin changes, no discharge present, nipple appearance normal, no skin retraction present  · Palpation of Breasts and Axillae: no masses present on palpation, no breast tenderness  · Axillary Lymph Nodes: no lymphadenopathy present    Gastrointestinal  · Abdominal Examination: abdomen non-tender to palpation, normal bowel sounds, no masses present  · Liver and spleen: no hepatomegaly present, spleen not palpable  · Hernias: no hernias identified    Genitourinary  · External Genitalia: normal appearance for age, no discharge present, no tenderness present, no inflammatory lesions present, no masses present  · Vagina: normal vaginal vault without central or paravaginal defects, minimal discharge present, no inflammatory lesions present, no masses present  · Bladder: non-tender to palpation  · Urethra: appears normal  · Cervix: normal   IUD strings seen and appropriate length  · Uterus: normal size, shape and consistency  · Adnexa: no adnexal tenderness present, no adnexal masses present  · Perineum: perineum within normal limits, no evidence of trauma, no rashes or skin lesions present  · Anus: anus within normal limits, no hemorrhoids present  · Inguinal Lymph Nodes: no lymphadenopathy present    Skin  · General Inspection: no rash, no lesions identified    Neurologic/Psychiatric  · Mental Status:  · Orientation: grossly oriented to person, place and time  · Mood and Affect: mood normal, affect appropriate    Assessment:  46 y.o.  for well woman exam  Encounter Diagnoses   Name Primary?  Encounter for gynecological examination (general) (routine) without abnormal findings Yes    Cervical cancer screening        Plan:  The patient was counseled about diet, exercise, healthy lifestyle  We discussed current pap smear and HR HPV testing guidelines. I recommended follow up one year for routine annual gynecologic exam or sooner prn  Handouts were given to the patient  I recommended follow up with a primary care physician for chronic medical problems and evaluation of non-gynecologic concerns and to please contact our office with any GYN questions or concerns. I recommended testing per CDC guidelines and at patient request.   We discussed risks of AMA: including but not limited to the patient's risk for adverse outcome, including miscarriage, pregnancy loss, stillbirth, fetal chromosomal and anatomic anomalies,  delivery, low birth weight, intrauterine growth restriction, placenta previa, gestational diabetes, preeclampsia, and  delivery. I recommended a genetics and MFM consult to review genetic and OB risks in detail. Rec change BP med prior to IUD removal  Disc that she could have PTD, and other OB risks with pregnancy.   Disc causes of breast discomfort and possible hormonal component, rec dec caffeine and NSAIDs if not +UPT, if NI rec breast fu or if notices lump or nipple or skin change      Folllow up:  [x] return for annual well woman exam in one year or sooner if she is having problems  [] follow up and ultrasound  [] 6 months  [] 3 months  [] 6 weeks   [] 1 month    Orders Placed This Encounter    PAP IG, APTIMA HPV AND RFX 16/18,45 ([id number])       No results found for any visits on 02/04/22.

## 2022-02-09 LAB
CYTOLOGIST CVX/VAG CYTO: NORMAL
CYTOLOGY CVX/VAG DOC CYTO: NORMAL
CYTOLOGY CVX/VAG DOC THIN PREP: NORMAL
CYTOLOGY HISTORY:: NORMAL
DX ICD CODE: NORMAL
HPV I/H RISK 4 DNA CVX QL PROBE+SIG AMP: NEGATIVE
Lab: NORMAL
OTHER STN SPEC: NORMAL
STAT OF ADQ CVX/VAG CYTO-IMP: NORMAL

## 2022-02-10 NOTE — PROGRESS NOTES
Normal pap smear, message sent if [address] active. Update PMH/HM: include: Date of pap, Cytology: wnl. For HR HPV results: list NEG or POS, when done.

## 2022-02-21 DIAGNOSIS — G43.909 MIGRAINE WITHOUT STATUS MIGRAINOSUS, NOT INTRACTABLE, UNSPECIFIED MIGRAINE TYPE: ICD-10-CM

## 2022-02-21 DIAGNOSIS — F41.9 ANXIETY: ICD-10-CM

## 2022-02-21 RX ORDER — BUTALBITAL, ACETAMINOPHEN AND CAFFEINE 50; 325; 40 MG/1; MG/1; MG/1
TABLET ORAL
Qty: 30 TABLET | Refills: 0 | Status: SHIPPED | OUTPATIENT
Start: 2022-02-21 | End: 2022-06-30 | Stop reason: SDUPTHER

## 2022-02-21 RX ORDER — ONDANSETRON 4 MG/1
4 TABLET, ORALLY DISINTEGRATING ORAL
Qty: 12 TABLET | Refills: 0 | Status: SHIPPED | OUTPATIENT
Start: 2022-02-21 | End: 2022-07-25 | Stop reason: SDUPTHER

## 2022-02-21 RX ORDER — CLONAZEPAM 0.5 MG/1
0.5 TABLET ORAL
Qty: 20 TABLET | Refills: 0 | Status: SHIPPED | OUTPATIENT
Start: 2022-02-21 | End: 2022-05-20 | Stop reason: SDUPTHER

## 2022-03-05 NOTE — ED PROVIDER NOTES
Date of Service:  1/14/2021    Patient:  Zeus Marquez    Chief Complaint:  Sinus Infection and Diarrhea       HPI:  Zeus Marquez is a 47 y.o.  female who presents for evaluation of dehydration, diarrhea and congestion. Patient states that her daughter herself and her  all started with respiratory type symptoms. Her daughter got roseola, her  got better the patient continued to have symptoms. This is going back about 2 weeks. She was placed on Augmentin and developed diarrhea 5 to 6 days later. She continues to have multiple watery diarrhea/bowel movements per day. Decreased appetite because of some generalized nausea and. She continues to eat and drink. She denies dysuria. No chest pain or shortness of breath. She does note a wet sounding cough and some congestion with some postnasal drip. Patient's concern for the diarrhea, the dehydration and the ongoing URI type symptoms. Otherwise she denies other acute complaints or modifying factors.            Past Medical History:   Diagnosis Date    ADHD     Dysplasia of cervix, low grade (ORION 1) 2/23/12    colpo    Encounter for IUD removal 08/19/2016    Mirena    Essential hypertension     10 years ago    GERD (gastroesophageal reflux disease)     trying to control with diet since pregnant    H/O abnormal Pap smear 11/8/12;2/8/12    LGSIL, HPV positive    Headache     Headache(784.0)     IUD (intrauterine device) in place 03/11/2014    Mirena    Kidney disease     kidney stones 2015    Migraine headache     without aura    pap smear for 2/1/11;2/5/13; 4/1/15;2/26/19    neg, HPV neg,no ECC; neg, HPV neg, Negative, HPV negative; neg pap and neg HPV       Past Surgical History:   Procedure Laterality Date    HX COLPOSCOPY  2/23/12    ORION 1    HX HEENT      wisdom teeth    HX LITHOTRIPSY      HX WISDOM TEETH EXTRACTION           Family History:   Problem Relation Age of Onset    Hypertension Father     Heart Disease Pt has approx 350 mls in cath. Urine pulled from port at top of cath after cleaning with alcohol swab.  Pt states pain was relieved when she moved around     [facility]  03/05/22 6557 Father     Diabetes Father    Dossie Janeth Elevated Lipids Father     Cancer Father         eye    Cancer Mother         Cervical,Uterine,& Ovarian (pt questions ovarian but got cancer after copper 7 IUD)    Depression Mother     Allergic Rhinitis Brother     Asthma Brother     Other Brother         Chron's    Hypertension Brother     Alcohol abuse Maternal Grandfather     Depression Maternal Grandfather     Alcohol abuse Paternal Grandmother     Stroke Paternal Grandmother     Migraines Brother     Allergic Rhinitis Brother     Breast Cancer Other        Social History     Socioeconomic History    Marital status:      Spouse name: Not on file    Number of children: Not on file    Years of education: Not on file    Highest education level: Not on file   Occupational History    Not on file   Social Needs    Financial resource strain: Not on file    Food insecurity     Worry: Not on file     Inability: Not on file   [employer] needs     Medical: Not on file     Non-medical: Not on file   Tobacco Use    Smoking status: Never Smoker    Smokeless tobacco: Never Used   Substance and Sexual Activity    Alcohol use:  Yes     Alcohol/week: 2.0 standard drinks     Types: 1 Cans of beer, 1 Shots of liquor per week     Comment: social    Drug use: No    Sexual activity: Yes     Partners: Male     Birth control/protection: Condom   Lifestyle    Physical activity     Days per week: Not on file     Minutes per session: Not on file    Stress: Not on file   Relationships    Social connections     Talks on phone: Not on file     Gets together: Not on file     Attends Adventist service: Not on file     Active member of club or organization: Not on file     Attends meetings of clubs or organizations: Not on file     Relationship status: Not on file    Intimate partner violence     Fear of current or ex partner: Not on file     Emotionally abused: Not on file     Physically abused: Not on file     Forced sexual activity: Not on file   Other Topics Concern     Service Not Asked    Blood Transfusions Not Asked    Caffeine Concern Not Asked    Occupational Exposure Not Asked    Hobby Hazards Not Asked    Sleep Concern Not Asked    Stress Concern Not Asked    Weight Concern Not Asked    Special Diet Not Asked    Back Care Not Asked    Exercise Not Asked    Bike Helmet Not Asked   [address] Not Asked    Self-Exams Not Asked   Social History Narrative    Not on file         ALLERGIES: Patient has no known allergies. Review of Systems   Constitutional: Positive for appetite change. Negative for fever. HENT: Positive for congestion, postnasal drip and sinus pressure. Negative for ear pain, hearing loss, sneezing and sore throat. Eyes: Negative for visual disturbance. Respiratory: Positive for cough. Negative for shortness of breath. Cardiovascular: Negative for chest pain. Gastrointestinal: Positive for diarrhea and nausea. Negative for abdominal pain and vomiting. Genitourinary: Negative for dysuria and flank pain. Musculoskeletal: Negative for back pain and myalgias. Skin: Negative for rash. Neurological: Negative for dizziness and light-headedness. Psychiatric/Behavioral: Negative for confusion. Vitals:    01/14/21 0840   BP: (!) 151/102   Pulse: (!) 124   Resp: 17   Temp: 99.1 °F (37.3 °C)   SpO2: 92%   Weight: 105.6 kg (232 lb 12.9 oz)   Height: 5' 4\" (1.626 m)            Physical Exam  Vitals signs and nursing note reviewed. Constitutional:       General: She is not in acute distress. Appearance: Normal appearance. She is well-developed. She is not ill-appearing, toxic-appearing or diaphoretic. HENT:      Head: Normocephalic and atraumatic. Nose: Nose normal.      Mouth/Throat:      Mouth: Mucous membranes are dry. Pharynx: Oropharynx is clear. No oropharyngeal exudate. Eyes:      General: No scleral icterus. Right eye: No discharge. Left eye: No discharge. Conjunctiva/sclera: Conjunctivae normal.   Neck:      Musculoskeletal: Neck supple. Vascular: No JVD. Trachea: No tracheal deviation. Cardiovascular:      Rate and Rhythm: Regular rhythm. Tachycardia present. Heart sounds: No murmur. Pulmonary:      Effort: Pulmonary effort is normal. No respiratory distress. Breath sounds: Normal breath sounds. No stridor. No wheezing, rhonchi or rales. Abdominal:      General: Bowel sounds are normal. There is no distension. Palpations: Abdomen is soft. Tenderness: There is no abdominal tenderness. Musculoskeletal: Normal range of motion. Right lower leg: No edema. Left lower leg: No edema. Skin:     General: Skin is warm and dry. Capillary Refill: Capillary refill takes less than 2 seconds. Neurological:      Mental Status: She is alert and oriented to person, place, and time.    Psychiatric:         Mood and Affect: Mood normal.         Behavior: Behavior normal.          [facility]  ED Course as of Jan 14 1013   Thu Jan 14, 2021   0940 WBC(!): 16.5 [GG]      ED Course User Index  [GG] Coty Trinidad DO     VITAL SIGNS:  Patient Vitals for the past 4 hrs:   Temp Pulse Resp BP SpO2   01/14/21 0933  (!) 112 15  94 %   01/14/21 0915 99.8 °F (37.7 °C) (!) 106 16 113/89 91 %   01/14/21 0910  (!) 109 16 132/69 90 %   01/14/21 0840 99.1 °F (37.3 °C) (!) 124 17 (!) 151/102 92 %   01/14/21 0826     91 %   01/14/21 0825    (!) 151/102          LABS:  Recent Results (from the past 6 hour(s))   CBC WITH AUTOMATED DIFF    Collection Time: 01/14/21  8:53 AM   Result Value Ref Range    WBC 16.5 (H) 3.6 - 11.0 K/uL    RBC 4.98 3.80 - 5.20 M/uL    HGB 14.0 11.5 - 16.0 g/dL    HCT 42.0 35.0 - 47.0 %    MCV 84.3 80.0 - 99.0 FL    MCH 28.1 26.0 - 34.0 PG    MCHC 33.3 30.0 - 36.5 g/dL    RDW 13.5 11.5 - 14.5 %    PLATELET 972 029 - 421 K/uL    MPV 10.1 8.9 - 12.9 FL    NRBC 0.0 0.0  WBC ABSOLUTE NRBC 0.00 0.00 - 0.01 K/uL    NEUTROPHILS 78 (H) 32 - 75 %    LYMPHOCYTES 13 12 - 49 %    MONOCYTES 8 5 - 13 %    EOSINOPHILS 1 0 - 7 %    BASOPHILS 0 0 - 1 %    IMMATURE GRANULOCYTES 0 0 - 0.5 %    ABS. NEUTROPHILS 12.9 (H) 1.8 - 8.0 K/UL    ABS. LYMPHOCYTES 2.1 0.8 - 3.5 K/UL    ABS. MONOCYTES 1.3 (H) 0.0 - 1.0 K/UL    ABS. EOSINOPHILS 0.1 0.0 - 0.4 K/UL    ABS. BASOPHILS 0.0 0.0 - 0.1 K/UL    ABS. IMM. GRANS. 0.1 (H) 0.00 - 0.04 K/UL    DF AUTOMATED     METABOLIC PANEL, BASIC    Collection Time: 01/14/21  8:53 AM   Result Value Ref Range    Sodium 139 136 - 145 mmol/L    Potassium 3.4 (L) 3.5 - 5.1 mmol/L    Chloride 103 97 - 108 mmol/L    CO2 26 21 - 32 mmol/L    Anion gap 10 5 - 15 mmol/L    Glucose 116 (H) 65 - 100 mg/dL    BUN 8 6 - 20 MG/DL    Creatinine 0.73 0.55 - 1.02 MG/DL    BUN/Creatinine ratio 11 (L) 12 - 20      GFR est AA >60 >60 ml/min/1.73m2    GFR est non-AA >60 >60 ml/min/1.73m2    Calcium 9.1 8.5 - 10.1 MG/DL   URINALYSIS W/ RFLX MICROSCOPIC    Collection Time: 01/14/21  8:53 AM   Result Value Ref Range    Color YELLOW/STRAW      Appearance CLEAR CLEAR      Specific gravity 1.025 1.003 - 1.030      pH (UA) 6.5 5.0 - 8.0      Protein 30 (A) NEG mg/dL    Glucose Negative NEG mg/dL    Ketone TRACE (A) NEG mg/dL    Blood TRACE (A) NEG      Urobilinogen 0.2 0.2 - 1.0 EU/dL    Nitrites Negative NEG      Leukocyte Esterase Negative NEG     SAMPLES BEING HELD    Collection Time: 01/14/21  8:53 AM   Result Value Ref Range    SAMPLES BEING HELD 1RED,1BLUE     COMMENT        Add-on orders for these samples will be processed based on acceptable specimen integrity and analyte stability, which may vary by analyte.    BILIRUBIN, CONFIRM    Collection Time: 01/14/21  8:53 AM   Result Value Ref Range    Bilirubin UA, confirm Negative NEG     URINE MICROSCOPIC ONLY    Collection Time: 01/14/21  8:53 AM   Result Value Ref Range    WBC 0-4 0 - 4 /hpf    RBC 0-5 0 - 5 /hpf    Epithelial cells FEW FEW /lpf Bacteria Negative NEG /hpf        IMAGING:  XR CHEST PORT   Final Result   IMPRESSION:   1. Mild infiltrate at the right lung base is consistent with mild pneumonia. Follow-up to resolution is suggested. .             Medications During Visit:  Medications   sodium chloride 0.9 % bolus infusion 1,000 mL (1,000 mL IntraVENous New Bag 1/14/21 0855)   ondansetron (ZOFRAN) injection 4 mg (4 mg IntraVENous Given 1/14/21 0855)         DECISION MAKING:  Francisca Castellanos is a 47 y.o. female who comes in as above. Diagnostics as above. Patient does have a pneumonia. She also has diarrhea but was unable to provide a stool sample. Patient to speak with PCP about collecting stool outside of the hospital.  I will place her on doxycycline given her reaction to her previous antibiotic. Testing for Covid to be provided. Patient feels remarkably better after IV fluid and appears to feel much better. Patient is agreeable to this plan. All questions answered. IMPRESSION:  1. Pneumonia due to infectious organism, unspecified laterality, unspecified part of lung    2. Sinus pressure    3. Nausea without vomiting    4. Diarrhea, unspecified type        DISPOSITION:  Discharged      Current Discharge Medication List      START taking these medications    Details   doxycycline (VIBRA-TABS) 100 mg tablet Take 1 Tab by mouth two (2) times a day for 10 days. Qty: 20 Tab, Refills: 0      ondansetron (ZOFRAN ODT) 4 mg disintegrating tablet Take 1 Tab by mouth every eight (8) hours as needed for Nausea for up to 12 doses. Qty: 12 Tab, Refills: 0              Follow-up Information     Follow up With Specialties Details Why Lee Moncada MD Internal Medicine Schedule an appointment as soon as possible for a visit   [address] [place] [address]  [phone]              The patient is asked to follow-up with their primary care provider in the next several days.   They are to call tomorrow for an appointment. The patient is asked to return promptly for any increased concerns or worsening of symptoms. They can return to this emergency department or any other emergency department.     Procedures

## 2022-03-17 ENCOUNTER — VIRTUAL VISIT (OUTPATIENT)
Dept: INTERNAL MEDICINE CLINIC | Age: 42
End: 2022-03-17
Payer: COMMERCIAL

## 2022-03-17 DIAGNOSIS — E66.01 OBESITY, CLASS III, BMI 40-49.9 (MORBID OBESITY) (HCC): ICD-10-CM

## 2022-03-17 DIAGNOSIS — J32.9 SINUSITIS, UNSPECIFIED CHRONICITY, UNSPECIFIED LOCATION: Primary | ICD-10-CM

## 2022-03-17 PROCEDURE — 99213 OFFICE O/P EST LOW 20 MIN: CPT | Performed by: INTERNAL MEDICINE

## 2022-03-17 RX ORDER — AMOXICILLIN AND CLAVULANATE POTASSIUM 875; 125 MG/1; MG/1
1 TABLET, FILM COATED ORAL EVERY 12 HOURS
Qty: 20 TABLET | Refills: 0 | Status: SHIPPED | OUTPATIENT
Start: 2022-03-17 | End: 2022-03-27

## 2022-03-17 NOTE — PROGRESS NOTES
Krish Curtis is a 46 y.o. female who presents today for Sinus Infection (VV// pt presents today for sinus infection is having some drainage, sore throat, headache, and sinuses are popping started feeling bad Monday)  . She has a history of   Patient Active Problem List   Diagnosis Code    Chronic daily headache R51.9    H/O abnormal Pap smear Z87.898    History of kidney stones Z87.442    Weight gain R63.5    Menorrhagia with regular cycle N92.0    Obesity (BMI 30-39. 9) E66.9    Irritable bowel syndrome with diarrhea K58.0    Gastroesophageal reflux disease K21.9    Severe obesity (HCC) E66.01    Chronic hypertension with superimposed preeclampsia O11.9   . Today patient is here for an acute visit. .   she does not have other concerns. Upper respiratory illness:  Krish Curtis presents with complaints of sore throat, post nasal drip and bilateral sinus pain for 5 days. no nausea and no vomiting . she has not had  fever and chills. Symptoms are moderate. Over-the-counter remedies including, advil. Drinking plenty of fluids: yes  Asthma?:  no  smoker : Non-smoker. Contacts with similar infections: no       ROS  Review of Systems   Constitutional: Positive for malaise/fatigue. Negative for chills, fever and weight loss. HENT: Positive for congestion and sinus pain. Negative for ear discharge, ear pain, hearing loss, sore throat and tinnitus. Eyes: Negative for blurred vision, double vision and photophobia. Respiratory: Negative for cough and shortness of breath. Cardiovascular: Negative for chest pain, palpitations and leg swelling. Gastrointestinal: Negative for abdominal pain, constipation, diarrhea, heartburn, nausea and vomiting. Genitourinary: Negative for dysuria, frequency and urgency. Musculoskeletal: Negative for joint pain and myalgias. Skin: Negative for rash. Neurological: Negative. Negative for headaches.    Endo/Heme/Allergies: Does not bruise/bleed easily. Psychiatric/Behavioral: Negative for memory loss and suicidal ideas. There were no vitals taken for this visit. Physical Exam  Constitutional:       Appearance: Normal appearance. HENT:      Head: Normocephalic and atraumatic. Pulmonary:      Effort: Pulmonary effort is normal.   Neurological:      General: No focal deficit present. Mental Status: She is alert. Psychiatric:         Mood and Affect: Mood normal.         Behavior: Behavior normal.           Current Outpatient Medications   Medication Sig    clonazePAM (KlonoPIN) 0.5 mg tablet Take 1 Tablet by mouth two (2) times daily as needed for Anxiety. Max Daily Amount: 1 mg.  ondansetron (ZOFRAN ODT) 4 mg disintegrating tablet Take 1 Tablet by mouth every eight (8) hours as needed for Nausea for up to 12 doses.  butalbital-acetaminophen-caffeine (FIORICET, ESGIC) -40 mg per tablet TAKE ONE TABLET BY MOUTH EVERY 6 HOURS AS NEEDED FOR PAIN    pantoprazole (PROTONIX) 40 mg tablet Take 1 Tablet by mouth daily.  hydroCHLOROthiazide (HYDRODIURIL) 25 mg tablet Take 1 Tablet by mouth daily.  acetaminophen (TylenoL) 325 mg tablet Take  by mouth every four (4) hours as needed for Pain.    B.infantis-B.ani-B.long-B.bifi (Probiotic 4X) 10-15 mg TbEC Take  by mouth.  ascorbic acid, vitamin C, (Vitamin C) 250 mg tablet Take  by mouth.  levonorgestreL (Mirena) 20 mcg/24 hours (6 yrs) 52 mg IUD 1 Device by IntraUTERine route once. No current facility-administered medications for this visit.         Past Medical History:   Diagnosis Date    ADHD     Dysplasia of cervix, low grade (ORION 1) 2/23/12    colpo    Encounter for IUD removal 08/19/2016    Mirena    Encounter for Papanicolaou smear for cervical cancer screening 02/04/2022    neg/hpv neg    Essential hypertension     10 years ago    GERD (gastroesophageal reflux disease)     trying to control with diet since pregnant    H/O abnormal Pap smear 11/8/12;2/8/12    LGSIL, HPV positive    Headache     Headache(784.0)     IUD (intrauterine device) in place 03/11/2014    Mirena    Kidney disease     kidney stones 2015    Migraine headache     without aura    pap smear for 2/1/11;2/5/13; 4/1/15;2/26/19    neg, HPV neg,no ECC; neg, HPV neg, Negative, HPV negative; neg pap and neg HPV      Past Surgical History:   Procedure Laterality Date    HX COLPOSCOPY  2/23/12    ORION 1    HX HEENT      wisdom teeth    HX LITHOTRIPSY      HX WISDOM TEETH EXTRACTION        Social History     Tobacco Use    Smoking status: Never Smoker    Smokeless tobacco: Never Used   Substance Use Topics    Alcohol use: Yes     Alcohol/week: 2.0 standard drinks     Types: 1 Cans of beer, 1 Shots of liquor per week     Comment: social      Family History   Problem Relation Age of Onset    Hypertension Father     Heart Disease Father     Diabetes Father     Elevated Lipids Father     Cancer Father         eye and pancreatic     Cancer Mother         Cervical,Uterine,& Ovarian (pt questions ovarian but got cancer after copper 7 IUD)    Depression Mother     Allergic Rhinitis Brother     Asthma Brother     Other Brother         Chron's    Hypertension Brother     Depression Brother     Alcohol abuse Maternal Grandfather     Depression Maternal Grandfather     Alcohol abuse Paternal Grandmother     Stroke Paternal Grandmother     Migraines Brother     Allergic Rhinitis Brother     Depression Brother     Breast Cancer Other         No Known Allergies     Assessment/Plan  Diagnoses and all orders for this visit:    1. Sinusitis, unspecified chronicity, unspecified location-likely triggered by allergens. They are planning on moving out of their house  -     amoxicillin-clavulanate (AUGMENTIN) 875-125 mg per tablet; Take 1 Tablet by mouth every twelve (12) hours for 10 days.     2. Obesity, Class III, BMI 40-49.9 (morbid obesity) ([facility]            Michael Lee, MD  3/17/2022    This note was created with the help of speech recognition software (Dragon) and may contain some 'sound alike' errors.

## 2022-03-18 PROBLEM — K58.0 IRRITABLE BOWEL SYNDROME WITH DIARRHEA: Status: ACTIVE | Noted: 2017-11-13

## 2022-03-19 PROBLEM — O11.9 CHRONIC HYPERTENSION WITH SUPERIMPOSED PREECLAMPSIA: Status: ACTIVE | Noted: 2019-09-10

## 2022-03-19 PROBLEM — K21.9 GASTROESOPHAGEAL REFLUX DISEASE: Status: ACTIVE | Noted: 2017-11-13

## 2022-03-19 PROBLEM — E66.01 SEVERE OBESITY (HCC): Status: ACTIVE | Noted: 2018-10-24

## 2022-03-19 PROBLEM — E66.9 OBESITY (BMI 30-39.9): Status: ACTIVE | Noted: 2017-08-14

## 2022-03-20 PROBLEM — N92.0 MENORRHAGIA WITH REGULAR CYCLE: Status: ACTIVE | Noted: 2017-05-02

## 2022-04-21 ENCOUNTER — VIRTUAL VISIT (OUTPATIENT)
Dept: INTERNAL MEDICINE CLINIC | Age: 42
End: 2022-04-21
Payer: COMMERCIAL

## 2022-04-21 DIAGNOSIS — J06.9 UPPER RESPIRATORY TRACT INFECTION, UNSPECIFIED TYPE: Primary | ICD-10-CM

## 2022-04-21 PROCEDURE — 99213 OFFICE O/P EST LOW 20 MIN: CPT | Performed by: INTERNAL MEDICINE

## 2022-04-21 RX ORDER — PREDNISONE 20 MG/1
40 TABLET ORAL
Qty: 10 TABLET | Refills: 0 | Status: SHIPPED | OUTPATIENT
Start: 2022-04-21 | End: 2022-04-26

## 2022-04-21 RX ORDER — AZITHROMYCIN 250 MG/1
250 TABLET, FILM COATED ORAL SEE ADMIN INSTRUCTIONS
Qty: 6 TABLET | Refills: 0 | Status: SHIPPED | OUTPATIENT
Start: 2022-04-21 | End: 2022-04-26

## 2022-04-21 NOTE — PROGRESS NOTES
Dominik Salvador was seen on 4/21/2022 using synchronous (real-time) audio-video technology; doxy. me. Consent: Dominik Salvador, who was seen by synchronous (real-time) audio-video technology, and/or her healthcare decision maker, is aware that this patient-initiated, Telehealth encounter on 4/21/2022 is a billable service, with coverage as determined by her insurance carrier. She is aware that she may receive a bill and has provided verbal consent to proceed: Yes. I was in the office while conducting this encounter. Dominik Salvador is a 46 y.o. female who presents today for No chief complaint on file. .    She has a history of   Patient Active Problem List   Diagnosis Code    Chronic daily headache R51.9    H/O abnormal Pap smear Z87.898    History of kidney stones Z87.442    Weight gain R63.5    Menorrhagia with regular cycle N92.0    Obesity (BMI 30-39. 9) E66.9    Irritable bowel syndrome with diarrhea K58.0    Gastroesophageal reflux disease K21.9    Severe obesity (HCC) E66.01    Chronic hypertension with superimposed preeclampsia O11.9   . Today patient is being seen for an acute visit. she does not have other concerns. Upper respiratory illness:  Dominik Salvador presents with complaints of congestion, sore throat, productive cough and hoarseness for 4 days. no nausea and no vomiting . she has not had  fever and chills. Symptoms are moderate. Over-the-counter remedies including 10mg of prednisone. Drinking plenty of fluids: yes  Asthma?:  no  non-smoker  Contacts with similar infections: yes, daughter had croup last week. ROS  Review of Systems   Constitutional: Positive for malaise/fatigue. Negative for chills, fever and weight loss. HENT: Positive for congestion, sinus pain and sore throat. Negative for ear discharge, ear pain, hearing loss and tinnitus. Eyes: Negative for blurred vision, double vision and photophobia.    Respiratory: Positive for cough. Negative for sputum production and shortness of breath. Wheezing: very mild. Cardiovascular: Negative for chest pain, palpitations and leg swelling. Gastrointestinal: Negative for abdominal pain, constipation, diarrhea, heartburn, nausea and vomiting. Genitourinary: Negative for dysuria, frequency and urgency. Musculoskeletal: Negative for joint pain and myalgias. Skin: Negative for rash. Neurological: Negative. Negative for headaches. Endo/Heme/Allergies: Does not bruise/bleed easily. Psychiatric/Behavioral: Negative for memory loss and suicidal ideas. There were no vitals taken for this visit. Patient-Reported Vitals 1/21/2022   Patient-Reported Weight 240lb        Physical Exam  Constitutional:       Appearance: Normal appearance. Comments: Sounds hoarse   HENT:      Head: Normocephalic and atraumatic. Pulmonary:      Effort: Pulmonary effort is normal.      Comments: Speaking in full sentences  Neurological:      General: No focal deficit present. Mental Status: She is alert. Psychiatric:         Mood and Affect: Mood normal.         Behavior: Behavior normal.           Current Outpatient Medications   Medication Sig    clonazePAM (KlonoPIN) 0.5 mg tablet Take 1 Tablet by mouth two (2) times daily as needed for Anxiety. Max Daily Amount: 1 mg.  ondansetron (ZOFRAN ODT) 4 mg disintegrating tablet Take 1 Tablet by mouth every eight (8) hours as needed for Nausea for up to 12 doses.  butalbital-acetaminophen-caffeine (FIORICET, ESGIC) -40 mg per tablet TAKE ONE TABLET BY MOUTH EVERY 6 HOURS AS NEEDED FOR PAIN    pantoprazole (PROTONIX) 40 mg tablet Take 1 Tablet by mouth daily.  hydroCHLOROthiazide (HYDRODIURIL) 25 mg tablet Take 1 Tablet by mouth daily.  acetaminophen (TylenoL) 325 mg tablet Take  by mouth every four (4) hours as needed for Pain.    B.infantis-B.ani-B.long-B.bifi (Probiotic 4X) 10-15 mg TbEC Take  by mouth.     ascorbic acid, vitamin C, (Vitamin C) 250 mg tablet Take  by mouth.  levonorgestreL (Mirena) 20 mcg/24 hours (6 yrs) 52 mg IUD 1 Device by IntraUTERine route once. No current facility-administered medications for this visit. Past Medical History:   Diagnosis Date    ADHD     Dysplasia of cervix, low grade (ORION 1) 2/23/12    colpo    Encounter for IUD removal 08/19/2016    Mirena    Encounter for Papanicolaou smear for cervical cancer screening 02/04/2022    neg/hpv neg    Essential hypertension     10 years ago    GERD (gastroesophageal reflux disease)     trying to control with diet since pregnant    H/O abnormal Pap smear 11/8/12;2/8/12    LGSIL, HPV positive    Headache     Headache(784.0)     IUD (intrauterine device) in place 03/11/2014    Mirena    Kidney disease     kidney stones 2015    Migraine headache     without aura    pap smear for 2/1/11;2/5/13; 4/1/15;2/26/19    neg, HPV neg,no ECC; neg, HPV neg, Negative, HPV negative; neg pap and neg HPV      Past Surgical History:   Procedure Laterality Date    HX COLPOSCOPY  2/23/12    ORION 1    HX HEENT      wisdom teeth    HX LITHOTRIPSY      HX WISDOM TEETH EXTRACTION        Social History     Tobacco Use    Smoking status: Never Smoker    Smokeless tobacco: Never Used   Substance Use Topics    Alcohol use:  Yes     Alcohol/week: 2.0 standard drinks     Types: 1 Cans of beer, 1 Shots of liquor per week     Comment: social      Family History   Problem Relation Age of Onset    Hypertension Father     Heart Disease Father     Diabetes Father     Elevated Lipids Father     Cancer Father         eye and pancreatic     Cancer Mother         Cervical,Uterine,& Ovarian (pt questions ovarian but got cancer after copper 7 IUD)    Depression Mother     Allergic Rhinitis Brother     Asthma Brother     Other Brother         Chron's    Hypertension Brother     Depression Brother     Alcohol abuse Maternal Grandfather     Depression Maternal Grandfather     Alcohol abuse Paternal Grandmother     Stroke Paternal Grandmother     Migraines Brother     Allergic Rhinitis Brother     Depression Brother     Breast Cancer Other         No Known Allergies     Assessment/Plan  Diagnoses and all orders for this visit:    1. Upper respiratory tract infection, unspecified type-likely viral upper respiratory infection since daughter just had croup. Would benefit from prednisone for throat fullness and discomfort. We discussed that if symptoms do not improve or turn the corner by this weekend would not be unreasonable to cover for bacterial upper respiratory infection given her history. We will call in the azithromycin but would hold off on taking it. Okay to continue taking cough syrup at bedtime. -     azithromycin (ZITHROMAX) 250 mg tablet; Take 1 Tablet by mouth See Admin Instructions for 5 days. -     predniSONE (DELTASONE) 20 mg tablet; Take 40 mg by mouth daily (with breakfast) for 5 days. Roula Daigle, was evaluated through a synchronous (real-time) audio-video encounter. The patient (or guardian if applicable) is aware that this is a billable service, which includes applicable co-pays. This Virtual Visit was conducted with patient's (and/or legal guardian's) consent. The visit was conducted pursuant to the emergency declaration under the [address] [place] [address], [address] [place] [address] authority and the [employer] General Act. Patient identification was verified, and a caregiver was present when appropriate. The patient was located in a state where the provider was licensed to provide care. Nathaly Sutton MD  4/21/2022    This note was created with the help of speech recognition software Danilo Baxter) and may contain some 'sound alike' errors.

## 2022-05-08 NOTE — PROGRESS NOTES
Pt currently in NAD on CM and  awaiting admission bed. MD Johnson ordered a beta blocker for pt to receive stat but pt is currently bradycardic so call placed to service and awaiting on call cards provider to call back with plan. Pt otherwise awaiting dinner tray from cafeteria at this time. Shantal Sierra is a 42 y.o. female who presents with concern of weight gain and stomach issues. Reports had a baby in September 2019. Is exercising regularly, walking 2 miles a day and using elliptical 3 days a week. Not counting calories. Tried ketodiet. Reports weight 250#. History of IBS with diarrhea. This is an established visit conducted via telemedicine by phone. The patient has been instructed that this meets HIPAA criteria and acknowledges and agrees to this method of visitation. Pursuant to the emergency declaration under the [facility] waiver authority and the [employer] Act, this Virtual Visit was conducted, with patient's consent, to reduce the patient's risk of exposure to COVID-19 and provide continuity of care for an established patient. Services were provided by phone to substitute for in-person clinic visit.         Past Medical History:   Diagnosis Date    ADHD     Dysplasia of cervix, low grade (ORION 1) 2/23/12    colpo    Encounter for IUD removal 08/19/2016    Mirena    Essential hypertension     10 years ago    GERD (gastroesophageal reflux disease)     trying to control with diet since pregnant    H/O abnormal Pap smear 11/8/12;2/8/12    LGSIL, HPV positive    Headache     Headache(784.0)     IUD (intrauterine device) in place 03/11/2014    Mirena    Kidney disease     kidney stones 2015    Migraine headache     without aura    pap smear for 2/1/11;2/5/13; 4/1/15;2/26/19    neg, HPV neg,no ECC; neg, HPV neg, Negative, HPV negative; neg pap and neg HPV       Family History   Problem Relation Age of Onset    Hypertension Father     Heart Disease Father     Diabetes Father     Elevated Lipids Father     Cancer Father         eye    Cancer Mother         Cervical,Uterine,& Ovarian (pt questions ovarian but got cancer after copper 7 IUD)    Depression Mother  Allergic Rhinitis Brother     Asthma Brother     Other Brother         Chron's    Hypertension Brother     Alcohol abuse Maternal Grandfather     Depression Maternal Grandfather     Alcohol abuse Paternal Grandmother     Stroke Paternal Grandmother     Migraines Brother     Allergic Rhinitis Brother     Breast Cancer Other        Social History     Socioeconomic History    Marital status:      Spouse name: Not on file    Number of children: Not on file    Years of education: Not on file    Highest education level: Not on file   Occupational History    Not on file   Social Needs    Financial resource strain: Not on file    Food insecurity     Worry: Not on file     Inability: Not on file   [employer] needs     Medical: Not on file     Non-medical: Not on file   Tobacco Use    Smoking status: Never Smoker    Smokeless tobacco: Never Used   Substance and Sexual Activity    Alcohol use: Not Currently     Comment: social    Drug use: No    Sexual activity: Yes     Partners: Male     Birth control/protection: Condom   Lifestyle    Physical activity     Days per week: Not on file     Minutes per session: Not on file    Stress: Not on file   Relationships    Social connections     Talks on phone: Not on file     Gets together: Not on file     Attends Yarsani service: Not on file     Active member of club or organization: Not on file     Attends meetings of clubs or organizations: Not on file     Relationship status: Not on file    Intimate partner violence     Fear of current or ex partner: Not on file     Emotionally abused: Not on file     Physically abused: Not on file     Forced sexual activity: Not on file   Other Topics Concern     Service Not Asked    Blood Transfusions Not Asked    Caffeine Concern Not Asked    Occupational Exposure Not Asked    Hobby Hazards Not Asked    Sleep Concern Not Asked    Stress Concern Not Asked    Weight Concern Not Asked    Special Diet Not Asked    Back Care Not Asked    Exercise Not Asked    Bike Helmet Not Asked   [address] [facility] Not Asked    Self-Exams Not Asked   Social History Narrative    Not on file       Current Outpatient Medications on File Prior to Visit   Medication Sig Dispense Refill    galcanezumab-gnlm (EMGALITY PEN) 120 mg/mL injection 2 mL by SubCUTAneous route every thirty (30) days. 2 Syringe 0    ubrogepant (UBRELVY) 50 mg tablet 1 tab at headache onset. Repeat in 2 hours if needed. Max 2 tab in 24 hours 2 Tab 0    ubrogepant (UBRELVY) 50 mg tablet Take one po at onset of headache. May repeat dose x1 after 2 hours if needed. 9 Tab 3    galcanezumab-gnlm (EMGALITY PEN) 120 mg/mL injection 1 mL by SubCUTAneous route every thirty (30) days. 1 mL 3     No current facility-administered medications on file prior to visit. Review of Systems  Pertinent items are noted in HPI. Objective:     Gen: well appearing female  HEENT: normal conjunctiva, no audible congestion, patient does not see oral erythema, has MMM  Neck: patient does not feel enlarged or tender LAD or masses  Resp: normal respiratory effort, no audible wheezing. CV: patient does not feel palpitations or heart irregularity  Abd: patient does not feel abdominal tenderness or mass, patient does not notice distension  Extrem: patient does not see swelling in ankles or joints. Neuro: Alert and oriented, able to answer questions without difficulty, able to move all extremities and walk normally        Assessment/Plan:       ICD-10-CM ICD-9-CM    1. Weight gain R63.5 783.1    2. Irritable bowel syndrome, unspecified type K58.9 564.1    3. Class 3 severe obesity without serious comorbidity with body mass index (BMI) of 40.0 to 44.9 in adult, unspecified obesity type (HCC) E66.01 278.01 phentermine (ADIPEX-P) 37.5 mg tablet    Z68.41 V85.41        This was a telemedicine visit by phone, duration 12 minutes.      Zachery Olguin MD    Follow-up and Dispositions    · Return in about 3 months (around 7/15/2020) for physical and fasting labs. Enrique Irving

## 2022-05-20 ENCOUNTER — OFFICE VISIT (OUTPATIENT)
Dept: INTERNAL MEDICINE CLINIC | Age: 42
End: 2022-05-20
Payer: COMMERCIAL

## 2022-05-20 VITALS
OXYGEN SATURATION: 97 % | HEART RATE: 73 BPM | RESPIRATION RATE: 12 BRPM | TEMPERATURE: 98.4 F | HEIGHT: 64 IN | SYSTOLIC BLOOD PRESSURE: 132 MMHG | WEIGHT: 251.8 LBS | BODY MASS INDEX: 42.99 KG/M2 | DIASTOLIC BLOOD PRESSURE: 85 MMHG

## 2022-05-20 DIAGNOSIS — I10 HYPERTENSION, UNSPECIFIED TYPE: Primary | ICD-10-CM

## 2022-05-20 DIAGNOSIS — G47.00 INSOMNIA, UNSPECIFIED TYPE: ICD-10-CM

## 2022-05-20 DIAGNOSIS — R10.9 ABDOMINAL PAIN, UNSPECIFIED ABDOMINAL LOCATION: ICD-10-CM

## 2022-05-20 DIAGNOSIS — F41.9 ANXIETY: ICD-10-CM

## 2022-05-20 DIAGNOSIS — R79.89 ELEVATED LFTS: ICD-10-CM

## 2022-05-20 PROCEDURE — 99214 OFFICE O/P EST MOD 30 MIN: CPT | Performed by: INTERNAL MEDICINE

## 2022-05-20 RX ORDER — HYDROCHLOROTHIAZIDE 25 MG/1
25 TABLET ORAL DAILY
Qty: 90 TABLET | Refills: 1 | Status: SHIPPED | OUTPATIENT
Start: 2022-05-20 | End: 2022-10-10 | Stop reason: SDUPTHER

## 2022-05-20 RX ORDER — IBUPROFEN 200 MG
TABLET ORAL
COMMUNITY
End: 2022-07-25 | Stop reason: ALTCHOICE

## 2022-05-20 RX ORDER — CLONAZEPAM 0.5 MG/1
0.5 TABLET ORAL
Qty: 20 TABLET | Refills: 0 | Status: SHIPPED | OUTPATIENT
Start: 2022-05-20 | End: 2022-09-27 | Stop reason: SDUPTHER

## 2022-05-20 RX ORDER — CETIRIZINE HYDROCHLORIDE 10 MG/1
CAPSULE, LIQUID FILLED ORAL
COMMUNITY

## 2022-05-20 NOTE — PROGRESS NOTES
Opal Figueroa is a 46 y.o. female who presents today for Fatigue (RM19// pt presents today feeling tired, bloated, full; just feel Zakia Dress)  . She has a history of   Patient Active Problem List   Diagnosis Code    Chronic daily headache R51.9    H/O abnormal Pap smear Z87.898    History of kidney stones Z87.442    Weight gain R63.5    Menorrhagia with regular cycle N92.0    Obesity (BMI 30-39. 9) E66.9    Irritable bowel syndrome with diarrhea K58.0    Gastroesophageal reflux disease K21.9    Severe obesity (HCC) E66.01    Chronic hypertension with superimposed preeclampsia O11.9   . Today patient is here for follow-up.   she does have other concerns. Elevated LFT: on previous blood work. Repeat this today. Having more GI issues. Bloating and constipation and some diarrhea. Often feels uncomfortable. Has been on multiple courses of antibiotics in the last year. Also is under good bit of stress. Does take a probiotic. Hypertension- HCTZ. Hypertension ROS: taking medications as instructed, no medication side effects noted, no TIA's, no chest pain on exertion, no dyspnea on exertion, no swelling of ankles     reports that she has never smoked. She has never used smokeless tobacco.    reports current alcohol use of about 2.0 standard drinks of alcohol per week. BP Readings from Last 2 Encounters:   05/20/22 132/85   02/04/22 131/85       ROS  Review of Systems   Constitutional: Negative for chills, fever and weight loss. HENT: Negative for congestion and sore throat. Eyes: Negative for blurred vision, double vision and photophobia. Respiratory: Negative for cough and shortness of breath. Cardiovascular: Negative for chest pain, palpitations and leg swelling. Gastrointestinal: Positive for abdominal pain, constipation and diarrhea. Negative for heartburn, nausea and vomiting. Bloating   Genitourinary: Negative for dysuria, frequency and urgency. Musculoskeletal: Negative for joint pain and myalgias. Skin: Negative for rash. Neurological: Negative. Negative for headaches. Endo/Heme/Allergies: Does not bruise/bleed easily. Psychiatric/Behavioral: Negative for memory loss and suicidal ideas. The patient has insomnia. Visit Vitals  /85 (BP 1 Location: Left upper arm, BP Patient Position: Sitting, BP Cuff Size: Large adult)   Pulse 73   Temp 98.4 °F (36.9 °C) (Oral)   Resp 12   Ht 5' 4\" (1.626 m)   Wt 251 lb 12.8 oz (114.2 kg)   SpO2 97%   BMI 43.22 kg/m²       Physical Exam  Constitutional:       General: She is not in acute distress. Appearance: She is well-developed. HENT:      Head: Normocephalic and atraumatic. Neck:      Thyroid: No thyromegaly. Cardiovascular:      Rate and Rhythm: Normal rate and regular rhythm. Heart sounds: No murmur heard. Pulmonary:      Effort: Pulmonary effort is normal.      Breath sounds: Normal breath sounds. No wheezing. Abdominal:      General: Bowel sounds are normal. There is no distension. Palpations: Abdomen is soft. Tenderness: There is no abdominal tenderness. Musculoskeletal:      Cervical back: Normal range of motion and neck supple. Skin:     General: Skin is warm and dry. Neurological:      Mental Status: She is alert and oriented to person, place, and time. Cranial Nerves: No cranial nerve deficit. Psychiatric:         Behavior: Behavior normal.           Current Outpatient Medications   Medication Sig    ibuprofen (AdviL) 200 mg tablet Take  by mouth.  Cetirizine (ZyrTEC) 10 mg cap Take  by mouth.  clonazePAM (KlonoPIN) 0.5 mg tablet Take 1 Tablet by mouth two (2) times daily as needed for Anxiety. Max Daily Amount: 1 mg.  ondansetron (ZOFRAN ODT) 4 mg disintegrating tablet Take 1 Tablet by mouth every eight (8) hours as needed for Nausea for up to 12 doses.     butalbital-acetaminophen-caffeine (FIORICET, ESGIC) -40 mg per tablet TAKE ONE TABLET BY MOUTH EVERY 6 HOURS AS NEEDED FOR PAIN    pantoprazole (PROTONIX) 40 mg tablet Take 1 Tablet by mouth daily.  hydroCHLOROthiazide (HYDRODIURIL) 25 mg tablet Take 1 Tablet by mouth daily.  acetaminophen (TylenoL) 325 mg tablet Take  by mouth every four (4) hours as needed for Pain.    B.infantis-B.ani-B.long-B.bifi (Probiotic 4X) 10-15 mg TbEC Take  by mouth.  levonorgestreL (Mirena) 20 mcg/24 hours (6 yrs) 52 mg IUD 1 Device by IntraUTERine route once.  ascorbic acid, vitamin C, (Vitamin C) 250 mg tablet Take  by mouth. (Patient not taking: Reported on 5/20/2022)     No current facility-administered medications for this visit. Past Medical History:   Diagnosis Date    ADHD     Dysplasia of cervix, low grade (ORION 1) 2/23/12    colpo    Encounter for IUD removal 08/19/2016    Mirena    Encounter for Papanicolaou smear for cervical cancer screening 02/04/2022    neg/hpv neg    Essential hypertension     10 years ago    GERD (gastroesophageal reflux disease)     trying to control with diet since pregnant    H/O abnormal Pap smear 11/8/12;2/8/12    LGSIL, HPV positive    Headache     Headache(784.0)     IUD (intrauterine device) in place 03/11/2014    Mirena    Kidney disease     kidney stones 2015    Migraine headache     without aura    pap smear for 2/1/11;2/5/13; 4/1/15;2/26/19    neg, HPV neg,no ECC; neg, HPV neg, Negative, HPV negative; neg pap and neg HPV      Past Surgical History:   Procedure Laterality Date    HX COLPOSCOPY  2/23/12    ORION 1    HX HEENT      wisdom teeth    HX LITHOTRIPSY      HX WISDOM TEETH EXTRACTION        Social History     Tobacco Use    Smoking status: Never Smoker    Smokeless tobacco: Never Used   Substance Use Topics    Alcohol use:  Yes     Alcohol/week: 2.0 standard drinks     Types: 1 Cans of beer, 1 Shots of liquor per week     Comment: social      Family History   Problem Relation Age of Onset    Hypertension Father    Azizamelania Faulkner Heart Disease Father     Diabetes Father     Elevated Lipids Father     Cancer Father         eye and pancreatic     Cancer Mother         Cervical,Uterine,& Ovarian (pt questions ovarian but got cancer after copper 7 IUD)    Depression Mother     Allergic Rhinitis Brother     Asthma Brother     Other Brother         Chron's    Hypertension Brother     Depression Brother     Alcohol abuse Maternal Grandfather     Depression Maternal Grandfather     Alcohol abuse Paternal Grandmother     Stroke Paternal Grandmother     Migraines Brother     Allergic Rhinitis Brother     Depression Brother     Breast Cancer Other         No Known Allergies     Assessment/Plan  Diagnoses and all orders for this visit:    1. Hypertension, unspecified type-pressure stable. -     hydroCHLOROthiazide (HYDRODIURIL) 25 mg tablet; Take 1 Tablet by mouth daily. 2. Anxiety-stress levels a bit elevated. May be contributing to abdominal symptoms. refill clonazepam, but not using often  -     clonazePAM (KlonoPIN) 0.5 mg tablet; Take 1 Tablet by mouth two (2) times daily as needed for Anxiety. Max Daily Amount: 1 mg.    3. Abdominal pain, unspecified abdominal location-generalized abdominal pain as well as constipation and diarrhea. Patient to try powdered fiber. Otherwise patient to try to stop probiotic as this may be worsening her symptoms. There may be some portion of irritable bowel syndrome going on. We will check inflammatory markers today  -     SED RATE (ESR); Future  -     C REACTIVE PROTEIN, QT; Future    4. Elevated LFTs-repeat levels  -     METABOLIC PANEL, COMPREHENSIVE; Future  -     CBC WITH AUTOMATED DIFF; Future    5. Insomnia, unspecified type-try increasing melatonin dose. Gladys Saunders MD  5/20/2022    This note was created with the help of speech recognition software [id number]) and may contain some 'sound alike' errors.

## 2022-05-20 NOTE — PROGRESS NOTES
Martita Nicole  Identified pt with two pt identifiers(name and ). Chief Complaint   Patient presents with    Fatigue     RM19// pt presents today feeling tired, bloated, full; just feel crappy       1. Have you been to the ER, urgent care clinic since your last visit? Hospitalized since your last visit? NO    2. Have you seen or consulted any other health care providers outside of the [address] [place] [address] since your last visit? Include any pap smears or colon screening. NO      Provider notified of reason for visit, vitals and flowsheets obtained on patients.      Patient received paperwork for advance directive during previous visit but has not completed at this time     Reviewed record In preparation for visit, huddled with provider and have obtained necessary documentation      Health Maintenance Due   Topic    COVID-19 Vaccine (3 - Booster for [email or web address] series)       Wt Readings from Last 3 Encounters:   22 251 lb 12.8 oz (114.2 kg)   22 247 lb (112 kg)   10/25/21 246 lb 9.6 oz (111.9 kg)     Temp Readings from Last 3 Encounters:   22 98.4 °F (36.9 °C) (Oral)   10/25/21 98.3 °F (36.8 °C) (Oral)   07/15/21 98 °F (36.7 °C) (Oral)     BP Readings from Last 3 Encounters:   22 132/85   22 131/85   10/25/21 (!) 143/94     Pulse Readings from Last 3 Encounters:   22 73   22 89   10/25/21 78     Vitals:    22 1039   BP: 132/85   Pulse: 73   Resp: 12   Temp: 98.4 °F (36.9 °C)   TempSrc: Oral   SpO2: 97%   Weight: 251 lb 12.8 oz (114.2 kg)   Height: 5' 4\" (1.626 m)   PainSc:   0 - No pain         Learning Assessment:  :     Learning Assessment 2018   PRIMARY LEARNER Patient Patient   HIGHEST LEVEL OF EDUCATION - PRIMARY LEARNER  - 4 Carolin LEARNER - NONE   CO-LEARNER CAREGIVER - No   PRIMARY LANGUAGE ENGLISH ENGLISH   LEARNER PREFERENCE PRIMARY READING LISTENING     - READING   ANSWERED BY Tay Reynaga self RELATIONSHIP SELF SELF       Depression Screening:  :     3 most recent PHQ Screens 5/20/2022   Little interest or pleasure in doing things Not at all   Feeling down, depressed, irritable, or hopeless Not at all   Total Score PHQ 2 0       Fall Risk Assessment:  :     Fall Risk Assessment, last 12 mths 12/16/2021   Able to walk? Yes   Fall in past 12 months? 0   Do you feel unsteady? 0   Are you worried about falling 0       Abuse Screening:  :     Abuse Screening Questionnaire 12/16/2021 2/12/2021 4/15/2019 11/13/2017   Do you ever feel afraid of your partner? N N N N   Are you in a relationship with someone who physically or mentally threatens you? N N N N   Is it safe for you to go home? Y Y Y Y       ADL Screening:  :     ADL Assessment 4/15/2019   Feeding yourself No Help Needed   Getting from bed to chair No Help Needed   Getting dressed No Help Needed   Bathing or showering No Help Needed   Walk across the room (includes cane/walker) No Help Needed   Using the telphone No Help Needed   Taking your medications No Help Needed   Preparing meals No Help Needed   Managing money (expenses/bills) No Help Needed   Moderately strenuous housework (laundry) No Help Needed   Shopping for personal items (toiletries/medicines) No Help Needed   Shopping for groceries No Help Needed   Driving No Help Needed   Climbing a flight of stairs No Help Needed   Getting to places beyond walking distances No Help Needed         Medication reconciliation up to date and corrected with patient at this time.

## 2022-05-21 LAB
ALBUMIN SERPL-MCNC: 4.7 G/DL (ref 3.8–4.8)
ALBUMIN/GLOB SERPL: 2.6 {RATIO} (ref 1.2–2.2)
ALP SERPL-CCNC: 53 IU/L (ref 44–121)
ALT SERPL-CCNC: 20 IU/L (ref 0–32)
AST SERPL-CCNC: 15 IU/L (ref 0–40)
BASOPHILS # BLD AUTO: 0.1 X10E3/UL (ref 0–0.2)
BASOPHILS NFR BLD AUTO: 1 %
BILIRUB SERPL-MCNC: 0.3 MG/DL (ref 0–1.2)
BUN SERPL-MCNC: 18 MG/DL (ref 6–20)
BUN/CREAT SERPL: 29 (ref 9–23)
CALCIUM SERPL-MCNC: 9.2 MG/DL (ref 8.7–10.2)
CHLORIDE SERPL-SCNC: 103 MMOL/L (ref 96–106)
CO2 SERPL-SCNC: 22 MMOL/L (ref 20–29)
CREAT SERPL-MCNC: 0.62 MG/DL (ref 0.57–1)
CRP SERPL-MCNC: 8 MG/L (ref 0–10)
EGFR: 116 ML/MIN/1.73
EOSINOPHIL # BLD AUTO: 0.2 X10E3/UL (ref 0–0.4)
EOSINOPHIL NFR BLD AUTO: 2 %
ERYTHROCYTE [DISTWIDTH] IN BLOOD BY AUTOMATED COUNT: 12.8 % (ref 11.7–15.4)
ERYTHROCYTE [SEDIMENTATION RATE] IN BLOOD BY WESTERGREN METHOD: 3 MM/HR (ref 0–32)
GLOBULIN SER CALC-MCNC: 1.8 G/DL (ref 1.5–4.5)
GLUCOSE SERPL-MCNC: 98 MG/DL (ref 65–99)
HCT VFR BLD AUTO: 39.3 % (ref 34–46.6)
HGB BLD-MCNC: 13.2 G/DL (ref 11.1–15.9)
IMM GRANULOCYTES # BLD AUTO: 0 X10E3/UL (ref 0–0.1)
IMM GRANULOCYTES NFR BLD AUTO: 0 %
LYMPHOCYTES # BLD AUTO: 2 X10E3/UL (ref 0.7–3.1)
LYMPHOCYTES NFR BLD AUTO: 28 %
MCH RBC QN AUTO: 28.9 PG (ref 26.6–33)
MCHC RBC AUTO-ENTMCNC: 33.6 G/DL (ref 31.5–35.7)
MCV RBC AUTO: 86 FL (ref 79–97)
MONOCYTES # BLD AUTO: 0.7 X10E3/UL (ref 0.1–0.9)
MONOCYTES NFR BLD AUTO: 9 %
NEUTROPHILS # BLD AUTO: 4.2 X10E3/UL (ref 1.4–7)
NEUTROPHILS NFR BLD AUTO: 60 %
PLATELET # BLD AUTO: 263 X10E3/UL (ref 150–450)
POTASSIUM SERPL-SCNC: 4.2 MMOL/L (ref 3.5–5.2)
PROT SERPL-MCNC: 6.5 G/DL (ref 6–8.5)
RBC # BLD AUTO: 4.57 X10E6/UL (ref 3.77–5.28)
SODIUM SERPL-SCNC: 140 MMOL/L (ref 134–144)
WBC # BLD AUTO: 7.1 X10E3/UL (ref 3.4–10.8)

## 2022-06-28 DIAGNOSIS — G43.909 MIGRAINE WITHOUT STATUS MIGRAINOSUS, NOT INTRACTABLE, UNSPECIFIED MIGRAINE TYPE: ICD-10-CM

## 2022-06-28 RX ORDER — BUTALBITAL, ACETAMINOPHEN AND CAFFEINE 50; 325; 40 MG/1; MG/1; MG/1
TABLET ORAL
Qty: 30 TABLET | Refills: 0 | OUTPATIENT
Start: 2022-06-28

## 2022-07-05 RX ORDER — BUTALBITAL, ACETAMINOPHEN AND CAFFEINE 50; 325; 40 MG/1; MG/1; MG/1
TABLET ORAL
Qty: 30 TABLET | Refills: 0 | Status: SHIPPED | OUTPATIENT
Start: 2022-07-05

## 2022-07-21 ENCOUNTER — VIRTUAL VISIT (OUTPATIENT)
Dept: INTERNAL MEDICINE CLINIC | Age: 42
End: 2022-07-21
Payer: COMMERCIAL

## 2022-07-21 DIAGNOSIS — R05.9 COUGH: ICD-10-CM

## 2022-07-21 DIAGNOSIS — J01.40 ACUTE PANSINUSITIS, RECURRENCE NOT SPECIFIED: Primary | ICD-10-CM

## 2022-07-21 PROCEDURE — 99213 OFFICE O/P EST LOW 20 MIN: CPT | Performed by: NURSE PRACTITIONER

## 2022-07-21 RX ORDER — GUAIFENESIN 600 MG/1
600 TABLET, EXTENDED RELEASE ORAL 2 TIMES DAILY
Qty: 20 TABLET | Refills: 0 | Status: SHIPPED | OUTPATIENT
Start: 2022-07-21 | End: 2022-07-25 | Stop reason: ALTCHOICE

## 2022-07-21 RX ORDER — AMOXICILLIN AND CLAVULANATE POTASSIUM 875; 125 MG/1; MG/1
1 TABLET, FILM COATED ORAL 2 TIMES DAILY
Qty: 20 TABLET | Refills: 0 | Status: SHIPPED | OUTPATIENT
Start: 2022-07-21 | End: 2022-09-27 | Stop reason: ALTCHOICE

## 2022-07-21 RX ORDER — CODEINE PHOSPHATE AND GUAIFENESIN 10; 100 MG/5ML; MG/5ML
5 SOLUTION ORAL
Qty: 100 ML | Refills: 0 | Status: SHIPPED | OUTPATIENT
Start: 2022-07-21 | End: 2022-07-28

## 2022-07-25 ENCOUNTER — VIRTUAL VISIT (OUTPATIENT)
Dept: NEUROLOGY | Age: 42
End: 2022-07-25
Payer: COMMERCIAL

## 2022-07-25 DIAGNOSIS — R51.9 CHRONIC DAILY HEADACHE: ICD-10-CM

## 2022-07-25 DIAGNOSIS — G43.909 MIGRAINE WITHOUT STATUS MIGRAINOSUS, NOT INTRACTABLE, UNSPECIFIED MIGRAINE TYPE: Primary | ICD-10-CM

## 2022-07-25 PROCEDURE — 99214 OFFICE O/P EST MOD 30 MIN: CPT | Performed by: NURSE PRACTITIONER

## 2022-07-25 RX ORDER — FREMANEZUMAB-VFRM 225 MG/1.5ML
225 INJECTION SUBCUTANEOUS
Qty: 1.5 ML | Refills: 3 | Status: SHIPPED | OUTPATIENT
Start: 2022-07-25 | End: 2022-09-26 | Stop reason: SDUPTHER

## 2022-07-25 RX ORDER — ONDANSETRON 4 MG/1
4 TABLET, ORALLY DISINTEGRATING ORAL
Qty: 12 TABLET | Refills: 0 | Status: SHIPPED | OUTPATIENT
Start: 2022-07-25 | End: 2022-10-16

## 2022-08-16 ENCOUNTER — PATIENT MESSAGE (OUTPATIENT)
Dept: NEUROLOGY | Age: 42
End: 2022-08-16

## 2022-08-20 DIAGNOSIS — K21.9 GASTROESOPHAGEAL REFLUX DISEASE WITHOUT ESOPHAGITIS: ICD-10-CM

## 2022-08-21 RX ORDER — PANTOPRAZOLE SODIUM 40 MG/1
TABLET, DELAYED RELEASE ORAL
Qty: 90 TABLET | Refills: 1 | Status: SHIPPED | OUTPATIENT
Start: 2022-08-21

## 2022-08-22 NOTE — TELEPHONE ENCOUNTER
Tristan CRUMP initiated through Cover my meds [facility] # -  Rosemarie CRUMP Case ID: [id number]    Approvedon August 22  CaseId:[id number];Status:Approved; Review Type:Prior Auth; Coverage Start Date:08/08/2022; Coverage End Date:08/22/2023

## 2022-08-22 NOTE — TELEPHONE ENCOUNTER
From: Noella Bloch  Sent: 8/19/2022 6:57 PM EDT  To: [facility] Nurse Pool  Subject: Jordana Salazar    Can you have someone approve the ajovy ?

## 2022-09-26 ENCOUNTER — VIRTUAL VISIT (OUTPATIENT)
Dept: NEUROLOGY | Age: 42
End: 2022-09-26
Payer: COMMERCIAL

## 2022-09-26 DIAGNOSIS — G43.909 MIGRAINE WITHOUT STATUS MIGRAINOSUS, NOT INTRACTABLE, UNSPECIFIED MIGRAINE TYPE: ICD-10-CM

## 2022-09-26 PROCEDURE — 99213 OFFICE O/P EST LOW 20 MIN: CPT | Performed by: NURSE PRACTITIONER

## 2022-09-26 RX ORDER — FREMANEZUMAB-VFRM 225 MG/1.5ML
225 INJECTION SUBCUTANEOUS
Qty: 1.5 ML | Refills: 5 | Status: SHIPPED | OUTPATIENT
Start: 2022-09-26

## 2022-09-26 NOTE — PROGRESS NOTES
[facility],[address]  [address]   [address] [place] [address] [place] [address] Drive   [phone] Office   [phone] Fax           Date:  22     Name:  Cherylene Slimmer  :  1980  MRN:  [id number]     PCP:  Ila Rodriguez MD    Celestine Laird is a 46 y.o. female who was seen by synchronous (real-time) audio-video technology on 2022 for Migraine    Subjective:   She did not get the [employer] until 2022. She has not had any migraines since she started it. She has not had the daily headaches. She is now treating a more minor headache about once a week. Recap from [address] [place] [address], 2021:  Chronic daily headache with underlying migraine. In the past, she did well on Ajovy without side effect. She has been on Emgality as well which also worked well from a migraine prevention perspective but she would have a metallic taste for a couple of days after the injections. Given the propensity for Aimovig to cause refractory hypertension, I would not recommend this for her as she already has high blood pressures which are well treated. Follow up in eight weeks. Current Outpatient Medications   Medication Sig    pantoprazole (PROTONIX) 40 mg tablet TAKE ONE TABLET BY MOUTH DAILY    fremanezumab-vfrm (Ajovy Autoinjector) 225 mg/1.5 mL auto-injector 1.5 mL by SubCUTAneous route every month. ondansetron (ZOFRAN ODT) 4 mg disintegrating tablet Take 1 Tablet by mouth every eight (8) hours as needed for Nausea for up to 12 doses. amoxicillin-clavulanate (AUGMENTIN) 875-125 mg per tablet Take 1 Tablet by mouth two (2) times a day. butalbital-acetaminophen-caffeine (FIORICET, ESGIC) -40 mg per tablet TAKE ONE TABLET BY MOUTH EVERY 6 HOURS AS NEEDED FOR PAIN    hydroCHLOROthiazide (HYDRODIURIL) 25 mg tablet Take 1 Tablet by mouth daily.     clonazePAM (KlonoPIN) 0.5 mg tablet Take 1 Tablet by mouth two (2) times daily as needed for Anxiety. Max Daily Amount: 1 mg. Cetirizine (ZyrTEC) 10 mg cap Take  by mouth. acetaminophen (TYLENOL) 325 mg tablet Take  by mouth every four (4) hours as needed for Pain. B.infantis-B.ani-B.long-B.bifi (Probiotic 4X) 10-15 mg TbEC Take  by mouth.    levonorgestreL (Mirena) 20 mcg/24 hours (6 yrs) 52 mg IUD 1 Device by IntraUTERine route once. No current facility-administered medications for this visit. No Known Allergies   Past Medical History:   Diagnosis Date    ADHD     Dysplasia of cervix, low grade (ORION 1) 2/23/12    colpo    Encounter for IUD removal 08/19/2016    Mirena    Encounter for Papanicolaou smear for cervical cancer screening 02/04/2022    neg/hpv neg    Essential hypertension     10 years ago    GERD (gastroesophageal reflux disease)     trying to control with diet since pregnant    H/O abnormal Pap smear 11/8/12;2/8/12    LGSIL, HPV positive    Headache     Headache(784.0)     IUD (intrauterine device) in place 03/11/2014    Mirena    Kidney disease     kidney stones 2015    Migraine headache     without aura    pap smear for 2/1/11;2/5/13; 4/1/15;2/26/19    neg, HPV neg,no ECC; neg, HPV neg, Negative, HPV negative; neg pap and neg HPV     Past Surgical History:   Procedure Laterality Date    HX COLPOSCOPY  2/23/12    ORION 1    HX HEENT      wisdom teeth    HX LITHOTRIPSY      HX WISDOM TEETH EXTRACTION        reports that she has never smoked. She has never used smokeless tobacco. She reports current alcohol use of about 2.0 standard drinks per week. She reports that she does not use drugs. family history includes Alcohol abuse in her maternal grandfather and paternal grandmother;  Allergic Rhinitis in her brother and brother; Asthma in her brother; Breast Cancer in an other family member; Cancer in her father and mother; Depression in her brother, brother, maternal grandfather, and mother; Diabetes in her father; Elevated Lipids in her father; Heart Disease in her father; Hypertension in her brother and father; Migraines in her brother; Other in her brother; Stroke in her paternal grandmother. ROS    Objective:     Patient-Reported Vitals 7/25/2022   Patient-Reported Weight 246        General:  Well defined, nourished, and groomed individual in no acute distress. Psych:  Good mood and bright affect    NEUROLOGICAL EXAMINATION:     Mental Status:   Alert and oriented to person, place, and time with recent and remote memory intact. Attention span and concentration are normal. Speech is fluent with a full fund of knowledge. Cranial Nerves:  I: smell Not tested   II: visual fields Not assessed   II: pupils Equal, round, reactive to light   II: optic disc Not assessed   III,VII: ptosis none   III,IV,VI: extraocular muscles  Full ROM   V: mastication normal   V: facial light touch sensation  Not assessed   VII: facial muscle function   symmetric   VIII: hearing symmetric   IX: soft palate elevation  normal   XI: trapezius strength  Not assessed   XI: sternocleidomastoid strength Not assessed   XI: neck flexion strength  Not assessed   XII: tongue  midline     Motor Examination: Normal tone and bulk. Strength was not assessed      Sensory exam:  Not assessed     Coordination:  No resting or intention tremor    Gait and Station:  Steady while walking. Normal arm swing. No pronator drift. No muscle wasting or fasiculations noted. Reflexes:  Not assessed    Assessment & Plan:       ICD-10-CM ICD-9-CM    1. Migraine without status migrainosus, not intractable, unspecified migraine type  G43.909 346.90 fremanezumab-vfrm (Ajovy Autoinjector) 225 mg/1.5 mL auto-injector        Chronic daily headache with underlying migraine has improved significantly since starting Ajovy. She will continue with this monthly. She can continue with Fiorinal for acute treatment. Follow up in six months.      We discussed the expected course, resolution and complications of the diagnosis(es) in detail. Medication risks, benefits, costs, interactions, and alternatives were discussed as indicated. I advised her to contact the office if her condition worsens, changes or fails to improve as anticipated. She expressed understanding with the diagnosis(es) and plan. Brooke Boateng, was evaluated through a synchronous (real-time) audio-video encounter. The patient (or guardian if applicable) is aware that this is a billable service, which includes applicable co-pays. This Virtual Visit was conducted with patient's (and/or legal guardian's) consent. The visit was conducted pursuant to the emergency declaration under the [facility], [address] [place] [address] authority and the [employer] General Act. Patient identification was verified, and a caregiver was present when appropriate.   The patient was located at: Home: [address] [facility] [id number]  The provider was located at: Home: [address] [facility]

## 2022-09-27 ENCOUNTER — OFFICE VISIT (OUTPATIENT)
Dept: INTERNAL MEDICINE CLINIC | Age: 42
End: 2022-09-27
Payer: COMMERCIAL

## 2022-09-27 VITALS
SYSTOLIC BLOOD PRESSURE: 131 MMHG | WEIGHT: 253 LBS | DIASTOLIC BLOOD PRESSURE: 85 MMHG | OXYGEN SATURATION: 96 % | TEMPERATURE: 98 F | HEART RATE: 87 BPM | RESPIRATION RATE: 18 BRPM | BODY MASS INDEX: 43.19 KG/M2 | HEIGHT: 64 IN

## 2022-09-27 DIAGNOSIS — F32.A ANXIETY AND DEPRESSION: Primary | ICD-10-CM

## 2022-09-27 DIAGNOSIS — F41.9 ANXIETY AND DEPRESSION: Primary | ICD-10-CM

## 2022-09-27 DIAGNOSIS — E66.9 OBESITY (BMI 30-39.9): ICD-10-CM

## 2022-09-27 DIAGNOSIS — O11.9 CHRONIC HYPERTENSION WITH SUPERIMPOSED PREECLAMPSIA: ICD-10-CM

## 2022-09-27 DIAGNOSIS — F41.9 ANXIETY: ICD-10-CM

## 2022-09-27 PROCEDURE — 99214 OFFICE O/P EST MOD 30 MIN: CPT | Performed by: INTERNAL MEDICINE

## 2022-09-27 RX ORDER — BUPROPION HYDROCHLORIDE 150 MG/1
150 TABLET ORAL
Qty: 30 TABLET | Refills: 1 | Status: SHIPPED | OUTPATIENT
Start: 2022-09-27 | End: 2022-10-27 | Stop reason: SDUPTHER

## 2022-09-27 RX ORDER — BISMUTH SUBSALICYLATE 262 MG
1 TABLET,CHEWABLE ORAL DAILY
COMMUNITY

## 2022-09-27 RX ORDER — CLONAZEPAM 0.5 MG/1
0.5 TABLET ORAL
Qty: 20 TABLET | Refills: 0 | Status: SHIPPED | OUTPATIENT
Start: 2022-09-27

## 2022-09-27 NOTE — PROGRESS NOTES
Laura Sr  Identified pt with two pt identifiers(name and ). Chief Complaint   Patient presents with    Elevated Blood Pressure     RM18// pt presenting today with elevated Bp readings, having anxiety issue, feeling pressure in chest, lightheaded, having a hard time catching breath was taking the medication prescribed but has not seem to help-pt states work stress is causing some of the issue. 1. Have you been to the ER, urgent care clinic since your last visit? Hospitalized since your last visit? NO    2. Have you seen or consulted any other health care providers outside of the [address] [place] [address] since your last visit? Include any pap smears or colon screening. NO      Provider notified of reason for visit, vitals and flowsheets obtained on patients.      Patient received paperwork for advance directive during previous visit but has not completed at this time     Reviewed record In preparation for visit, huddled with provider and have obtained necessary documentation      Health Maintenance Due   Topic    COVID-19 Vaccine (3 - Booster for [employer] series)    Flu Vaccine (1)       Wt Readings from Last 3 Encounters:   22 253 lb (114.8 kg)   22 251 lb 12.8 oz (114.2 kg)   22 247 lb (112 kg)     Temp Readings from Last 3 Encounters:   22 98 °F (36.7 °C) (Oral)   22 98.4 °F (36.9 °C) (Oral)   10/25/21 98.3 °F (36.8 °C) (Oral)     BP Readings from Last 3 Encounters:   22 131/85   22 132/85   22 131/85     Pulse Readings from Last 3 Encounters:   22 87   22 73   22 89     Vitals:    22 1009 22 1023   BP: (!) 172/92 131/85   Pulse: 87    Resp: 18    Temp: 98 °F (36.7 °C)    TempSrc: Oral    SpO2: 96%    Weight: 253 lb (114.8 kg)    Height: 5' 4\" (1.626 m)    PainSc:   0 - No pain          Learning Assessment:  :     Learning Assessment 2018   PRIMARY LEARNER Patient Patient   HIGHEST LEVEL OF EDUCATION - PRIMARY LEARNER  - 4 YEARS OF COLLEGE   BARRIERS PRIMARY LEARNER - NONE   CO-LEARNER CAREGIVER - No   PRIMARY LANGUAGE ENGLISH ENGLISH   LEARNER PREFERENCE PRIMARY READING LISTENING     - READING   ANSWERED BY Chanelle bo   RELATIONSHIP SELF SELF       Depression Screening:  :     3 most recent [facility] 36 Screens 7/21/2022   Little interest or pleasure in doing things Not at all   Feeling down, depressed, irritable, or hopeless Not at all   Total Score PHQ 2 0       Fall Risk Assessment:  :     Fall Risk Assessment, last 12 mths 12/16/2021   Able to walk? Yes   Fall in past 12 months? 0   Do you feel unsteady? 0   Are you worried about falling 0       Abuse Screening:  :     Abuse Screening Questionnaire 12/16/2021 2/12/2021 4/15/2019 11/13/2017   Do you ever feel afraid of your partner? N N N N   Are you in a relationship with someone who physically or mentally threatens you? N N N N   Is it safe for you to go home? Y Y Y Y       ADL Screening:  :     ADL Assessment 4/15/2019   Feeding yourself No Help Needed   Getting from bed to chair No Help Needed   Getting dressed No Help Needed   Bathing or showering No Help Needed   Walk across the room (includes cane/walker) No Help Needed   Using the telphone No Help Needed   Taking your medications No Help Needed   Preparing meals No Help Needed   Managing money (expenses/bills) No Help Needed   Moderately strenuous housework (laundry) No Help Needed   Shopping for personal items (toiletries/medicines) No Help Needed   Shopping for groceries No Help Needed   Driving No Help Needed   Climbing a flight of stairs No Help Needed   Getting to places beyond walking distances No Help Needed         Medication reconciliation up to date and corrected with patient at this time.

## 2022-09-27 NOTE — PROGRESS NOTES
Samm Porter is a 46 y.o. female who presents today for Elevated Blood Pressure (RM18// pt presenting today with elevated Bp readings, having anxiety issue, feeling pressure in chest, lightheaded, having a hard time catching breath was taking the medication prescribed but has not seem to help-pt states work stress is causing some of the issue.  )  . She has a history of   Patient Active Problem List   Diagnosis Code    Chronic daily headache R51.9    H/O abnormal Pap smear Z87.898    History of kidney stones Z87.442    Weight gain R63.5    Menorrhagia with regular cycle N92.0    Obesity (BMI 30-39. 9) E66.9    Irritable bowel syndrome with diarrhea K58.0    Gastroesophageal reflux disease K21.9    Severe obesity (HCC) E66.01    Chronic hypertension with superimposed preeclampsia O11.9   . Today patient is here for follow up. she does not have other concerns. Hypertension- borderline. Hypertension ROS: taking medications as instructed, no medication side effects noted, no TIA's, no chest pain on exertion, no dyspnea on exertion, no swelling of ankles     reports that she has never smoked. She has never used smokeless tobacco.    reports current alcohol use of about 2.0 standard drinks per week. BP Readings from Last 2 Encounters:   09/27/22 131/85   05/20/22 132/85     Anxiety/Depression: has been getting worse. Still a lot of responsibilities. Cannot keep up. Has a history of ADD. Stress levels between family life and work is up a good bit. Patient is in the process of trying to train someone to take over some of her role. Mother does take Wellbutrin and does well with it. Does have as needed clonazepam    Migraines: Currently following with neurology. These have been better. ROS  Review of Systems   Constitutional:  Positive for malaise/fatigue. Negative for chills, fever and weight loss. HENT:  Negative for congestion and sore throat.     Eyes:  Negative for blurred vision, double vision and photophobia. Respiratory:  Negative for cough and shortness of breath. Cardiovascular:  Positive for chest pain (with anxiety). Negative for palpitations and leg swelling. Gastrointestinal:  Negative for abdominal pain, constipation, diarrhea, heartburn, nausea and vomiting. Genitourinary:  Negative for dysuria, frequency and urgency. Musculoskeletal:  Negative for joint pain and myalgias. Skin:  Negative for rash. Neurological: Negative. Negative for headaches. Endo/Heme/Allergies:  Does not bruise/bleed easily. Psychiatric/Behavioral:  Positive for depression. Negative for memory loss and suicidal ideas. The patient is nervous/anxious. Visit Vitals  /85 (BP 1 Location: Left upper arm, BP Patient Position: Sitting, BP Cuff Size: Large adult)   Pulse 87   Temp 98 °F (36.7 °C) (Oral)   Resp 18   Ht 5' 4\" (1.626 m)   Wt 253 lb (114.8 kg)   SpO2 96%   Breastfeeding No   BMI 43.43 kg/m²       Physical Exam  Constitutional:       Appearance: She is well-developed. HENT:      Head: Normocephalic and atraumatic. Cardiovascular:      Rate and Rhythm: Normal rate and regular rhythm. Heart sounds: No murmur heard. Pulmonary:      Effort: Pulmonary effort is normal. No respiratory distress. Skin:     General: Skin is warm and dry. Neurological:      Mental Status: She is alert and oriented to person, place, and time. Psychiatric:         Behavior: Behavior normal.         Current Outpatient Medications   Medication Sig    multivitamin (ONE A DAY) tablet Take 1 Tablet by mouth daily. fremanezumab-vfrm (Ajovy Autoinjector) 225 mg/1.5 mL auto-injector 1.5 mL by SubCUTAneous route every month.    pantoprazole (PROTONIX) 40 mg tablet TAKE ONE TABLET BY MOUTH DAILY    ondansetron (ZOFRAN ODT) 4 mg disintegrating tablet Take 1 Tablet by mouth every eight (8) hours as needed for Nausea for up to 12 doses.     butalbital-acetaminophen-caffeine (FIORICET, ESGIC) -40 mg per tablet TAKE ONE TABLET BY MOUTH EVERY 6 HOURS AS NEEDED FOR PAIN    hydroCHLOROthiazide (HYDRODIURIL) 25 mg tablet Take 1 Tablet by mouth daily. clonazePAM (KlonoPIN) 0.5 mg tablet Take 1 Tablet by mouth two (2) times daily as needed for Anxiety. Max Daily Amount: 1 mg. Cetirizine (ZyrTEC) 10 mg cap Take  by mouth. acetaminophen (TYLENOL) 325 mg tablet Take  by mouth every four (4) hours as needed for Pain. B.infantis-B.ani-B.long-B.bifi (Probiotic 4X) 10-15 mg TbEC Take  by mouth.    levonorgestreL (Mirena) 20 mcg/24 hours (6 yrs) 52 mg IUD 1 Device by IntraUTERine route once. No current facility-administered medications for this visit. Past Medical History:   Diagnosis Date    ADHD     Dysplasia of cervix, low grade (ORION 1) 2/23/12    colpo    Encounter for IUD removal 08/19/2016    Mirena    Encounter for Papanicolaou smear for cervical cancer screening 02/04/2022    neg/hpv neg    Essential hypertension     10 years ago    GERD (gastroesophageal reflux disease)     trying to control with diet since pregnant    H/O abnormal Pap smear 11/8/12;2/8/12    LGSIL, HPV positive    Headache     Headache(784.0)     IUD (intrauterine device) in place 03/11/2014    Mirena    Kidney disease     kidney stones 2015    Migraine headache     without aura    pap smear for 2/1/11;2/5/13; 4/1/15;2/26/19    neg, HPV neg,no ECC; neg, HPV neg, Negative, HPV negative; neg pap and neg HPV      Past Surgical History:   Procedure Laterality Date    HX COLPOSCOPY  2/23/12    ORION 1    HX HEENT      wisdom teeth    HX LITHOTRIPSY      HX WISDOM TEETH EXTRACTION        Social History     Tobacco Use    Smoking status: Never    Smokeless tobacco: Never   Substance Use Topics    Alcohol use:  Yes     Alcohol/week: 2.0 standard drinks     Types: 1 Cans of beer, 1 Shots of liquor per week     Comment: social      Family History   Problem Relation Age of Onset    Hypertension Father     Heart Disease Father     Diabetes Father     Elevated Lipids Father     Cancer Father         eye and pancreatic     Cancer Mother         Cervical,Uterine,& Ovarian (pt questions ovarian but got cancer after copper 7 IUD)    Depression Mother     Allergic Rhinitis Brother     Asthma Brother     Other Brother         Chron's    Hypertension Brother     Depression Brother     Alcohol abuse Maternal Grandfather     Depression Maternal Grandfather     Alcohol abuse Paternal Grandmother     Stroke Paternal Grandmother     Migraines Brother     Allergic Rhinitis Brother     Depression Brother     Breast Cancer Other         No Known Allergies     Assessment/Plan  Diagnoses and all orders for this visit:    1. Anxiety and depression-does have a history of ADD and completing all her task has been a huge issue. Mother does benefit from Wellbutrin. We will try this for the coming 4 to 6 weeks. Patient was advised that certain anxiety symptoms may worsen with Wellbutrin. She does have as needed clonazepam.  Patient is working on some lifestyle changes and plans to decrease the stress in her life. -     buPROPion XL (WELLBUTRIN XL) 150 mg tablet; Take 1 Tablet by mouth every morning. 2. Chronic hypertension with superimposed preeclampsia-blood pressures are stable. Monitor    3. Obesity (BMI 30-39. 9)-weight stable. 4. Anxiety  -     clonazePAM (KlonoPIN) 0.5 mg tablet; Take 1 Tablet by mouth two (2) times daily as needed for Anxiety. Max Daily Amount: 1 mg. Chantelle Wiggins MD  9/27/2022    This note was created with the help of speech recognition software [employer]) and may contain some 'sound alike' errors.

## 2022-10-09 DIAGNOSIS — I10 HYPERTENSION, UNSPECIFIED TYPE: ICD-10-CM

## 2022-10-09 DIAGNOSIS — G43.909 MIGRAINE WITHOUT STATUS MIGRAINOSUS, NOT INTRACTABLE, UNSPECIFIED MIGRAINE TYPE: ICD-10-CM

## 2022-10-09 RX ORDER — ONDANSETRON 4 MG/1
4 TABLET, ORALLY DISINTEGRATING ORAL
Qty: 12 TABLET | Refills: 0 | Status: CANCELLED | OUTPATIENT
Start: 2022-10-09

## 2022-10-10 RX ORDER — HYDROCHLOROTHIAZIDE 25 MG/1
25 TABLET ORAL DAILY
Qty: 90 TABLET | Refills: 1 | Status: SHIPPED | OUTPATIENT
Start: 2022-10-10

## 2022-10-14 DIAGNOSIS — G43.909 MIGRAINE WITHOUT STATUS MIGRAINOSUS, NOT INTRACTABLE, UNSPECIFIED MIGRAINE TYPE: ICD-10-CM

## 2022-10-16 RX ORDER — ONDANSETRON 4 MG/1
TABLET, ORALLY DISINTEGRATING ORAL
Qty: 12 TABLET | Refills: 0 | Status: SHIPPED | OUTPATIENT
Start: 2022-10-16

## 2022-10-21 ENCOUNTER — VIRTUAL VISIT (OUTPATIENT)
Dept: INTERNAL MEDICINE CLINIC | Age: 42
End: 2022-10-21
Payer: COMMERCIAL

## 2022-10-21 DIAGNOSIS — J01.01 ACUTE RECURRENT MAXILLARY SINUSITIS: Primary | ICD-10-CM

## 2022-10-21 DIAGNOSIS — J40 BRONCHITIS: ICD-10-CM

## 2022-10-21 PROCEDURE — 99213 OFFICE O/P EST LOW 20 MIN: CPT | Performed by: INTERNAL MEDICINE

## 2022-10-21 RX ORDER — GUAIFENESIN 600 MG/1
600 TABLET, EXTENDED RELEASE ORAL 2 TIMES DAILY
Qty: 30 TABLET | Refills: 1 | Status: SHIPPED | OUTPATIENT
Start: 2022-10-21 | End: 2022-10-27 | Stop reason: ALTCHOICE

## 2022-10-21 RX ORDER — DOXYCYCLINE 100 MG/1
100 TABLET ORAL 2 TIMES DAILY
Qty: 20 TABLET | Refills: 0 | Status: SHIPPED | OUTPATIENT
Start: 2022-10-21

## 2022-10-21 RX ORDER — ALBUTEROL SULFATE 90 UG/1
1 AEROSOL, METERED RESPIRATORY (INHALATION)
Qty: 18 G | Refills: 1 | Status: SHIPPED | OUTPATIENT
Start: 2022-10-21

## 2022-10-21 NOTE — PROGRESS NOTES
Diagnoses and all orders for this visit:    1. Acute recurrent maxillary sinusitis  2. Bronchitis  Not in proving despite conservative care history of sinusitis  Start Doxy for sinusitis  Albuterol as needed  -     guaiFENesin ER (MUCINEX) 600 mg ER tablet; Take 1 Tablet by mouth two (2) times a day. Must take with glass of water    -     doxycycline (ADOXA) 100 mg tablet; Take 1 Tablet by mouth two (2) times a day. -     albuterol (PROVENTIL HFA, VENTOLIN HFA, PROAIR HFA) 90 mcg/actuation inhaler; Take 1 Puff by inhalation every four (4) hours as needed for Wheezing. Monitor symptoms    Chief Complaint   Patient presents with    Cold     Green mucous; cough; hurts to talk; no fever; symptoms started Monday 10/17  Using albuterol inhaler and prednisone that had been previously prescribed        Sinusitis  Patient reports history of sinus infections which usually occurs in the winter/fall in summer. She notes her symptoms started last week similar to before and not resolving. She notes pain in her maxillary area and in her teeth. No fevers. She has a green nasal drainage and coughing yellow-greenish sputum. Tenderness and Risby/popping sensation over her sinuses. She notes she has become progressively worse. She had some leftover prednisone so took this with some slight improvement with inflammation. She feels like her symptoms have moved into her chest.  No history of asthma but responding to albuterol when upper respiratory symptoms.   No history of pneumonia not a smoker  She is also taken Advil 800 mg 4 times a day with no relief    Past Medical History:   Diagnosis Date    ADHD     Dysplasia of cervix, low grade (ORION 1) 2/23/12    colpo    Encounter for IUD removal 08/19/2016    Mirena    Encounter for Papanicolaou smear for cervical cancer screening 02/04/2022    neg/hpv neg    Essential hypertension     10 years ago    GERD (gastroesophageal reflux disease)     trying to control with diet since pregnant    H/O abnormal Pap smear 11/8/12;2/8/12    LGSIL, HPV positive    Headache     Headache(784.0)     IUD (intrauterine device) in place 03/11/2014    Mirena    Kidney disease     kidney stones 2015    Migraine headache     without aura    pap smear for 2/1/11;2/5/13; 4/1/15;2/26/19    neg, HPV neg,no ECC; neg, HPV neg, Negative, HPV negative; neg pap and neg HPV     Past Surgical History:   Procedure Laterality Date    HX COLPOSCOPY  2/23/12    ORION 1    HX HEENT      wisdom teeth    HX LITHOTRIPSY      HX WISDOM TEETH EXTRACTION       Social History     Socioeconomic History    Marital status:    Tobacco Use    Smoking status: Never    Smokeless tobacco: Never   Vaping Use    Vaping Use: Never used   Substance and Sexual Activity    Alcohol use: Yes     Alcohol/week: 2.0 standard drinks     Types: 1 Cans of beer, 1 Shots of liquor per week     Comment: social    Drug use: No    Sexual activity: Yes     Partners: Male     Birth control/protection: I.U.D.      Family History   Problem Relation Age of Onset    Hypertension Father     Heart Disease Father     Diabetes Father     Elevated Lipids Father     Cancer Father         eye and pancreatic     Cancer Mother         Cervical,Uterine,& Ovarian (pt questions ovarian but got cancer after copper 7 IUD)    Depression Mother     Allergic Rhinitis Brother     Asthma Brother     Other Brother         Chron's    Hypertension Brother     Depression Brother     Alcohol abuse Maternal Grandfather     Depression Maternal Grandfather     Alcohol abuse Paternal Grandmother     Stroke Paternal Grandmother     Migraines Brother     Allergic Rhinitis Brother     Depression Brother     Breast Cancer Other      Current Outpatient Medications   Medication Sig Dispense Refill    ondansetron (ZOFRAN ODT) 4 mg disintegrating tablet DISSOLVE 1 TABLET ON THE TONGUE EVERY 8 HOURS FOR UP TO 12 DOSES AS NEEDED FOR NAUSEA 12 Tablet 0    hydroCHLOROthiazide (HYDRODIURIL) 25 mg tablet Take 1 Tablet by mouth daily. 90 Tablet 1    multivitamin (ONE A DAY) tablet Take 1 Tablet by mouth daily. buPROPion XL (WELLBUTRIN XL) 150 mg tablet Take 1 Tablet by mouth every morning. 30 Tablet 1    clonazePAM (KlonoPIN) 0.5 mg tablet Take 1 Tablet by mouth two (2) times daily as needed for Anxiety. Max Daily Amount: 1 mg. 20 Tablet 0    fremanezumab-vfrm (Ajovy Autoinjector) 225 mg/1.5 mL auto-injector 1.5 mL by SubCUTAneous route every month. 1.5 mL 5    pantoprazole (PROTONIX) 40 mg tablet TAKE ONE TABLET BY MOUTH DAILY 90 Tablet 1    butalbital-acetaminophen-caffeine (FIORICET, ESGIC) -40 mg per tablet TAKE ONE TABLET BY MOUTH EVERY 6 HOURS AS NEEDED FOR PAIN 30 Tablet 0    Cetirizine (ZyrTEC) 10 mg cap Take  by mouth. acetaminophen (TYLENOL) 325 mg tablet Take  by mouth every four (4) hours as needed for Pain. B.infantis-B.ani-B.long-B.bifi (Probiotic 4X) 10-15 mg TbEC Take  by mouth.      levonorgestreL (Mirena) 20 mcg/24 hours (6 yrs) 52 mg IUD 1 Device by IntraUTERine route once. No Known Allergies    There were no vitals taken for this visit.   Constitutional: [x] Appears well-developed and well-nourished [x] No apparent distress      [] Abnormal -     Mental status: [x] Alert and awake  [x] Oriented to person/place/time [x] Able to follow commands    [] Abnormal -     Eyes:   EOM    [x]  Normal    [] Abnormal -   Sclera  [x]  Normal    [] Abnormal -          Discharge [x]  None visible   [] Abnormal -     HENT: [x] Normocephalic, atraumatic  [] Abnormal -   [x] Mouth/Throat: Mucous membranes are moist    External Ears [x] Normal  [] Abnormal -    Neck: [x] No visualized mass [] Abnormal -     Pulmonary/Chest: [x] Respiratory effort normal   [x] No visualized signs of difficulty breathing or respiratory distress        [] Abnormal -      Musculoskeletal:   [x] Normal gait with no signs of ataxia         [x] Normal range of motion of neck        [] Abnormal - Neurological:        [x] No Facial Asymmetry (Cranial nerve 7 motor function) (limited exam due to video visit)          [x] No gaze palsy        [] Abnormal -          Skin:        [x] No significant exanthematous lesions or discoloration noted on facial skin         [] Abnormal -            Psychiatric:       [x] Normal Affect [] Abnormal -        [x] No Hallucinations      This medical record was transcribed using an electronic medical records/speech recognition system. Although proofread, it may and can contain electronic, spelling and other errors. Corrections may be executed at a later time. Please contact us for any clarifications as needed. This is a audio and visual video virtual visit. I was located in my office and the patient was located in his/her home. Pt has given consent and aware this visit will be billed to his/her health insurance.

## 2022-10-27 ENCOUNTER — OFFICE VISIT (OUTPATIENT)
Dept: INTERNAL MEDICINE CLINIC | Age: 42
End: 2022-10-27
Payer: COMMERCIAL

## 2022-10-27 VITALS
DIASTOLIC BLOOD PRESSURE: 89 MMHG | HEIGHT: 64 IN | SYSTOLIC BLOOD PRESSURE: 135 MMHG | WEIGHT: 251.2 LBS | RESPIRATION RATE: 16 BRPM | BODY MASS INDEX: 42.88 KG/M2 | OXYGEN SATURATION: 95 % | HEART RATE: 76 BPM | TEMPERATURE: 97.8 F

## 2022-10-27 DIAGNOSIS — F32.A ANXIETY AND DEPRESSION: ICD-10-CM

## 2022-10-27 DIAGNOSIS — L02.91 ABSCESS: Primary | ICD-10-CM

## 2022-10-27 DIAGNOSIS — I10 HYPERTENSION, UNSPECIFIED TYPE: ICD-10-CM

## 2022-10-27 DIAGNOSIS — Z23 NEEDS FLU SHOT: ICD-10-CM

## 2022-10-27 DIAGNOSIS — F41.9 ANXIETY AND DEPRESSION: ICD-10-CM

## 2022-10-27 PROCEDURE — 90686 IIV4 VACC NO PRSV 0.5 ML IM: CPT | Performed by: INTERNAL MEDICINE

## 2022-10-27 PROCEDURE — 99214 OFFICE O/P EST MOD 30 MIN: CPT | Performed by: INTERNAL MEDICINE

## 2022-10-27 PROCEDURE — 90471 IMMUNIZATION ADMIN: CPT | Performed by: INTERNAL MEDICINE

## 2022-10-27 RX ORDER — BACITRACIN ZINC 500 UNIT/G
OINTMENT (GRAM) TOPICAL 2 TIMES DAILY
Qty: 15 G | Refills: 0 | Status: SHIPPED | OUTPATIENT
Start: 2022-10-27

## 2022-10-27 RX ORDER — BUPROPION HYDROCHLORIDE 300 MG/1
300 TABLET ORAL
Qty: 90 TABLET | Refills: 1 | Status: SHIPPED | OUTPATIENT
Start: 2022-10-27

## 2022-10-27 NOTE — PROGRESS NOTES
Vanessa Montalvo is a 38 y.o. female who presents today for Follow-up (RM19// pt presenting today for medication follow up; has a spot on LUQ x 3 months has noticed a change.)  . She has a history of   Patient Active Problem List   Diagnosis Code    Chronic daily headache R51.9    H/O abnormal Pap smear Z87.898    History of kidney stones Z87.442    Weight gain R63.5    Menorrhagia with regular cycle N92.0    Obesity (BMI 30-39. 9) E66.9    Irritable bowel syndrome with diarrhea K58.0    Gastroesophageal reflux disease K21.9    Severe obesity (HCC) E66.01    Chronic hypertension with superimposed preeclampsia O11.9   . Today patient is here for follow up. Small abscess/lesion. To L abd. No systemic s/s of infection. Anx/Dep/ADD: Started on wellbutrin. Since reports overall feeling better. Like to try the 300 mg dose. Weight has been stable. Hypertension-   Hypertension ROS: taking medications as instructed, no medication side effects noted, no TIA's, no chest pain on exertion, no dyspnea on exertion, no swelling of ankles     reports that she has never smoked. She has never used smokeless tobacco.    reports current alcohol use of about 2.0 standard drinks per week. BP Readings from Last 2 Encounters:   10/27/22 135/89   09/27/22 131/85         ROS  Review of Systems   Constitutional:  Negative for chills, fever and weight loss. HENT:  Negative for congestion and sore throat. Eyes:  Negative for blurred vision, double vision and photophobia. Respiratory:  Negative for cough and shortness of breath. Cardiovascular:  Negative for chest pain, palpitations and leg swelling. Gastrointestinal:  Negative for abdominal pain, constipation, diarrhea, heartburn, nausea and vomiting. Genitourinary:  Negative for dysuria, frequency and urgency. Musculoskeletal:  Negative for joint pain and myalgias. Skin:  Positive for rash. Neurological: Negative. Negative for headaches. Endo/Heme/Allergies:  Does not bruise/bleed easily. Psychiatric/Behavioral:  Negative for depression, memory loss and suicidal ideas. The patient is nervous/anxious. The patient does not have insomnia. Visit Vitals  /89 (BP 1 Location: Left upper arm, BP Patient Position: Sitting, BP Cuff Size: Large adult)   Pulse 76   Temp 97.8 °F (36.6 °C) (Oral)   Resp 16   Ht 5' 4\" (1.626 m)   Wt 251 lb 3.2 oz (113.9 kg)   SpO2 95%   BMI 43.12 kg/m²       Physical Exam  Constitutional:       General: She is not in acute distress. Appearance: She is well-developed. HENT:      Head: Normocephalic and atraumatic. Neck:      Thyroid: No thyromegaly. Cardiovascular:      Rate and Rhythm: Normal rate and regular rhythm. Heart sounds: No murmur heard. Pulmonary:      Effort: Pulmonary effort is normal.      Breath sounds: Normal breath sounds. No wheezing. Abdominal:      General: Bowel sounds are normal. There is no distension. Palpations: Abdomen is soft. Comments: Small centimeter sized abscess versus folliculitis. No surrounding cellulitis. Musculoskeletal:      Cervical back: Normal range of motion and neck supple. Skin:     General: Skin is warm and dry. Neurological:      Mental Status: She is alert and oriented to person, place, and time. Cranial Nerves: No cranial nerve deficit. Psychiatric:         Behavior: Behavior normal.         Current Outpatient Medications   Medication Sig    doxycycline (ADOXA) 100 mg tablet Take 1 Tablet by mouth two (2) times a day. albuterol (PROVENTIL HFA, VENTOLIN HFA, PROAIR HFA) 90 mcg/actuation inhaler Take 1 Puff by inhalation every four (4) hours as needed for Wheezing. ondansetron (ZOFRAN ODT) 4 mg disintegrating tablet DISSOLVE 1 TABLET ON THE TONGUE EVERY 8 HOURS FOR UP TO 12 DOSES AS NEEDED FOR NAUSEA    hydroCHLOROthiazide (HYDRODIURIL) 25 mg tablet Take 1 Tablet by mouth daily.     multivitamin (ONE A DAY) tablet Take 1 Tablet by mouth daily. buPROPion XL (WELLBUTRIN XL) 150 mg tablet Take 1 Tablet by mouth every morning. clonazePAM (KlonoPIN) 0.5 mg tablet Take 1 Tablet by mouth two (2) times daily as needed for Anxiety. Max Daily Amount: 1 mg.    fremanezumab-vfrm (Ajovy Autoinjector) 225 mg/1.5 mL auto-injector 1.5 mL by SubCUTAneous route every month.    pantoprazole (PROTONIX) 40 mg tablet TAKE ONE TABLET BY MOUTH DAILY    butalbital-acetaminophen-caffeine (FIORICET, ESGIC) -40 mg per tablet TAKE ONE TABLET BY MOUTH EVERY 6 HOURS AS NEEDED FOR PAIN    Cetirizine (ZyrTEC) 10 mg cap Take  by mouth. acetaminophen (TYLENOL) 325 mg tablet Take  by mouth every four (4) hours as needed for Pain. B.infantis-B.ani-B.long-B.bifi (Probiotic 4X) 10-15 mg TbEC Take  by mouth.    levonorgestreL (Mirena) 20 mcg/24 hours (6 yrs) 52 mg IUD 1 Device by IntraUTERine route once. guaiFENesin ER (MUCINEX) 600 mg ER tablet Take 1 Tablet by mouth two (2) times a day. Must take with glass of water (Patient not taking: Reported on 10/27/2022)     No current facility-administered medications for this visit.         Past Medical History:   Diagnosis Date    ADHD     Dysplasia of cervix, low grade (ORION 1) 2/23/12    colpo    Encounter for IUD removal 08/19/2016    Mirena    Encounter for Papanicolaou smear for cervical cancer screening 02/04/2022    neg/hpv neg    Essential hypertension     10 years ago    GERD (gastroesophageal reflux disease)     trying to control with diet since pregnant    H/O abnormal Pap smear 11/8/12;2/8/12    LGSIL, HPV positive    Headache     Headache(784.0)     IUD (intrauterine device) in place 03/11/2014    Mirena    Kidney disease     kidney stones 2015    Migraine headache     without aura    pap smear for 2/1/11;2/5/13; 4/1/15;2/26/19    neg, HPV neg,no ECC; neg, HPV neg, Negative, HPV negative; neg pap and neg HPV      Past Surgical History:   Procedure Laterality Date    HX COLPOSCOPY  2/23/12    ORION 1    HX HEENT      wisdom teeth    HX LITHOTRIPSY      HX WISDOM TEETH EXTRACTION        Social History     Tobacco Use    Smoking status: Never    Smokeless tobacco: Never   Substance Use Topics    Alcohol use: Yes     Alcohol/week: 2.0 standard drinks     Types: 1 Cans of beer, 1 Shots of liquor per week     Comment: social      Family History   Problem Relation Age of Onset    Hypertension Father     Heart Disease Father     Diabetes Father     Elevated Lipids Father     Cancer Father         eye and pancreatic     Cancer Mother         Cervical,Uterine,& Ovarian (pt questions ovarian but got cancer after copper 7 IUD)    Depression Mother     Allergic Rhinitis Brother     Asthma Brother     Other Brother         Chron's    Hypertension Brother     Depression Brother     Alcohol abuse Maternal Grandfather     Depression Maternal Grandfather     Alcohol abuse Paternal Grandmother     Stroke Paternal Grandmother     Migraines Brother     Allergic Rhinitis Brother     Depression Brother     Breast Cancer Other         No Known Allergies     Assessment/Plan  Diagnoses and all orders for this visit:    1. Abscess-very small area to left lower abdominal wall. Patient to use topical antibiotics. She will let us know if this worsens.  -     bacitracin zinc (BACITRACIN) ointment; Apply  to affected area two (2) times a day. 2. Hypertension, unspecified type-stable, continue hydrochlorothiazide  -     METABOLIC PANEL, COMPREHENSIVE; Future  -     CBC WITH AUTOMATED DIFF; Future    3. Anxiety and depression-mental health moving in the right direction. Increase to 300 mg. We discussed side effects.  -     buPROPion XL (WELLBUTRIN XL) 300 mg XL tablet; Take 1 Tablet by mouth every morning.     4. Needs flu shot  -     INFLUENZA, FLUARIX, FLULAVAL, FLUZONE (AGE 6 MO+), AFLURIA(AGE 3Y+) IM, PF, 0.5 ML  -     IA IMMUNIZ ADMIN,1 SINGLE/COMB VAC/TOXOID          Jordyn Li MD  10/27/2022    This note was created with the help of speech recognition software ([employer]agon) and may contain some 'sound alike' errors.

## 2022-10-27 NOTE — PROGRESS NOTES
Silverio Lewis  Identified pt with two pt identifiers(name and ). Chief Complaint   Patient presents with    Follow-up     RM19// pt presenting today for medication follow up; has a spot on LUQ x 3 months has noticed a change. 1. Have you been to the ER, urgent care clinic since your last visit? Hospitalized since your last visit? NO    2. Have you seen or consulted any other health care providers outside of the [address] [place] [address] since your last visit? Include any pap smears or colon screening. NO      Provider notified of reason for visit, vitals and flowsheets obtained on patients.      Patient received paperwork for advance directive during previous visit but has not completed at this time     Reviewed record In preparation for visit, huddled with provider and have obtained necessary documentation      Health Maintenance Due   Topic    COVID-19 Vaccine (3 - Booster for Pfizer series)    Flu Vaccine (1)       Wt Readings from Last 3 Encounters:   10/27/22 251 lb 3.2 oz (113.9 kg)   22 253 lb (114.8 kg)   22 251 lb 12.8 oz (114.2 kg)     Temp Readings from Last 3 Encounters:   10/27/22 97.8 °F (36.6 °C) (Oral)   22 98 °F (36.7 °C) (Oral)   22 98.4 °F (36.9 °C) (Oral)     BP Readings from Last 3 Encounters:   10/27/22 135/89   22 131/85   22 132/85     Pulse Readings from Last 3 Encounters:   10/27/22 76   22 87   22 73     Vitals:    10/27/22 1012   BP: 135/89   Pulse: 76   Resp: 16   Temp: 97.8 °F (36.6 °C)   TempSrc: Oral   SpO2: 95%   Weight: 251 lb 3.2 oz (113.9 kg)   Height: 5' 4\" (1.626 m)   PainSc:   0 - No pain         Learning Assessment:  :     Learning Assessment 2018   PRIMARY LEARNER Patient Patient   HIGHEST LEVEL OF EDUCATION - PRIMARY LEARNER  - 4 YEARS OF COLLEGE   BARRIERS PRIMARY LEARNER - NONE   CO-LEARNER CAREGIVER - No   PRIMARY LANGUAGE ENGLISH ENGLISH   LEARNER PREFERENCE PRIMARY READING LISTENING     - READING   ANSWERED BY Chandana Goods self   RELATIONSHIP SELF SELF       Depression Screening:  :     3 most recent PHQ Screens 10/27/2022   Little interest or pleasure in doing things Not at all   Feeling down, depressed, irritable, or hopeless Not at all   Total Score PHQ 2 0       Fall Risk Assessment:  :     Fall Risk Assessment, last 12 mths 12/16/2021   Able to walk? Yes   Fall in past 12 months? 0   Do you feel unsteady? 0   Are you worried about falling 0       Abuse Screening:  :     Abuse Screening Questionnaire 12/16/2021 2/12/2021 4/15/2019 11/13/2017   Do you ever feel afraid of your partner? N N N N   Are you in a relationship with someone who physically or mentally threatens you? N N N N   Is it safe for you to go home? Y Y Y Y       ADL Screening:  :     ADL Assessment 4/15/2019   Feeding yourself No Help Needed   Getting from bed to chair No Help Needed   Getting dressed No Help Needed   Bathing or showering No Help Needed   Walk across the room (includes cane/walker) No Help Needed   Using the telphone No Help Needed   Taking your medications No Help Needed   Preparing meals No Help Needed   Managing money (expenses/bills) No Help Needed   Moderately strenuous housework (laundry) No Help Needed   Shopping for personal items (toiletries/medicines) No Help Needed   Shopping for groceries No Help Needed   Driving No Help Needed   Climbing a flight of stairs No Help Needed   Getting to places beyond walking distances No Help Needed         Medication reconciliation up to date and corrected with patient at this time. Self Administrated

## 2022-10-29 LAB
ALBUMIN SERPL-MCNC: 4.8 G/DL (ref 3.8–4.8)
ALBUMIN/GLOB SERPL: 2.7 {RATIO} (ref 1.2–2.2)
ALP SERPL-CCNC: 52 IU/L (ref 44–121)
ALT SERPL-CCNC: 18 IU/L (ref 0–32)
AST SERPL-CCNC: 14 IU/L (ref 0–40)
BASOPHILS # BLD AUTO: 0.1 X10E3/UL (ref 0–0.2)
BASOPHILS NFR BLD AUTO: 1 %
BILIRUB SERPL-MCNC: 0.4 MG/DL (ref 0–1.2)
BUN SERPL-MCNC: 17 MG/DL (ref 6–24)
BUN/CREAT SERPL: 20 (ref 9–23)
CALCIUM SERPL-MCNC: 9.6 MG/DL (ref 8.7–10.2)
CHLORIDE SERPL-SCNC: 100 MMOL/L (ref 96–106)
CO2 SERPL-SCNC: 23 MMOL/L (ref 20–29)
CREAT SERPL-MCNC: 0.84 MG/DL (ref 0.57–1)
EGFR: 90 ML/MIN/1.73
EOSINOPHIL # BLD AUTO: 0.2 X10E3/UL (ref 0–0.4)
EOSINOPHIL NFR BLD AUTO: 2 %
ERYTHROCYTE [DISTWIDTH] IN BLOOD BY AUTOMATED COUNT: 12.8 % (ref 11.7–15.4)
GLOBULIN SER CALC-MCNC: 1.8 G/DL (ref 1.5–4.5)
GLUCOSE SERPL-MCNC: 95 MG/DL (ref 70–99)
HCT VFR BLD AUTO: 44.2 % (ref 34–46.6)
HGB BLD-MCNC: 14.6 G/DL (ref 11.1–15.9)
IMM GRANULOCYTES # BLD AUTO: 0 X10E3/UL (ref 0–0.1)
IMM GRANULOCYTES NFR BLD AUTO: 1 %
LYMPHOCYTES # BLD AUTO: 1.9 X10E3/UL (ref 0.7–3.1)
LYMPHOCYTES NFR BLD AUTO: 23 %
MCH RBC QN AUTO: 29.3 PG (ref 26.6–33)
MCHC RBC AUTO-ENTMCNC: 33 G/DL (ref 31.5–35.7)
MCV RBC AUTO: 89 FL (ref 79–97)
MONOCYTES # BLD AUTO: 0.7 X10E3/UL (ref 0.1–0.9)
MONOCYTES NFR BLD AUTO: 9 %
NEUTROPHILS # BLD AUTO: 5.2 X10E3/UL (ref 1.4–7)
NEUTROPHILS NFR BLD AUTO: 64 %
PLATELET # BLD AUTO: 294 X10E3/UL (ref 150–450)
POTASSIUM SERPL-SCNC: 4 MMOL/L (ref 3.5–5.2)
PROT SERPL-MCNC: 6.6 G/DL (ref 6–8.5)
RBC # BLD AUTO: 4.98 X10E6/UL (ref 3.77–5.28)
SODIUM SERPL-SCNC: 139 MMOL/L (ref 134–144)
WBC # BLD AUTO: 8.1 X10E3/UL (ref 3.4–10.8)

## 2022-12-01 NOTE — TELEPHONE ENCOUNTER
----- Message from Suni Olsen sent at 2/4/2021  2:44 PM EST -----  Regarding: Dr. Alan Savage  Patient return call    Caller's first and last name and relationship (if not the patient): N/A      Best contact number(s): [phone]      Whose call is being returned: Tammy      Details to clarify the request: Pt. Was calling to let Tammy know that the NP appt. For 2/12/21 11:30am will be great and she will take that appt.       Suni Olsen
No

## 2022-12-26 DIAGNOSIS — F41.9 ANXIETY: ICD-10-CM

## 2022-12-26 DIAGNOSIS — G43.909 MIGRAINE WITHOUT STATUS MIGRAINOSUS, NOT INTRACTABLE, UNSPECIFIED MIGRAINE TYPE: ICD-10-CM

## 2022-12-26 RX ORDER — DOXYCYCLINE 100 MG/1
100 TABLET ORAL 2 TIMES DAILY
Qty: 20 TABLET | Refills: 0 | Status: CANCELLED | OUTPATIENT
Start: 2022-12-26

## 2022-12-27 RX ORDER — CLONAZEPAM 0.5 MG/1
0.5 TABLET ORAL
Qty: 20 TABLET | Refills: 0 | Status: SHIPPED | OUTPATIENT
Start: 2022-12-27

## 2022-12-28 RX ORDER — BUTALBITAL, ACETAMINOPHEN AND CAFFEINE 50; 325; 40 MG/1; MG/1; MG/1
TABLET ORAL
Qty: 30 TABLET | Refills: 0 | Status: SHIPPED | OUTPATIENT
Start: 2022-12-28

## 2022-12-28 RX ORDER — CLONAZEPAM 0.5 MG/1
0.5 TABLET ORAL
Qty: 20 TABLET | Refills: 0 | Status: SHIPPED | OUTPATIENT
Start: 2022-12-28

## 2023-02-07 ENCOUNTER — OFFICE VISIT (OUTPATIENT)
Dept: INTERNAL MEDICINE CLINIC | Age: 43
End: 2023-02-07
Payer: COMMERCIAL

## 2023-02-07 VITALS
WEIGHT: 250.8 LBS | TEMPERATURE: 98.1 F | SYSTOLIC BLOOD PRESSURE: 137 MMHG | HEART RATE: 93 BPM | RESPIRATION RATE: 16 BRPM | HEIGHT: 64 IN | OXYGEN SATURATION: 98 % | BODY MASS INDEX: 42.82 KG/M2 | DIASTOLIC BLOOD PRESSURE: 88 MMHG

## 2023-02-07 DIAGNOSIS — R05.9 COUGH IN ADULT: Primary | ICD-10-CM

## 2023-02-07 DIAGNOSIS — J40 BRONCHITIS: ICD-10-CM

## 2023-02-07 LAB — SARS-COV-2 POC: NEGATIVE

## 2023-02-07 PROCEDURE — 87426 SARSCOV CORONAVIRUS AG IA: CPT | Performed by: INTERNAL MEDICINE

## 2023-02-07 PROCEDURE — 99213 OFFICE O/P EST LOW 20 MIN: CPT | Performed by: INTERNAL MEDICINE

## 2023-02-07 RX ORDER — FLUTICASONE PROPIONATE AND SALMETEROL 250; 50 UG/1; UG/1
1 POWDER RESPIRATORY (INHALATION) 2 TIMES DAILY
Qty: 60 EACH | Refills: 1 | Status: SHIPPED | OUTPATIENT
Start: 2023-02-07

## 2023-02-07 RX ORDER — AZITHROMYCIN 250 MG/1
TABLET, FILM COATED ORAL
Qty: 6 TABLET | Refills: 0 | Status: SHIPPED | OUTPATIENT
Start: 2023-02-07

## 2023-02-07 RX ORDER — ALBUTEROL SULFATE 90 UG/1
1 AEROSOL, METERED RESPIRATORY (INHALATION)
Qty: 18 G | Refills: 1 | Status: SHIPPED | OUTPATIENT
Start: 2023-02-07

## 2023-02-07 NOTE — PROGRESS NOTES
HISTORY OF PRESENT ILLNESS  Dillan Arroyo is a 38 y.o. female. HPI  Seen with over a week of cough with some heaviness and tightness and congestion. No fevers or chills. Does not smoke. Exposed to her daughter, who has had several colds recently in the last few weeks. Did have an old Albuterol, which seemed to help. Review of Systems   Constitutional:  Positive for malaise/fatigue. Negative for chills, diaphoresis and fever. HENT:  Positive for congestion. Negative for ear discharge, ear pain, nosebleeds and sore throat. Eyes:  Negative for pain and discharge. Respiratory:  Positive for cough and wheezing. Negative for hemoptysis, sputum production and shortness of breath. Cardiovascular:  Negative for chest pain. Neurological:  Negative for headaches. Physical Exam  Vitals and nursing note reviewed. Constitutional:       Appearance: She is well-developed. HENT:      Head: Normocephalic. Right Ear: Tympanic membrane, ear canal and external ear normal.      Left Ear: Tympanic membrane, ear canal and external ear normal.      Nose: Nose normal.      Mouth/Throat:      Pharynx: No oropharyngeal exudate. Eyes:      General:         Right eye: No discharge. Left eye: No discharge. Conjunctiva/sclera: Conjunctivae normal.      Pupils: Pupils are equal, round, and reactive to light. Cardiovascular:      Rate and Rhythm: Normal rate and regular rhythm. Heart sounds: Normal heart sounds. Pulmonary:      Effort: Pulmonary effort is normal. No respiratory distress. Breath sounds: No wheezing or rales. Comments: Coarse bs bilaterally   Musculoskeletal:      Cervical back: Normal range of motion and neck supple. Lymphadenopathy:      Cervical: No cervical adenopathy. ASSESSMENT and PLAN  Diagnoses and all orders for this visit:    1. Cough in adult  -     AMB POC SARS-COV-2 neg    2.  Bronchitis-start advair for rad  If  not improved add zpak  Follow up if signs and symptoms worsen or change. After hours number given. -     fluticasone propion-salmeteroL (ADVAIR/WIXELA) 250-50 mcg/dose diskus inhaler; Take 1 Puff by inhalation two (2) times a day. -     azithromycin (ZITHROMAX) 250 mg tablet; Per pack  -     albuterol (PROVENTIL HFA, VENTOLIN HFA, PROAIR HFA) 90 mcg/actuation inhaler; Take 1 Puff by inhalation every six (6) hours as needed for Wheezing.

## 2023-02-21 DIAGNOSIS — K21.9 GASTROESOPHAGEAL REFLUX DISEASE WITHOUT ESOPHAGITIS: ICD-10-CM

## 2023-02-21 RX ORDER — PANTOPRAZOLE SODIUM 40 MG/1
TABLET, DELAYED RELEASE ORAL
Qty: 90 TABLET | Refills: 1 | Status: SHIPPED | OUTPATIENT
Start: 2023-02-21

## 2023-03-07 ENCOUNTER — OFFICE VISIT (OUTPATIENT)
Dept: INTERNAL MEDICINE CLINIC | Age: 43
End: 2023-03-07
Payer: COMMERCIAL

## 2023-03-07 VITALS
HEART RATE: 93 BPM | OXYGEN SATURATION: 97 % | BODY MASS INDEX: 42.88 KG/M2 | HEIGHT: 64 IN | TEMPERATURE: 98 F | RESPIRATION RATE: 16 BRPM | DIASTOLIC BLOOD PRESSURE: 82 MMHG | WEIGHT: 251.2 LBS | SYSTOLIC BLOOD PRESSURE: 146 MMHG

## 2023-03-07 DIAGNOSIS — R05.8 OTHER COUGH: Primary | ICD-10-CM

## 2023-03-07 DIAGNOSIS — J01.00 SUBACUTE MAXILLARY SINUSITIS: ICD-10-CM

## 2023-03-07 DIAGNOSIS — J30.1 SEASONAL ALLERGIC RHINITIS DUE TO POLLEN: ICD-10-CM

## 2023-03-07 PROCEDURE — 99213 OFFICE O/P EST LOW 20 MIN: CPT | Performed by: INTERNAL MEDICINE

## 2023-03-07 RX ORDER — CEFUROXIME AXETIL 500 MG/1
500 TABLET ORAL 2 TIMES DAILY
Qty: 20 TABLET | Refills: 0 | Status: SHIPPED | OUTPATIENT
Start: 2023-03-07

## 2023-03-07 RX ORDER — MONTELUKAST SODIUM 10 MG/1
10 TABLET ORAL DAILY
Qty: 30 TABLET | Refills: 5 | Status: SHIPPED | OUTPATIENT
Start: 2023-03-07

## 2023-03-07 NOTE — PROGRESS NOTES
HISTORY OF PRESENT ILLNESS  Brittany Marr is a 38 y.o. female. HPI  Seen with persistent cough for about a month now. A month ago she was given Zithromax, had about three doses because her daughter somehow misplaced several of the doses. Has been using Advair. Does not feel ill, but does have ongoing episodic green drainage from nose and in mucus when she coughs. Feels some crackling in her sinuses and throat and some pressure in sinuses. No bloody drainage, no fevers, no dyspnea on exertion. Review of Systems   Constitutional: Negative. Negative for chills, diaphoresis, fever and malaise/fatigue. HENT:  Positive for congestion and sinus pain. Negative for ear discharge, ear pain, nosebleeds, sore throat and tinnitus. Eyes:  Negative for pain and discharge. Respiratory:  Positive for cough. Negative for hemoptysis, sputum production, shortness of breath and wheezing. Cardiovascular:  Negative for chest pain. Neurological:  Negative for headaches. Endo/Heme/Allergies:  Positive for environmental allergies. Physical Exam  Vitals and nursing note reviewed. Constitutional:       Appearance: She is well-developed. HENT:      Head: Normocephalic. Right Ear: Tympanic membrane, ear canal and external ear normal.      Left Ear: Tympanic membrane, ear canal and external ear normal.      Nose: Nose normal.      Mouth/Throat:      Pharynx: No oropharyngeal exudate. Eyes:      General:         Right eye: No discharge. Left eye: No discharge. Conjunctiva/sclera: Conjunctivae normal.      Pupils: Pupils are equal, round, and reactive to light. Cardiovascular:      Rate and Rhythm: Normal rate and regular rhythm. Heart sounds: Normal heart sounds. Pulmonary:      Effort: Pulmonary effort is normal. No respiratory distress. Breath sounds: Normal breath sounds. No wheezing or rales. Musculoskeletal:      Cervical back: Normal range of motion and neck supple. Lymphadenopathy:      Cervical: No cervical adenopathy. ASSESSMENT and PLAN  Diagnoses and all orders for this visit:    1. Other cough-for about 5 weeks-I worry that she has persistent sinusitis given green  discharge and incomplete course of  zpak-will use 10 days of ceftin  Also suspect allergic component so add singulair    2. Subacute maxillary sinusitis  -     cefUROXime (CEFTIN) 500 mg tablet; Take 1 Tablet by mouth two (2) times a day. 3. Seasonal allergic rhinitis due to pollen  -     montelukast (SINGULAIR) 10 mg tablet; Take 1 Tablet by mouth daily.

## 2023-03-20 ENCOUNTER — PATIENT MESSAGE (OUTPATIENT)
Dept: INTERNAL MEDICINE CLINIC | Age: 43
End: 2023-03-20

## 2023-03-22 ENCOUNTER — TELEPHONE (OUTPATIENT)
Dept: INTERNAL MEDICINE CLINIC | Age: 43
End: 2023-03-22

## 2023-03-22 RX ORDER — AZITHROMYCIN 250 MG/1
TABLET, FILM COATED ORAL
Qty: 6 TABLET | Refills: 0 | Status: SHIPPED | OUTPATIENT
Start: 2023-03-22

## 2023-03-24 DIAGNOSIS — R05.8 OTHER COUGH: Primary | ICD-10-CM

## 2023-03-24 RX ORDER — CODEINE PHOSPHATE AND GUAIFENESIN 10; 100 MG/5ML; MG/5ML
5 SOLUTION ORAL
Qty: 40 ML | Refills: 0 | Status: SHIPPED | OUTPATIENT
Start: 2023-03-24 | End: 2023-04-01

## 2023-03-28 ENCOUNTER — VIRTUAL VISIT (OUTPATIENT)
Dept: NEUROLOGY | Age: 43
End: 2023-03-28
Payer: COMMERCIAL

## 2023-03-28 DIAGNOSIS — G43.909 MIGRAINE WITHOUT STATUS MIGRAINOSUS, NOT INTRACTABLE, UNSPECIFIED MIGRAINE TYPE: ICD-10-CM

## 2023-03-28 PROCEDURE — 99213 OFFICE O/P EST LOW 20 MIN: CPT | Performed by: NURSE PRACTITIONER

## 2023-03-28 RX ORDER — FREMANEZUMAB-VFRM 225 MG/1.5ML
225 INJECTION SUBCUTANEOUS
Qty: 1.5 ML | Refills: 6 | OUTPATIENT
Start: 2023-03-28

## 2023-03-28 RX ORDER — ONDANSETRON 4 MG/1
TABLET, ORALLY DISINTEGRATING ORAL
Qty: 12 TABLET | Refills: 0 | Status: CANCELLED | OUTPATIENT
Start: 2023-03-28

## 2023-03-28 RX ORDER — FREMANEZUMAB-VFRM 225 MG/1.5ML
225 INJECTION SUBCUTANEOUS
Qty: 1.5 ML | Refills: 6 | Status: SHIPPED | OUTPATIENT
Start: 2023-03-28

## 2023-03-28 RX ORDER — ONDANSETRON 4 MG/1
TABLET, ORALLY DISINTEGRATING ORAL
Qty: 30 TABLET | Refills: 2 | Status: SHIPPED | OUTPATIENT
Start: 2023-03-28

## 2023-03-28 RX ORDER — ONDANSETRON 4 MG/1
TABLET, ORALLY DISINTEGRATING ORAL
Qty: 30 TABLET | Refills: 2 | OUTPATIENT
Start: 2023-03-28

## 2023-03-28 RX ORDER — FREMANEZUMAB-VFRM 225 MG/1.5ML
225 INJECTION SUBCUTANEOUS
Qty: 1.5 ML | Refills: 5 | Status: CANCELLED | OUTPATIENT
Start: 2023-03-28

## 2023-03-28 NOTE — PROGRESS NOTES
[address] [facility],[address]  [address]   [address] [place] [address] [facility]   [phone] Office   [phone] Fax           Date:  23     Name:  Amaury Berrios  :  1980  MRN:  [id number]     PCP:  Marlys Dobbs MD    Renetta Subramanian is a 38 y.o. female who was seen by synchronous (real-time) audio-video technology on 3/28/2023 for Migraine    Subjective:   Has been getting an intense pressure pain, like cramp, on one side of her head or the other, usually the right. This is occurring about once a month for the last couple of months. Since her last visit, she has had this about 3-4 times. This is not associated with any other symptoms. She can abort this with Fiorinal and/or Advil. Her last migraine was around [place]. She has not had any other migraines. Recap from LOV:  Chronic daily headache with underlying migraine has improved significantly since starting Ajovy. She will continue with this monthly. She can continue with Fiorinal for acute treatment. Follow up in six months. Current Outpatient Medications   Medication Sig    guaiFENesin-codeine (ROBITUSSIN AC) 100-10 mg/5 mL solution Take 5 mL by mouth nightly as needed for Cough for up to 8 days. Max Daily Amount: 5 mL. montelukast (SINGULAIR) 10 mg tablet Take 1 Tablet by mouth daily. pantoprazole (PROTONIX) 40 mg tablet TAKE ONE TABLET BY MOUTH DAILY    fluticasone propion-salmeteroL (ADVAIR/WIXELA) 250-50 mcg/dose diskus inhaler Take 1 Puff by inhalation two (2) times a day. butalbital-acetaminophen-caffeine (FIORICET, ESGIC) -40 mg per tablet TAKE ONE TABLET BY MOUTH EVERY 6 HOURS AS NEEDED FOR PAIN    clonazePAM (KlonoPIN) 0.5 mg tablet Take 1 Tablet by mouth two (2) times daily as needed for Anxiety. Max Daily Amount: 1 mg.    bacitracin zinc (BACITRACIN) ointment Apply  to affected area two (2) times a day.     buPROPion XL (WELLBUTRIN XL) 300 mg XL tablet Take 1 Tablet by mouth every morning. albuterol (PROVENTIL HFA, VENTOLIN HFA, PROAIR HFA) 90 mcg/actuation inhaler Take 1 Puff by inhalation every four (4) hours as needed for Wheezing. ondansetron (ZOFRAN ODT) 4 mg disintegrating tablet DISSOLVE 1 TABLET ON THE TONGUE EVERY 8 HOURS FOR UP TO 12 DOSES AS NEEDED FOR NAUSEA    hydroCHLOROthiazide (HYDRODIURIL) 25 mg tablet Take 1 Tablet by mouth daily. fremanezumab-vfrm (Ajovy Autoinjector) 225 mg/1.5 mL auto-injector 1.5 mL by SubCUTAneous route every month. Cetirizine (ZyrTEC) 10 mg cap Take  by mouth. acetaminophen (TYLENOL) 325 mg tablet Take  by mouth every four (4) hours as needed for Pain. B.infantis-B.ani-B.long-B.bifi (Probiotic 4X) 10-15 mg TbEC Take  by mouth.    levonorgestreL (Mirena) 20 mcg/24 hours (6 yrs) 52 mg IUD 1 Device by IntraUTERine route once. No current facility-administered medications for this visit. No Known Allergies   Past Medical History:   Diagnosis Date    ADHD     Dysplasia of cervix, low grade (ORION 1) 2/23/12    colpo    Encounter for IUD removal 08/19/2016    Mirena    Encounter for Papanicolaou smear for cervical cancer screening 02/04/2022    neg/hpv neg    Essential hypertension     10 years ago    GERD (gastroesophageal reflux disease)     trying to control with diet since pregnant    H/O abnormal Pap smear 11/8/12;2/8/12    LGSIL, HPV positive    Headache     Headache(784.0)     IUD (intrauterine device) in place 03/11/2014    Mirena    Kidney disease     kidney stones 2015    Migraine headache     without aura    pap smear for 2/1/11;2/5/13; 4/1/15;2/26/19    neg, HPV neg,no ECC; neg, HPV neg, Negative, HPV negative; neg pap and neg HPV     Past Surgical History:   Procedure Laterality Date    HX COLPOSCOPY  2/23/12    ORION 1    HX HEENT      wisdom teeth    HX LITHOTRIPSY      HX WISDOM TEETH EXTRACTION        reports that she has never smoked.  She has never used smokeless tobacco. She reports current alcohol use of about 2.0 standard drinks per week. She reports that she does not use drugs. family history includes Alcohol abuse in her maternal grandfather and paternal grandmother; Allergic Rhinitis in her brother and brother; Asthma in her brother; Breast Cancer in an other family member; Cancer in her father and mother; Depression in her brother, brother, maternal grandfather, and mother; Diabetes in her father; Elevated Lipids in her father; Heart Disease in her father; Hypertension in her brother and father; Migraines in her brother; Other in her brother; Stroke in her paternal grandmother. ROS    Objective:     Patient-Reported Vitals 10/21/2022   Patient-Reported Weight 247lb   Patient-Reported Temperature 99        General:  Well defined, nourished, and groomed individual in no acute distress. Psych:  Good mood and bright affect    NEUROLOGICAL EXAMINATION:     Mental Status:   Alert and oriented to person, place, and time with recent and remote memory intact. Attention span and concentration are normal. Speech is fluent with a full fund of knowledge. Cranial Nerves:  I: smell Not tested   II: visual fields Not assessed   II: pupils Equal, round, reactive to light   II: optic disc Not assessed   III,VII: ptosis none   III,IV,VI: extraocular muscles  Full ROM   V: mastication normal   V: facial light touch sensation  Not assessed   VII: facial muscle function   symmetric   VIII: hearing symmetric   IX: soft palate elevation  normal   XI: trapezius strength  Not assessed   XI: sternocleidomastoid strength Not assessed   XI: neck flexion strength  Not assessed   XII: tongue  midline     Motor Examination: Normal tone and bulk. Strength was not assessed      Sensory exam:  Not assessed     Coordination:  No resting or intention tremor    Gait and Station:  Steady while walking. Normal arm swing. No pronator drift.    No muscle wasting or fasiculations noted. Reflexes:  Not assessed    Assessment & Plan:       ICD-10-CM ICD-9-CM    1. Migraine without status migrainosus, not intractable, unspecified migraine type  G43.909 346.90 ondansetron (ZOFRAN ODT) 4 mg disintegrating tablet      fremanezumab-vfrm (Ajovy Autoinjector) 225 mg/1.5 mL auto-injector        Chronic daily headache with underlying migraine has improved significantly since starting Ajovy. She is having one migraine every couple of months that are less intense and easily aborted with Fioricet. She will continue with this Ajovy monthly. She can continue with Fiorinal for acute treatment. Follow up in six months or sooner if needed. We discussed the expected course, resolution and complications of the diagnosis(es) in detail. Medication risks, benefits, costs, interactions, and alternatives were discussed as indicated. I advised her to contact the office if her condition worsens, changes or fails to improve as anticipated. She expressed understanding with the diagnosis(es) and plan. Liu Cea, was evaluated through a synchronous (real-time) audio-video encounter. The patient (or guardian if applicable) is aware that this is a billable service, which includes applicable co-pays. This Virtual Visit was conducted with patient's (and/or legal guardian's) consent. The visit was conducted pursuant to the emergency declaration under the [address], [address] [place] [address] wa[facility] authority and the [employer] and [employer] General Act. Patient identification was verified, and a caregiver was present when appropriate.   The patient was located at: Home: [address] [facility] [id number]  The provider was located at: Home: [address] [place] [address]

## 2023-03-31 ENCOUNTER — OFFICE VISIT (OUTPATIENT)
Dept: INTERNAL MEDICINE CLINIC | Age: 43
End: 2023-03-31
Attending: INTERNAL MEDICINE
Payer: COMMERCIAL

## 2023-03-31 ENCOUNTER — HOSPITAL ENCOUNTER (OUTPATIENT)
Dept: GENERAL RADIOLOGY | Age: 43
Discharge: HOME | End: 2023-03-31
Attending: INTERNAL MEDICINE
Payer: COMMERCIAL

## 2023-03-31 VITALS
OXYGEN SATURATION: 96 % | SYSTOLIC BLOOD PRESSURE: 139 MMHG | HEART RATE: 87 BPM | BODY MASS INDEX: 42.65 KG/M2 | HEIGHT: 64 IN | WEIGHT: 249.8 LBS | TEMPERATURE: 98.1 F | RESPIRATION RATE: 16 BRPM | DIASTOLIC BLOOD PRESSURE: 88 MMHG

## 2023-03-31 DIAGNOSIS — J01.00 SUBACUTE MAXILLARY SINUSITIS: ICD-10-CM

## 2023-03-31 DIAGNOSIS — R05.2 SUBACUTE COUGH: ICD-10-CM

## 2023-03-31 DIAGNOSIS — R05.2 SUBACUTE COUGH: Primary | ICD-10-CM

## 2023-03-31 DIAGNOSIS — I10 HYPERTENSION, UNSPECIFIED TYPE: ICD-10-CM

## 2023-03-31 DIAGNOSIS — F32.A ANXIETY AND DEPRESSION: ICD-10-CM

## 2023-03-31 DIAGNOSIS — J45.40 MODERATE PERSISTENT REACTIVE AIRWAY DISEASE WITHOUT COMPLICATION: ICD-10-CM

## 2023-03-31 DIAGNOSIS — F41.9 ANXIETY AND DEPRESSION: ICD-10-CM

## 2023-03-31 PROCEDURE — 71046 X-RAY EXAM CHEST 2 VIEWS: CPT

## 2023-03-31 RX ORDER — PREDNISONE 20 MG/1
TABLET ORAL
Qty: 14 TABLET | Refills: 0 | Status: SHIPPED | OUTPATIENT
Start: 2023-03-31

## 2023-03-31 NOTE — PROGRESS NOTES
Ladarius Rhodes is a 38 y.o. female who presents today for Cough (RM19// pt presenting today for persistent cough and congestion x 2-3 month)  . She has a history of   Patient Active Problem List   Diagnosis Code    Chronic daily headache R51.9    H/O abnormal Pap smear Z87.898    History of kidney stones Z87.442    Weight gain R63.5    Menorrhagia with regular cycle N92.0    Obesity (BMI 30-39. 9) E66.9    Irritable bowel syndrome with diarrhea K58.0    Gastroesophageal reflux disease K21.9    Severe obesity (HCC) E66.01    Chronic hypertension with superimposed preeclampsia O11.9   . Today patient is here for follow-up. Upper respiratory illness:  Ladarius Rhodes presents with complaints of congestion, productive cough, and hoarseness for 7 weeks. no nausea and no vomiting . she has not had  fever, chills, and hoarseness. Symptoms are moderate. Over-the-counter remedies including: Nighttime antihistamine. Patient has been seen several times for this upper respiratory problem. She has been placed on azithromycin on March 22 and previously was placed on Ceftin on March 7. She had also been placed on azithromycin and early February. The seem to have helped a bit. Has been started on Advair and also prescribed Singulair. Headaches and sinus pressures seem to have improved, but she continues to have coughing and mucus production. Denies any elbert shortness of breath. Hypertension- BP is stable. Hypertension ROS: taking medications as instructed, no medication side effects noted, no TIA's, no chest pain on exertion, no dyspnea on exertion, no swelling of ankles     reports that she has never smoked. She has never used smokeless tobacco.    reports current alcohol use of about 2.0 standard drinks per week. BP Readings from Last 2 Encounters:   03/31/23 139/88   03/07/23 (!) 146/82     Mood has been better since being on the Wellbutrin.     ROS  Review of Systems   Constitutional: Negative for chills, fever and weight loss. HENT:  Positive for congestion. Negative for sore throat. Eyes:  Negative for blurred vision, double vision and photophobia. Respiratory:  Positive for cough, sputum production and wheezing. Negative for shortness of breath. Cardiovascular:  Negative for chest pain, palpitations and leg swelling. Gastrointestinal:  Negative for abdominal pain, constipation, diarrhea, heartburn, nausea and vomiting. Genitourinary:  Negative for dysuria, frequency and urgency. Musculoskeletal:  Negative for joint pain and myalgias. Skin:  Negative for rash. Neurological: Negative. Negative for headaches. Endo/Heme/Allergies:  Does not bruise/bleed easily. Psychiatric/Behavioral:  Negative for memory loss and suicidal ideas. Visit Vitals  /88 (BP 1 Location: Left upper arm, BP Patient Position: Sitting, BP Cuff Size: Large adult)   Pulse 87   Temp 98.1 °F (36.7 °C) (Oral)   Resp 16   Ht 5' 4\" (1.626 m)   Wt 249 lb 12.8 oz (113.3 kg)   SpO2 96%   BMI 42.88 kg/m²       Physical Exam  Constitutional:       Appearance: She is well-developed. HENT:      Head: Normocephalic and atraumatic. Right Ear: Tympanic membrane normal.      Left Ear: Tympanic membrane normal.      Nose: Nose normal.   Cardiovascular:      Rate and Rhythm: Normal rate and regular rhythm. Heart sounds: No murmur heard. Pulmonary:      Effort: Pulmonary effort is normal. No respiratory distress. Comments: Relatively clear breath sounds. No egophony. No wheezing. Harsh cough during exam  Skin:     General: Skin is warm and dry. Neurological:      Mental Status: She is alert and oriented to person, place, and time.    Psychiatric:         Behavior: Behavior normal.         Current Outpatient Medications   Medication Sig    ondansetron (ZOFRAN ODT) 4 mg disintegrating tablet DISSOLVE 1 TABLET ON THE TONGUE EVERY 8 HOURS FOR UP TO 12 DOSES AS NEEDED FOR NAUSEA fremanezumab-vfrm (Ajovy Autoinjector) 225 mg/1.5 mL auto-injector 1.5 mL by SubCUTAneous route every month.    montelukast (SINGULAIR) 10 mg tablet Take 1 Tablet by mouth daily. pantoprazole (PROTONIX) 40 mg tablet TAKE ONE TABLET BY MOUTH DAILY    fluticasone propion-salmeteroL (ADVAIR/WIXELA) 250-50 mcg/dose diskus inhaler Take 1 Puff by inhalation two (2) times a day. butalbital-acetaminophen-caffeine (FIORICET, ESGIC) -40 mg per tablet TAKE ONE TABLET BY MOUTH EVERY 6 HOURS AS NEEDED FOR PAIN    clonazePAM (KlonoPIN) 0.5 mg tablet Take 1 Tablet by mouth two (2) times daily as needed for Anxiety. Max Daily Amount: 1 mg.    bacitracin zinc (BACITRACIN) ointment Apply  to affected area two (2) times a day. buPROPion XL (WELLBUTRIN XL) 300 mg XL tablet Take 1 Tablet by mouth every morning. albuterol (PROVENTIL HFA, VENTOLIN HFA, PROAIR HFA) 90 mcg/actuation inhaler Take 1 Puff by inhalation every four (4) hours as needed for Wheezing. hydroCHLOROthiazide (HYDRODIURIL) 25 mg tablet Take 1 Tablet by mouth daily. Cetirizine (ZyrTEC) 10 mg cap Take  by mouth. acetaminophen (TYLENOL) 325 mg tablet Take  by mouth every four (4) hours as needed for Pain. B.infantis-B.ani-B.long-B.bifi (Probiotic 4X) 10-15 mg TbEC Take  by mouth.    levonorgestreL (Mirena) 20 mcg/24 hours (6 yrs) 52 mg IUD 1 Device by IntraUTERine route once. guaiFENesin-codeine (ROBITUSSIN AC) 100-10 mg/5 mL solution Take 5 mL by mouth nightly as needed for Cough for up to 8 days. Max Daily Amount: 5 mL. (Patient not taking: Reported on 3/31/2023)     No current facility-administered medications for this visit.         Past Medical History:   Diagnosis Date    ADHD     Dysplasia of cervix, low grade (ORION 1) 2/23/12    colpo    Encounter for IUD removal 08/19/2016    Mirena    Encounter for Papanicolaou smear for cervical cancer screening 02/04/2022    neg/hpv neg    Essential hypertension     10 years ago    GERD (gastroesophageal reflux disease)     trying to control with diet since pregnant    H/O abnormal Pap smear 11/8/12;2/8/12    LGSIL, HPV positive    Headache     Headache(784.0)     IUD (intrauterine device) in place 03/11/2014    Mirena    Kidney disease     kidney stones 2015    Migraine headache     without aura    pap smear for 2/1/11;2/5/13; 4/1/15;2/26/19    neg, HPV neg,no ECC; neg, HPV neg, Negative, HPV negative; neg pap and neg HPV      Past Surgical History:   Procedure Laterality Date    HX COLPOSCOPY  2/23/12    ORION 1    HX HEENT      wisdom teeth    HX LITHOTRIPSY      HX WISDOM TEETH EXTRACTION        Social History     Tobacco Use    Smoking status: Never    Smokeless tobacco: Never   Substance Use Topics    Alcohol use: Yes     Alcohol/week: 2.0 standard drinks     Types: 1 Cans of beer, 1 Shots of liquor per week     Comment: social      Family History   Problem Relation Age of Onset    Hypertension Father     Heart Disease Father     Diabetes Father     Elevated Lipids Father     Cancer Father         eye and pancreatic     Cancer Mother         Cervical,Uterine,& Ovarian (pt questions ovarian but got cancer after copper 7 IUD)    Depression Mother     Allergic Rhinitis Brother     Asthma Brother     Other Brother         Chron's    Hypertension Brother     Depression Brother     Alcohol abuse Maternal Grandfather     Depression Maternal Grandfather     Alcohol abuse Paternal Grandmother     Stroke Paternal Grandmother     Migraines Brother     Allergic Rhinitis Brother     Depression Brother     Breast Cancer Other         No Known Allergies     Assessment/Plan  Diagnoses and all orders for this visit:    1. Subacute cough-sinus pressure and discomfort has improved. She has been on 3 rounds of antibiotics in the last month and a half. Will give her oral steroids and slowly taper them over 12 days. Patient to see me back in 2 weeks. Sitter CT sinus imaging if this persists.   In the meantime continue montelukast ICS/LABA and as needed albuterol  -     predniSONE (DELTASONE) 20 mg tablet; Take two tablets for 4 days, then one for 4 days then 1/2 for 4 days then stop. -     XR CHEST PA LAT; Future    2. Subacute maxillary sinusitis  -     predniSONE (DELTASONE) 20 mg tablet; Take two tablets for 4 days, then one for 4 days then 1/2 for 4 days then stop. -     XR CHEST PA LAT; Future    3. Moderate persistent reactive airway disease without complication  -     predniSONE (DELTASONE) 20 mg tablet; Take two tablets for 4 days, then one for 4 days then 1/2 for 4 days then stop. -     XR CHEST PA LAT; Future    4. Anxiety and depression-mood much better with Wellbutrin    5. Hypertension, unspecified type-Home readings stable          Leticia Drake MD  3/31/2023    This note was created with the help of speech recognition software [employer]) and may contain some 'sound alike' errors.

## 2023-03-31 NOTE — PROGRESS NOTES
Bubba Philip  Identified pt with two pt identifiers(name and ). Chief Complaint   Patient presents with    Cough     RM19// pt presenting today for persistent cough and congestion x 2-3 month       1. Have you been to the ER, urgent care clinic since your last visit? Hospitalized since your last visit? NO    2. Have you seen or consulted any other health care providers outside of the [address] [place] [address] since your last visit? Include any pap smears or colon screening. NO      Provider notified of reason for visit, vitals and flowsheets obtained on patients.      Patient received paperwork for advance directive during previous visit but has not completed at this time     Reviewed record In preparation for visit, huddled with provider and have obtained necessary documentation      Health Maintenance Due   Topic    Varicella Vaccine (1 of 2 - 2-dose childhood series)    COVID-19 Vaccine (3 - Booster for Pfizer series)    Lipid Screen        Wt Readings from Last 3 Encounters:   23 249 lb 12.8 oz (113.3 kg)   23 251 lb 3.2 oz (113.9 kg)   23 250 lb 12.8 oz (113.8 kg)     Temp Readings from Last 3 Encounters:   23 98.1 °F (36.7 °C) (Oral)   23 98 °F (36.7 °C) (Oral)   23 98.1 °F (36.7 °C) (Oral)     BP Readings from Last 3 Encounters:   23 139/88   23 (!) 146/82   23 137/88     Pulse Readings from Last 3 Encounters:   23 87   23 93   23 93     Vitals:    23 0913   BP: 139/88   Pulse: 87   Resp: 16   Temp: 98.1 °F (36.7 °C)   TempSrc: Oral   SpO2: 96%   Weight: 249 lb 12.8 oz (113.3 kg)   Height: 5' 4\" (1.626 m)         Learning Assessment:  :     Learning Assessment 2018   PRIMARY LEARNER Patient Patient   HIGHEST LEVEL OF EDUCATION - PRIMARY LEARNER  - 4 YEARS OF COLLEGE   BARRIERS PRIMARY LEARNER - NONE   CO-LEARNER CAREGIVER - No   PRIMARY LANGUAGE ENGLISH ENGLISH   LEARNER PREFERENCE PRIMARY READING LISTENING     - READING   ANSWERED BY Radha Diane self   RELATIONSHIP SELF SELF       Depression Screening:  :     3 most recent PHQ Screens 3/31/2023   Little interest or pleasure in doing things Not at all   Feeling down, depressed, irritable, or hopeless Not at all   Total Score PHQ 2 0       Fall Risk Assessment:  :     Fall Risk Assessment, last 12 mths 12/16/2021   Able to walk? Yes   Fall in past 12 months? 0   Do you feel unsteady? 0   Are you worried about falling 0       Abuse Screening:  :     Abuse Screening Questionnaire 12/16/2021 2/12/2021 4/15/2019 11/13/2017   Do you ever feel afraid of your partner? N N N N   Are you in a relationship with someone who physically or mentally threatens you? N N N N   Is it safe for you to go home? Y Y Y Y       ADL Screening:  :     ADL Assessment 4/15/2019   Feeding yourself No Help Needed   Getting from bed to chair No Help Needed   Getting dressed No Help Needed   Bathing or showering No Help Needed   Walk across the room (includes cane/walker) No Help Needed   Using the telphone No Help Needed   Taking your medications No Help Needed   Preparing meals No Help Needed   Managing money (expenses/bills) No Help Needed   Moderately strenuous housework (laundry) No Help Needed   Shopping for personal items (toiletries/medicines) No Help Needed   Shopping for groceries No Help Needed   Driving No Help Needed   Climbing a flight of stairs No Help Needed   Getting to places beyond walking distances No Help Needed         Medication reconciliation up to date and corrected with patient at this time.

## 2023-04-21 ENCOUNTER — OFFICE VISIT (OUTPATIENT)
Dept: INTERNAL MEDICINE CLINIC | Age: 43
End: 2023-04-21

## 2023-04-21 VITALS
OXYGEN SATURATION: 97 % | RESPIRATION RATE: 15 BRPM | TEMPERATURE: 98.2 F | DIASTOLIC BLOOD PRESSURE: 85 MMHG | HEIGHT: 64 IN | WEIGHT: 248.8 LBS | HEART RATE: 86 BPM | BODY MASS INDEX: 42.47 KG/M2 | SYSTOLIC BLOOD PRESSURE: 120 MMHG

## 2023-04-21 DIAGNOSIS — I10 HYPERTENSION, UNSPECIFIED TYPE: Primary | ICD-10-CM

## 2023-04-21 DIAGNOSIS — E66.9 OBESITY (BMI 30-39.9): ICD-10-CM

## 2023-04-21 DIAGNOSIS — R25.1 TREMOR: ICD-10-CM

## 2023-04-21 DIAGNOSIS — F32.A ANXIETY AND DEPRESSION: ICD-10-CM

## 2023-04-21 DIAGNOSIS — F41.9 ANXIETY AND DEPRESSION: ICD-10-CM

## 2023-04-21 DIAGNOSIS — R05.8 OTHER COUGH: ICD-10-CM

## 2023-04-21 DIAGNOSIS — J30.1 SEASONAL ALLERGIC RHINITIS DUE TO POLLEN: ICD-10-CM

## 2023-04-21 NOTE — PROGRESS NOTES
Nancy Estrada  Identified pt with two pt identifiers(name and ). Chief Complaint   Patient presents with    Follow-up     RM21// Pt presenting today following up on a cough, pt states cough is better. Concerned with  experiencing x 2 weeks; has questions about the [facility] program.       1. Have you been to the ER, urgent care clinic since your last visit? Hospitalized since your last visit? NO    2. Have you seen or consulted any other health care providers outside of the [address] [place] [address] since your last visit? Include any pap smears or colon screening. NO      Provider notified of reason for visit, vitals and flowsheets obtained on patients.      Patient received paperwork for advance directive during previous visit but has not completed at this time     Reviewed record In preparation for visit, huddled with provider and have obtained necessary documentation      Health Maintenance Due   Topic    Varicella Vaccine (1 of 2 - 2-dose childhood series)    COVID-19 Vaccine (3 - Booster for Pfizer series)    Lipid Screen        Wt Readings from Last 3 Encounters:   23 248 lb 12.8 oz (112.9 kg)   23 249 lb 12.8 oz (113.3 kg)   23 251 lb 3.2 oz (113.9 kg)     Temp Readings from Last 3 Encounters:   23 98.2 °F (36.8 °C) (Oral)   23 98.1 °F (36.7 °C) (Oral)   23 98 °F (36.7 °C) (Oral)     BP Readings from Last 3 Encounters:   23 (!) 123/91   23 139/88   23 (!) 146/82     Pulse Readings from Last 3 Encounters:   23 86   23 87   23 93     Vitals:    23 1119   BP: (!) 123/91   Pulse: 86   Resp: 15   Temp: 98.2 °F (36.8 °C)   TempSrc: Oral   SpO2: 97%   Weight: 248 lb 12.8 oz (112.9 kg)   Height: 5' 4\" (1.626 m)   PainSc:   0 - No pain         Learning Assessment:  :     Learning Assessment 2018   PRIMARY LEARNER Patient Patient   HIGHEST LEVEL OF EDUCATION - PRIMARY LEARNER  - [address] [place] [address] PRIMARY LEARNER - NONE   CO-LEARNER CAREGIVER - No   PRIMARY LANGUAGE ENGLISH ENGLISH   LEARNER PREFERENCE PRIMARY READING LISTENING     - READING   ANSWERED BY Maryyeimi Heal self   RELATIONSHIP SELF SELF       Depression Screening:  :     3 most recent PHQ Screens 3/31/2023   Little interest or pleasure in doing things Not at all   Feeling down, depressed, irritable, or hopeless Not at all   Total Score PHQ 2 0       Fall Risk Assessment:  :     Fall Risk Assessment, last 12 mths 12/16/2021   Able to walk? Yes   Fall in past 12 months? 0   Do you feel unsteady? 0   Are you worried about falling 0       Abuse Screening:  :     Abuse Screening Questionnaire 12/16/2021 2/12/2021 4/15/2019 11/13/2017   Do you ever feel afraid of your partner? N N N N   Are you in a relationship with someone who physically or mentally threatens you? N N N N   Is it safe for you to go home? Y Y Y Y       ADL Screening:  :     ADL Assessment 4/15/2019   Feeding yourself No Help Needed   Getting from bed to chair No Help Needed   Getting dressed No Help Needed   Bathing or showering No Help Needed   Walk across the room (includes cane/walker) No Help Needed   Using the telphone No Help Needed   Taking your medications No Help Needed   Preparing meals No Help Needed   Managing money (expenses/bills) No Help Needed   Moderately strenuous housework (laundry) No Help Needed   Shopping for personal items (toiletries/medicines) No Help Needed   Shopping for groceries No Help Needed   Driving No Help Needed   Climbing a flight of stairs No Help Needed   Getting to places beyond walking distances No Help Needed         Medication reconciliation up to date and corrected with patient at this time.

## 2023-04-21 NOTE — PROGRESS NOTES
Juan Diego Kerns is a 38 y.o. female who presents today for Follow-up (RM21// Pt presenting today following up on a cough, pt states cough is better. Concerned with  experiencing x 2 weeks; has questions about the [facility] program.)  . She has a history of   Patient Active Problem List   Diagnosis Code    Chronic daily headache R51.9    H/O abnormal Pap smear Z87.898    History of kidney stones Z87.442    Weight gain R63.5    Menorrhagia with regular cycle N92.0    Obesity (BMI 30-39. 9) E66.9    Irritable bowel syndrome with diarrhea K58.0    Gastroesophageal reflux disease K21.9    Severe obesity (HCC) E66.01    Chronic hypertension with superimposed preeclampsia O11.9   . Today patient is here for follow-up.   she does have other concerns. Asking if it would be safe for her to use Golo. Does seem to have an occasional tremor. Reports that she just recently noticed this. We discussed that prednisone and albuterol may be causing this problem. Cough: Last visit patient is having subacute to chronic cough. Would patient extend course of prednisone. We will continue ICS/LABA as well as Singulair. Since she Reports much better. Still on ICS. Hypertension- BP is stable. Hypertension ROS: taking medications as instructed, no medication side effects noted, no TIA's, no chest pain on exertion, no dyspnea on exertion, no swelling of ankles     reports that she has never smoked. She has never used smokeless tobacco.    reports current alcohol use of about 2.0 standard drinks per week. BP Readings from Last 2 Encounters:   04/21/23 120/85   03/31/23 139/88     Depression: mood has been overall stable. .  Anxiety still high at times. ROS  Review of Systems   Constitutional:  Negative for chills, fever and weight loss. HENT:  Negative for congestion and sore throat. Eyes:  Negative for blurred vision, double vision and photophobia. Respiratory:  Positive for cough (Much better). Negative for shortness of breath. Cardiovascular:  Negative for chest pain, palpitations and leg swelling. Gastrointestinal:  Negative for abdominal pain, constipation, diarrhea, heartburn, nausea and vomiting. Genitourinary:  Negative for dysuria, frequency and urgency. Musculoskeletal:  Negative for joint pain and myalgias. Skin:  Negative for rash. Neurological: Negative. Negative for headaches. Endo/Heme/Allergies:  Does not bruise/bleed easily. Psychiatric/Behavioral:  Negative for memory loss and suicidal ideas. The patient is nervous/anxious. Visit Vitals  /85   Pulse 86   Temp 98.2 °F (36.8 °C) (Oral)   Resp 15   Ht 5' 4\" (1.626 m)   Wt 248 lb 12.8 oz (112.9 kg)   SpO2 97%   BMI 42.71 kg/m²       Physical Exam  Constitutional:       Appearance: She is well-developed. HENT:      Head: Normocephalic and atraumatic. Cardiovascular:      Rate and Rhythm: Normal rate and regular rhythm. Heart sounds: No murmur heard. Pulmonary:      Effort: Pulmonary effort is normal. No respiratory distress. Breath sounds: Normal breath sounds. No stridor. Skin:     General: Skin is warm and dry. Neurological:      Mental Status: She is alert and oriented to person, place, and time. Psychiatric:         Behavior: Behavior normal.         Current Outpatient Medications   Medication Sig    ondansetron (ZOFRAN ODT) 4 mg disintegrating tablet DISSOLVE 1 TABLET ON THE TONGUE EVERY 8 HOURS FOR UP TO 12 DOSES AS NEEDED FOR NAUSEA    fremanezumab-vfrm (Ajovy Autoinjector) 225 mg/1.5 mL auto-injector 1.5 mL by SubCUTAneous route every month.    montelukast (SINGULAIR) 10 mg tablet Take 1 Tablet by mouth daily. pantoprazole (PROTONIX) 40 mg tablet TAKE ONE TABLET BY MOUTH DAILY    fluticasone propion-salmeteroL (ADVAIR/WIXELA) 250-50 mcg/dose diskus inhaler Take 1 Puff by inhalation two (2) times a day.     butalbital-acetaminophen-caffeine (FIORICET, ESGIC) -40 mg per tablet TAKE ONE TABLET BY MOUTH EVERY 6 HOURS AS NEEDED FOR PAIN    clonazePAM (KlonoPIN) 0.5 mg tablet Take 1 Tablet by mouth two (2) times daily as needed for Anxiety. Max Daily Amount: 1 mg.    bacitracin zinc (BACITRACIN) ointment Apply  to affected area two (2) times a day. buPROPion XL (WELLBUTRIN XL) 300 mg XL tablet Take 1 Tablet by mouth every morning. albuterol (PROVENTIL HFA, VENTOLIN HFA, PROAIR HFA) 90 mcg/actuation inhaler Take 1 Puff by inhalation every four (4) hours as needed for Wheezing. hydroCHLOROthiazide (HYDRODIURIL) 25 mg tablet Take 1 Tablet by mouth daily. Cetirizine (ZyrTEC) 10 mg cap Take  by mouth. acetaminophen (TYLENOL) 325 mg tablet Take  by mouth every four (4) hours as needed for Pain. B.infantis-B.ani-B.long-B.bifi (Probiotic 4X) 10-15 mg TbEC Take  by mouth.    levonorgestreL (Mirena) 20 mcg/24 hours (6 yrs) 52 mg IUD 1 Device by IntraUTERine route once. No current facility-administered medications for this visit.         Past Medical History:   Diagnosis Date    ADHD     Dysplasia of cervix, low grade (ORION 1) 2/23/12    colpo    Encounter for IUD removal 08/19/2016    Mirena    Encounter for Papanicolaou smear for cervical cancer screening 02/04/2022    neg/hpv neg    Essential hypertension     10 years ago    GERD (gastroesophageal reflux disease)     trying to control with diet since pregnant    H/O abnormal Pap smear 11/8/12;2/8/12    LGSIL, HPV positive    Headache     Headache(784.0)     IUD (intrauterine device) in place 03/11/2014    Mirena    Kidney disease     kidney stones 2015    Migraine headache     without aura    pap smear for 2/1/11;2/5/13; 4/1/15;2/26/19    neg, HPV neg,no ECC; neg, HPV neg, Negative, HPV negative; neg pap and neg HPV      Past Surgical History:   Procedure Laterality Date    HX COLPOSCOPY  2/23/12    ORION 1    HX HEENT      wisdom teeth    HX LITHOTRIPSY      HX WISDOM TEETH EXTRACTION        Social History     Tobacco Use    Smoking status: Never    Smokeless tobacco: Never   Substance Use Topics    Alcohol use: Yes     Alcohol/week: 2.0 standard drinks     Types: 1 Cans of beer, 1 Shots of liquor per week     Comment: social      Family History   Problem Relation Age of Onset    Hypertension Father     Heart Disease Father     Diabetes Father     Elevated Lipids Father     Cancer Father         eye and pancreatic     Cancer Mother         Cervical,Uterine,& Ovarian (pt questions ovarian but got cancer after copper 7 IUD)    Depression Mother     Allergic Rhinitis Brother     Asthma Brother     Other Brother         Chron's    Hypertension Brother     Depression Brother     Alcohol abuse Maternal Grandfather     Depression Maternal Grandfather     Alcohol abuse Paternal Grandmother     Stroke Paternal Grandmother     Migraines Brother     Allergic Rhinitis Brother     Depression Brother     Breast Cancer Other         No Known Allergies     Assessment/Plan  Diagnoses and all orders for this visit:    1. Hypertension, unspecified type-overall stable. Tolerating therapy. Blood work today. -     METABOLIC PANEL, COMPREHENSIVE; Future  -     CBC WITH AUTOMATED DIFF; Future  -     LIPID PANEL; Future    2. Anxiety and depression-anxiety still high at times. Continue Wellbutrin and as needed benzodiazepine.  -     METABOLIC PANEL, COMPREHENSIVE; Future  -     CBC WITH AUTOMATED DIFF; Future  -     LIPID PANEL; Future    3. Other cough -overall much better. Continue ICS/LABA and allergy medications. As needed albuterol. 4. Seasonal allergic rhinitis due to pollen    5. Obesity (BMI 30-39.9) -safe for her to try Golo    6. Tremor -very mild and occasional.  We discussed that prednisone and albuterol may be causing this. If this persist could look into Wellbutrin. Charly Barker MD  4/21/2023    This note was created with the help of speech recognition software [employer]) and may contain some 'sound alike' errors.

## 2023-04-22 LAB
ALBUMIN SERPL-MCNC: 4.9 G/DL (ref 3.8–4.8)
ALBUMIN/GLOB SERPL: 2.3 {RATIO} (ref 1.2–2.2)
ALP SERPL-CCNC: 51 IU/L (ref 44–121)
ALT SERPL-CCNC: 21 IU/L (ref 0–32)
AST SERPL-CCNC: 20 IU/L (ref 0–40)
BASOPHILS # BLD AUTO: 0.1 X10E3/UL (ref 0–0.2)
BASOPHILS NFR BLD AUTO: 1 %
BILIRUB SERPL-MCNC: 0.3 MG/DL (ref 0–1.2)
BUN SERPL-MCNC: 10 MG/DL (ref 6–24)
BUN/CREAT SERPL: 13 (ref 9–23)
CALCIUM SERPL-MCNC: 9.5 MG/DL (ref 8.7–10.2)
CHLORIDE SERPL-SCNC: 100 MMOL/L (ref 96–106)
CHOLEST SERPL-MCNC: 124 MG/DL (ref 100–199)
CO2 SERPL-SCNC: 23 MMOL/L (ref 20–29)
CREAT SERPL-MCNC: 0.78 MG/DL (ref 0.57–1)
EGFRCR SERPLBLD CKD-EPI 2021: 98 ML/MIN/1.73
EOSINOPHIL # BLD AUTO: 0.1 X10E3/UL (ref 0–0.4)
EOSINOPHIL NFR BLD AUTO: 1 %
ERYTHROCYTE [DISTWIDTH] IN BLOOD BY AUTOMATED COUNT: 13.3 % (ref 11.7–15.4)
GLOBULIN SER CALC-MCNC: 2.1 G/DL (ref 1.5–4.5)
GLUCOSE SERPL-MCNC: 100 MG/DL (ref 70–99)
HCT VFR BLD AUTO: 42.2 % (ref 34–46.6)
HDLC SERPL-MCNC: 39 MG/DL
HGB BLD-MCNC: 14.3 G/DL (ref 11.1–15.9)
IMM GRANULOCYTES # BLD AUTO: 0 X10E3/UL (ref 0–0.1)
IMM GRANULOCYTES NFR BLD AUTO: 0 %
IMP & REVIEW OF LAB RESULTS: NORMAL
LDLC SERPL CALC-MCNC: 64 MG/DL (ref 0–99)
LYMPHOCYTES # BLD AUTO: 2.6 X10E3/UL (ref 0.7–3.1)
LYMPHOCYTES NFR BLD AUTO: 30 %
MCH RBC QN AUTO: 29.8 PG (ref 26.6–33)
MCHC RBC AUTO-ENTMCNC: 33.9 G/DL (ref 31.5–35.7)
MCV RBC AUTO: 88 FL (ref 79–97)
MONOCYTES # BLD AUTO: 0.7 X10E3/UL (ref 0.1–0.9)
MONOCYTES NFR BLD AUTO: 8 %
NEUTROPHILS # BLD AUTO: 5 X10E3/UL (ref 1.4–7)
NEUTROPHILS NFR BLD AUTO: 60 %
PLATELET # BLD AUTO: 295 X10E3/UL (ref 150–450)
POTASSIUM SERPL-SCNC: 3.3 MMOL/L (ref 3.5–5.2)
PROT SERPL-MCNC: 7 G/DL (ref 6–8.5)
RBC # BLD AUTO: 4.8 X10E6/UL (ref 3.77–5.28)
SODIUM SERPL-SCNC: 142 MMOL/L (ref 134–144)
TRIGL SERPL-MCNC: 116 MG/DL (ref 0–149)
VLDLC SERPL CALC-MCNC: 21 MG/DL (ref 5–40)
WBC # BLD AUTO: 8.4 X10E3/UL (ref 3.4–10.8)

## 2023-05-01 DIAGNOSIS — I10 HYPERTENSION, UNSPECIFIED TYPE: ICD-10-CM

## 2023-05-01 RX ORDER — HYDROCHLOROTHIAZIDE 25 MG/1
25 TABLET ORAL DAILY
Qty: 90 TABLET | Refills: 1 | Status: SHIPPED | OUTPATIENT
Start: 2023-05-01

## 2023-05-08 RX ORDER — FLUTICASONE PROPIONATE AND SALMETEROL 50; 250 UG/1; UG/1
POWDER RESPIRATORY (INHALATION)
Qty: 60 EACH | Refills: 1 | Status: SHIPPED | OUTPATIENT
Start: 2023-05-08

## 2023-05-18 ENCOUNTER — OFFICE VISIT (OUTPATIENT)
Age: 43
End: 2023-05-18
Payer: COMMERCIAL

## 2023-05-18 VITALS
RESPIRATION RATE: 16 BRPM | TEMPERATURE: 98.2 F | HEIGHT: 64 IN | DIASTOLIC BLOOD PRESSURE: 80 MMHG | SYSTOLIC BLOOD PRESSURE: 132 MMHG | BODY MASS INDEX: 43.19 KG/M2 | WEIGHT: 253 LBS | OXYGEN SATURATION: 96 % | HEART RATE: 85 BPM

## 2023-05-18 DIAGNOSIS — I10 ESSENTIAL (PRIMARY) HYPERTENSION: ICD-10-CM

## 2023-05-18 DIAGNOSIS — G47.00 INSOMNIA, UNSPECIFIED TYPE: ICD-10-CM

## 2023-05-18 DIAGNOSIS — E66.01 OBESITY, CLASS III, BMI 40-49.9 (MORBID OBESITY) (HCC): ICD-10-CM

## 2023-05-18 DIAGNOSIS — F41.9 ANXIETY: Primary | ICD-10-CM

## 2023-05-18 PROCEDURE — 3075F SYST BP GE 130 - 139MM HG: CPT | Performed by: INTERNAL MEDICINE

## 2023-05-18 PROCEDURE — 99214 OFFICE O/P EST MOD 30 MIN: CPT | Performed by: INTERNAL MEDICINE

## 2023-05-18 PROCEDURE — 3079F DIAST BP 80-89 MM HG: CPT | Performed by: INTERNAL MEDICINE

## 2023-05-18 RX ORDER — HYDROCHLOROTHIAZIDE 25 MG/1
25 TABLET ORAL DAILY
COMMUNITY
Start: 2023-05-02

## 2023-05-18 RX ORDER — AMITRIPTYLINE HYDROCHLORIDE 25 MG/1
25 TABLET, FILM COATED ORAL NIGHTLY
Qty: 30 TABLET | Refills: 5 | Status: SHIPPED | OUTPATIENT
Start: 2023-05-18

## 2023-05-18 RX ORDER — ONDANSETRON 4 MG/1
TABLET, ORALLY DISINTEGRATING ORAL
COMMUNITY
Start: 2021-01-14

## 2023-05-18 RX ORDER — CETIRIZINE HYDROCHLORIDE 10 MG/1
CAPSULE, LIQUID FILLED ORAL
COMMUNITY

## 2023-05-18 RX ORDER — MONTELUKAST SODIUM 10 MG/1
10 TABLET ORAL DAILY
COMMUNITY
Start: 2023-05-08

## 2023-05-18 RX ORDER — ACETAMINOPHEN 325 MG/1
TABLET ORAL EVERY 4 HOURS PRN
COMMUNITY

## 2023-05-18 RX ORDER — CLONAZEPAM 0.5 MG/1
0.5 TABLET ORAL 2 TIMES DAILY PRN
Qty: 30 TABLET | Refills: 2 | Status: SHIPPED | OUTPATIENT
Start: 2023-05-18 | End: 2023-06-17

## 2023-05-18 RX ORDER — BUPROPION HYDROCHLORIDE 300 MG/1
300 TABLET ORAL EVERY MORNING
COMMUNITY
Start: 2023-04-27

## 2023-05-18 RX ORDER — FREMANEZUMAB-VFRM 225 MG/1.5ML
INJECTION SUBCUTANEOUS
COMMUNITY
Start: 2023-04-28

## 2023-05-18 RX ORDER — LEVONORGESTREL 52 MG/1
20 INTRAUTERINE DEVICE INTRAUTERINE ONCE
COMMUNITY

## 2023-05-18 RX ORDER — CLONAZEPAM 0.5 MG/1
0.5 TABLET ORAL 2 TIMES DAILY PRN
COMMUNITY
Start: 2022-12-27 | End: 2023-05-18 | Stop reason: SDUPTHER

## 2023-05-18 RX ORDER — ALBUTEROL SULFATE 90 UG/1
1 AEROSOL, METERED RESPIRATORY (INHALATION) EVERY 4 HOURS PRN
COMMUNITY
Start: 2022-10-21

## 2023-05-18 RX ORDER — PANTOPRAZOLE SODIUM 40 MG/1
1 TABLET, DELAYED RELEASE ORAL DAILY
COMMUNITY
Start: 2023-02-21

## 2023-05-18 SDOH — ECONOMIC STABILITY: FOOD INSECURITY: WITHIN THE PAST 12 MONTHS, YOU WORRIED THAT YOUR FOOD WOULD RUN OUT BEFORE YOU GOT MONEY TO BUY MORE.: NEVER TRUE

## 2023-05-18 SDOH — ECONOMIC STABILITY: HOUSING INSECURITY
IN THE LAST 12 MONTHS, WAS THERE A TIME WHEN YOU DID NOT HAVE A STEADY PLACE TO SLEEP OR SLEPT IN A SHELTER (INCLUDING NOW)?: NO

## 2023-05-18 SDOH — ECONOMIC STABILITY: FOOD INSECURITY: WITHIN THE PAST 12 MONTHS, THE FOOD YOU BOUGHT JUST DIDN'T LAST AND YOU DIDN'T HAVE MONEY TO GET MORE.: NEVER TRUE

## 2023-05-18 SDOH — ECONOMIC STABILITY: INCOME INSECURITY: HOW HARD IS IT FOR YOU TO PAY FOR THE VERY BASICS LIKE FOOD, HOUSING, MEDICAL CARE, AND HEATING?: NOT VERY HARD

## 2023-05-18 ASSESSMENT — PATIENT HEALTH QUESTIONNAIRE - PHQ9
SUM OF ALL RESPONSES TO PHQ9 QUESTIONS 1 & 2: 0
2. FEELING DOWN, DEPRESSED OR HOPELESS: 0
1. LITTLE INTEREST OR PLEASURE IN DOING THINGS: 0
SUM OF ALL RESPONSES TO PHQ QUESTIONS 1-9: 0
9. THOUGHTS THAT YOU WOULD BE BETTER OFF DEAD, OR OF HURTING YOURSELF: 0
8. MOVING OR SPEAKING SO SLOWLY THAT OTHER PEOPLE COULD HAVE NOTICED. OR THE OPPOSITE, BEING SO FIGETY OR RESTLESS THAT YOU HAVE BEEN MOVING AROUND A LOT MORE THAN USUAL: 0
SUM OF ALL RESPONSES TO PHQ QUESTIONS 1-9: 0
5. POOR APPETITE OR OVEREATING: 0
7. TROUBLE CONCENTRATING ON THINGS, SUCH AS READING THE NEWSPAPER OR WATCHING TELEVISION: 0
SUM OF ALL RESPONSES TO PHQ QUESTIONS 1-9: 0
10. IF YOU CHECKED OFF ANY PROBLEMS, HOW DIFFICULT HAVE THESE PROBLEMS MADE IT FOR YOU TO DO YOUR WORK, TAKE CARE OF THINGS AT HOME, OR GET ALONG WITH OTHER PEOPLE: 0
SUM OF ALL RESPONSES TO PHQ QUESTIONS 1-9: 0
3. TROUBLE FALLING OR STAYING ASLEEP: 0
6. FEELING BAD ABOUT YOURSELF - OR THAT YOU ARE A FAILURE OR HAVE LET YOURSELF OR YOUR FAMILY DOWN: 0
4. FEELING TIRED OR HAVING LITTLE ENERGY: 0

## 2023-05-18 ASSESSMENT — ENCOUNTER SYMPTOMS
DIARRHEA: 0
SHORTNESS OF BREATH: 0
BACK PAIN: 0
CONSTIPATION: 0
CHEST TIGHTNESS: 0
ABDOMINAL PAIN: 0

## 2023-05-18 NOTE — PROGRESS NOTES
Milton Oliveira is a 38 y.o. female who presents today for Anxiety (RM18// Pt presenting today to discuss anxiety and insomnia; feeling tightness in chest, lots of stress lately.)  . She has a history of   Patient Active Problem List   Diagnosis    History of kidney stones    Irritable bowel syndrome with diarrhea    Chronic daily headache    Chronic hypertension with superimposed preeclampsia    Weight gain    Severe obesity (HCC)    Gastroesophageal reflux disease    Obesity (BMI 30-39. 9)    Menorrhagia with regular cycle   . Today patient is here for follow-up. Anxiety: Patient reports that her anxiety levels have been quite high recently. There is a lot of stressful things happening in her life including work. Job is growing. Her grandparents are leading to more stress and she is having to help her mother. Insomnia is an issue. Reports that she has been reaching for her lorazepam a bit more. We discussed treatment of acute stressors versus chronic stressors. She believes that the stress level will improve over time. We discussed seeing therapist to work on managing and prioritizing stressors. Does have good support. Does report that the Wellbutrin is helping her focus. She believes that if her sleep was better, she would be able to deal with her stressors during the day better. Hypertension- stable . Hypertension ROS: taking medications as instructed, no medication side effects noted, no TIA's, no chest pain on exertion, no dyspnea on exertion, no swelling of ankles     reports that she has never smoked. She has never been exposed to tobacco smoke. She has never used smokeless tobacco.    reports current alcohol use of about 2.0 standard drinks per week. BP Readings from Last 2 Encounters:   05/18/23 132/80   04/21/23 120/85         ROS  Review of Systems   Constitutional:  Negative for fatigue and fever. HENT:  Negative for congestion and ear pain.     Respiratory:  Negative for

## 2023-05-18 NOTE — PROGRESS NOTES
Thaddeus Rodriges  Identified pt with two pt identifiers(name and ). Chief Complaint   Patient presents with    Anxiety     RM18// Pt presenting today to discuss anxiety and insomnia; feeling tightness in chest, lots of stress lately. 1. Have you been to the ER, urgent care clinic since your last visit? Hospitalized since your last visit? NO    2. Have you seen or consulted any other health care providers outside of the [address] [place] [address] since your last visit? Include any pap smears or colon screening. NO      Provider notified of reason for visit, vitals and flowsheets obtained on patients. Patient received paperwork for advance directive during previous visit but has not completed at this time     Reviewed record In preparation for visit, huddled with provider and have obtained necessary documentation      Health Maintenance Due   Topic    Varicella vaccine (1 of 2 - 2-dose childhood series)    Pneumococcal 0-64 years Vaccine (1 - PCV)    COVID-19 Vaccine (3 - Booster for Pfizer series)       Wt Readings from Last 3 Encounters:   23 253 lb (114.8 kg)   23 248 lb 12.8 oz (112.9 kg)   23 249 lb 12.8 oz (113.3 kg)     Temp Readings from Last 3 Encounters:   23 98.2 °F (36.8 °C) (Oral)     BP Readings from Last 3 Encounters:   23 132/80   23 120/85   23 139/88     Pulse Readings from Last 3 Encounters:   23 85   23 86   23 87     [unfilled]      Learning Assessment:  :     No flowsheet data found. Depression Screening:  :     No flowsheet data found. Fall Risk Assessment:  :     No flowsheet data found. Abuse Screening:  :     No flowsheet data found. ADL Screening:  :     No flowsheet data found. Medication reconciliation up to date and corrected with patient at this time.

## 2023-05-24 ENCOUNTER — OFFICE VISIT (OUTPATIENT)
Age: 43
End: 2023-05-24
Payer: COMMERCIAL

## 2023-05-24 VITALS — WEIGHT: 251 LBS | BODY MASS INDEX: 43.08 KG/M2 | SYSTOLIC BLOOD PRESSURE: 133 MMHG | DIASTOLIC BLOOD PRESSURE: 86 MMHG

## 2023-05-24 DIAGNOSIS — Z01.419 ENCOUNTER FOR GYNECOLOGICAL EXAMINATION (GENERAL) (ROUTINE) WITHOUT ABNORMAL FINDINGS: Primary | ICD-10-CM

## 2023-05-24 PROCEDURE — 99396 PREV VISIT EST AGE 40-64: CPT | Performed by: OBSTETRICS & GYNECOLOGY

## 2023-05-24 NOTE — PROGRESS NOTES
[address] [facility]  [email or web address]  [phone]    Amrit Reddy MD, [address]        Annual Gynecologic Exam:  Chief Complaint   Patient presents with    Annual Exam       Nikia Samuel is a No obstetric history on file. ,  38 y.o. female   No LMP recorded. (Menstrual status: IUD). She presents for her annual checkup. She is having no problems. Per Rooming Note:  No LMP recorded. (Menstrual status: IUD). Her periods are spotting, light in flow and often irregular with no apparent pattern. She does not have dysmenorrhea. Problems: no problems  Birth Control: IUD. Last Pap: normal obtained 1 year(s) ago. She does not have a history of KADY 2, 3 or cervical cancer. Last Mammogram: had her mammogram today in our office. It was see report.      Sexual history and Contraception:    Social History     Substance and Sexual Activity   Sexual Activity Not on file      Past Medical History:   Diagnosis Date    ADHD     Dysplasia of cervix, low grade (KADY 1) 2/23/12    colpo    Encounter for IUD removal 08/19/2016    Mirena    Encounter for Papanicolaou smear for cervical cancer screening 02/04/2022    neg/hpv neg    Essential hypertension     10 years ago    GERD (gastroesophageal reflux disease)     trying to control with diet since pregnant    H/O abnormal Pap smear 11/8/12;2/8/12    LGSIL, HPV positive    Headache     Headache(784.0)     Hx of abnormal Pap smear 2/1/11;2/5/13; 4/1/15;2/26/19    neg, HPV neg,no ECC; neg, HPV neg, Negative, HPV negative; neg pap and neg HPV    IUD (intrauterine device) in place 03/11/2014    Mirena    Kidney disease     kidney stones 2015    Migraine headache     without aura     Current Outpatient Medications   Medication Sig Dispense Refill    buPROPion (WELLBUTRIN XL) 300 MG extended release tablet Take 1 tablet by mouth every morning      AJOVY 225 MG/1.5ML SOAJ ADMINISTER 1.5 ML UNDER THE SKIN EVERY MONTH      hydroCHLOROthiazide

## 2023-05-24 NOTE — PROGRESS NOTES
Williams Pena is a 38 y.o. female returns for an annual exam     Chief Complaint   Patient presents with    Annual Exam       No LMP recorded. (Menstrual status: IUD). Her periods are spotting, light in flow and often irregular with no apparent pattern. She does not have dysmenorrhea. Problems: no problems  Birth Control: IUD. Last Pap: normal obtained 1 year(s) ago. She does not have a history of KADY 2, 3 or cervical cancer. Last Mammogram: had her mammogram today in our office. It was see report. Examination chaperoned by Louis Piper LPN.

## 2023-05-30 RX ORDER — BUPROPION HYDROCHLORIDE 300 MG/1
300 TABLET ORAL EVERY MORNING
Qty: 90 TABLET | Refills: 1 | Status: SHIPPED | OUTPATIENT
Start: 2023-05-30

## 2023-06-19 RX ORDER — BUTALBITAL, ACETAMINOPHEN AND CAFFEINE 50; 325; 40 MG/1; MG/1; MG/1
TABLET ORAL
Qty: 30 TABLET | Refills: 1 | Status: SHIPPED | OUTPATIENT
Start: 2023-06-19

## 2023-06-22 DIAGNOSIS — G43.709 CHRONIC MIGRAINE WITHOUT AURA WITHOUT STATUS MIGRAINOSUS, NOT INTRACTABLE: Primary | ICD-10-CM

## 2023-06-22 RX ORDER — METHYLPREDNISOLONE 4 MG/1
TABLET ORAL
Qty: 15 TABLET | Refills: 0 | Status: SHIPPED | OUTPATIENT
Start: 2023-06-22 | End: 2023-06-28

## 2023-06-23 ENCOUNTER — TELEPHONE (OUTPATIENT)
Age: 43
End: 2023-06-23

## 2023-06-23 NOTE — TELEPHONE ENCOUNTER
Called pt x 2 regarding not needing this appointment scheduled for today. Patient has not called back.

## 2023-06-26 ENCOUNTER — OFFICE VISIT (OUTPATIENT)
Age: 43
End: 2023-06-26
Payer: COMMERCIAL

## 2023-06-26 VITALS
TEMPERATURE: 98.4 F | OXYGEN SATURATION: 94 % | BODY MASS INDEX: 43.19 KG/M2 | SYSTOLIC BLOOD PRESSURE: 163 MMHG | HEIGHT: 64 IN | HEART RATE: 116 BPM | WEIGHT: 253 LBS | DIASTOLIC BLOOD PRESSURE: 95 MMHG

## 2023-06-26 DIAGNOSIS — J02.9 SORE THROAT: Primary | ICD-10-CM

## 2023-06-26 DIAGNOSIS — I10 ESSENTIAL (PRIMARY) HYPERTENSION: ICD-10-CM

## 2023-06-26 DIAGNOSIS — R51.9 CHRONIC DAILY HEADACHE: ICD-10-CM

## 2023-06-26 LAB
GROUP A STREP ANTIGEN, POC: NEGATIVE
SARS-COV-2, POC: NORMAL
VALID INTERNAL CONTROL, POC: NORMAL

## 2023-06-26 PROCEDURE — 3080F DIAST BP >= 90 MM HG: CPT | Performed by: INTERNAL MEDICINE

## 2023-06-26 PROCEDURE — 3077F SYST BP >= 140 MM HG: CPT | Performed by: INTERNAL MEDICINE

## 2023-06-26 PROCEDURE — 99214 OFFICE O/P EST MOD 30 MIN: CPT | Performed by: INTERNAL MEDICINE

## 2023-06-26 PROCEDURE — 87426 SARSCOV CORONAVIRUS AG IA: CPT | Performed by: INTERNAL MEDICINE

## 2023-06-26 PROCEDURE — 87880 STREP A ASSAY W/OPTIC: CPT | Performed by: INTERNAL MEDICINE

## 2023-06-26 RX ORDER — FLUTICASONE PROPIONATE AND SALMETEROL 50; 250 UG/1; UG/1
POWDER RESPIRATORY (INHALATION)
COMMUNITY
Start: 2023-06-19

## 2023-06-26 ASSESSMENT — PATIENT HEALTH QUESTIONNAIRE - PHQ9
SUM OF ALL RESPONSES TO PHQ QUESTIONS 1-9: 0
1. LITTLE INTEREST OR PLEASURE IN DOING THINGS: 0
SUM OF ALL RESPONSES TO PHQ QUESTIONS 1-9: 0
2. FEELING DOWN, DEPRESSED OR HOPELESS: 0
SUM OF ALL RESPONSES TO PHQ9 QUESTIONS 1 & 2: 0

## 2023-06-27 ENCOUNTER — PATIENT MESSAGE (OUTPATIENT)
Age: 43
End: 2023-06-27

## 2023-06-27 RX ORDER — AMOXICILLIN AND CLAVULANATE POTASSIUM 875; 125 MG/1; MG/1
1 TABLET, FILM COATED ORAL 2 TIMES DAILY
Qty: 20 TABLET | Refills: 0 | Status: SHIPPED | OUTPATIENT
Start: 2023-06-27 | End: 2023-07-07

## 2023-07-21 RX ORDER — FLUTICASONE PROPIONATE AND SALMETEROL 50; 250 UG/1; UG/1
POWDER RESPIRATORY (INHALATION)
Qty: 60 EACH | Refills: 1 | Status: SHIPPED | OUTPATIENT
Start: 2023-07-21

## 2023-08-17 NOTE — PROGRESS NOTES
[address] [facility]  [email or web address]  [phone]    Felicia Dos Santos MD, FACOG       Annual Gynecologic Exam:  [facility] <40  Chief Complaint   Patient presents with    Well Woman         Carline Alfonso is a 31 y.o.  WHITE [place]  female who presents for an annual well woman exam.  Patient's last menstrual period was 2017 (exact date). .    With regard to the Gardisil vaccine, she is older than the FDA approved age to receive it. She does report additional concerns today. Patient would like to discuss [facility] options. Patient also reports that her periods have gotten very heavy since removing the IUD. Menstrual status:  Her periods are heavy. She does not report dysmenorrhea/painful menses. She does not report irregular bleeding. Sexual history and Contraception:  History   Sexual Activity    Sexual activity: Yes    Partners: Male    Birth control/ protection: Condom     She always use condoms with sexual activity  She does not reports new sexual partner(s) in the last year. The patient does not request STD testing. We recommended testing per CDC guidelines and at patient request.     Preventive Medicine History:  Her last annual GYN exam was about one year ago. Her most recent Pap smear result: normal was obtained in 2015. Her most recent HR HPV screen was Negative obtained 2 year(s) ago. She does not have a history of ORION 2, 3 or cervical cancer.      Past Medical History:   Diagnosis Date    Dysplasia of cervix, low grade (ORION 1) 12    colpo    Encounter for IUD removal 2016    Mirena    H/O abnormal Pap smear 12;12    LGSIL, HPV positive    Headache     Headache     IUD (intrauterine device) in place 2014    Mirena    Migraine headache     without aura    pap smear for 11;13; 4/1/15    neg, HPV neg,no ECC; neg, HPV neg, Negative, HPV negative     OB History    Para Term  AB SAB TAB Ectopic Diagnosis:   1. SCLC (small cell lung carcinoma), right (CMD)       Regimen: Tecentriq  Cycle/Day: Cycle 27 Day 1    Dr. Noel is ordering clinician today.    Vital Signs:  ONC OP Encounter Vitals  BP: 106/69  Heart Rate: 81  Temp: 97.9 °F (36.6 °C)  Temp src: Oral  SpO2: 96 %  Weight: 86.8 kg (191 lb 5.8 oz)      Allergies:  ALLERGIES:  No Known Allergies     Medications:  The medication list was reviewed. No changes noted.     ECOG: ECOG Performance Status: 1    Distress Screening: Is this day one of cycle or a new regimen? Yes Distress Screening Completed    Toxicity Assessment:   Adverse Events?  Adverse Events: No    Auditory/Ear  Assessment: Yes (Within Defined Limits)    Cardiac General  Assessment: Yes (Within Defined Limits)    Dermatology/Skin  Assessment: Yes (Within Defined Limits)    Constitutional  Assessment: Yes (w/ Exceptions to WDL)  Fatigue: Grade 1    Endocrine  Assessment: Yes (Within Defined Limits)    Gastrointestinal  Assessment: Yes (Within Defined Limits)    Hemorrhage/Bleeding  Assessment: Yes (Within Defined Limits)    Infection  Assessment: Yes (Within Defined Limits)    Lymphatics  Assessment: Yes (Within Defined Limits)    Musculoskeletal  Assessment: Yes (Within Defined Limits)    Neurology  Assessment: Yes (Within Defined Limits)    Ocular  Assessment: Yes (Within Defined Limits)    Pain  Assessment: Yes (Within Defined Limits)    Pulmonary/Upper Respiratory  Assessment: Yes (Within Defined Limits)    Genitourinary  Assessment: Yes (Within Defined Limits)      Additional Nursing Assessment: In addition to above toxicity assessment, this RN assessed      Prechemo Checklist  Chemo Consent Signed: Yes  Protocol Verified: Yes  Is Protocol Standard of Care or Research?: Standard of Care  Pre-Chemo Labs Reviewed?: Yes  Provider Notified of Abnormal Labs Not Meeting Treatment Conditions: N/A  Pregnancy Screening Performed (if indicated): Not applicable  All Treatment Conditions Met?:  Multiple Living   0 0 0 0 0 0 0 0 0 0         Obstetric Comments   Menarche:  17. LMP: 03/17/15 (IUD Mirena). # of Children:  0. Age at Delivery of First Child:  n/a. Hysterectomy/oophorectomy:  NO/NO. Breast Bx:  No.  Hx of Breast Feeding:  n/a. BCP:  Yes. Hormone therapy:  No.      Past Surgical History:   Procedure Laterality Date    HX COLPOSCOPY  2/23/12    ORION 1    HX HEENT      wisdom teeth    HX LITHOTRIPSY      HX WISDOM TEETH EXTRACTION       Family History   Problem Relation Age of Onset    Hypertension Father     Heart Disease Father     Diabetes Father    Espinoza Eans Elevated Lipids Father     Cancer Father      eye    Cancer Mother      Cervical,Uterine,& Ovarian    Depression Mother     Allergic Rhinitis Brother     Asthma Brother     Other Brother      Chron's    Alcohol abuse Maternal Grandfather     Depression Maternal Grandfather     Alcohol abuse Paternal Grandmother     Stroke Paternal Grandmother     Migraines Brother     Allergic Rhinitis Brother     Breast Cancer Other      Social History     Social History    Marital status: SINGLE     Spouse name: N/A    Number of children: N/A    Years of education: N/A     Occupational History    Not on file. Social History Main Topics    Smoking status: Never Smoker    Smokeless tobacco: Never Used    Alcohol use Yes    Drug use: No    Sexual activity: Yes     Partners: Male     Birth control/ protection: Condom     Other Topics Concern    Not on file     Social History Narrative       No Known Allergies    Current Outpatient Prescriptions   Medication Sig    desog-e.estradiol/e.estradiol (MIRCETTE, 28,) 0.15-0.02 mgx21 /0.01 mg x 5 tab Take 1 Tab by mouth daily.  folic acid 718 mcg tablet Take 400 mcg by mouth daily.  cetirizine (ZYRTEC) 10 mg tablet Take 1 Tab by mouth daily.  multivitamin with iron (DAILY MULTI-VITAMINS/IRON) tablet Take 1 Tab by mouth daily.     ZOLMitriptan (ZOMIG ZMT) 5 mg disintegrating Yes  BSA/weight in Orders Verified for Weight-Based Drugs?: Yes  Chemo Dose Calculations Verified: Yes  Second RN Verified Calculations: Michelle RN      Pre-Treatment: Patient has valid pre-authorization, Premed orders, including hydration, are verified prior to administration and I have reviewed the following with the patient: Name of chemo drug, duration and route of infusion, Infusion/Drug volume and dose, and reportable infusion-related symptoms.     Treatment: Refer to [facility] and MAR for line assessment and medication administration, Chemotherapy has not ; double checked & verified by two practitioners, Appearance and physical integrity of drugs meets standard of drug monograph; double checked & verified by two practitioners, Rate set on infusion pump is in alignment with ordered rate; double checked & verified by two practitioners, Drugs were administered in proper sequencing, Blood return confirmed before, during and after treatment administered and Infusion pump used for non-vesicant drugs    Post Treatment: Treatment tolerated well; no adverse reaction    Oral Chemotherapy: No.    Education: No new instructions needed    Next appointment scheduled: 23  Patient instructed to call the office with any questions or concerns.    Patient Discharged: patient discharged to home in stable, ambulatory condition.   tablet 1 tab at onset of migraine; take 1 tab in 2 hours if headache remains; Limit: 2 tabs in 24 hours, max 3 days a week. 90 day Rx    gabapentin (NEURONTIN) 300 mg capsule Take 1 Cap by mouth two (2) times a day. No current facility-administered medications for this visit.         Patient Active Problem List   Diagnosis Code    Headache R46    H/O abnormal Pap smear Z87.898    History of kidney stones Z87.442    Weight gain R63.5    Menorrhagia with regular cycle N92.0       Review of Systems - History obtained from the patient  Constitutional: negative for weight loss, fever, night sweats  HEENT: negative for hearing loss, earache, congestion, snoring, sorethroat  CV: negative for chest pain, palpitations, edema  Resp: negative for cough, shortness of breath, wheezing  GI: negative for change in bowel habits, abdominal pain, black or bloody stools  : negative for frequency, dysuria, hematuria  GYN: see HPI  MSK: negative for back pain, joint pain, muscle pain  Breast: negative for breast lumps, nipple discharge, galactorrhea  Skin :negative for itching, rash, hives  Neuro: negative for dizziness, headache, confusion, weakness  Psych: negative for anxiety, depression, change in mood  Heme/lymph: negative for bleeding, bruising, pallor    Physical Exam  Visit Vitals    /74    Ht 5' 4\" (1.626 m)    Wt 227 lb (103 kg)    LMP 04/24/2017 (Exact Date)    BMI 38.96 kg/m2       Constitutional  · Appearance: well-nourished, well developed, alert, in no acute distress    HENT  · Head and Face: appears normal    Neck  · Inspection/Palpation: normal appearance, no masses or tenderness  · Lymph Nodes: no lymphadenopathy present  · Thyroid: gland size normal, nontender, no nodules or masses present on palpation    Chest  · Respiratory Effort: breathing labored  · Auscultation: normal breath sounds    Cardiovascular  · Heart:  · Auscultation: regular rate and rhythm without murmur    Breasts  · Inspection of Breasts: breasts symmetrical, no skin changes, no discharge present, nipple appearance normal, no skin retraction present  · Palpation of Breasts and Axillae: no masses present on palpation, no breast tenderness  · Axillary Lymph Nodes: no lymphadenopathy present    Gastrointestinal  · Abdominal Examination: abdomen non-tender to palpation, normal bowel sounds, no masses present  · Liver and spleen: no hepatomegaly present, spleen not palpable  · Hernias: no hernias identified    Genitourinary  · External Genitalia: normal appearance for age, no discharge present, no tenderness present, no inflammatory lesions present, no masses present  · Vagina: normal vaginal vault without central or paravaginal defects, no discharge present, no inflammatory lesions present, no masses present  · Bladder: non-tender to palpation  · Urethra: appears normal  · Cervix: normal   · Uterus: normal size, shape and consistency  · Adnexa: no adnexal tenderness present, no adnexal masses present  · Perineum: perineum within normal limits, no evidence of trauma, no rashes or skin lesions present  · Anus: anus within normal limits, no hemorrhoids present  · Inguinal Lymph Nodes: no lymphadenopathy present    Skin  · General Inspection: no rash, no lesions identified    Neurologic/Psychiatric  · Mental Status:  · Orientation: grossly oriented to person, place and time  · Mood and Affect: mood normal, affect appropriate    Assessment:  31 y.o.  for well woman exam  Encounter Diagnoses   Name Primary?  Encounter for gynecological examination (general) (routine) without abnormal findings Yes    Family planning     Menorrhagia with regular cycle        Plan:  The patient was counseled about diet, exercise, healthy lifestyle  We discussed self breast exam  We discussed safer sex practices, condom use and risk factors for sexually transmitted diseases. We discussed current pap smear and HR HPV testing guidelines.    We recommend follow up one year for routine annual gynecologic exam or sooner prn  We recommend routine follow up with her primary care doctor for management of chronic medical problems and non-gynecologic concerns  Handouts were given to the patient  We discussed calcium/vitamin D/weight bearing exercise and osteoporosis prevention  Discussed risks, benefits and alternatives of OCP: including but not limited to dvt/pe/mi/cva/ca/gi risks. Disc risks of OCP and HA, migraines: no neuro sx/ no aura  We discussed progesterone only and non hormonal options for contraception including but not limited to condoms, IUDs, Nexplanon, and depo provera. Liked mircette in past  OCP h/o  Ideal to start OCP on first day of cycle and use back up for first pack. If wants to discuss risks/concerns/options in more detail: rec problem visit but can discuss with pharmacist or refer to Ann[facility] [address]38 website or OCP package insert for more information. Folllow up:  [x] return for annual well woman exam in one year or sooner if she is having problems  [] follow up and ultrasound  [] 6 months  [] 3 months  [] 6 weeks   [] 1 month    Orders Placed This Encounter    desog-e.estradiol/e.estradiol (MIRCETTE, 28,) 0.15-0.02 mgx21 /0.01 mg x 5 tab       No results found for any visits on 05/02/17.

## 2023-08-24 RX ORDER — PANTOPRAZOLE SODIUM 40 MG/1
TABLET, DELAYED RELEASE ORAL
Qty: 90 TABLET | Refills: 1 | Status: SHIPPED | OUTPATIENT
Start: 2023-08-24

## 2023-09-18 ENCOUNTER — TELEPHONE (OUTPATIENT)
Age: 43
End: 2023-09-18

## 2023-09-18 NOTE — TELEPHONE ENCOUNTER
RE:John  Key: [id number]      Rcvd notification via CMM that medication is approved. Approved today  CaseId:[id number];Status:Approved; Review Type:Prior Auth; Coverage Start Date:09/18/2023; Coverage End Date:09/17/2024;      Nurse notified

## 2023-09-25 RX ORDER — MONTELUKAST SODIUM 10 MG/1
10 TABLET ORAL DAILY
Qty: 30 TABLET | Refills: 1 | Status: SHIPPED | OUTPATIENT
Start: 2023-09-25

## 2023-09-26 ENCOUNTER — TELEMEDICINE (OUTPATIENT)
Age: 43
End: 2023-09-26
Payer: COMMERCIAL

## 2023-09-26 DIAGNOSIS — F33.0 MAJOR DEPRESSIVE DISORDER, RECURRENT, MILD (HCC): ICD-10-CM

## 2023-09-26 DIAGNOSIS — F41.8 TEST ANXIETY: ICD-10-CM

## 2023-09-26 DIAGNOSIS — G43.109 MIGRAINE WITH AURA AND WITHOUT STATUS MIGRAINOSUS, NOT INTRACTABLE: ICD-10-CM

## 2023-09-26 DIAGNOSIS — R42 VERTIGO, INTERMITTENT: Primary | ICD-10-CM

## 2023-09-26 DIAGNOSIS — E34.8 PINEAL GLAND CYST: ICD-10-CM

## 2023-09-26 PROCEDURE — 99214 OFFICE O/P EST MOD 30 MIN: CPT | Performed by: NURSE PRACTITIONER

## 2023-09-26 RX ORDER — LORAZEPAM 1 MG/1
TABLET ORAL
Qty: 2 TABLET | Refills: 0 | Status: SHIPPED | OUTPATIENT
Start: 2023-09-26 | End: 2023-10-26

## 2023-09-26 NOTE — PROGRESS NOTES
[address] [facility]  [address] [address] [facility]   [phone] Office   [phone] Fax           Date:  23     Name:  Elias Heller  :  1980  MRN:  [id number]     PCP:  Marcellus Lund MD    Elias Heller is a 38 y.o. female who was seen by synchronous (real-time) audio-video technology on 2023 for Migraine and Other (New onset vertigo)    Subjective:   Since her last visit, she did have a bout of headaches that lasted two weeks. This resolved with a medrol dose pack. Otherwise, the migraines have been well controlled on     She has been having episodes of dizziness that started about three weeks ago. She describes it as if her equilibrium is off and everything is moving around her. It can occur at anytime and she cannot identify any triggering cause. It is associated with ringing in her ears. No weakness, headache. She does have some mild nausea. No changes in hearing. It does not last long, maybe 30-60 seconds. Since the onset, it has occurred four times. Recap from LV:  Chronic daily headache with underlying migraine has improved significantly since starting Ajovy. She is having one migraine every couple of months that are less intense and easily aborted with Fioricet. She will continue with this Ajovy monthly. She can continue with Fiorinal for acute treatment. Follow up in six months or sooner if needed.       Current Outpatient Medications   Medication Sig    montelukast (SINGULAIR) 10 MG tablet TAKE 1 TABLET BY MOUTH DAILY    pantoprazole (PROTONIX) 40 MG tablet TAKE ONE TABLET BY MOUTH DAILY    ADVAIR DISKUS 250-50 MCG/ACT AEPB diskus inhaler INHALE 1 PUFF BY MOUTH TWICE DAILY    MAGNESIUM PO Take by mouth    butalbital-acetaminophen-caffeine (FIORICET, ESGIC) -40 MG per tablet TAKE 1 TABLET BY MOUTH EVERY 6 HOURS AS NEEDED FOR PAIN    buPROPion [facility]

## 2023-10-04 ENCOUNTER — HOSPITAL ENCOUNTER (OUTPATIENT)
Facility: HOSPITAL | Age: 43
Discharge: HOME | End: 2023-10-07
Payer: COMMERCIAL

## 2023-10-04 DIAGNOSIS — E34.8 PINEAL GLAND CYST: ICD-10-CM

## 2023-10-04 DIAGNOSIS — R42 VERTIGO, INTERMITTENT: ICD-10-CM

## 2023-10-04 PROCEDURE — A9579 GAD-BASE MR CONTRAST NOS,1ML: HCPCS | Performed by: NURSE PRACTITIONER

## 2023-10-04 PROCEDURE — 6360000004 HC RX CONTRAST MEDICATION: Performed by: NURSE PRACTITIONER

## 2023-10-04 PROCEDURE — 70553 MRI BRAIN STEM W/O & W/DYE: CPT

## 2023-10-04 RX ADMIN — GADOTERIDOL 20 ML: 279.3 INJECTION, SOLUTION INTRAVENOUS at 20:43

## 2023-10-17 ENCOUNTER — OFFICE VISIT (OUTPATIENT)
Age: 43
End: 2023-10-17
Payer: COMMERCIAL

## 2023-10-17 VITALS
OXYGEN SATURATION: 98 % | BODY MASS INDEX: 42.34 KG/M2 | DIASTOLIC BLOOD PRESSURE: 86 MMHG | HEART RATE: 79 BPM | TEMPERATURE: 98.4 F | HEIGHT: 64 IN | SYSTOLIC BLOOD PRESSURE: 136 MMHG | WEIGHT: 248 LBS

## 2023-10-17 DIAGNOSIS — R11.0 NAUSEA: ICD-10-CM

## 2023-10-17 DIAGNOSIS — K21.9 GASTROESOPHAGEAL REFLUX DISEASE, UNSPECIFIED WHETHER ESOPHAGITIS PRESENT: ICD-10-CM

## 2023-10-17 DIAGNOSIS — I10 ESSENTIAL (PRIMARY) HYPERTENSION: Primary | ICD-10-CM

## 2023-10-17 DIAGNOSIS — F41.9 ANXIETY: ICD-10-CM

## 2023-10-17 DIAGNOSIS — R42 DIZZINESS: ICD-10-CM

## 2023-10-17 DIAGNOSIS — Z23 NEEDS FLU SHOT: ICD-10-CM

## 2023-10-17 PROCEDURE — 3075F SYST BP GE 130 - 139MM HG: CPT | Performed by: INTERNAL MEDICINE

## 2023-10-17 PROCEDURE — 90674 CCIIV4 VAC NO PRSV 0.5 ML IM: CPT | Performed by: INTERNAL MEDICINE

## 2023-10-17 PROCEDURE — 3079F DIAST BP 80-89 MM HG: CPT | Performed by: INTERNAL MEDICINE

## 2023-10-17 PROCEDURE — 90471 IMMUNIZATION ADMIN: CPT | Performed by: INTERNAL MEDICINE

## 2023-10-17 PROCEDURE — 99214 OFFICE O/P EST MOD 30 MIN: CPT | Performed by: INTERNAL MEDICINE

## 2023-10-17 RX ORDER — BUPROPION HYDROCHLORIDE 300 MG/1
300 TABLET ORAL EVERY MORNING
Qty: 90 TABLET | Refills: 1 | Status: SHIPPED | OUTPATIENT
Start: 2023-10-17

## 2023-10-17 RX ORDER — ONDANSETRON 4 MG/1
TABLET, ORALLY DISINTEGRATING ORAL
COMMUNITY
Start: 2023-08-28

## 2023-10-17 RX ORDER — CLONAZEPAM 0.5 MG/1
0.5 TABLET ORAL 2 TIMES DAILY PRN
Qty: 30 TABLET | Refills: 2 | Status: SHIPPED | OUTPATIENT
Start: 2023-10-17 | End: 2023-11-16

## 2023-10-17 RX ORDER — FLUTICASONE PROPIONATE AND SALMETEROL 250; 50 UG/1; UG/1
POWDER RESPIRATORY (INHALATION)
Qty: 60 EACH | Refills: 1 | Status: SHIPPED | OUTPATIENT
Start: 2023-10-17

## 2023-10-17 RX ORDER — HYDROCHLOROTHIAZIDE 25 MG/1
25 TABLET ORAL DAILY
Qty: 90 TABLET | Refills: 1 | Status: SHIPPED | OUTPATIENT
Start: 2023-10-17

## 2023-10-17 ASSESSMENT — PATIENT HEALTH QUESTIONNAIRE - PHQ9
SUM OF ALL RESPONSES TO PHQ QUESTIONS 1-9: 0
1. LITTLE INTEREST OR PLEASURE IN DOING THINGS: 0
SUM OF ALL RESPONSES TO PHQ9 QUESTIONS 1 & 2: 0
2. FEELING DOWN, DEPRESSED OR HOPELESS: 0
SUM OF ALL RESPONSES TO PHQ QUESTIONS 1-9: 0

## 2023-10-17 ASSESSMENT — ENCOUNTER SYMPTOMS
ABDOMINAL PAIN: 0
CONSTIPATION: 0
SHORTNESS OF BREATH: 0
BACK PAIN: 0
CHEST TIGHTNESS: 0
COUGH: 1
NAUSEA: 1
DIARRHEA: 0

## 2023-10-17 NOTE — PROGRESS NOTES
Tomasa Zhao is a 41 y.o. female who presents today for Dizziness (Pt is c/o dizziness over the last 6 weeks. Pt did see her neurologist and she orderd a MRI 2 weeks ago. Pt hasnt gotten her results.), Medication Refill (Advair, Wellbutrin, clonazepam and HCTZ), and Nausea (Pt states when she eats something sweet she gets nauseated.)  . She has a history of   Patient Active Problem List   Diagnosis    History of kidney stones    Irritable bowel syndrome with diarrhea    Chronic daily headache    Chronic hypertension with superimposed preeclampsia    Weight gain    Severe obesity (HCC)    Gastroesophageal reflux disease    Obesity (BMI 30-39. 9)    Menorrhagia with regular cycle    Major depressive disorder, recurrent, mild    Essential (primary) hypertension   . Today patient is here for follow-up.   she does have other concerns. Did have an MRI done several weeks ago due to dizziness, but unfortunately there is no unofficial read on this. She will reach out to radiology. Continues to have some intermittent dizziness. They are in the process of moving to a new house. This will likely be much better and certainly did not is much allergens. Hypertension- BP has been elevated. Better on repeat. Continue to monitor. Hypertension ROS: taking medications as instructed, no medication side effects noted, no TIA's, no dyspnea on exertion, no swelling of ankles     reports that she has never smoked. She has never been exposed to tobacco smoke. She has never used smokeless tobacco.    reports current alcohol use of about 2.0 standard drinks of alcohol per week. BP Readings from Last 2 Encounters:   10/17/23 136/86   06/26/23 (!) 163/95     Still having nausea. She is on pantoprazole. Slowly improving. ? Started during ovulation. We will check blood work today and monitor. Anxiety is still high, but doing ok. Feels as though mental health is stable. Has not yet started therapy.     ROS  Review

## 2023-10-18 ENCOUNTER — TELEPHONE (OUTPATIENT)
Age: 43
End: 2023-10-18

## 2023-10-18 LAB
ALBUMIN SERPL-MCNC: 4.8 G/DL (ref 3.9–4.9)
ALBUMIN/GLOB SERPL: 2.3 {RATIO} (ref 1.2–2.2)
ALP SERPL-CCNC: 59 IU/L (ref 44–121)
ALT SERPL-CCNC: 25 IU/L (ref 0–32)
AST SERPL-CCNC: 18 IU/L (ref 0–40)
BASOPHILS # BLD AUTO: 0 X10E3/UL (ref 0–0.2)
BASOPHILS NFR BLD AUTO: 0 %
BILIRUB SERPL-MCNC: 0.3 MG/DL (ref 0–1.2)
BUN SERPL-MCNC: 11 MG/DL (ref 6–24)
BUN/CREAT SERPL: 14 (ref 9–23)
CALCIUM SERPL-MCNC: 9.4 MG/DL (ref 8.7–10.2)
CHLORIDE SERPL-SCNC: 101 MMOL/L (ref 96–106)
CO2 SERPL-SCNC: 24 MMOL/L (ref 20–29)
CREAT SERPL-MCNC: 0.76 MG/DL (ref 0.57–1)
EGFRCR SERPLBLD CKD-EPI 2021: 101 ML/MIN/1.73
EOSINOPHIL # BLD AUTO: 0.2 X10E3/UL (ref 0–0.4)
EOSINOPHIL NFR BLD AUTO: 3 %
ERYTHROCYTE [DISTWIDTH] IN BLOOD BY AUTOMATED COUNT: 13.1 % (ref 11.7–15.4)
GLOBULIN SER CALC-MCNC: 2.1 G/DL (ref 1.5–4.5)
GLUCOSE SERPL-MCNC: 97 MG/DL (ref 70–99)
HCT VFR BLD AUTO: 40.8 % (ref 34–46.6)
HGB BLD-MCNC: 13.8 G/DL (ref 11.1–15.9)
IMM GRANULOCYTES # BLD AUTO: 0 X10E3/UL (ref 0–0.1)
IMM GRANULOCYTES NFR BLD AUTO: 0 %
LYMPHOCYTES # BLD AUTO: 2.4 X10E3/UL (ref 0.7–3.1)
LYMPHOCYTES NFR BLD AUTO: 31 %
MCH RBC QN AUTO: 29.2 PG (ref 26.6–33)
MCHC RBC AUTO-ENTMCNC: 33.8 G/DL (ref 31.5–35.7)
MCV RBC AUTO: 86 FL (ref 79–97)
MONOCYTES # BLD AUTO: 0.7 X10E3/UL (ref 0.1–0.9)
MONOCYTES NFR BLD AUTO: 9 %
NEUTROPHILS # BLD AUTO: 4.4 X10E3/UL (ref 1.4–7)
NEUTROPHILS NFR BLD AUTO: 57 %
PLATELET # BLD AUTO: 308 X10E3/UL (ref 150–450)
POTASSIUM SERPL-SCNC: 3.6 MMOL/L (ref 3.5–5.2)
PROT SERPL-MCNC: 6.9 G/DL (ref 6–8.5)
RBC # BLD AUTO: 4.73 X10E6/UL (ref 3.77–5.28)
SODIUM SERPL-SCNC: 141 MMOL/L (ref 134–144)
WBC # BLD AUTO: 7.7 X10E3/UL (ref 3.4–10.8)

## 2023-11-08 DIAGNOSIS — G43.709 CHRONIC MIGRAINE WITHOUT AURA WITHOUT STATUS MIGRAINOSUS, NOT INTRACTABLE: Primary | ICD-10-CM

## 2023-11-08 RX ORDER — BUTALBITAL, ACETAMINOPHEN AND CAFFEINE 50; 325; 40 MG/1; MG/1; MG/1
TABLET ORAL
Qty: 30 TABLET | Refills: 1 | Status: SHIPPED | OUTPATIENT
Start: 2023-11-08

## 2023-12-05 RX ORDER — FREMANEZUMAB-VFRM 225 MG/1.5ML
INJECTION SUBCUTANEOUS
Qty: 1.5 ML | Refills: 3 | Status: SHIPPED | OUTPATIENT
Start: 2023-12-05

## 2023-12-08 RX ORDER — MONTELUKAST SODIUM 10 MG/1
10 TABLET ORAL DAILY
Qty: 30 TABLET | Refills: 1 | Status: SHIPPED | OUTPATIENT
Start: 2023-12-08

## 2023-12-26 RX ORDER — ALBUTEROL SULFATE 90 UG/1
AEROSOL, METERED RESPIRATORY (INHALATION)
Qty: 8.5 G | Refills: 1 | Status: SHIPPED | OUTPATIENT
Start: 2023-12-26

## 2024-01-03 RX ORDER — PREDNISONE 10 MG/1
30 TABLET ORAL DAILY
Qty: 15 TABLET | Refills: 0 | Status: SHIPPED | OUTPATIENT
Start: 2024-01-03 | End: 2024-01-08

## 2024-01-03 RX ORDER — AMOXICILLIN AND CLAVULANATE POTASSIUM 875; 125 MG/1; MG/1
1 TABLET, FILM COATED ORAL 2 TIMES DAILY
Qty: 14 TABLET | Refills: 0 | Status: SHIPPED | OUTPATIENT
Start: 2024-01-03 | End: 2024-01-10

## 2024-01-17 RX ORDER — FLUTICASONE PROPIONATE AND SALMETEROL 250; 50 UG/1; UG/1
POWDER RESPIRATORY (INHALATION)
Qty: 60 EACH | Refills: 1 | Status: SHIPPED | OUTPATIENT
Start: 2024-01-17

## 2024-02-08 RX ORDER — MONTELUKAST SODIUM 10 MG/1
10 TABLET ORAL DAILY
Qty: 30 TABLET | Refills: 1 | Status: SHIPPED | OUTPATIENT
Start: 2024-02-08

## 2024-02-14 ENCOUNTER — OFFICE VISIT (OUTPATIENT)
Age: 44
End: 2024-02-14

## 2024-02-14 VITALS
TEMPERATURE: 99 F | BODY MASS INDEX: 42.34 KG/M2 | HEIGHT: 64 IN | SYSTOLIC BLOOD PRESSURE: 137 MMHG | DIASTOLIC BLOOD PRESSURE: 87 MMHG | RESPIRATION RATE: 18 BRPM | HEART RATE: 79 BPM | OXYGEN SATURATION: 97 % | WEIGHT: 248 LBS

## 2024-02-14 DIAGNOSIS — B96.89 ACUTE BACTERIAL SINUSITIS: Primary | ICD-10-CM

## 2024-02-14 DIAGNOSIS — R03.0 ELEVATED BLOOD PRESSURE READING: ICD-10-CM

## 2024-02-14 DIAGNOSIS — J01.90 ACUTE BACTERIAL SINUSITIS: Primary | ICD-10-CM

## 2024-02-14 RX ORDER — AMOXICILLIN AND CLAVULANATE POTASSIUM 875; 125 MG/1; MG/1
1 TABLET, FILM COATED ORAL 2 TIMES DAILY
Qty: 14 TABLET | Refills: 0 | Status: SHIPPED | OUTPATIENT
Start: 2024-02-14 | End: 2024-02-21

## 2024-02-14 NOTE — PROGRESS NOTES
2024   Nusrat Gao (: 1980) is a 43 y.o. female, New patient, here for evaluation of the following chief complaint(s):  Sinusitis (Head congestion, post nasal drip, jaw pain, chest congestion, throat pain from drainage thick and green but blowing nose is clear in color been going on for a week. )     ASSESSMENT/PLAN:  Below is the assessment and plan developed based on review of pertinent history, physical exam, labs, studies, and medications.  1. Acute bacterial sinusitis  -     amoxicillin-clavulanate (AUGMENTIN) 875-125 MG per tablet; Take 1 tablet by mouth 2 times daily for 7 days, Disp-14 tablet, R-0Normal  2. Elevated blood pressure reading         Handout given with care instructions  2. OTC for symptom management such as Tylenol and Ibuprofen. Increase fluid intake, ensure adequate nutritional intake.  3. Follow up with PCP as needed.  4. Go to ED with development of any acute symptoms.     Follow up:  Return in about 4 weeks (around 3/13/2024) for BP Check with medication change.  Follow up immediately for any new, worsening or changes or if symptoms are not improving over the next 5-7 days.     SUBJECTIVE/OBJECTIVE:  HPI     Nusrat Gao is a 43 y.o. female who complains of congestion, sneezing, sore throat, post nasal drip, dry cough, and headache for 8 days. She denies a history of chest pain, dizziness, nausea, shortness of breath, sweats, vomiting, and sputum production and denies a history of asthma.  Patient has environmental/ seasonal allergies. Patient denies exposure to smoke / cigarettes.Patient denies exposure to  sick contacts . Patient also noted that OTC remedies did not help. PROGRESSION: STABLE SYMPTOMS      There are no diagnoses linked to this encounter.    Sinusitis (Head congestion, post nasal drip, jaw pain, chest congestion, throat pain from drainage thick and green but blowing nose is clear in color been going on for a week. )      No results found for any

## 2024-02-26 RX ORDER — PANTOPRAZOLE SODIUM 40 MG/1
40 TABLET, DELAYED RELEASE ORAL DAILY
Qty: 90 TABLET | Refills: 1 | Status: SHIPPED | OUTPATIENT
Start: 2024-02-26

## 2024-03-15 ENCOUNTER — OFFICE VISIT (OUTPATIENT)
Age: 44
End: 2024-03-15
Payer: COMMERCIAL

## 2024-03-15 VITALS
BODY MASS INDEX: 42.85 KG/M2 | SYSTOLIC BLOOD PRESSURE: 118 MMHG | TEMPERATURE: 98.5 F | DIASTOLIC BLOOD PRESSURE: 82 MMHG | HEIGHT: 64 IN | OXYGEN SATURATION: 97 % | RESPIRATION RATE: 16 BRPM | HEART RATE: 89 BPM | WEIGHT: 251 LBS

## 2024-03-15 DIAGNOSIS — E66.01 OBESITY, CLASS III, BMI 40-49.9 (MORBID OBESITY) (HCC): ICD-10-CM

## 2024-03-15 DIAGNOSIS — J32.9 SINUSITIS, UNSPECIFIED CHRONICITY, UNSPECIFIED LOCATION: Primary | ICD-10-CM

## 2024-03-15 DIAGNOSIS — I10 ESSENTIAL (PRIMARY) HYPERTENSION: ICD-10-CM

## 2024-03-15 PROBLEM — F33.0 MAJOR DEPRESSIVE DISORDER, RECURRENT, MILD (HCC): Status: RESOLVED | Noted: 2023-09-26 | Resolved: 2024-03-15

## 2024-03-15 PROCEDURE — 3079F DIAST BP 80-89 MM HG: CPT | Performed by: INTERNAL MEDICINE

## 2024-03-15 PROCEDURE — 3074F SYST BP LT 130 MM HG: CPT | Performed by: INTERNAL MEDICINE

## 2024-03-15 PROCEDURE — 99213 OFFICE O/P EST LOW 20 MIN: CPT | Performed by: INTERNAL MEDICINE

## 2024-03-15 RX ORDER — AMOXICILLIN AND CLAVULANATE POTASSIUM 875; 125 MG/1; MG/1
1 TABLET, FILM COATED ORAL 2 TIMES DAILY
Qty: 28 TABLET | Refills: 0 | Status: SHIPPED | OUTPATIENT
Start: 2024-03-15 | End: 2024-03-29

## 2024-03-15 ASSESSMENT — ENCOUNTER SYMPTOMS
CONSTIPATION: 0
CHEST TIGHTNESS: 0
ABDOMINAL PAIN: 0
DIARRHEA: 0
SHORTNESS OF BREATH: 0
SINUS PRESSURE: 1
COUGH: 1
BACK PAIN: 0

## 2024-03-15 ASSESSMENT — PATIENT HEALTH QUESTIONNAIRE - PHQ9
SUM OF ALL RESPONSES TO PHQ QUESTIONS 1-9: 0
SUM OF ALL RESPONSES TO PHQ QUESTIONS 1-9: 0
1. LITTLE INTEREST OR PLEASURE IN DOING THINGS: 0
2. FEELING DOWN, DEPRESSED OR HOPELESS: 0
SUM OF ALL RESPONSES TO PHQ9 QUESTIONS 1 & 2: 0
SUM OF ALL RESPONSES TO PHQ QUESTIONS 1-9: 0
SUM OF ALL RESPONSES TO PHQ QUESTIONS 1-9: 0

## 2024-03-15 NOTE — PROGRESS NOTES
Other Brother         Chron's    Asthma Brother     Depression Brother     Allergic Rhinitis Brother     Migraines Brother     Alcohol Abuse Paternal Grandmother     Breast Cancer Other     Hypertension Father     Stroke Paternal Grandmother     Diabetes Father     Elevated Lipids Father     Cancer Father         eye and pancreatic     Cancer Mother         Cervical,Uterine,& Ovarian (pt questions ovarian but got cancer after copper 7 IUD)    Depression Mother     Allergic Rhinitis Brother         No Known Allergies     Assessment/Plan  Nusrat was seen today for sinusitis.    Diagnoses and all orders for this visit:    Sinusitis, unspecified chronicity, unspecified location-we will retreat for an extended course.  Never did completely resolve from first upper respiratory infection in February.  Could consider CT of sinuses if this persists  -     amoxicillin-clavulanate (AUGMENTIN) 875-125 MG per tablet; Take 1 tablet by mouth 2 times daily for 14 days    Essential (primary) hypertension-much better on repeat    Obesity, Class III, BMI 40-49.9 (morbid obesity) (HCC)        No follow-up provider specified.    Layo Cali MD  3/15/2024    This note was created with the help of speech recognition software (Dragon) and may contain some 'sound alike' errors.

## 2024-04-07 RX ORDER — MONTELUKAST SODIUM 10 MG/1
10 TABLET ORAL DAILY
Qty: 90 TABLET | Refills: 1 | Status: SHIPPED | OUTPATIENT
Start: 2024-04-07

## 2024-04-24 ENCOUNTER — OFFICE VISIT (OUTPATIENT)
Age: 44
End: 2024-04-24
Payer: COMMERCIAL

## 2024-04-24 VITALS
HEIGHT: 64 IN | OXYGEN SATURATION: 99 % | WEIGHT: 252 LBS | RESPIRATION RATE: 16 BRPM | TEMPERATURE: 97.9 F | SYSTOLIC BLOOD PRESSURE: 132 MMHG | DIASTOLIC BLOOD PRESSURE: 88 MMHG | BODY MASS INDEX: 43.02 KG/M2 | HEART RATE: 83 BPM

## 2024-04-24 DIAGNOSIS — J45.40 MODERATE PERSISTENT ASTHMA, UNCOMPLICATED: ICD-10-CM

## 2024-04-24 DIAGNOSIS — I10 ESSENTIAL (PRIMARY) HYPERTENSION: ICD-10-CM

## 2024-04-24 DIAGNOSIS — G47.00 INSOMNIA, UNSPECIFIED TYPE: ICD-10-CM

## 2024-04-24 DIAGNOSIS — F41.9 ANXIETY: ICD-10-CM

## 2024-04-24 DIAGNOSIS — R53.83 OTHER FATIGUE: Primary | ICD-10-CM

## 2024-04-24 PROCEDURE — 3075F SYST BP GE 130 - 139MM HG: CPT | Performed by: INTERNAL MEDICINE

## 2024-04-24 PROCEDURE — 3079F DIAST BP 80-89 MM HG: CPT | Performed by: INTERNAL MEDICINE

## 2024-04-24 PROCEDURE — 99214 OFFICE O/P EST MOD 30 MIN: CPT | Performed by: INTERNAL MEDICINE

## 2024-04-24 RX ORDER — TRAZODONE HYDROCHLORIDE 50 MG/1
50 TABLET ORAL NIGHTLY PRN
Qty: 30 TABLET | Refills: 5 | Status: SHIPPED | OUTPATIENT
Start: 2024-04-24

## 2024-04-24 RX ORDER — FREMANEZUMAB-VFRM 225 MG/1.5ML
INJECTION SUBCUTANEOUS
Qty: 1.5 ML | Refills: 3 | Status: SHIPPED | OUTPATIENT
Start: 2024-04-24

## 2024-04-24 ASSESSMENT — ENCOUNTER SYMPTOMS
SHORTNESS OF BREATH: 0
DIARRHEA: 0
BACK PAIN: 0
ABDOMINAL PAIN: 0
CONSTIPATION: 0
CHEST TIGHTNESS: 0

## 2024-04-24 NOTE — PROGRESS NOTES
Nusrat Gao is a 43 y.o. female who presents today for Fatigue (Per pt states she is constantly tired and no energy to do anything; difficulty sleeping; ongoing for about 6 weeks )  .      She has a history of   Patient Active Problem List   Diagnosis    History of kidney stones    Irritable bowel syndrome with diarrhea    Chronic daily headache    Chronic hypertension with superimposed preeclampsia    Weight gain    Severe obesity (HCC)    Gastroesophageal reflux disease    Obesity (BMI 30-39.9)    Menorrhagia with regular cycle    Essential (primary) hypertension   .    Today patient is here for follow-up.     Fatigue: Patient reports about 6 weeks of general fatigue.  She reports that throughout the day she is feeling tired. Is working more from home. Anxiety is up a bit. Moving soon and helping to take care of grandparents.     Though anxiety is high, she does not believe that that should be affecting her sleep as much.  Has been taken as needed clonazepam.  Believes that stress levels will improve once they moved.    Hypertension: Her blood pressures have been borderline elevated at home.  She has been checking these from time to time.    Breathing and respiratory issues have been overall stable    ROS  Review of Systems   Constitutional:  Positive for fatigue. Negative for fever.   HENT:  Negative for congestion and ear pain.    Respiratory:  Negative for chest tightness and shortness of breath.    Cardiovascular:  Negative for chest pain and leg swelling.   Gastrointestinal:  Negative for abdominal pain, constipation and diarrhea.   Endocrine: Negative for polydipsia.   Genitourinary:  Negative for difficulty urinating and dysuria.   Musculoskeletal:  Negative for arthralgias and back pain.   Skin:  Negative for rash.   Neurological:  Negative for dizziness and headaches.   Psychiatric/Behavioral:  Positive for sleep disturbance. Negative for agitation. The patient is nervous/anxious.          Vitals:

## 2024-04-25 LAB
ALBUMIN SERPL-MCNC: 4.9 G/DL (ref 3.9–4.9)
ALBUMIN/GLOB SERPL: 2.3 {RATIO} (ref 1.2–2.2)
ALP SERPL-CCNC: 59 IU/L (ref 44–121)
ALT SERPL-CCNC: 18 IU/L (ref 0–32)
AST SERPL-CCNC: 14 IU/L (ref 0–40)
BASOPHILS # BLD AUTO: 0.1 X10E3/UL (ref 0–0.2)
BASOPHILS NFR BLD AUTO: 1 %
BILIRUB SERPL-MCNC: 0.3 MG/DL (ref 0–1.2)
BUN SERPL-MCNC: 15 MG/DL (ref 6–24)
BUN/CREAT SERPL: 21 (ref 9–23)
CALCIUM SERPL-MCNC: 9.8 MG/DL (ref 8.7–10.2)
CHLORIDE SERPL-SCNC: 102 MMOL/L (ref 96–106)
CO2 SERPL-SCNC: 21 MMOL/L (ref 20–29)
CREAT SERPL-MCNC: 0.72 MG/DL (ref 0.57–1)
EGFRCR SERPLBLD CKD-EPI 2021: 108 ML/MIN/1.73
EOSINOPHIL # BLD AUTO: 0.3 X10E3/UL (ref 0–0.4)
EOSINOPHIL NFR BLD AUTO: 3 %
ERYTHROCYTE [DISTWIDTH] IN BLOOD BY AUTOMATED COUNT: 13.1 % (ref 11.7–15.4)
GLOBULIN SER CALC-MCNC: 2.1 G/DL (ref 1.5–4.5)
GLUCOSE SERPL-MCNC: 97 MG/DL (ref 70–99)
HCT VFR BLD AUTO: 44.8 % (ref 34–46.6)
HGB BLD-MCNC: 14.5 G/DL (ref 11.1–15.9)
IMM GRANULOCYTES # BLD AUTO: 0 X10E3/UL (ref 0–0.1)
IMM GRANULOCYTES NFR BLD AUTO: 0 %
LYMPHOCYTES # BLD AUTO: 2.2 X10E3/UL (ref 0.7–3.1)
LYMPHOCYTES NFR BLD AUTO: 30 %
MCH RBC QN AUTO: 28.6 PG (ref 26.6–33)
MCHC RBC AUTO-ENTMCNC: 32.4 G/DL (ref 31.5–35.7)
MCV RBC AUTO: 88 FL (ref 79–97)
MONOCYTES # BLD AUTO: 0.8 X10E3/UL (ref 0.1–0.9)
MONOCYTES NFR BLD AUTO: 11 %
NEUTROPHILS # BLD AUTO: 4.1 X10E3/UL (ref 1.4–7)
NEUTROPHILS NFR BLD AUTO: 55 %
PLATELET # BLD AUTO: 283 X10E3/UL (ref 150–450)
POTASSIUM SERPL-SCNC: 4.3 MMOL/L (ref 3.5–5.2)
PROT SERPL-MCNC: 7 G/DL (ref 6–8.5)
RBC # BLD AUTO: 5.07 X10E6/UL (ref 3.77–5.28)
SODIUM SERPL-SCNC: 141 MMOL/L (ref 134–144)
TSH SERPL DL<=0.005 MIU/L-ACNC: 2.1 UIU/ML (ref 0.45–4.5)
VIT B12 SERPL-MCNC: 530 PG/ML (ref 232–1245)
WBC # BLD AUTO: 7.4 X10E3/UL (ref 3.4–10.8)

## 2024-05-03 DIAGNOSIS — F41.9 ANXIETY: ICD-10-CM

## 2024-05-03 RX ORDER — CLONAZEPAM 0.5 MG/1
TABLET ORAL
Qty: 30 TABLET | Refills: 0 | Status: SHIPPED | OUTPATIENT
Start: 2024-05-03 | End: 2024-06-02

## 2024-05-06 RX ORDER — FLUTICASONE PROPIONATE AND SALMETEROL 250; 50 UG/1; UG/1
POWDER RESPIRATORY (INHALATION)
Qty: 60 EACH | Refills: 1 | Status: SHIPPED | OUTPATIENT
Start: 2024-05-06

## 2024-05-09 DIAGNOSIS — I10 ESSENTIAL (PRIMARY) HYPERTENSION: ICD-10-CM

## 2024-05-09 RX ORDER — HYDROCHLOROTHIAZIDE 25 MG/1
25 TABLET ORAL DAILY
Qty: 90 TABLET | Refills: 1 | Status: SHIPPED | OUTPATIENT
Start: 2024-05-09

## 2024-05-18 DIAGNOSIS — G43.709 CHRONIC MIGRAINE WITHOUT AURA WITHOUT STATUS MIGRAINOSUS, NOT INTRACTABLE: ICD-10-CM

## 2024-05-19 RX ORDER — BUTALBITAL, ACETAMINOPHEN AND CAFFEINE 50; 325; 40 MG/1; MG/1; MG/1
TABLET ORAL
Qty: 30 TABLET | Refills: 1 | OUTPATIENT
Start: 2024-05-19

## 2024-05-23 DIAGNOSIS — G43.709 CHRONIC MIGRAINE WITHOUT AURA WITHOUT STATUS MIGRAINOSUS, NOT INTRACTABLE: ICD-10-CM

## 2024-05-24 RX ORDER — BUTALBITAL, ACETAMINOPHEN AND CAFFEINE 50; 325; 40 MG/1; MG/1; MG/1
1 TABLET ORAL EVERY 6 HOURS PRN
Qty: 30 TABLET | Refills: 1 | OUTPATIENT
Start: 2024-05-24

## 2024-05-29 ENCOUNTER — TELEPHONE (OUTPATIENT)
Age: 44
End: 2024-05-29

## 2024-05-29 DIAGNOSIS — G43.709 CHRONIC MIGRAINE WITHOUT AURA WITHOUT STATUS MIGRAINOSUS, NOT INTRACTABLE: ICD-10-CM

## 2024-05-29 RX ORDER — BUTALBITAL, ACETAMINOPHEN AND CAFFEINE 50; 325; 40 MG/1; MG/1; MG/1
TABLET ORAL
Qty: 30 TABLET | Refills: 1 | Status: SHIPPED | OUTPATIENT
Start: 2024-05-29

## 2024-05-29 NOTE — TELEPHONE ENCOUNTER
Patient scheduled for [facility] appt on 8/16/24    She stated can she get a refill or at least half of refill to get her through the Summer Months.    Butalbital-acetaminophen-caffeine    Please advise.

## 2024-06-01 DIAGNOSIS — G43.709 CHRONIC MIGRAINE WITHOUT AURA WITHOUT STATUS MIGRAINOSUS, NOT INTRACTABLE: ICD-10-CM

## 2024-06-03 RX ORDER — BUTALBITAL, ACETAMINOPHEN AND CAFFEINE 50; 325; 40 MG/1; MG/1; MG/1
TABLET ORAL
Qty: 30 TABLET | Refills: 1 | OUTPATIENT
Start: 2024-06-03

## 2024-06-03 RX ORDER — FREMANEZUMAB-VFRM 225 MG/1.5ML
INJECTION SUBCUTANEOUS
Qty: 1.5 ML | Refills: 3 | Status: SHIPPED | OUTPATIENT
Start: 2024-06-03

## 2024-06-11 DIAGNOSIS — F41.9 ANXIETY: ICD-10-CM

## 2024-06-12 RX ORDER — BUPROPION HYDROCHLORIDE 300 MG/1
300 TABLET ORAL EVERY MORNING
Qty: 90 TABLET | Refills: 1 | Status: SHIPPED | OUTPATIENT
Start: 2024-06-12

## 2024-06-26 ENCOUNTER — PATIENT MESSAGE (OUTPATIENT)
Age: 44
End: 2024-06-26

## 2024-06-26 DIAGNOSIS — R63.5 WEIGHT GAIN: Primary | ICD-10-CM

## 2024-06-26 DIAGNOSIS — E66.01 SEVERE OBESITY (HCC): ICD-10-CM

## 2024-06-26 NOTE — TELEPHONE ENCOUNTER
From: Nusrat Gao  To: Dr. Layo Cali  Sent: 6/26/2024 7:04 AM EDT  Subject: Dietician recommendations?     hi there,  So, we have been at our new house now for I think 7 weeks it will be this coming Friday- [place] so there is a lot of walking paths, I have been walking everyday, averaging 8k steps, I cut out soda and sugar, I don't eat late at night, I drink gobs of water, and the damn scale is not moving one bit, if I lose a pound or two I get excited then the next few days I will weigh again and I have gained those back plus a few more.. I don't know what to do anymore and maybe seeing a dietician would help? I figure I have to start somewhere, this is the most active I have ever been in my life so was hoping for better results and ... nothing :(

## 2024-07-02 DIAGNOSIS — R05.8 OTHER SPECIFIED COUGH: Primary | ICD-10-CM

## 2024-07-03 RX ORDER — FLUTICASONE PROPIONATE AND SALMETEROL 250; 50 UG/1; UG/1
1 POWDER RESPIRATORY (INHALATION) 2 TIMES DAILY
Qty: 60 EACH | Refills: 5 | Status: SHIPPED | OUTPATIENT
Start: 2024-07-03

## 2024-07-17 ENCOUNTER — HOSPITAL ENCOUNTER (OUTPATIENT)
Facility: HOSPITAL | Age: 44
Setting detail: RECURRING SERIES
Discharge: HOME | End: 2024-07-20
Payer: COMMERCIAL

## 2024-07-17 PROCEDURE — 97802 MEDICAL NUTRITION INDIV IN: CPT

## 2024-07-17 NOTE — PROGRESS NOTES
least 3 days per week.     Dietitian Signature: Sylvia Feliciano MS, RDN Date: 7/17/2024   Follow-up:  2-4 weeks Time: 200pm

## 2024-07-18 ENCOUNTER — OFFICE VISIT (OUTPATIENT)
Age: 44
End: 2024-07-18
Payer: COMMERCIAL

## 2024-07-18 VITALS — SYSTOLIC BLOOD PRESSURE: 141 MMHG | BODY MASS INDEX: 44.46 KG/M2 | DIASTOLIC BLOOD PRESSURE: 84 MMHG | WEIGHT: 259 LBS

## 2024-07-18 DIAGNOSIS — Z01.419 ENCOUNTER FOR GYNECOLOGICAL EXAMINATION: Primary | ICD-10-CM

## 2024-07-18 DIAGNOSIS — Z30.431 IUD CHECK UP: ICD-10-CM

## 2024-07-18 PROCEDURE — 3079F DIAST BP 80-89 MM HG: CPT | Performed by: OBSTETRICS & GYNECOLOGY

## 2024-07-18 PROCEDURE — 99396 PREV VISIT EST AGE 40-64: CPT | Performed by: OBSTETRICS & GYNECOLOGY

## 2024-07-18 PROCEDURE — 99459 PELVIC EXAMINATION: CPT | Performed by: OBSTETRICS & GYNECOLOGY

## 2024-07-18 PROCEDURE — 3077F SYST BP >= 140 MM HG: CPT | Performed by: OBSTETRICS & GYNECOLOGY

## 2024-07-18 NOTE — PROGRESS NOTES
Nusrat Gao is a 43 y.o. female returns for an annual exam     Chief Complaint   Patient presents with    Annual Exam       No LMP recorded. (Menstrual status: IUD).  Her periods are absent   Problems: no problems  Birth Control: IUD.  Last Pap: see report obtained 2 year(s) ago.  She does not have a history of KADY 2, 3 or cervical cancer.   Last Mammogram: 5/24/2023 that was normal; pt has mammogram after visit today        1. Have you been to the ER, urgent care clinic, or hospitalized since your last visit? No    2. Have you seen or consulted any other health care providers outside of the [facility] since your last visit? No    Examination chaperoned by Tana Barlow LPN.  
use of about 2.0 standard drinks of alcohol per week.  Drug Abuse:  reports no history of drug use.  Allergies: No Known Allergies  Patient Active Problem List   Diagnosis    History of kidney stones    Irritable bowel syndrome with diarrhea    Chronic daily headache    Chronic hypertension with superimposed preeclampsia    Weight gain    Severe obesity (HCC)    Gastroesophageal reflux disease    Obesity (BMI 30-39.9)    Menorrhagia with regular cycle    Essential (primary) hypertension       Review of Systems - History obtained from the patient and patient filled out questionnaire   Constitutional/general, HEENT, CV, Resp, GI, MSK, Neuro, Psych, Heme/lymph, Skin, Breast ROS: no significant complaints except as noted on HPI    Physical Exam  BP (!) 141/84   Wt 117.5 kg (259 lb)   BMI 44.46 kg/m²     Constitutional  Appearance: alert, in no acute distress    HENT  Head and Face: appears normal    Neck  Inspection/Palpation: normal appearance    Breasts  Inspection of Breasts: breasts symmetrical, no skin changes, no discharge present, nipple appearance normal, no skin retraction present  Palpation of Breasts and Axillae: no masses present on palpation, no breast tenderness  Axillary Lymph Nodes: no lymphadenopathy present    Gastrointestinal  Abdominal Examination: abdomen non-tender to palpation, no masses present    Genitourinary  External Genitalia: normal appearance for age  Vagina: normal vaginal vault, minimal discharge present  Bladder: non-tender to palpation  Urethra: appears normal  Cervix: normal, non tender, IUD strings seen and appropriate length  Uterus: normal, non tender  Adnexa: no adnexal tenderness present, no adnexal masses present  Perineum: normal appearing  Anus: normal appearing    Skin  General Inspection: no significant rash    Neurologic/Psychiatric  Mental Status:  Orientation: grossly oriented and alert  Mood and Affect: mood normal, affect appropriate    Assessment:  43 y.o. No

## 2024-07-31 ENCOUNTER — HOSPITAL ENCOUNTER (OUTPATIENT)
Facility: HOSPITAL | Age: 44
Setting detail: RECURRING SERIES
Discharge: HOME | End: 2024-08-03
Payer: COMMERCIAL

## 2024-07-31 PROCEDURE — 97803 MED NUTRITION INDIV SUBSEQ: CPT

## 2024-07-31 NOTE — PROGRESS NOTES
NUTRITION - FOLLOW-UP TREATMENT NOTE  Patient Name: Nusrat Gao         Date: 2024  : 1980    YES Patient  Verified  Diagnosis:   R63.5 (ICD-10-CM) - Weight gain   E66.01 (ICD-10-CM) - Severe obesity (HCC)      In time:   150pm             Out time:   230pm   Total Treatment Time (min):   40     SUBJECTIVE/ASSESSMENT  Current Wt: 252.2 (with shoes on) Previous Wt: 256.4 (with shoes on) Wt Change: -4.2     Initial Wt: 256.4 (with shoes on) Total Wt change: -4.2 Height: 64     Changes in medication or medical history? Any new allergies, surgeries or procedures?    No    If yes, update Summary List             Nutrition Diagnosis        Diagnosis Status: Obesity R/T excessive caloric intake, inactivity AEB pt 24-hour food recall, BMI >40, and pt stated lack of activity  [x]  Improved []  No Change    []  Declined   []  Discontinued      Food and nutrition related knowledge deficit R/T mixed information about various diet patterns for weight loss AEB pt questions today.   [x]  Improved []  No Change    []  Declined   []  Discontinued      Disordered eating pattern R/T skipping meals AEB pt states skipping breakfast daily; large portions at dinner; late night snacking  [x]  Improved []  No Change    []  Declined   []  Discontinued        Nutrition Monitoring and Evaluation: Pt seen for follow-up visit. Pt was successful with tracking her food everyday. She has been more mindful of her food choices and choosing foods that are more nutrient dense. She is also trying to listen more to her hunger and fullness cues. She states that she has started improving her relationship with food. She is somewhat concerned about getting too caught up with calorie counting.   Pt's biggest challenges are finding time to workout and eating breakfast and lunch. Pt at breakfast 2x/week since last visit which is an improvement but she did not meet her goal. Pt also only worked out 2x since the last visit. She did not

## 2024-08-14 ENCOUNTER — HOSPITAL ENCOUNTER (OUTPATIENT)
Facility: HOSPITAL | Age: 44
Setting detail: RECURRING SERIES
Discharge: HOME | End: 2024-08-17
Payer: COMMERCIAL

## 2024-08-14 PROCEDURE — 97803 MED NUTRITION INDIV SUBSEQ: CPT

## 2024-08-14 NOTE — PROGRESS NOTES
NUTRITION - FOLLOW-UP TREATMENT NOTE  Patient Name: Nusrat Gao         Date: 2024  : 1980    YES Patient  Verified  Diagnosis:   R63.5 (ICD-10-CM) - Weight gain   E66.01 (ICD-10-CM) - Severe obesity (HCC)      In time:   335pm             Out time:   405pm   Total Treatment Time (min):   30     SUBJECTIVE/ASSESSMENT  Current Wt: 251.6 (shoes on) Previous Wt: 252.2 (with shoes on) Wt Change: -0.6     Initial Wt: 256.4 (with shoes on) Total Wt change: -4.8 Height: 64     Changes in medication or medical history? Any new allergies, surgeries or procedures?    No    If yes, update Summary List             Nutrition Diagnosis        Diagnosis Status: Obesity R/T excessive caloric intake, inactivity AEB pt 24-hour food recall, BMI >40, and pt stated lack of activity  [x]  Improved []  No Change    []  Declined   []  Discontinued      Food and nutrition related knowledge deficit R/T mixed information about various diet patterns for weight loss AEB pt questions today.   [x]  Improved []  No Change    []  Declined   []  Discontinued      Disordered eating pattern R/T skipping meals AEB pt states skipping breakfast daily; large portions at dinner; late night snacking  [x]  Improved []  No Change    []  Declined   []  Discontinued        Nutrition Monitoring and Evaluation: Pt seen for follow-up visit. Pt has been successful tracking her food each day. She has not been tracking all food and some are inaccurate. Pt states she has had a chaotic 2 weeks and has had difficulty staying on track. Her kitchen is getting redone limiting cooking at home. Pt states her kitchen should be done today. She also reports having the stomach bug for a few days last week and went out of town this past weekend.   Pt reports drinking 1 can of diet coke per day and 2 bottles of water as fluid intake. Per her food log pt is not getting fruits and vegetables.  Pt has eaten breakfast 3x/week which is an improvement from last

## 2024-08-16 ENCOUNTER — TELEMEDICINE (OUTPATIENT)
Age: 44
End: 2024-08-16
Payer: COMMERCIAL

## 2024-08-16 DIAGNOSIS — R42 VERTIGO, INTERMITTENT: ICD-10-CM

## 2024-08-16 DIAGNOSIS — G43.709 CHRONIC MIGRAINE WITHOUT AURA WITHOUT STATUS MIGRAINOSUS, NOT INTRACTABLE: Primary | ICD-10-CM

## 2024-08-16 DIAGNOSIS — E34.8 PINEAL GLAND CYST: ICD-10-CM

## 2024-08-16 PROCEDURE — 99214 OFFICE O/P EST MOD 30 MIN: CPT | Performed by: NURSE PRACTITIONER

## 2024-08-16 RX ORDER — FREMANEZUMAB-VFRM 225 MG/1.5ML
INJECTION SUBCUTANEOUS
Qty: 1.5 ML | Refills: 5 | Status: SHIPPED | OUTPATIENT
Start: 2024-08-16

## 2024-08-16 NOTE — PROGRESS NOTES
[facility] NEUROSCIENCE [facility] MEDICAL/EMERGENCY CENTER  NEUROLOGY CLINIC   [address]   [place] [address]   [phone] Office   [phone] Fax           Date:  24     Name:  RYAN FINNEY  :  1980  MRN:  [id number]     PCP:  Layo Cali MD    Ryan Finney is a 43 y.o. female who was seen by synchronous (real-time) audio-video technology on 2024 for Migraine    Subjective:   Migraines continue to be stable with Ajovy for prevention of migraine. She does have Fioricet if needed but states that since she refilled this at the end of May she has only used this a 2-3 times.     At her last visit, she did have a complaint of intermittent vertigo. 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 There were no new findings. Since then, she has moved from her previous house where there was found to be black mold and has not had the vertigo. Her congestion and the frequency of being sick has also improved.     Recap from LV:  Migraines are stable on present therapy of Ajovy for prevention and Fiorinal for acute management.     She did have a new complaint today of intermittent vertigo that started about three weeks ago. In the past she did have an abnormal MRI with one solitary focus of T2 signal abnormality which had not changed on serial imaging. This was about five years ago. Will repeat MRI now since she does have new symptoms which in combination with new white matter changes might lead to a diagnosis of RRMS. Other possible etiology would include acoustic neuroma, an inner ear issue, vertiginous migraine. Follow up after testing.     Current Outpatient Medications   Medication Sig    Coenzyme Q10 (COQ10 PO) Take by mouth daily    fluticasone-salmeterol (ADVAIR) 250-50 MCG/ACT AEPB diskus inhaler Inhale 1 puff into the lungs in the morning and at bedtime    buPROPion (WELLBUTRIN

## 2024-08-19 DIAGNOSIS — F41.9 ANXIETY: ICD-10-CM

## 2024-08-19 RX ORDER — CLONAZEPAM 0.5 MG/1
TABLET ORAL
Qty: 30 TABLET | OUTPATIENT
Start: 2024-08-19 | End: 2024-09-18

## 2024-08-23 RX ORDER — PANTOPRAZOLE SODIUM 40 MG/1
40 TABLET, DELAYED RELEASE ORAL DAILY
Qty: 90 TABLET | Refills: 1 | Status: SHIPPED | OUTPATIENT
Start: 2024-08-23

## 2024-08-28 DIAGNOSIS — F41.9 ANXIETY: ICD-10-CM

## 2024-08-28 DIAGNOSIS — G43.709 CHRONIC MIGRAINE WITHOUT AURA WITHOUT STATUS MIGRAINOSUS, NOT INTRACTABLE: ICD-10-CM

## 2024-08-29 RX ORDER — CLONAZEPAM 0.5 MG/1
0.5 TABLET ORAL 2 TIMES DAILY PRN
Qty: 30 TABLET | Refills: 0 | Status: SHIPPED | OUTPATIENT
Start: 2024-08-29 | End: 2024-09-28

## 2024-08-29 RX ORDER — CLONAZEPAM 0.5 MG/1
TABLET ORAL
Qty: 30 TABLET | OUTPATIENT
Start: 2024-08-29 | End: 2024-09-28

## 2024-08-30 RX ORDER — BUTALBITAL, ACETAMINOPHEN AND CAFFEINE 50; 325; 40 MG/1; MG/1; MG/1
TABLET ORAL
Qty: 30 TABLET | Refills: 1 | Status: SHIPPED | OUTPATIENT
Start: 2024-08-30

## 2024-09-24 ENCOUNTER — OFFICE VISIT (OUTPATIENT)
Age: 44
End: 2024-09-24
Payer: COMMERCIAL

## 2024-09-24 VITALS
TEMPERATURE: 98.2 F | HEART RATE: 87 BPM | DIASTOLIC BLOOD PRESSURE: 84 MMHG | HEIGHT: 64 IN | OXYGEN SATURATION: 99 % | RESPIRATION RATE: 16 BRPM | BODY MASS INDEX: 42.51 KG/M2 | SYSTOLIC BLOOD PRESSURE: 128 MMHG | WEIGHT: 249 LBS

## 2024-09-24 DIAGNOSIS — I10 ESSENTIAL (PRIMARY) HYPERTENSION: ICD-10-CM

## 2024-09-24 DIAGNOSIS — R35.0 FREQUENT URINATION: Primary | ICD-10-CM

## 2024-09-24 DIAGNOSIS — R10.2 PELVIC PRESSURE IN FEMALE: ICD-10-CM

## 2024-09-24 LAB
BILIRUBIN, URINE, POC: NEGATIVE
BLOOD URINE, POC: NORMAL
GLUCOSE URINE, POC: NEGATIVE
KETONES, URINE, POC: NEGATIVE
LEUKOCYTE ESTERASE, URINE, POC: NEGATIVE
NITRITE, URINE, POC: NEGATIVE
PH, URINE, POC: 6.5 (ref 4.6–8)
PROTEIN,URINE, POC: NEGATIVE
SPECIFIC GRAVITY, URINE, POC: 1.02 (ref 1–1.03)
URINALYSIS CLARITY, POC: CLEAR
URINALYSIS COLOR, POC: YELLOW
UROBILINOGEN, POC: NORMAL

## 2024-09-24 PROCEDURE — 81002 URINALYSIS NONAUTO W/O SCOPE: CPT | Performed by: INTERNAL MEDICINE

## 2024-09-24 PROCEDURE — 3079F DIAST BP 80-89 MM HG: CPT | Performed by: INTERNAL MEDICINE

## 2024-09-24 PROCEDURE — 3074F SYST BP LT 130 MM HG: CPT | Performed by: INTERNAL MEDICINE

## 2024-09-24 PROCEDURE — 99213 OFFICE O/P EST LOW 20 MIN: CPT | Performed by: INTERNAL MEDICINE

## 2024-09-24 RX ORDER — PHENAZOPYRIDINE HYDROCHLORIDE 200 MG/1
200 TABLET, FILM COATED ORAL 3 TIMES DAILY PRN
Qty: 9 TABLET | Refills: 0 | Status: SHIPPED | OUTPATIENT
Start: 2024-09-24 | End: 2024-09-27

## 2024-09-24 SDOH — ECONOMIC STABILITY: FOOD INSECURITY: WITHIN THE PAST 12 MONTHS, THE FOOD YOU BOUGHT JUST DIDN'T LAST AND YOU DIDN'T HAVE MONEY TO GET MORE.: NEVER TRUE

## 2024-09-24 SDOH — ECONOMIC STABILITY: FOOD INSECURITY: WITHIN THE PAST 12 MONTHS, YOU WORRIED THAT YOUR FOOD WOULD RUN OUT BEFORE YOU GOT MONEY TO BUY MORE.: NEVER TRUE

## 2024-09-24 SDOH — ECONOMIC STABILITY: INCOME INSECURITY: HOW HARD IS IT FOR YOU TO PAY FOR THE VERY BASICS LIKE FOOD, HOUSING, MEDICAL CARE, AND HEATING?: NOT HARD AT ALL

## 2024-09-24 ASSESSMENT — ENCOUNTER SYMPTOMS
BACK PAIN: 0
ABDOMINAL PAIN: 0
DIARRHEA: 0
SHORTNESS OF BREATH: 0
CHEST TIGHTNESS: 0
CONSTIPATION: 0

## 2024-09-26 LAB — BACTERIA UR CULT: NORMAL

## 2024-10-08 ENCOUNTER — OFFICE VISIT (OUTPATIENT)
Age: 44
End: 2024-10-08
Payer: COMMERCIAL

## 2024-10-08 VITALS
RESPIRATION RATE: 16 BRPM | TEMPERATURE: 98.6 F | OXYGEN SATURATION: 95 % | WEIGHT: 252 LBS | HEART RATE: 77 BPM | BODY MASS INDEX: 43.02 KG/M2 | DIASTOLIC BLOOD PRESSURE: 103 MMHG | HEIGHT: 64 IN | SYSTOLIC BLOOD PRESSURE: 139 MMHG

## 2024-10-08 DIAGNOSIS — J06.9 UPPER RESPIRATORY TRACT INFECTION, UNSPECIFIED TYPE: Primary | ICD-10-CM

## 2024-10-08 LAB
EXP DATE SOLUTION: NORMAL
EXP DATE SWAB: NORMAL
EXPIRATION DATE: NORMAL
LOT NUMBER POC: NORMAL
LOT NUMBER SOLUTION: NORMAL
LOT NUMBER SWAB: NORMAL
SARS-COV-2 RNA, POC: NEGATIVE

## 2024-10-08 PROCEDURE — 87635 SARS-COV-2 COVID-19 AMP PRB: CPT | Performed by: INTERNAL MEDICINE

## 2024-10-08 PROCEDURE — 3080F DIAST BP >= 90 MM HG: CPT | Performed by: INTERNAL MEDICINE

## 2024-10-08 PROCEDURE — 99213 OFFICE O/P EST LOW 20 MIN: CPT | Performed by: INTERNAL MEDICINE

## 2024-10-08 PROCEDURE — 3075F SYST BP GE 130 - 139MM HG: CPT | Performed by: INTERNAL MEDICINE

## 2024-10-08 RX ORDER — GUAIFENESIN 600 MG/1
600 TABLET, EXTENDED RELEASE ORAL 2 TIMES DAILY
COMMUNITY

## 2024-10-08 ASSESSMENT — ENCOUNTER SYMPTOMS
DIARRHEA: 0
SINUS PRESSURE: 1
CHEST TIGHTNESS: 0
ABDOMINAL PAIN: 0
SINUS PAIN: 1
BACK PAIN: 0
CONSTIPATION: 0
SHORTNESS OF BREATH: 0

## 2024-10-08 NOTE — PROGRESS NOTES
Depression Maternal Grandfather     Alcohol Abuse Maternal Grandfather     Depression Brother     Hypertension Brother     Other Brother         Chron's    Asthma Brother     Depression Brother     Allergic Rhinitis Brother     Migraines Brother     Alcohol Abuse Paternal Grandmother     Breast Cancer Other     Hypertension Father     Stroke Paternal Grandmother     Diabetes Father     Elevated Lipids Father     Cancer Father         eye and pancreatic     Cancer Mother         Cervical,Uterine,& Ovarian (pt questions ovarian but got cancer after copper 7 IUD)    Depression Mother     Allergic Rhinitis Brother         No Known Allergies     Assessment/Plan  Nursat \"Shirlene\" was seen today for sinusitis.    Diagnoses and all orders for this visit:    Upper respiratory tract infection, unspecified type- Will treat for sinusitis. COVID today better.   -     AMB POC COVID-19 COV    Other orders  -     amoxicillin-clavulanate (AUGMENTIN) 875-125 MG per tablet; Take 1 tablet by mouth 2 times daily for 10 days        No follow-up provider specified.    Layo Cali MD  10/8/2024    This note was created with the help of speech recognition software (Dragon) and may contain some 'sound alike' errors.

## 2024-10-09 RX ORDER — MONTELUKAST SODIUM 10 MG/1
10 TABLET ORAL DAILY
Qty: 90 TABLET | Refills: 1 | Status: SHIPPED | OUTPATIENT
Start: 2024-10-09

## 2024-11-04 ENCOUNTER — OFFICE VISIT (OUTPATIENT)
Age: 44
End: 2024-11-04

## 2024-11-04 VITALS
HEIGHT: 64 IN | RESPIRATION RATE: 17 BRPM | DIASTOLIC BLOOD PRESSURE: 111 MMHG | OXYGEN SATURATION: 96 % | TEMPERATURE: 97.8 F | BODY MASS INDEX: 42.75 KG/M2 | SYSTOLIC BLOOD PRESSURE: 157 MMHG | WEIGHT: 250.4 LBS | HEART RATE: 88 BPM

## 2024-11-04 DIAGNOSIS — J06.9 VIRAL UPPER RESPIRATORY TRACT INFECTION: Primary | ICD-10-CM

## 2024-11-04 DIAGNOSIS — R03.0 ELEVATED BLOOD PRESSURE READING: ICD-10-CM

## 2024-11-04 DIAGNOSIS — R06.89 ADVENTITIOUS BREATH SOUNDS: ICD-10-CM

## 2024-11-04 RX ORDER — BENZONATATE 100 MG/1
100 CAPSULE ORAL 3 TIMES DAILY PRN
Qty: 21 CAPSULE | Refills: 0 | Status: SHIPPED | OUTPATIENT
Start: 2024-11-04 | End: 2024-11-11

## 2024-11-04 ASSESSMENT — ENCOUNTER SYMPTOMS
EYES NEGATIVE: 1
ALLERGIC/IMMUNOLOGIC NEGATIVE: 1
COUGH: 1
GASTROINTESTINAL NEGATIVE: 1

## 2024-11-04 NOTE — PATIENT INSTRUCTIONS
Thank you for visiting [facility] today.    For relief at home, you may use the following to help with your cough:  Throat lozenges, hot tea or honey  Minimize contact with irritants such as cigarette smoke  Zyrtec, Claritin, Allegra or Xyzal may provide relief  Ibuprofen/Tylenol for pain or muscle aches.  Do not take ibuprofen if you have been prescribed prednisone.  Vicks VapoRub on the soles of feet with socks at night time  Saline nasal spray 8-10 times daily  Increase humidification in your home, preferably cool mist  Cough medications as prescribed  Vitamin C 75-90 mg daily  Zinc 40 mg daily    Follow up with your PCP if no improvement in 2 weeks.

## 2024-11-04 NOTE — PROGRESS NOTES
2024   Nusrat Gao (: 1980) is a 44 y.o. female, New patient, here for evaluation of the following chief complaint(s):  Congestion (Pt c/o congestion in her chest , sx started Wednesday , pt states it hurts when she coughs , pt states she is coughing up thick green mucus. Pt states she is also lightheaded and she has taking Delsym this morning. )     ASSESSMENT/PLAN:  Below is the assessment and plan developed based on review of pertinent history, physical exam, labs, studies, and medications.       Assessment & Plan  Viral upper respiratory tract infection  OTC medication for comfort  Follow-up with PCP  Discussed chest x-ray results with patient    The patient is a good candidate for outpatient therapy based on normal PO intake, reassuring exam with clear lungs on auscultation, normal oxygen saturations and lack of respiratory distress upon discharge.    Discharge decision based on the following:  clinical impression is consistent with outpatient treatment, patient's exam is stable and patient's condition is stable.    -Provided reassurance and reassessment  -Discussed over-the-counter medications for symptomatic relief  -Provided educational material and patient instructions  -Discharged patient with instructions to follow up with PCP  -Advised to go immediately to the ED for worsening or persistent symptoms   Orders:    benzonatate (TESSALON) 100 MG capsule; Take 1 capsule by mouth 3 times daily as needed for Cough    Adventitious breath sounds  Discussed chest x-ray result with patient  Follow-up with PCP  Orders:    XR CHEST (2 VIEWS); Future    Elevated blood pressure reading  States that primary care provider manages blood pressure  Follow-up with PCP    Handout given with care instructions  OTC for symptom management. Increase fluid intake, ensure adequate nutritional intake.  Follow up with PCP as needed.  Go to ED with development of any acute symptoms.     Follow up:  Return in about

## 2024-11-08 DIAGNOSIS — I10 ESSENTIAL (PRIMARY) HYPERTENSION: ICD-10-CM

## 2024-11-08 RX ORDER — HYDROCHLOROTHIAZIDE 25 MG/1
25 TABLET ORAL DAILY
Qty: 90 TABLET | Refills: 1 | Status: SHIPPED | OUTPATIENT
Start: 2024-11-08

## 2024-11-11 ENCOUNTER — OFFICE VISIT (OUTPATIENT)
Age: 44
End: 2024-11-11
Payer: COMMERCIAL

## 2024-11-11 VITALS
OXYGEN SATURATION: 96 % | HEIGHT: 64 IN | WEIGHT: 250 LBS | RESPIRATION RATE: 16 BRPM | SYSTOLIC BLOOD PRESSURE: 133 MMHG | BODY MASS INDEX: 42.68 KG/M2 | DIASTOLIC BLOOD PRESSURE: 87 MMHG | HEART RATE: 83 BPM | TEMPERATURE: 98.5 F

## 2024-11-11 DIAGNOSIS — J06.9 UPPER RESPIRATORY TRACT INFECTION, UNSPECIFIED TYPE: Primary | ICD-10-CM

## 2024-11-11 PROCEDURE — 3075F SYST BP GE 130 - 139MM HG: CPT | Performed by: INTERNAL MEDICINE

## 2024-11-11 PROCEDURE — 99213 OFFICE O/P EST LOW 20 MIN: CPT | Performed by: INTERNAL MEDICINE

## 2024-11-11 PROCEDURE — 3079F DIAST BP 80-89 MM HG: CPT | Performed by: INTERNAL MEDICINE

## 2024-11-11 RX ORDER — ALBUTEROL SULFATE 90 UG/1
2 INHALANT RESPIRATORY (INHALATION) 4 TIMES DAILY PRN
Qty: 18 G | Refills: 0 | Status: SHIPPED | OUTPATIENT
Start: 2024-11-11

## 2024-11-11 RX ORDER — PREDNISONE 20 MG/1
40 TABLET ORAL DAILY
Qty: 10 TABLET | Refills: 0 | Status: SHIPPED | OUTPATIENT
Start: 2024-11-11 | End: 2024-11-16

## 2024-11-11 RX ORDER — AZITHROMYCIN 250 MG/1
TABLET, FILM COATED ORAL
Qty: 6 TABLET | Refills: 0 | Status: SHIPPED | OUTPATIENT
Start: 2024-11-11 | End: 2024-11-21

## 2024-11-11 ASSESSMENT — ENCOUNTER SYMPTOMS
SHORTNESS OF BREATH: 0
COUGH: 1
ABDOMINAL PAIN: 0
DIARRHEA: 0
CONSTIPATION: 0
BACK PAIN: 0
CHEST TIGHTNESS: 0
WHEEZING: 1

## 2024-11-11 NOTE — PROGRESS NOTES
Nusrat Gao is a 44 y.o. female who presents today for Cough (Still coughing since 10/8; worsening at night; pt went to Urgent care 11/4; pt was given a cough syrup- did not help )  .      She has a history of   Patient Active Problem List   Diagnosis    History of kidney stones    Irritable bowel syndrome with diarrhea    Chronic daily headache    Chronic hypertension with superimposed preeclampsia    Weight gain    Severe obesity    Gastroesophageal reflux disease    Obesity (BMI 30-39.9)    Menorrhagia with regular cycle    Essential (primary) hypertension   .    Today patient is here for follow-up.   she does not have other concerns.    URI  Seen by urgent care on 4 November due to cough and congestion.  Told that she had a viral upper respiratory infection and sent home with Tessalon Perles.  Chest x-ray was negative for any acute findings.  Since she report persistent cough and congestion.  Some wheezing.  Albuterol seems to be helping out some.  Denies any fevers or chills.  Denies any sick contacts.    ROS  Review of Systems   Constitutional:  Negative for fatigue and fever.   HENT:  Negative for congestion and ear pain.    Respiratory:  Positive for cough and wheezing. Negative for chest tightness and shortness of breath.    Cardiovascular:  Negative for chest pain and leg swelling.   Gastrointestinal:  Negative for abdominal pain, constipation and diarrhea.   Endocrine: Negative for polydipsia.   Genitourinary:  Negative for difficulty urinating and dysuria.   Musculoskeletal:  Negative for arthralgias and back pain.   Skin:  Negative for rash.   Neurological:  Negative for dizziness and headaches.   Psychiatric/Behavioral:  Negative for agitation. The patient is not nervous/anxious.          Vitals:    11/11/24 1425   BP: 133/87   Pulse: 83   Resp: 16   Temp: 98.5 °F (36.9 °C)   SpO2: 96%       Physical Exam  Constitutional:       Appearance: Normal appearance.   HENT:      Head: Normocephalic.

## 2024-11-30 DIAGNOSIS — F41.9 ANXIETY: ICD-10-CM

## 2024-12-02 RX ORDER — CLONAZEPAM 0.5 MG/1
TABLET ORAL
Qty: 30 TABLET | Refills: 1 | Status: SHIPPED | OUTPATIENT
Start: 2024-12-02 | End: 2025-01-01

## 2024-12-06 DIAGNOSIS — F41.9 ANXIETY: ICD-10-CM

## 2024-12-06 RX ORDER — BUPROPION HYDROCHLORIDE 300 MG/1
300 TABLET ORAL EVERY MORNING
Qty: 90 TABLET | Refills: 1 | Status: SHIPPED | OUTPATIENT
Start: 2024-12-06

## 2025-01-13 RX ORDER — ONDANSETRON 4 MG/1
TABLET, ORALLY DISINTEGRATING ORAL
Qty: 30 TABLET | Refills: 1 | Status: SHIPPED | OUTPATIENT
Start: 2025-01-13

## 2025-02-18 ENCOUNTER — TELEMEDICINE (OUTPATIENT)
Age: 45
End: 2025-02-18
Payer: COMMERCIAL

## 2025-02-18 DIAGNOSIS — G43.709 CHRONIC MIGRAINE WITHOUT AURA WITHOUT STATUS MIGRAINOSUS, NOT INTRACTABLE: Primary | ICD-10-CM

## 2025-02-18 DIAGNOSIS — E34.8 PINEAL GLAND CYST: ICD-10-CM

## 2025-02-18 DIAGNOSIS — R42 VERTIGO, INTERMITTENT: ICD-10-CM

## 2025-02-18 PROCEDURE — 99214 OFFICE O/P EST MOD 30 MIN: CPT | Performed by: NURSE PRACTITIONER

## 2025-02-18 RX ORDER — NICOTINE 14MG/24HR
PATCH, TRANSDERMAL 24 HOURS TRANSDERMAL
COMMUNITY

## 2025-02-18 RX ORDER — MINOCYCLINE HYDROCHLORIDE 50 MG/1
1 CAPSULE ORAL
COMMUNITY
Start: 2025-02-14 | End: 2026-01-10

## 2025-02-18 RX ORDER — FREMANEZUMAB-VFRM 225 MG/1.5ML
INJECTION SUBCUTANEOUS
Qty: 1.5 ML | Refills: 5 | Status: ACTIVE | OUTPATIENT
Start: 2025-02-18

## 2025-02-18 RX ORDER — BUTALBITAL, ACETAMINOPHEN AND CAFFEINE 50; 325; 40 MG/1; MG/1; MG/1
TABLET ORAL
Qty: 30 TABLET | Refills: 1 | Status: SHIPPED | OUTPATIENT
Start: 2025-02-18

## 2025-02-18 NOTE — PROGRESS NOTES
[place] [id number]  Provider [place] located at [place]Appt [place]  Confirm you are appropriately licensed, registered, or certified to deliver care in the state where the patient is located as indicated above. If you are not or unsure, please re-schedule the visit: Yes, I confirm.     SHANICE Melara NP

## 2025-02-28 RX ORDER — PANTOPRAZOLE SODIUM 40 MG/1
40 TABLET, DELAYED RELEASE ORAL DAILY
Qty: 90 TABLET | Refills: 1 | Status: SHIPPED | OUTPATIENT
Start: 2025-02-28

## 2025-04-17 RX ORDER — MONTELUKAST SODIUM 10 MG/1
10 TABLET ORAL DAILY
Qty: 90 TABLET | Refills: 1 | Status: SHIPPED | OUTPATIENT
Start: 2025-04-17

## 2025-05-15 DIAGNOSIS — I10 ESSENTIAL (PRIMARY) HYPERTENSION: ICD-10-CM

## 2025-05-15 RX ORDER — HYDROCHLOROTHIAZIDE 25 MG/1
25 TABLET ORAL DAILY
Qty: 90 TABLET | Refills: 1 | Status: SHIPPED | OUTPATIENT
Start: 2025-05-15

## 2025-05-23 DIAGNOSIS — R05.8 OTHER SPECIFIED COUGH: ICD-10-CM

## 2025-05-23 RX ORDER — FLUTICASONE PROPIONATE AND SALMETEROL 250; 50 UG/1; UG/1
1 POWDER RESPIRATORY (INHALATION) 2 TIMES DAILY
Qty: 60 EACH | Refills: 5 | Status: SHIPPED | OUTPATIENT
Start: 2025-05-23

## 2025-06-02 ENCOUNTER — OFFICE VISIT (OUTPATIENT)
Facility: CLINIC | Age: 45
End: 2025-06-02
Payer: COMMERCIAL

## 2025-06-02 VITALS
HEART RATE: 87 BPM | HEIGHT: 64 IN | TEMPERATURE: 98.4 F | OXYGEN SATURATION: 94 % | RESPIRATION RATE: 16 BRPM | BODY MASS INDEX: 41.83 KG/M2 | DIASTOLIC BLOOD PRESSURE: 99 MMHG | SYSTOLIC BLOOD PRESSURE: 147 MMHG | WEIGHT: 245 LBS

## 2025-06-02 DIAGNOSIS — J32.9 SINUSITIS, UNSPECIFIED CHRONICITY, UNSPECIFIED LOCATION: ICD-10-CM

## 2025-06-02 DIAGNOSIS — J06.9 UPPER RESPIRATORY TRACT INFECTION, UNSPECIFIED TYPE: Primary | ICD-10-CM

## 2025-06-02 PROCEDURE — 99213 OFFICE O/P EST LOW 20 MIN: CPT | Performed by: INTERNAL MEDICINE

## 2025-06-02 PROCEDURE — 3077F SYST BP >= 140 MM HG: CPT | Performed by: INTERNAL MEDICINE

## 2025-06-02 PROCEDURE — 3080F DIAST BP >= 90 MM HG: CPT | Performed by: INTERNAL MEDICINE

## 2025-06-02 RX ORDER — BENZONATATE 200 MG/1
200 CAPSULE ORAL 3 TIMES DAILY PRN
Qty: 30 CAPSULE | Refills: 0 | Status: SHIPPED | OUTPATIENT
Start: 2025-06-02 | End: 2025-06-12

## 2025-06-02 SDOH — ECONOMIC STABILITY: FOOD INSECURITY: WITHIN THE PAST 12 MONTHS, YOU WORRIED THAT YOUR FOOD WOULD RUN OUT BEFORE YOU GOT MONEY TO BUY MORE.: NEVER TRUE

## 2025-06-02 SDOH — ECONOMIC STABILITY: FOOD INSECURITY: WITHIN THE PAST 12 MONTHS, THE FOOD YOU BOUGHT JUST DIDN'T LAST AND YOU DIDN'T HAVE MONEY TO GET MORE.: NEVER TRUE

## 2025-06-02 ASSESSMENT — PATIENT HEALTH QUESTIONNAIRE - PHQ9
8. MOVING OR SPEAKING SO SLOWLY THAT OTHER PEOPLE COULD HAVE NOTICED. OR THE OPPOSITE, BEING SO FIGETY OR RESTLESS THAT YOU HAVE BEEN MOVING AROUND A LOT MORE THAN USUAL: NOT AT ALL
SUM OF ALL RESPONSES TO PHQ QUESTIONS 1-9: 0
SUM OF ALL RESPONSES TO PHQ QUESTIONS 1-9: 0
7. TROUBLE CONCENTRATING ON THINGS, SUCH AS READING THE NEWSPAPER OR WATCHING TELEVISION: NOT AT ALL
6. FEELING BAD ABOUT YOURSELF - OR THAT YOU ARE A FAILURE OR HAVE LET YOURSELF OR YOUR FAMILY DOWN: NOT AT ALL
SUM OF ALL RESPONSES TO PHQ QUESTIONS 1-9: 0
1. LITTLE INTEREST OR PLEASURE IN DOING THINGS: NOT AT ALL
9. THOUGHTS THAT YOU WOULD BE BETTER OFF DEAD, OR OF HURTING YOURSELF: NOT AT ALL
SUM OF ALL RESPONSES TO PHQ QUESTIONS 1-9: 0
4. FEELING TIRED OR HAVING LITTLE ENERGY: NOT AT ALL
2. FEELING DOWN, DEPRESSED OR HOPELESS: NOT AT ALL
3. TROUBLE FALLING OR STAYING ASLEEP: NOT AT ALL
5. POOR APPETITE OR OVEREATING: NOT AT ALL
10. IF YOU CHECKED OFF ANY PROBLEMS, HOW DIFFICULT HAVE THESE PROBLEMS MADE IT FOR YOU TO DO YOUR WORK, TAKE CARE OF THINGS AT HOME, OR GET ALONG WITH OTHER PEOPLE: NOT DIFFICULT AT ALL

## 2025-06-02 ASSESSMENT — ENCOUNTER SYMPTOMS
WHEEZING: 0
ABDOMINAL PAIN: 0
BACK PAIN: 0
DIARRHEA: 0
CONSTIPATION: 0
CHEST TIGHTNESS: 0
COUGH: 1
SHORTNESS OF BREATH: 0

## 2025-06-02 NOTE — PROGRESS NOTES
chronicity, unspecified location  -     benzonatate (TESSALON) 200 MG capsule; Take 1 capsule by mouth 3 times daily as needed for Cough  -     amoxicillin-clavulanate (AUGMENTIN) 875-125 MG per tablet; Take 1 tablet by mouth 2 times daily for 7 days        No follow-up provider specified.    Layo Cali MD  6/2/2025    This note was created with the help of speech recognition software (Dragon) and may contain some 'sound alike' errors.

## 2025-06-18 DIAGNOSIS — F41.9 ANXIETY: ICD-10-CM

## 2025-06-18 RX ORDER — BUPROPION HYDROCHLORIDE 300 MG/1
300 TABLET ORAL EVERY MORNING
Qty: 90 TABLET | Refills: 1 | Status: SHIPPED | OUTPATIENT
Start: 2025-06-18

## 2025-07-24 ENCOUNTER — TELEMEDICINE (OUTPATIENT)
Facility: CLINIC | Age: 45
End: 2025-07-24
Payer: COMMERCIAL

## 2025-07-24 DIAGNOSIS — R53.83 OTHER FATIGUE: ICD-10-CM

## 2025-07-24 DIAGNOSIS — G47.33 OSA (OBSTRUCTIVE SLEEP APNEA): Primary | ICD-10-CM

## 2025-07-24 PROCEDURE — 99213 OFFICE O/P EST LOW 20 MIN: CPT | Performed by: INTERNAL MEDICINE

## 2025-07-24 ASSESSMENT — ENCOUNTER SYMPTOMS
BACK PAIN: 0
CHEST TIGHTNESS: 0
DIARRHEA: 0
SHORTNESS OF BREATH: 0
CONSTIPATION: 0
ABDOMINAL PAIN: 0

## 2025-07-24 NOTE — PROGRESS NOTES
Nusrat Gao was seen on 7/24/2025 using synchronous (real-time) audio-video technology; Asad.     Consent: Nusrat Gao, who was seen by synchronous (real-time) audio-video technology, and/or her healthcare decision maker, is aware that this patient-initiated, Telehealth encounter on 7/24/2025 is a billable service, with coverage as determined by her insurance carrier. She is aware that she may receive a bill and has provided verbal consent to proceed: In the last 12 months: Yes.       I was in the office while conducting this encounter.    Nusrat Gao is a 44 y.o. female who presents today for Sleep Apnea (Discuss testing for sleep apnea; )  .    She has a history of   Patient Active Problem List   Diagnosis    History of kidney stones    Irritable bowel syndrome with diarrhea    Chronic daily headache    Chronic hypertension with superimposed preeclampsia    Weight gain    Severe obesity (HCC)    Gastroesophageal reflux disease    Obesity (BMI 30-39.9)    Menorrhagia with regular cycle    Essential (primary) hypertension   .    Today patient is being seen for an acute visit.   she does not have other concerns.    History of Present Illness  The patient is a 44-year-old female who presents via virtual visit to discuss concerns regarding her sleep apnea.    She reports that her partner has observed episodes of loud breathing, followed by periods of breath-holding during sleep. This pattern has been consistent since her college years. Upon waking, she experiences a feeling of unwellness, which she attempts to alleviate with one or two cups of coffee. It takes her some time to fully awaken and become alert. She does not consider herself a morning person and often feels fatigued, which she attributes to stress and a busy lifestyle.  Do you snore loudly? N  Are you often sleepy during the day or fall asleep during the day? Y  Has anyone observed you choking or stop breathing? Y    HTN?

## 2025-07-29 ENCOUNTER — TELEPHONE (OUTPATIENT)
Age: 45
End: 2025-07-29

## 2025-07-29 DIAGNOSIS — G43.709 CHRONIC MIGRAINE WITHOUT AURA WITHOUT STATUS MIGRAINOSUS, NOT INTRACTABLE: ICD-10-CM

## 2025-08-04 RX ORDER — FREMANEZUMAB-VFRM 225 MG/1.5ML
INJECTION SUBCUTANEOUS
Qty: 1.5 ML | Refills: 5 | Status: ACTIVE | OUTPATIENT
Start: 2025-08-04

## 2025-08-05 ENCOUNTER — OFFICE VISIT (OUTPATIENT)
Age: 45
End: 2025-08-05
Payer: COMMERCIAL

## 2025-08-05 VITALS
WEIGHT: 242.4 LBS | HEIGHT: 64 IN | SYSTOLIC BLOOD PRESSURE: 146 MMHG | HEART RATE: 82 BPM | DIASTOLIC BLOOD PRESSURE: 88 MMHG | BODY MASS INDEX: 41.38 KG/M2 | OXYGEN SATURATION: 97 %

## 2025-08-05 DIAGNOSIS — Z80.49 FAMILY HISTORY OF UTERINE CANCER: Primary | ICD-10-CM

## 2025-08-05 DIAGNOSIS — Z01.419 ENCOUNTER FOR ANNUAL ROUTINE GYNECOLOGICAL EXAMINATION: ICD-10-CM

## 2025-08-05 PROBLEM — E66.9 OBESITY (BMI 30-39.9): Status: RESOLVED | Noted: 2017-08-14 | Resolved: 2025-08-05

## 2025-08-05 PROCEDURE — 99396 PREV VISIT EST AGE 40-64: CPT | Performed by: OBSTETRICS & GYNECOLOGY

## 2025-08-05 PROCEDURE — 3077F SYST BP >= 140 MM HG: CPT | Performed by: OBSTETRICS & GYNECOLOGY

## 2025-08-05 PROCEDURE — 99459 PELVIC EXAMINATION: CPT | Performed by: OBSTETRICS & GYNECOLOGY

## 2025-08-05 PROCEDURE — 3079F DIAST BP 80-89 MM HG: CPT | Performed by: OBSTETRICS & GYNECOLOGY

## 2025-08-14 DIAGNOSIS — F41.9 ANXIETY: ICD-10-CM

## 2025-08-14 RX ORDER — CLONAZEPAM 0.5 MG/1
TABLET ORAL
Qty: 30 TABLET | Refills: 1 | Status: SHIPPED | OUTPATIENT
Start: 2025-08-14 | End: 2025-09-13

## 2025-08-16 LAB
Lab: NEGATIVE
Lab: NORMAL

## 2025-08-18 ENCOUNTER — OFFICE VISIT (OUTPATIENT)
Facility: CLINIC | Age: 45
End: 2025-08-18
Payer: COMMERCIAL

## 2025-08-18 VITALS
WEIGHT: 250 LBS | RESPIRATION RATE: 16 BRPM | HEART RATE: 82 BPM | HEIGHT: 64 IN | DIASTOLIC BLOOD PRESSURE: 84 MMHG | TEMPERATURE: 98.2 F | SYSTOLIC BLOOD PRESSURE: 132 MMHG | BODY MASS INDEX: 42.68 KG/M2 | OXYGEN SATURATION: 96 %

## 2025-08-18 DIAGNOSIS — I10 ESSENTIAL (PRIMARY) HYPERTENSION: Primary | ICD-10-CM

## 2025-08-18 DIAGNOSIS — R53.83 OTHER FATIGUE: ICD-10-CM

## 2025-08-18 DIAGNOSIS — E66.01 SEVERE OBESITY (HCC): ICD-10-CM

## 2025-08-18 DIAGNOSIS — I10 ESSENTIAL (PRIMARY) HYPERTENSION: ICD-10-CM

## 2025-08-18 PROCEDURE — 3075F SYST BP GE 130 - 139MM HG: CPT | Performed by: INTERNAL MEDICINE

## 2025-08-18 PROCEDURE — 3079F DIAST BP 80-89 MM HG: CPT | Performed by: INTERNAL MEDICINE

## 2025-08-18 PROCEDURE — 99214 OFFICE O/P EST MOD 30 MIN: CPT | Performed by: INTERNAL MEDICINE

## 2025-08-18 RX ORDER — DOXYCYCLINE HYCLATE 100 MG
100 TABLET ORAL 2 TIMES DAILY
COMMUNITY
Start: 2025-07-10

## 2025-08-18 ASSESSMENT — ENCOUNTER SYMPTOMS
DIARRHEA: 0
ABDOMINAL PAIN: 0
CHEST TIGHTNESS: 0
BACK PAIN: 0
SHORTNESS OF BREATH: 0
CONSTIPATION: 0

## 2025-08-19 ENCOUNTER — TELEMEDICINE (OUTPATIENT)
Age: 45
End: 2025-08-19
Payer: COMMERCIAL

## 2025-08-19 DIAGNOSIS — G43.709 CHRONIC MIGRAINE WITHOUT AURA WITHOUT STATUS MIGRAINOSUS, NOT INTRACTABLE: Primary | ICD-10-CM

## 2025-08-19 DIAGNOSIS — E34.8 PINEAL GLAND CYST: ICD-10-CM

## 2025-08-19 DIAGNOSIS — F41.8 TEST ANXIETY: ICD-10-CM

## 2025-08-19 LAB
ALBUMIN SERPL-MCNC: 4.1 G/DL (ref 3.5–5.2)
ALBUMIN/GLOB SERPL: 1.6 (ref 1.1–2.2)
ALP SERPL-CCNC: 55 U/L (ref 35–104)
ALT SERPL-CCNC: 23 U/L (ref 10–35)
ANION GAP SERPL CALC-SCNC: 12 MMOL/L (ref 2–14)
AST SERPL-CCNC: 22 U/L (ref 10–35)
BASOPHILS # BLD: 0.05 K/UL (ref 0–0.1)
BASOPHILS NFR BLD: 0.5 % (ref 0–1)
BILIRUB SERPL-MCNC: 0.3 MG/DL (ref 0–1.2)
BUN SERPL-MCNC: 13 MG/DL (ref 6–20)
BUN/CREAT SERPL: 20 (ref 12–20)
CALCIUM SERPL-MCNC: 9.3 MG/DL (ref 8.6–10)
CHLORIDE SERPL-SCNC: 102 MMOL/L (ref 98–107)
CO2 SERPL-SCNC: 26 MMOL/L (ref 20–29)
CREAT SERPL-MCNC: 0.67 MG/DL (ref 0.6–1)
DIFFERENTIAL METHOD BLD: NORMAL
EOSINOPHIL # BLD: 0.24 K/UL (ref 0–0.4)
EOSINOPHIL NFR BLD: 2.4 % (ref 0–7)
ERYTHROCYTE [DISTWIDTH] IN BLOOD BY AUTOMATED COUNT: 13.5 % (ref 11.5–14.5)
EST. AVERAGE GLUCOSE BLD GHB EST-MCNC: 113 MG/DL
GLOBULIN SER CALC-MCNC: 2.6 G/DL (ref 2–4)
GLUCOSE SERPL-MCNC: 99 MG/DL (ref 65–100)
HBA1C MFR BLD: 5.6 % (ref 4–5.6)
HCT VFR BLD AUTO: 38.4 % (ref 35–47)
HGB BLD-MCNC: 13.1 G/DL (ref 11.5–16)
IMM GRANULOCYTES # BLD AUTO: 0.02 K/UL (ref 0–0.04)
IMM GRANULOCYTES NFR BLD AUTO: 0.2 % (ref 0–0.5)
LYMPHOCYTES # BLD: 2.43 K/UL (ref 0.8–3.5)
LYMPHOCYTES NFR BLD: 24.1 % (ref 12–49)
MCH RBC QN AUTO: 29 PG (ref 26–34)
MCHC RBC AUTO-ENTMCNC: 34.1 G/DL (ref 30–36.5)
MCV RBC AUTO: 85.1 FL (ref 80–99)
MONOCYTES # BLD: 0.77 K/UL (ref 0–1)
MONOCYTES NFR BLD: 7.6 % (ref 5–13)
NEUTS SEG # BLD: 6.57 K/UL (ref 1.8–8)
NEUTS SEG NFR BLD: 65.2 % (ref 32–75)
NRBC # BLD: 0 K/UL (ref 0–0.01)
NRBC BLD-RTO: 0 PER 100 WBC
PLATELET # BLD AUTO: 301 K/UL (ref 150–400)
PMV BLD AUTO: 11.1 FL (ref 8.9–12.9)
POTASSIUM SERPL-SCNC: 3.6 MMOL/L (ref 3.5–5.1)
PROT SERPL-MCNC: 6.7 G/DL (ref 6.4–8.3)
RBC # BLD AUTO: 4.51 M/UL (ref 3.8–5.2)
SODIUM SERPL-SCNC: 139 MMOL/L (ref 136–145)
TSH, 3RD GENERATION: 2.6 UIU/ML (ref 0.27–4.2)
WBC # BLD AUTO: 10.1 K/UL (ref 3.6–11)

## 2025-08-19 PROCEDURE — 99214 OFFICE O/P EST MOD 30 MIN: CPT | Performed by: NURSE PRACTITIONER

## 2025-08-19 RX ORDER — BUTALBITAL, ACETAMINOPHEN AND CAFFEINE 50; 325; 40 MG/1; MG/1; MG/1
TABLET ORAL
Qty: 30 TABLET | Refills: 1 | Status: SHIPPED | OUTPATIENT
Start: 2025-08-19

## 2025-08-19 RX ORDER — LORAZEPAM 1 MG/1
TABLET ORAL
Qty: 2 TABLET | Refills: 0 | Status: SHIPPED | OUTPATIENT
Start: 2025-08-19 | End: 2025-09-19

## 2025-08-19 RX ORDER — FREMANEZUMAB-VFRM 225 MG/1.5ML
INJECTION SUBCUTANEOUS
Qty: 1.5 ML | Refills: 5 | Status: ACTIVE | OUTPATIENT
Start: 2025-08-19

## 2025-08-19 RX ORDER — VITAMIN B COMPLEX
1 CAPSULE ORAL DAILY
COMMUNITY

## 2025-08-19 RX ORDER — UBROGEPANT 50 MG/1
50 TABLET ORAL PRN
Qty: 10 TABLET | Refills: 1 | Status: ACTIVE | OUTPATIENT
Start: 2025-08-19

## 2025-09-04 RX ORDER — PANTOPRAZOLE SODIUM 40 MG/1
40 TABLET, DELAYED RELEASE ORAL DAILY
Qty: 90 TABLET | Refills: 1 | Status: SHIPPED | OUTPATIENT
Start: 2025-09-04